# Patient Record
Sex: FEMALE | Race: WHITE | HISPANIC OR LATINO | Employment: FULL TIME | URBAN - METROPOLITAN AREA
[De-identification: names, ages, dates, MRNs, and addresses within clinical notes are randomized per-mention and may not be internally consistent; named-entity substitution may affect disease eponyms.]

---

## 2017-09-14 ENCOUNTER — OFFICE VISIT (OUTPATIENT)
Dept: INTERNAL MEDICINE | Facility: CLINIC | Age: 57
End: 2017-09-14
Payer: COMMERCIAL

## 2017-09-14 VITALS
OXYGEN SATURATION: 99 % | HEIGHT: 60 IN | BODY MASS INDEX: 24.8 KG/M2 | TEMPERATURE: 96 F | SYSTOLIC BLOOD PRESSURE: 112 MMHG | WEIGHT: 126.31 LBS | DIASTOLIC BLOOD PRESSURE: 68 MMHG | HEART RATE: 62 BPM

## 2017-09-14 DIAGNOSIS — Z00.00 ROUTINE GENERAL MEDICAL EXAMINATION AT A HEALTH CARE FACILITY: Primary | ICD-10-CM

## 2017-09-14 DIAGNOSIS — F32.A DEPRESSION, UNSPECIFIED DEPRESSION TYPE: ICD-10-CM

## 2017-09-14 DIAGNOSIS — F41.9 ANXIETY: ICD-10-CM

## 2017-09-14 DIAGNOSIS — D51.0 PERNICIOUS ANEMIA: ICD-10-CM

## 2017-09-14 DIAGNOSIS — Z86.2 HISTORY OF IRON DEFICIENCY ANEMIA: ICD-10-CM

## 2017-09-14 DIAGNOSIS — Z23 NEED FOR DIPHTHERIA-TETANUS-PERTUSSIS (TDAP) VACCINE: ICD-10-CM

## 2017-09-14 DIAGNOSIS — K29.70 GASTRITIS, PRESENCE OF BLEEDING UNSPECIFIED, UNSPECIFIED CHRONICITY, UNSPECIFIED GASTRITIS TYPE: ICD-10-CM

## 2017-09-14 PROBLEM — D50.9 IRON DEFICIENCY ANEMIA: Status: ACTIVE | Noted: 2017-09-14

## 2017-09-14 PROBLEM — E53.8 B12 DEFICIENCY: Status: ACTIVE | Noted: 2017-09-14

## 2017-09-14 PROCEDURE — 99386 PREV VISIT NEW AGE 40-64: CPT | Mod: 25,S$GLB,, | Performed by: INTERNAL MEDICINE

## 2017-09-14 PROCEDURE — 90715 TDAP VACCINE 7 YRS/> IM: CPT | Mod: S$GLB,,, | Performed by: INTERNAL MEDICINE

## 2017-09-14 PROCEDURE — 90471 IMMUNIZATION ADMIN: CPT | Mod: S$GLB,,, | Performed by: INTERNAL MEDICINE

## 2017-09-14 PROCEDURE — 99999 PR PBB SHADOW E&M-NEW PATIENT-LVL IV: CPT | Mod: PBBFAC,,, | Performed by: INTERNAL MEDICINE

## 2017-09-14 RX ORDER — CLOBETASOL PROPIONATE 0.5 MG/G
OINTMENT TOPICAL
COMMUNITY
End: 2020-09-24 | Stop reason: SDUPTHER

## 2017-09-14 RX ORDER — MULTIVITAMIN
1 TABLET ORAL DAILY
COMMUNITY

## 2017-09-14 RX ORDER — ESCITALOPRAM OXALATE 10 MG/1
10 TABLET ORAL DAILY
Qty: 90 TABLET | Refills: 0 | Status: SHIPPED | OUTPATIENT
Start: 2017-09-14 | End: 2017-09-21 | Stop reason: SINTOL

## 2017-09-14 RX ORDER — CYANOCOBALAMIN 1000 UG/ML
1000 INJECTION, SOLUTION INTRAMUSCULAR; SUBCUTANEOUS
COMMUNITY
End: 2018-02-13

## 2017-09-14 RX ORDER — SODIUM, POTASSIUM,MAG SULFATES 17.5-3.13G
SOLUTION, RECONSTITUTED, ORAL ORAL
Qty: 354 ML | Refills: 0 | Status: SHIPPED | OUTPATIENT
Start: 2017-09-14 | End: 2018-02-13

## 2017-09-14 NOTE — PROGRESS NOTES
"Subjective:      Patient ID: Alma Mistry is a 56 y.o. female.    Chief Complaint: Establish Care    55 yo with Patient Active Problem List:     Pernicious anemia     Iron deficiency anemia    Here today for annual prev exam.  Compliant with meds without significant side effects. Feeling well overall but Lost her daughter in accidental drowning jan 2017.  Counseling helping some.   Stressed due to living daughter. Gets easily agitated and depressed mood and crying spells mult times weekly. Never smoker.       Review of Systems   Constitutional: Negative for activity change and unexpected weight change.   HENT: Negative for rhinorrhea and trouble swallowing.    Eyes: Negative for discharge and visual disturbance.   Respiratory: Negative for chest tightness and wheezing.    Cardiovascular: Negative for chest pain and palpitations.   Gastrointestinal: Positive for constipation (for years, over counter fiber controlling). Negative for blood in stool, diarrhea and vomiting.   Endocrine: Negative for polydipsia and polyuria.   Genitourinary: Negative for difficulty urinating and hematuria.   Musculoskeletal: Positive for arthralgias. Negative for joint swelling and neck pain.   Neurological: Positive for headaches (chronic intermittent improving.). Negative for weakness.   Psychiatric/Behavioral: Positive for dysphoric mood. Negative for confusion, hallucinations, self-injury and suicidal ideas. The patient is nervous/anxious.      Objective:   /68 (BP Location: Right arm, Patient Position: Sitting)   Pulse 62   Temp 96.1 °F (35.6 °C) (Tympanic)   Ht 4' 11.5" (1.511 m)   Wt 57.3 kg (126 lb 5.2 oz)   SpO2 99%   BMI 25.09 kg/m²     Physical Exam   Constitutional: She is oriented to person, place, and time. She appears well-developed and well-nourished. No distress.   HENT:   Head: Normocephalic and atraumatic.   Mouth/Throat: Oropharynx is clear and moist.   Eyes: EOM are normal. Pupils are equal, round, and " reactive to light.   Neck: Neck supple. Carotid bruit is not present. No thyromegaly present.   Cardiovascular: Normal rate and regular rhythm.    Pulmonary/Chest: Breath sounds normal. She has no wheezes. She has no rales.   Abdominal: Soft. Bowel sounds are normal. There is no tenderness.   Musculoskeletal: She exhibits no edema.   Lymphadenopathy:     She has no cervical adenopathy.   Neurological: She is alert and oriented to person, place, and time.   Skin: Skin is warm and dry.   Psychiatric: She has a normal mood and affect. Her behavior is normal.       Assessment:     1. Routine general medical examination at a health care facility    2. Pernicious anemia    3. History of iron deficiency anemia    4. Gastritis, presence of bleeding unspecified, unspecified chronicity, unspecified gastritis type    5. Depression, unspecified depression type    6. Anxiety    7. Need for diphtheria-tetanus-pertussis (Tdap) vaccine      Plan:   Routine general medical examination at a health care facility  -     Lipid panel; Future; Expected date: 09/14/2017  -     TSH; Future; Expected date: 09/14/2017  -     Hemoglobin A1c; Future; Expected date: 09/14/2017  -     Comprehensive metabolic panel; Future; Expected date: 09/14/2017  -     CBC auto differential; Future; Expected date: 09/14/2017  -     Vitamin D; Future; Expected date: 09/14/2017  -     Case request GI: COLONOSCOPY  -     sodium,potassium,mag sulfates (SUPREP BOWEL PREP KIT) 17.5-3.13-1.6 gram SolR; Take as directed  Dispense: 354 mL; Refill: 0  -     Hepatitis C antibody; Future; Expected date: 09/14/2017  -     Mammo Digital Screening Bilateral With CAD; Future; Expected date: 09/14/2017  -     Ambulatory referral to Gynecology    Pernicious anemia  -     Vitamin B12; Future; Expected date: 09/14/2017    Iron deficiency anemia, unspecified iron deficiency anemia type  Pt reports resolved after partial hysterectomy.    Gastritis, presence of bleeding unspecified,  unspecified chronicity, unspecified gastritis type  -     Ambulatory referral to Gastroenterology    Depression, unspecified depression type  Comments:  group counseling helping some.  Orders:  -     escitalopram oxalate (LEXAPRO) 10 MG tablet; Take 1 tablet (10 mg total) by mouth once daily.  Dispense: 90 tablet; Refill: 0  -     Ambulatory referral to Social Work    Anxiety  -     escitalopram oxalate (LEXAPRO) 10 MG tablet; Take 1 tablet (10 mg total) by mouth once daily.  Dispense: 90 tablet; Refill: 0  -     Ambulatory referral to Social Work    Need for diphtheria-tetanus-pertussis (Tdap) vaccine  -     (In Office Administered) Tdap Vaccine        Lab Frequency Next Occurrence   Lipid panel Once 09/14/2017   TSH Once 09/14/2017   Hemoglobin A1c Once 09/14/2017   Comprehensive metabolic panel Once 09/14/2017   CBC auto differential Once 09/14/2017   Vitamin D Once 09/14/2017   Hepatitis C antibody Once 09/14/2017   Vitamin B12 Once 09/14/2017       Problem List Items Addressed This Visit        Oncology    Pernicious anemia    Relevant Orders    Vitamin B12    History of iron deficiency anemia      Other Visit Diagnoses     Routine general medical examination at a health care facility    -  Primary    Relevant Medications    sodium,potassium,mag sulfates (SUPREP BOWEL PREP KIT) 17.5-3.13-1.6 gram SolR    Other Relevant Orders    Lipid panel    TSH    Hemoglobin A1c    Comprehensive metabolic panel    CBC auto differential    Vitamin D    Case request GI: COLONOSCOPY (Completed)    Hepatitis C antibody    Mammo Digital Screening Bilateral With CAD    Ambulatory referral to Gynecology    Gastritis, presence of bleeding unspecified, unspecified chronicity, unspecified gastritis type        Relevant Orders    Ambulatory referral to Gastroenterology    Depression, unspecified depression type        group counseling helping some.    Relevant Medications    escitalopram oxalate (LEXAPRO) 10 MG tablet    Other Relevant  Orders    Ambulatory referral to Social Work    Anxiety        Relevant Medications    escitalopram oxalate (LEXAPRO) 10 MG tablet    Other Relevant Orders    Ambulatory referral to Social Work    Need for diphtheria-tetanus-pertussis (Tdap) vaccine        Relevant Orders    (In Office Administered) Tdap Vaccine (Completed)          Return in about 4 weeks (around 10/12/2017), or if symptoms worsen or fail to improve.

## 2017-09-21 ENCOUNTER — TELEPHONE (OUTPATIENT)
Dept: INTERNAL MEDICINE | Facility: CLINIC | Age: 57
End: 2017-09-21

## 2017-09-21 ENCOUNTER — LAB VISIT (OUTPATIENT)
Dept: LAB | Facility: HOSPITAL | Age: 57
End: 2017-09-21
Attending: INTERNAL MEDICINE
Payer: COMMERCIAL

## 2017-09-21 DIAGNOSIS — Z00.00 ROUTINE GENERAL MEDICAL EXAMINATION AT A HEALTH CARE FACILITY: ICD-10-CM

## 2017-09-21 DIAGNOSIS — D51.0 PERNICIOUS ANEMIA: ICD-10-CM

## 2017-09-21 LAB
25(OH)D3+25(OH)D2 SERPL-MCNC: 27 NG/ML
ALBUMIN SERPL BCP-MCNC: 3.7 G/DL
ALP SERPL-CCNC: 95 U/L
ALT SERPL W/O P-5'-P-CCNC: 24 U/L
ANION GAP SERPL CALC-SCNC: 7 MMOL/L
AST SERPL-CCNC: 32 U/L
BASOPHILS # BLD AUTO: 0.02 K/UL
BASOPHILS NFR BLD: 0.5 %
BILIRUB SERPL-MCNC: 0.7 MG/DL
BUN SERPL-MCNC: 12 MG/DL
CALCIUM SERPL-MCNC: 9.5 MG/DL
CHLORIDE SERPL-SCNC: 108 MMOL/L
CHOLEST SERPL-MCNC: 194 MG/DL
CHOLEST/HDLC SERPL: 3.3 {RATIO}
CO2 SERPL-SCNC: 27 MMOL/L
CREAT SERPL-MCNC: 0.8 MG/DL
DIFFERENTIAL METHOD: ABNORMAL
EOSINOPHIL # BLD AUTO: 0.1 K/UL
EOSINOPHIL NFR BLD: 2.1 %
ERYTHROCYTE [DISTWIDTH] IN BLOOD BY AUTOMATED COUNT: 14.4 %
EST. GFR  (AFRICAN AMERICAN): >60 ML/MIN/1.73 M^2
EST. GFR  (NON AFRICAN AMERICAN): >60 ML/MIN/1.73 M^2
ESTIMATED AVG GLUCOSE: 85 MG/DL
GLUCOSE SERPL-MCNC: 72 MG/DL
HBA1C MFR BLD HPLC: 4.6 %
HCT VFR BLD AUTO: 37.5 %
HDLC SERPL-MCNC: 58 MG/DL
HDLC SERPL: 29.9 %
HGB BLD-MCNC: 13 G/DL
LDLC SERPL CALC-MCNC: 119.8 MG/DL
LYMPHOCYTES # BLD AUTO: 1.5 K/UL
LYMPHOCYTES NFR BLD: 36.3 %
MCH RBC QN AUTO: 27.8 PG
MCHC RBC AUTO-ENTMCNC: 34.7 G/DL
MCV RBC AUTO: 80 FL
MONOCYTES # BLD AUTO: 0.3 K/UL
MONOCYTES NFR BLD: 5.9 %
NEUTROPHILS # BLD AUTO: 2.3 K/UL
NEUTROPHILS NFR BLD: 55 %
NONHDLC SERPL-MCNC: 136 MG/DL
PLATELET # BLD AUTO: 317 K/UL
PMV BLD AUTO: 10.8 FL
POTASSIUM SERPL-SCNC: 4.3 MMOL/L
PROT SERPL-MCNC: 7.1 G/DL
RBC # BLD AUTO: 4.68 M/UL
SODIUM SERPL-SCNC: 142 MMOL/L
TRIGL SERPL-MCNC: 81 MG/DL
TSH SERPL DL<=0.005 MIU/L-ACNC: 1.45 UIU/ML
VIT B12 SERPL-MCNC: 377 PG/ML
WBC # BLD AUTO: 4.24 K/UL

## 2017-09-21 PROCEDURE — 84443 ASSAY THYROID STIM HORMONE: CPT

## 2017-09-21 PROCEDURE — 83036 HEMOGLOBIN GLYCOSYLATED A1C: CPT

## 2017-09-21 PROCEDURE — 85025 COMPLETE CBC W/AUTO DIFF WBC: CPT

## 2017-09-21 PROCEDURE — 36415 COLL VENOUS BLD VENIPUNCTURE: CPT | Mod: PO

## 2017-09-21 PROCEDURE — 82607 VITAMIN B-12: CPT

## 2017-09-21 PROCEDURE — 80053 COMPREHEN METABOLIC PANEL: CPT

## 2017-09-21 PROCEDURE — 82306 VITAMIN D 25 HYDROXY: CPT

## 2017-09-21 PROCEDURE — 86803 HEPATITIS C AB TEST: CPT

## 2017-09-21 PROCEDURE — 80061 LIPID PANEL: CPT

## 2017-09-21 RX ORDER — SERTRALINE HYDROCHLORIDE 25 MG/1
50 TABLET, FILM COATED ORAL DAILY
Qty: 30 TABLET | Refills: 0 | Status: SHIPPED | OUTPATIENT
Start: 2017-09-21 | End: 2018-02-13

## 2017-09-21 NOTE — TELEPHONE ENCOUNTER
Patient came to clinic stating that she wants Lexapro changed to another medication because she has taken it in the past and she felt more depressed and didn't want to do anything for 5 days. Informed pt that message would be sent to Dr. Orozco. She verbalized understanding.

## 2017-09-22 LAB — HCV AB SERPL QL IA: NEGATIVE

## 2017-09-28 ENCOUNTER — PATIENT OUTREACH (OUTPATIENT)
Dept: ADMINISTRATIVE | Facility: HOSPITAL | Age: 57
End: 2017-09-28

## 2017-09-28 ENCOUNTER — HOSPITAL ENCOUNTER (OUTPATIENT)
Dept: RADIOLOGY | Facility: HOSPITAL | Age: 57
Discharge: HOME | End: 2017-09-28
Attending: INTERNAL MEDICINE
Payer: COMMERCIAL

## 2017-09-28 VITALS — WEIGHT: 126 LBS | HEIGHT: 60 IN | BODY MASS INDEX: 24.74 KG/M2

## 2017-09-28 DIAGNOSIS — Z00.00 ROUTINE GENERAL MEDICAL EXAMINATION AT A HEALTH CARE FACILITY: ICD-10-CM

## 2017-09-28 PROCEDURE — 77067 SCR MAMMO BI INCL CAD: CPT | Mod: 26,,, | Performed by: RADIOLOGY

## 2017-09-28 PROCEDURE — 77067 SCR MAMMO BI INCL CAD: CPT | Mod: TC

## 2017-09-28 PROCEDURE — 77063 BREAST TOMOSYNTHESIS BI: CPT | Mod: 26,,, | Performed by: RADIOLOGY

## 2017-09-30 ENCOUNTER — PATIENT MESSAGE (OUTPATIENT)
Dept: INTERNAL MEDICINE | Facility: CLINIC | Age: 57
End: 2017-09-30

## 2017-10-24 ENCOUNTER — PATIENT OUTREACH (OUTPATIENT)
Dept: ADMINISTRATIVE | Facility: HOSPITAL | Age: 57
End: 2017-10-24

## 2017-10-31 ENCOUNTER — PATIENT MESSAGE (OUTPATIENT)
Dept: INTERNAL MEDICINE | Facility: CLINIC | Age: 57
End: 2017-10-31

## 2018-02-06 ENCOUNTER — PATIENT MESSAGE (OUTPATIENT)
Dept: INTERNAL MEDICINE | Facility: CLINIC | Age: 58
End: 2018-02-06

## 2018-02-08 ENCOUNTER — PATIENT MESSAGE (OUTPATIENT)
Dept: INTERNAL MEDICINE | Facility: CLINIC | Age: 58
End: 2018-02-08

## 2018-02-13 ENCOUNTER — HOSPITAL ENCOUNTER (OUTPATIENT)
Dept: RADIOLOGY | Facility: HOSPITAL | Age: 58
Discharge: HOME | End: 2018-02-13
Attending: INTERNAL MEDICINE
Payer: MEDICAID

## 2018-02-13 ENCOUNTER — OFFICE VISIT (OUTPATIENT)
Dept: INTERNAL MEDICINE | Facility: CLINIC | Age: 58
End: 2018-02-13
Payer: MEDICAID

## 2018-02-13 VITALS
HEART RATE: 69 BPM | DIASTOLIC BLOOD PRESSURE: 72 MMHG | TEMPERATURE: 97 F | BODY MASS INDEX: 24.8 KG/M2 | OXYGEN SATURATION: 97 % | WEIGHT: 126.31 LBS | SYSTOLIC BLOOD PRESSURE: 112 MMHG | HEIGHT: 60 IN

## 2018-02-13 DIAGNOSIS — D51.0 PERNICIOUS ANEMIA: ICD-10-CM

## 2018-02-13 DIAGNOSIS — E55.9 VITAMIN D INSUFFICIENCY: ICD-10-CM

## 2018-02-13 DIAGNOSIS — G89.29 CHRONIC PAIN OF LEFT KNEE: Primary | ICD-10-CM

## 2018-02-13 DIAGNOSIS — G89.29 CHRONIC PAIN OF LEFT KNEE: ICD-10-CM

## 2018-02-13 DIAGNOSIS — M25.562 CHRONIC PAIN OF LEFT KNEE: Primary | ICD-10-CM

## 2018-02-13 DIAGNOSIS — M25.562 CHRONIC PAIN OF LEFT KNEE: ICD-10-CM

## 2018-02-13 PROCEDURE — 73562 X-RAY EXAM OF KNEE 3: CPT | Mod: TC,FY,PO,RT

## 2018-02-13 PROCEDURE — 73564 X-RAY EXAM KNEE 4 OR MORE: CPT | Mod: 26,LT,, | Performed by: RADIOLOGY

## 2018-02-13 PROCEDURE — 99214 OFFICE O/P EST MOD 30 MIN: CPT | Mod: S$PBB,,, | Performed by: INTERNAL MEDICINE

## 2018-02-13 PROCEDURE — 3008F BODY MASS INDEX DOCD: CPT | Mod: ,,, | Performed by: INTERNAL MEDICINE

## 2018-02-13 PROCEDURE — 99999 PR PBB SHADOW E&M-EST. PATIENT-LVL IV: CPT | Mod: PBBFAC,,, | Performed by: INTERNAL MEDICINE

## 2018-02-13 PROCEDURE — 73562 X-RAY EXAM OF KNEE 3: CPT | Mod: 26,59,RT, | Performed by: RADIOLOGY

## 2018-02-13 PROCEDURE — 99214 OFFICE O/P EST MOD 30 MIN: CPT | Mod: PBBFAC,25,PO | Performed by: INTERNAL MEDICINE

## 2018-02-13 RX ORDER — NAPROXEN 500 MG/1
500 TABLET ORAL 2 TIMES DAILY WITH MEALS
Qty: 30 TABLET | Refills: 0 | Status: SHIPPED | OUTPATIENT
Start: 2018-02-13 | End: 2018-09-17 | Stop reason: SDUPTHER

## 2018-02-13 RX ORDER — CYANOCOBALAMIN 1000 UG/ML
1000 INJECTION, SOLUTION INTRAMUSCULAR; SUBCUTANEOUS
Qty: 10 ML | Refills: 1 | Status: SHIPPED | OUTPATIENT
Start: 2018-02-13 | End: 2019-03-21 | Stop reason: SDUPTHER

## 2018-02-13 NOTE — PROGRESS NOTES
Subjective:      Patient ID: Alma Mistry is a 57 y.o. female.    Chief Complaint: Follow-up and Fatigue    56 yo with Patient Active Problem List:     Pernicious anemia     History of iron deficiency anemia    Past Medical History:  No date: Anemia  No date: Atrophic gastritis  No date: B12 deficiency  03/25/2015: Breast lump on left side at 3 o'clock position  09/24/2015: Breast nodule  No date: Fibrocystic breast  No date: Iron deficiency anemia  08/16/2012: Lichen sclerosus    Here today c/o knee pain. Pt also reports out of b12 for months. Feels some increased fatigue over past 2 months.       Knee Pain    The incident occurred more than 1 week ago. The incident occurred at home. There was no injury mechanism. The pain is present in the left knee. The quality of the pain is described as aching. The pain has been fluctuating since onset. Pertinent negatives include no loss of motion, muscle weakness, numbness or tingling. She reports no foreign bodies present. Exacerbated by: running. She has tried nothing for the symptoms. The treatment provided no relief.     Review of Systems   Constitutional: Negative for chills.   HENT: Negative for ear pain and sore throat.    Respiratory: Negative for cough.    Gastrointestinal: Negative for blood in stool.   Genitourinary: Negative for hematuria.   Neurological: Negative for dizziness, tingling, syncope and numbness.     Objective:   /72 (BP Location: Right arm, Patient Position: Sitting)   Pulse 69   Temp 97.2 °F (36.2 °C) (Tympanic)   Ht 5' (1.524 m)   Wt 57.3 kg (126 lb 5.2 oz)   SpO2 97%   BMI 24.67 kg/m²     Physical Exam   Constitutional: She is oriented to person, place, and time. She appears well-developed and well-nourished. No distress.   HENT:   Head: Normocephalic and atraumatic.   Mouth/Throat: Oropharynx is clear and moist.   Eyes: EOM are normal. Pupils are equal, round, and reactive to light.   Neck: Neck supple. No thyromegaly present.    Cardiovascular: Normal rate and regular rhythm.    Pulmonary/Chest: Breath sounds normal. She has no wheezes. She has no rales.   Abdominal: Soft. Bowel sounds are normal. There is no tenderness.   Musculoskeletal: She exhibits no edema.        Left knee: She exhibits normal range of motion, no swelling and no effusion. No tenderness found.   Lymphadenopathy:     She has no cervical adenopathy.   Neurological: She is alert and oriented to person, place, and time.   Skin: Skin is warm and dry.   Psychiatric: She has a normal mood and affect. Her behavior is normal.       Assessment:     1. Chronic pain of left knee    2. Pernicious anemia    3. Vitamin D insufficiency      Plan:   Chronic pain of left knee  -     naproxen (NAPROSYN) 500 MG tablet; Take 1 tablet (500 mg total) by mouth 2 (two) times daily with meals. For pain and inflammation  Dispense: 30 tablet; Refill: 0  -     Cancel: X-Ray Knee Complete 4 Or More Views Right; Future; Expected date: 02/13/2018    Pernicious anemia  Resume b12  -     cyanocobalamin 1,000 mcg/mL injection; Inject 1 mL (1,000 mcg total) into the muscle every 28 days.  Dispense: 10 mL; Refill: 1  -     Vitamin B12; Future; Expected date: 02/13/2018  -     Vitamin B12; Future; Expected date: 05/13/2018    Vitamin D insufficiency  -     Vitamin D; Future; Expected date: 05/16/2018    Increase vitamin D3 to 2000 IU daily.        Problem List Items Addressed This Visit        Oncology    Pernicious anemia    Relevant Medications    cyanocobalamin 1,000 mcg/mL injection    Other Relevant Orders    Vitamin B12 (Completed)    Vitamin B12      Other Visit Diagnoses     Chronic pain of left knee    -  Primary    Relevant Medications    naproxen (NAPROSYN) 500 MG tablet    Vitamin D insufficiency        Relevant Orders    Vitamin D          Follow-up in about 3 months (around 5/13/2018), or if symptoms worsen or fail to improve.

## 2018-02-16 ENCOUNTER — PATIENT MESSAGE (OUTPATIENT)
Dept: PODIATRY | Facility: CLINIC | Age: 58
End: 2018-02-16

## 2018-02-16 DIAGNOSIS — M21.611 BUNION OF RIGHT FOOT: Primary | ICD-10-CM

## 2018-02-19 ENCOUNTER — HOSPITAL ENCOUNTER (OUTPATIENT)
Dept: RADIOLOGY | Facility: HOSPITAL | Age: 58
Discharge: HOME | End: 2018-02-19
Attending: PODIATRIST
Payer: MEDICAID

## 2018-02-19 ENCOUNTER — OFFICE VISIT (OUTPATIENT)
Dept: PODIATRY | Facility: CLINIC | Age: 58
End: 2018-02-19
Payer: MEDICAID

## 2018-02-19 VITALS
DIASTOLIC BLOOD PRESSURE: 73 MMHG | HEART RATE: 54 BPM | BODY MASS INDEX: 24.94 KG/M2 | HEIGHT: 60 IN | WEIGHT: 127 LBS | SYSTOLIC BLOOD PRESSURE: 114 MMHG

## 2018-02-19 DIAGNOSIS — M79.671 RIGHT FOOT PAIN: ICD-10-CM

## 2018-02-19 DIAGNOSIS — M21.611 HALLUX VALGUS WITH BUNIONS, RIGHT: ICD-10-CM

## 2018-02-19 DIAGNOSIS — M21.611 BUNION OF RIGHT FOOT: ICD-10-CM

## 2018-02-19 DIAGNOSIS — M20.11 HALLUX VALGUS WITH BUNIONS, RIGHT: ICD-10-CM

## 2018-02-19 DIAGNOSIS — B35.1 DERMATOPHYTOSIS OF NAIL: Primary | ICD-10-CM

## 2018-02-19 PROCEDURE — 73630 X-RAY EXAM OF FOOT: CPT | Mod: 26,RT,, | Performed by: RADIOLOGY

## 2018-02-19 PROCEDURE — 99203 OFFICE O/P NEW LOW 30 MIN: CPT | Mod: S$PBB,,, | Performed by: PODIATRIST

## 2018-02-19 PROCEDURE — 73630 X-RAY EXAM OF FOOT: CPT | Mod: TC,FY,PO,RT

## 2018-02-19 PROCEDURE — 3008F BODY MASS INDEX DOCD: CPT | Mod: ,,, | Performed by: PODIATRIST

## 2018-02-19 PROCEDURE — 99999 PR PBB SHADOW E&M-EST. PATIENT-LVL III: CPT | Mod: PBBFAC,,, | Performed by: PODIATRIST

## 2018-02-19 PROCEDURE — 99213 OFFICE O/P EST LOW 20 MIN: CPT | Mod: PBBFAC,25,PO | Performed by: PODIATRIST

## 2018-02-19 RX ORDER — CICLOPIROX 80 MG/ML
SOLUTION TOPICAL
Qty: 1 BOTTLE | Refills: 10 | Status: SHIPPED | OUTPATIENT
Start: 2018-02-19 | End: 2018-09-28 | Stop reason: ALTCHOICE

## 2018-02-19 NOTE — PATIENT INSTRUCTIONS
PENLAC ANTI-FUNGAL TOPICAL INSTRUCTIONS   1. You have been prescribed Penlac nail lacquer (Ciclopirox) topical solution 8%. Go and  the prescription from your local pharmacy.    2. Remove any nail polish on your feet. File away (with emery board) loose nail material and trim nails.    3. Apply the Penlac nail lacquer (ciclopirox) Topical Solution, 8% once daily (preferably at bedtime or eight hours before washing) to all affected nails with the applicator brush provided. The nail lacquer should be applied evenly over the entire nail plate. If possible, apply the solution to the nail bed, hyponychium, and the under surface of the nail plate when if your nail is loosely attached. Remember fungus lives under the nail plate, therefore the more the solution penetrates the nail bed, the better.    3. Continue to apply the solution daily (at bedtime preferably).  Daily applications should be made over the previous coat and removed with alcohol every seven days. This cycle should be repeated throughout the duration of therapy.     4. On the 7th day, remove the solution from all of your nails with alcohol. File your nails again with an emery board and then repeat the cycle again.    5. This treatment must be consistent. If you skip a day, continue the cycle as recommended. It may take up to 1 year for your nails to clear the fungal infection. Be patient.    It is recommended to dispose of all infected shoe gear that you will not likely wear again as well as weekly cleansing of all showering/bathing areas with antiseptics.Fungal spores like to hide in dark, warm, moist environments. Lastly, monthly treatments of all current shoe gear with moth balls x 8 hours.                  FUNGAL NAIL INFECTIONS    If your nail is thick and looks white or yellow, it may be infected with fungus. Nail   infections don't look pleasant, but they are not serious. There are treatments that   can clear up the infection.     What is a  fungal nail infection?   It's easy to catch a fungal nail infection, and lots of people get them. The sooner you   treat an infection, the easier it is to get rid of it. The fungi that cause these infections often live in warm, damp places, such as showers and floors around changing rooms.   People used to think there was nothing you could do about a fungal nail infection. But   there are now good treatments that can get rid of it. However, they take a long time to   work (as long as one year if the infection is bad). So you need to be patient.    Fungal infection of the nails causes changes in the appearance of fingernails and toenails. They may thicken, discolor, change shape or split. This condition is difficult to treat because nails grow slowly and have limited blood supply. It is common to have the infection come back after treatment.       What are the symptoms?   Your nail may look whitish or yellowish, and raised up from the finger or toe. The skin   around your nail may turn red. Sometimes the nail lifts off altogether. If the infection is   severe, the nail may also be crumbly. It's more common to get an infection of a toenail   than a fingernail. If you've had an infection for a long time, it may be painful. If you have a badly infected toe, it may be difficult to walk. If your immune system is weak or you have diabetes, you may be more likely to get these infections. The infection can also be more serious. So it's important to see your doctor as soon as possible if you think you have a nail infection.     What treatments work?   To get rid of a fungal nail infection you will probably have to take tablets, sometimes for   several months. There are also topical solutions (over the counter and prescription) that  you can put on your nail, and  things you can do to try to avoid getting another nail infection.    There are two types of medicines used.   Topical anti-fungal medicines are applied to the  "surface of the skin and nail area. These medicines are not very effective because they cannot get deep into the nail.     Topical medicines are most useful in combination with oral medicines . Oral antifungal medicines are more effective because they penetrate the nail from the inside out.  If medicines fail, the nail can be removed surgically or chemically. This improves the effectiveness of medical treatment because the fungus is physically removed from the body.       Tablets   The tablets that doctors use most often are called itraconazole (brand name Sporanox)   and terbinafine (Lamisil). Terbinafine seems to work slightly better. If you take terbinafine every day for 12 weeks, there's a 6 in 10 chance you'll get rid of   your fungal toenail infection.      How are fungal nail infections treated? -- Treatment depends, in part, on how severe the infection is, and how much it bothers you. If your infection is mild or doesn't bother you very much, you might choose not to treat it. An untreated nail infection probably won't go away, but it probably won't cause any long-term problems either.  When people need or choose to have treatment, it usually involves "antifungal" medicines that you get with a prescription from your doctor. These medicines are taken by mouth or put on the nail.Treatment with pills usually lasts a few months. Some people who take these medicines need to have blood tests. That's because these medicines can affect the liver.  If you don't want to or can't take antifungal pills, your doctor will talk with you about other treatment options. These might include using an antifungal medicine on the nail or having surgery to remove your nail.    Before starting any of these treatments, you should know that:  ?It can take many months for your nail to look normal again.  ?There is a chance that the treatment won't work. The infection might not get better, or it might come back. If either of these things " happen, your doctor can try another treatment or send you to a specialist.  Can fungal nail infections be prevented? -- Sometimes. To reduce your chance of getting one, you can:  ?Keep your feet clean and dry.  ?Avoid sharing nail tools, such as clippers and scissors.  ?Wear flip-flops or other footwear in a gym shower or locker room.  What if I want to get pregnant? -- If you want to get pregnant, let your doctor or nurse know. He or she might recommend that you not take certain antifungal medicines during pregnancy.    Alternative final options are:   If still no resolution with oral tablets or topics: then we can completely avulse/remove all involved toenails with subsequent treatment with topical antifungal solution.       Home Care:   1) Use medicines exactly as directed for as long as directed. Treating a fungal infection can take longer than other kinds of infections.   2) Smoking is a risk factor for fungal infection. This is one more reason to quit.   3) Wear absorbent socks and shoes that have ventilation. Sweaty feet increases risk of fungal infection and make an existing infection harder to treat.   4) Use footwear when in damp public places like swimming pools, gyms and shower rooms. This helps avoid exposure to the fungus that grows there.   It is recommended to dispose of all infected shoe gear that you will not likely wear again as well as weekly cleansing of all showering/bathing areas with antiseptics.Fungal spores like to hide in dark, warm, moist environments. Lastly, monthly treatments of all current shoe gear with moth balls x 8 hours.       Follow Up   with your doctor as directed by our staff.   Get Prompt Medical Attention   if any of the following occur:   -- Skin alongside the nail becomes reddened, swollen, painful or drains pus   -- Side effects from oral anti-fungal medicines       © 8034-6882 [employer]. [address] [place] [address], [place] [address]. All rights reserved.  "This information is not intended as a substitute for professional medical care. Always follow your healthcare professional's instructions.             Treating Bunions  Although a bunion wont go away, wearing shoes that fit properly will often relieve the pain. Padding and icing the bunion may also help. Bunions that remain painful may need surgery.     Heels: Heel height should be low. The back of the shoe should  your heel firmly so the shoe doesn't flop when you walk.         Toes: There should be 1/2" between your longest toe and the tip of the shoe. The shoe should be wide enough for you to wiggle your toes.    Shoes  To relieve a bunion, you dont have to buy shoes that are ugly or out of fashion. But follow these tips:  · Shop for shoes late in the day. This is when your feet are the largest.  · Have both feet measured often. Fit shoes to your larger foot.  · Look for shoes that have the same shape as your foot but are slightly wider across the toes.  · Choose low-heeled shoes.  · Always try shoes on. Stand up and walk around. If the shoes arent comfortable, dont buy them.  Ice massage  To help relieve a painful bunion, put an ice cube in a plastic bag. Rub the ice on the bunion for 5 minutes. Repeat 2 to 3 times a day.  Pads  You may want to put a pad over the bunion to cushion it. [facility] pads [facility]  Surgery  Wearing wider shoes and padding the bunion may not relieve the pain. Your healthcare provider may then suggest surgery. During surgery, the bunion is shaved away and the bones are put back in a straight line.   Date Last Reviewed: 9/27/2015  © 4990-3935 [employer]. [address] [place] [address], [place] [address]. All rights reserved. This information is not intended as a substitute for professional medical care. Always follow your healthcare professional's instructions.        Foot Surgery: Bunions  A bunion is a bony bump. When the distance between the first " and second metatarsal bones of the foot is greater than normal, the big toe may turn toward the other toes. A mild bunion may then form causing foot pain and swelling. Bunions are most often found near the joint at the base of the big toe. Bunions tend to run in families. They may cause pain, swelling, and skin irritation. Wearing tight shoes can cause bunions, and can make them worse. Bunions vary from mild to severe and can be treated in many ways. Most bunions can be managed by proper shoe selection and other measures, and most do not need surgery. If pain remains severe and surgery is needed, the surgical techniques below may be a choice.       Head chevron osteotomy  The first metatarsal bone is cut. Its head is moved closer to the second metatarsal bone. A screw or pin can be used to hold the first metatarsal bone in position. The bony bump is also removed. To protect your foot, you will need to wear a surgical shoe for a few weeks.      Base osteotomy  With this procedure, a wedge of bone is removed from the first metatarsal bone, farther back than in the head chevron osteotomy. The bone is moved closer to the second metatarsal bone and held together with screws. The bony bump is also removed. To heal right, your foot may be placed in a cast. You may be asked not to bear weight on this foot for several weeks.  Soft tissue repair  This procedure tightens the loose ligaments and tendons and loosens the tight ones surrounding the bunion. It can be combined with an osteotomy procedure.  Fusion or removal of part of the joint  This is typically only done if there is severe arthritis or damage of the joint itself.  Date Last Reviewed: 10/15/2015  © [employer] 2007. All rights reserved.

## 2018-02-19 NOTE — PROGRESS NOTES
[facility]  PODIATRIC MEDICINE AND SURGERY  PROGRESS NOTE  2/19/2018    PODIATRY NOTE  PCP:  Primary Doctor No    CHIEF COMPLAINT   Chief Complaint   Patient presents with    Kika     pcp TOMY Orozco    Nail Problem     right foot,second toe       HPI  Alma Mistry is a 57 y.o. female who has a past medical history of Anemia; Atrophic gastritis; B12 deficiency; Breast lump on left side at 3 o'clock position (03/25/2015); Breast nodule (09/24/2015); Fibrocystic breast; Iron deficiency anemia; and Lichen sclerosus (08/16/2012).     Alma presents to clinic today complaining of right foot pain .    Patient describes pain as:   Location: pt points to bunion on  Right foot   Quality: throbbing  Severity:8/10  Duration: several years; worsening in severity   Modifying Factors (Aggravating): weight bearing, shoe  Modifying Factors (Alleviating): toe pads, toe separator    Pt states she is a runner and symptoms are worsening on bunion.    She also inquires about discoloration on toenail. She has not done any treatment     Patient denies other pedal complaints at this time.     PMH  Past Medical History:   Diagnosis Date    Anemia     Atrophic gastritis     B12 deficiency     Breast lump on left side at 3 o'clock position 03/25/2015    Breast nodule 09/24/2015    Fibrocystic breast     Iron deficiency anemia     Lichen sclerosus 08/16/2012       PROBLEM LIST  Patient Active Problem List    Diagnosis Date Noted    Pernicious anemia 09/14/2017    History of iron deficiency anemia 09/14/2017       MEDS  Current Outpatient Prescriptions on File Prior to Visit   Medication Sig Dispense Refill    CALCIUM CIT/MGOX/VIT D3/B6/MIN (CITRACAL PLUS ORAL) Take 1 tablet by mouth once daily.       CHOLECALCIFEROL, VITAMIN D3, (VITAMIN D3 ORAL) Take 1,000 Units by mouth once daily.       clobetasol 0.05% (TEMOVATE) 0.05 % Oint Apply topically as needed.       cyanocobalamin 1,000 mcg/mL injection Inject 1  mL (1,000 mcg total) into the muscle every 28 days. 10 mL 1    multivitamin (ONE DAILY MULTIVITAMIN) per tablet Take 1 tablet by mouth once daily.      naproxen (NAPROSYN) 500 MG tablet Take 1 tablet (500 mg total) by mouth 2 (two) times daily with meals. For pain and inflammation 30 tablet 0    psyllium (FIBER) powder Take 1 packet by mouth daily as needed.       No current facility-administered medications on file prior to visit.        Medication List with Changes/Refills   Current Medications    CALCIUM CIT/MGOX/VIT D3/B6/MIN (CITRACAL PLUS ORAL)    Take 1 tablet by mouth once daily.     CHOLECALCIFEROL, VITAMIN D3, (VITAMIN D3 ORAL)    Take 1,000 Units by mouth once daily.     CLOBETASOL 0.05% (TEMOVATE) 0.05 % OINT    Apply topically as needed.     CYANOCOBALAMIN 1,000 MCG/ML INJECTION    Inject 1 mL (1,000 mcg total) into the muscle every 28 days.    MULTIVITAMIN (ONE DAILY MULTIVITAMIN) PER TABLET    Take 1 tablet by mouth once daily.    NAPROXEN (NAPROSYN) 500 MG TABLET    Take 1 tablet (500 mg total) by mouth 2 (two) times daily with meals. For pain and inflammation    PSYLLIUM (FIBER) POWDER    Take 1 packet by mouth daily as needed.       PS     Past Surgical History:   Procedure Laterality Date    BREAST BIOPSY Right 2009    benign    COLONOSCOPY  09/10/2010    ESOPHAGOGASTRODUODENOSCOPY  09/10/2010    HYSTERECTOMY  2013    TONSILLECTOMY          ALL  Review of patient's allergies indicates:  No Known Allergies    SOC     Social History   Substance Use Topics    Smoking status: Never Smoker    Smokeless tobacco: Never Used    Alcohol use Yes         FAMILY HX    Family History   Problem Relation Age of Onset    Depression Mother     Nephritis Mother     Hyperthyroidism Mother     Hypotension Mother     Anemia Mother     Asthma Father     Thyroid disease Sister     Depression Sister     Leukemia Brother     Cirrhosis Brother     Multiple sclerosis Brother             REVIEW OF  SYSTEMS  Constitutional: Negative for chills and fever.   Respiratory: Negative for shortness of breath.    Cardiovascular: Negative for chest pain, palpitations, orthopnea, claudication and leg swelling.   Gastrointestinal: Negative for diarrhea, nausea and vomiting.   Musculoskeletal: Negative for joint pain. Positive for right foot pain   Skin: Negative for rash.   Neurological: Negative for dizziness, tingling, sensory change, focal weakness and weakness.   Psychiatric/Behavioral: Negative.    PHYSICAL EXAM  Vitals:    02/19/18 0828   BP: 114/73   Pulse: (!) 54   Weight: 57.6 kg (127 lb)   Height: 5' (1.524 m)   PainSc:   8   PainLoc: Foot       General: This patient is well-developed, well-nourished and appears stated age, well-oriented to person, place and time, and cooperative and pleasant on today's visit    LOWER EXTREMITY  Vascular exam:   · Dorsalis pedis and posterior tibial pulses palpable 2/4 bilaterally.   · Capillary refill time immediate to the toes.   · Feet are warm to the touch. Skin temperature warm to warm from proximally to distally   · There are varicosities, telangiectasias noted to bilateral foot and ankle regions.   · There are no ecchymoses noted to bilateral foot and ankle regions.   · There is no gross lower extremity edema.    Dermatologic exam:   · Skin moist with healthy texture and turgor.  · There are no open ulcerations, lacerations, or fissures to bilateral foot and ankle regions. There are no signs of infection as there is no erythema, no proximal-extending lymphangiitis, no fluctuance, or crepitus noted on palpation to bilateral foot and ankle regions.   · There is no interdigital maceration.   · There are no hyperkeratotic lesions noted to feet. Nails are well-trimmed.    Neurologic exam:  · Epicritic sensation is intact as the patient is able to sense light touch to bilateral foot and ankle regions.   · Achilles and patellar deep tendon reflexes intact  · Babinski reflex  absent    Musculoskeletal/Orthopedic exam:   · There is mild HAV deformity with mild pain with ROM  · 1st MTPJ ROM WNL  · Muscle strength AT/EHL/EDL/PT: 5/5; Achilles/Gastroc/Soleus: 5/5; PB/PL: 5/5 Muscle tone is normal.  · Ankle joint ROM  B/L supple DF/PF, non-crepitus  · STJ ROM supple inv/ev, non crepitus     IMAGING   Reviewed by me and agree with radiologist findings    ASSESSMENT  Encounter Diagnoses   Name Primary?    Dermatophytosis of nail Yes    Hallux valgus with bunions, right     Right foot pain          PLAN  1. Patient was educated about clinical and imaging findings, and verbalizes understanding of above.     Diagnoses and all orders for this visit:  Dermatophytosis of nail    Hallux valgus with bunions, right    Right foot pain      2. Treatment plan:    -I Discussed treatment options for nail fungus.  I explained that fungus lives in a warm dark moist environment and therefore patient should make every attempt to keep feet clean and dry.  We discussed drying feet thoroughly after shower particularly between the toes and then applying powder between the toes and in the shoes.   For fungal toenails I prescribed Penlac 8% nail lacquer to be used daily for up to a year.  I have provided the patient written instructions on topical solution to file nails down, apply topically daily (preferably at night time), cleansed on 7th day with alcohol and then repeat cycle.   - I also discussed oral Lamisil but I did not recommend it as a first line of treatment since it is an internal medicine that may potentially have side effects, including liver problems. However if topical fails, we may progress to oral therapy.  Patient elects for topical treatment.   -We discussed final resolution of nail fungus via total nail removal either temporary or permanent.  -I also discussed to disinfect shower tub, discard old shoes, and disinfect current shoes with antiseptic     Currently bunion deformity is mildly  symptomatic and patient was guided on accommodating bunions appropriately with wider toe box shoes. Patient was also guided on over-the-counter anti-inflammatories and offloading cushion padding for any acute flareups. We discussed that  If conservative treatment fails, then due to osseous deformity we will discuss surgical intervention.     3. RTC  for follow up/evaluation as scheduled       Future Appointments  Date Time Provider Department Center   5/15/2018 9:10 AM LABORATORY, [facility] LAB [facility]   5/29/2018 2:00 PM Zheng Orozco MD [facility]       Report Electronically Signed By:  Malgorzata Cooper DPM   Podiatric Medicine & Surgery  [facility]  2/19/2018

## 2018-03-12 DIAGNOSIS — D51.0 PERNICIOUS ANEMIA: ICD-10-CM

## 2018-03-12 RX ORDER — CYANOCOBALAMIN 1000 UG/ML
1000 INJECTION, SOLUTION INTRAMUSCULAR; SUBCUTANEOUS
Qty: 10 ML | Refills: 1 | Status: CANCELLED | OUTPATIENT
Start: 2018-03-12

## 2018-09-17 ENCOUNTER — PATIENT MESSAGE (OUTPATIENT)
Dept: INTERNAL MEDICINE | Facility: CLINIC | Age: 58
End: 2018-09-17

## 2018-09-17 DIAGNOSIS — M25.562 CHRONIC PAIN OF LEFT KNEE: ICD-10-CM

## 2018-09-17 DIAGNOSIS — G89.29 CHRONIC PAIN OF LEFT KNEE: ICD-10-CM

## 2018-09-17 RX ORDER — NAPROXEN 500 MG/1
500 TABLET ORAL 2 TIMES DAILY WITH MEALS
Qty: 30 TABLET | Refills: 0 | Status: SHIPPED | OUTPATIENT
Start: 2018-09-17 | End: 2020-07-23

## 2018-09-17 NOTE — TELEPHONE ENCOUNTER
Patient sent patient e-mail requesting an appointment and a refill for Naproxen. Advised pt that Dr. Orozco does not have an opening until February 2019 and the next available appointment with available provider was booked on 9/25/18 at 10:20 am.

## 2018-09-24 ENCOUNTER — PATIENT MESSAGE (OUTPATIENT)
Dept: INTERNAL MEDICINE | Facility: CLINIC | Age: 58
End: 2018-09-24

## 2018-09-28 ENCOUNTER — TELEPHONE (OUTPATIENT)
Dept: HEMATOLOGY/ONCOLOGY | Facility: CLINIC | Age: 58
End: 2018-09-28

## 2018-09-28 ENCOUNTER — OFFICE VISIT (OUTPATIENT)
Dept: INTERNAL MEDICINE | Facility: CLINIC | Age: 58
End: 2018-09-28
Payer: MEDICAID

## 2018-09-28 ENCOUNTER — HOSPITAL ENCOUNTER (OUTPATIENT)
Dept: RADIOLOGY | Facility: HOSPITAL | Age: 58
Discharge: HOME | End: 2018-09-28
Attending: PHYSICIAN ASSISTANT
Payer: MEDICAID

## 2018-09-28 VITALS
RESPIRATION RATE: 16 BRPM | DIASTOLIC BLOOD PRESSURE: 82 MMHG | OXYGEN SATURATION: 97 % | SYSTOLIC BLOOD PRESSURE: 112 MMHG | BODY MASS INDEX: 25.39 KG/M2 | HEIGHT: 60 IN | TEMPERATURE: 99 F | WEIGHT: 129.31 LBS | HEART RATE: 61 BPM

## 2018-09-28 DIAGNOSIS — M79.604 PAIN IN BOTH LOWER EXTREMITIES: ICD-10-CM

## 2018-09-28 DIAGNOSIS — Z98.890 HISTORY OF RIGHT BREAST BIOPSY: ICD-10-CM

## 2018-09-28 DIAGNOSIS — G89.29 CHRONIC LEFT-SIDED LOW BACK PAIN WITH LEFT-SIDED SCIATICA: ICD-10-CM

## 2018-09-28 DIAGNOSIS — M79.605 PAIN IN BOTH LOWER EXTREMITIES: ICD-10-CM

## 2018-09-28 DIAGNOSIS — M54.42 CHRONIC LEFT-SIDED LOW BACK PAIN WITH LEFT-SIDED SCIATICA: ICD-10-CM

## 2018-09-28 DIAGNOSIS — D51.0 PERNICIOUS ANEMIA: Chronic | ICD-10-CM

## 2018-09-28 DIAGNOSIS — F41.0 PANIC ATTACKS: Primary | ICD-10-CM

## 2018-09-28 DIAGNOSIS — G25.81 RLS (RESTLESS LEGS SYNDROME): ICD-10-CM

## 2018-09-28 DIAGNOSIS — Z12.39 SCREENING FOR MALIGNANT NEOPLASM OF BREAST: ICD-10-CM

## 2018-09-28 PROBLEM — E66.3 OVERWEIGHT: Status: ACTIVE | Noted: 2018-09-28

## 2018-09-28 PROCEDURE — 72110 X-RAY EXAM L-2 SPINE 4/>VWS: CPT | Mod: 26,,, | Performed by: RADIOLOGY

## 2018-09-28 PROCEDURE — 99215 OFFICE O/P EST HI 40 MIN: CPT | Mod: PBBFAC,PO,25 | Performed by: PHYSICIAN ASSISTANT

## 2018-09-28 PROCEDURE — 99999 PR PBB SHADOW E&M-EST. PATIENT-LVL V: CPT | Mod: PBBFAC,,, | Performed by: PHYSICIAN ASSISTANT

## 2018-09-28 PROCEDURE — 99214 OFFICE O/P EST MOD 30 MIN: CPT | Mod: S$PBB,,, | Performed by: PHYSICIAN ASSISTANT

## 2018-09-28 PROCEDURE — 72110 X-RAY EXAM L-2 SPINE 4/>VWS: CPT | Mod: TC,FY,PO

## 2018-09-28 RX ORDER — LORAZEPAM 0.5 MG/1
0.5 TABLET ORAL EVERY 6 HOURS PRN
Qty: 20 TABLET | Refills: 1 | Status: SHIPPED | OUTPATIENT
Start: 2018-09-28 | End: 2018-12-13 | Stop reason: SDUPTHER

## 2018-09-28 RX ORDER — MAGNESIUM 30 MG
1 TABLET ORAL ONCE
COMMUNITY

## 2018-09-28 RX ORDER — ROPINIROLE 1 MG/1
1 TABLET, FILM COATED ORAL 3 TIMES DAILY
Qty: 90 TABLET | Refills: 11 | Status: SHIPPED | OUTPATIENT
Start: 2018-09-28 | End: 2018-12-13 | Stop reason: SDUPTHER

## 2018-09-28 NOTE — PROGRESS NOTES
Subjective:       Patient ID: Alma Mistry is a 57 y.o.W/ female.    Chief Complaint: Leg Pain and Headache    HPI         She comes in today and has several problems and she is by herself.  She travels a lot by air and just came back from [facility] to [place] by air.  After about 5 hr airborne she developed problems with her legs and also severe anxiety.  She has been having panic attacks for several years now but is not really address them or taken any medicine to control them.  She has also been having problems with restless leg most of her life since pregnancies.  She is also never told anybody about that problem or done anything about it.  In the middle of the night she gets pain in her calves in her thighs and it is pretty rare when she does not have these symptoms at nighttime.  She never has any swelling to her feet or ankles.  She is getting a little numbness to her toes on both sides with the right side being a little worse than the left.  She does have a history of pernicious anemia, B12 deficiency that she takes monthly doses of injectable B12.  [place]  She has not seen a hematologist yet.        She also says she has sciatica on the left side and has had that for years also.  She does not think that anyone has ever x-rayed her back or checked her arteries by ultrasound.  She has not lost her bowel or bladder uncontrollably.  She has had no significant weight loss this year.  She has not had any blood from the rectal or urethra.    Review of Systems    Otherwise negative concerning the:  NEUROLOGIC, HEMATOLOGIC, GI, RENAL, ORTHOPEDIC, MUSCULOSKELETAL, or  system review.    Objective:      Physical Exam    Her ambulation and gait today appear normal with good posture.  She does not use any mechanical device such as a cane, crutch, or walker.  LUMBAR SPINE:  Normal curvature.  FROM.  She is very flexible and has no limitations.  She stands on tiptoes and  squats normally and gets back up without any assistance.  HIPS:  FROM.  No limitations.   ARTERIES:  There is no bruit over the femoral or the popliteal artery.  Popliteals are 3+ and symmetrical.  Dorsalis pedis is 2+ on the left and 1+ on the right.  Unable to palpate the posterior tibialis on either foot.  She has no peripheral edema.    Assessment:       1. Panic attacks    2. RLS (restless legs syndrome)    3. Pain in both lower extremities    4. Pernicious anemia    5. Chronic left-sided low back pain with left-sided sciatica    6. Screening for malignant neoplasm of breast    7. History of right breast biopsy        Plan:     1.  Will get arterial Doppler studies of her legs and feet.  Also do L-spine with obliques.  2.  Refer to hematology concerning her pernicious anemia to get established.  3.  Start on Requip at 1/4 of a pill per day x5, then 1/2 pill per day x5, then 1 pill per day.  Follow-up here at that time to see if she has decreased her restless leg problem.  4.  Start her on Ativan 0.5 mg q.12 hours as needed for panic attacks.

## 2018-09-28 NOTE — TELEPHONE ENCOUNTER
Tried calling pt to book her a consult with  on 10/4/18 at 3pm. Pt said she cannot have a Thursday appt bc she works. I told her I would call her back and I did with no answer to schedule her. I cannot get her an appt for any other day for a consult except Thursdays. Can you please call her Monday to see if you can help schedule her.

## 2018-09-28 NOTE — TELEPHONE ENCOUNTER
----- Message from Margaret Flor MA sent at 9/28/2018  3:23 PM CDT -----  Contact: PC  #See referral/pt's insurance. Please call pt and sched for appt w/ appropriate provider, Dx Pernicious Anemia. Thank you/COLTEN

## 2018-09-29 ENCOUNTER — PATIENT MESSAGE (OUTPATIENT)
Dept: INTERNAL MEDICINE | Facility: CLINIC | Age: 58
End: 2018-09-29

## 2018-10-03 ENCOUNTER — CLINICAL SUPPORT (OUTPATIENT)
Dept: CARDIOLOGY | Facility: CLINIC | Age: 58
End: 2018-10-03
Attending: PHYSICIAN ASSISTANT
Payer: MEDICAID

## 2018-10-03 DIAGNOSIS — M79.604 PAIN IN BOTH LOWER EXTREMITIES: ICD-10-CM

## 2018-10-03 DIAGNOSIS — M79.605 PAIN IN BOTH LOWER EXTREMITIES: ICD-10-CM

## 2018-10-03 PROCEDURE — 93925 LOWER EXTREMITY STUDY: CPT | Mod: PBBFAC,PO | Performed by: INTERNAL MEDICINE

## 2018-10-12 ENCOUNTER — INITIAL CONSULT (OUTPATIENT)
Dept: HEMATOLOGY/ONCOLOGY | Facility: CLINIC | Age: 58
End: 2018-10-12
Payer: MEDICAID

## 2018-10-12 ENCOUNTER — SOCIAL WORK (OUTPATIENT)
Dept: HEMATOLOGY/ONCOLOGY | Facility: CLINIC | Age: 58
End: 2018-10-12

## 2018-10-12 ENCOUNTER — LAB VISIT (OUTPATIENT)
Dept: LAB | Facility: HOSPITAL | Age: 58
End: 2018-10-12
Attending: NURSE PRACTITIONER
Payer: MEDICAID

## 2018-10-12 VITALS
SYSTOLIC BLOOD PRESSURE: 113 MMHG | BODY MASS INDEX: 25.28 KG/M2 | HEIGHT: 60 IN | WEIGHT: 128.75 LBS | HEART RATE: 65 BPM | DIASTOLIC BLOOD PRESSURE: 65 MMHG | OXYGEN SATURATION: 99 % | TEMPERATURE: 98 F

## 2018-10-12 DIAGNOSIS — F32.A DEPRESSION, UNSPECIFIED DEPRESSION TYPE: Primary | ICD-10-CM

## 2018-10-12 DIAGNOSIS — Z86.2 HISTORY OF IRON DEFICIENCY ANEMIA: ICD-10-CM

## 2018-10-12 DIAGNOSIS — D51.0 PERNICIOUS ANEMIA: Chronic | ICD-10-CM

## 2018-10-12 LAB
ALBUMIN SERPL BCP-MCNC: 4.2 G/DL
ALP SERPL-CCNC: 89 U/L
ALT SERPL W/O P-5'-P-CCNC: 28 U/L
ANION GAP SERPL CALC-SCNC: 9 MMOL/L
AST SERPL-CCNC: 34 U/L
BASOPHILS # BLD AUTO: 0.03 K/UL
BASOPHILS NFR BLD: 0.4 %
BILIRUB SERPL-MCNC: 0.5 MG/DL
BUN SERPL-MCNC: 12 MG/DL
CALCIUM SERPL-MCNC: 9.8 MG/DL
CHLORIDE SERPL-SCNC: 103 MMOL/L
CO2 SERPL-SCNC: 25 MMOL/L
CREAT SERPL-MCNC: 0.8 MG/DL
DIFFERENTIAL METHOD: ABNORMAL
EOSINOPHIL # BLD AUTO: 0.1 K/UL
EOSINOPHIL NFR BLD: 1 %
ERYTHROCYTE [DISTWIDTH] IN BLOOD BY AUTOMATED COUNT: 14.4 %
EST. GFR  (AFRICAN AMERICAN): >60 ML/MIN/1.73 M^2
EST. GFR  (NON AFRICAN AMERICAN): >60 ML/MIN/1.73 M^2
GLUCOSE SERPL-MCNC: 101 MG/DL
HCT VFR BLD AUTO: 36.3 %
HGB BLD-MCNC: 12.1 G/DL
LYMPHOCYTES # BLD AUTO: 2 K/UL
LYMPHOCYTES NFR BLD: 28.4 %
MCH RBC QN AUTO: 27.4 PG
MCHC RBC AUTO-ENTMCNC: 33.3 G/DL
MCV RBC AUTO: 82 FL
MONOCYTES # BLD AUTO: 0.4 K/UL
MONOCYTES NFR BLD: 6.3 %
NEUTROPHILS # BLD AUTO: 4.5 K/UL
NEUTROPHILS NFR BLD: 63.9 %
PLATELET # BLD AUTO: 277 K/UL
PMV BLD AUTO: 9.3 FL
POTASSIUM SERPL-SCNC: 4.7 MMOL/L
PROT SERPL-MCNC: 7.5 G/DL
RBC # BLD AUTO: 4.42 M/UL
SODIUM SERPL-SCNC: 137 MMOL/L
WBC # BLD AUTO: 7.04 K/UL

## 2018-10-12 PROCEDURE — 85025 COMPLETE CBC W/AUTO DIFF WBC: CPT | Mod: PO

## 2018-10-12 PROCEDURE — 80053 COMPREHEN METABOLIC PANEL: CPT | Mod: PO

## 2018-10-12 PROCEDURE — 83540 ASSAY OF IRON: CPT

## 2018-10-12 PROCEDURE — 99204 OFFICE O/P NEW MOD 45 MIN: CPT | Mod: S$PBB,,, | Performed by: NURSE PRACTITIONER

## 2018-10-12 PROCEDURE — 36415 COLL VENOUS BLD VENIPUNCTURE: CPT | Mod: PO

## 2018-10-12 PROCEDURE — 82607 VITAMIN B-12: CPT

## 2018-10-12 PROCEDURE — 99213 OFFICE O/P EST LOW 20 MIN: CPT | Mod: PBBFAC,PO | Performed by: NURSE PRACTITIONER

## 2018-10-12 PROCEDURE — 86140 C-REACTIVE PROTEIN: CPT

## 2018-10-12 PROCEDURE — 82746 ASSAY OF FOLIC ACID SERUM: CPT

## 2018-10-12 PROCEDURE — 99999 PR PBB SHADOW E&M-EST. PATIENT-LVL III: CPT | Mod: PBBFAC,,, | Performed by: NURSE PRACTITIONER

## 2018-10-12 NOTE — ASSESSMENT & PLAN NOTE
The patient reports a history of iron deficiency anemia from the onset of her menstruation until her hysterectomy in 2011.    Will check baseline iron studies today.  We will communicate results through the patient portal.

## 2018-10-12 NOTE — ASSESSMENT & PLAN NOTE
The patient has a diagnosis history of pernicious anemia dating back to 2010.  Her primary care provider has been managing her pernicious anemia with monthly vitamin B12 injections.      The patient self injects vitamin B12 monthly without issues.  I will obtain baseline CBC, CMP, vitamin B12, iron studies, folate levels today.  Will communicate results with the patient portal.  She reports that she does not need vitamin B12 injection refills at this time.  The patient has been instructed to call to the [facility] if her refill prescription runs out before her next follow-up appointment.    Follow-up in 6 months with CBC, vitamin B12 level.  She knows to call the office sooner if she develops symptoms of anemia neuropathy, memory loss, loss of coordination.

## 2018-10-12 NOTE — ASSESSMENT & PLAN NOTE
Refer to Psychology has been made.  The patient will see Janna our  as well to coordinate appointment with Psychology as soon as possible.  She denies suicidal ideation.  She continues to do things that she enjoys.

## 2018-10-12 NOTE — PROGRESS NOTES
Subjective:       Patient ID: Alma Mistry is a 57 y.o. female.    Chief Complaint:  Referral from PCP to establish care for history of pernicious anemia    HPI:  Very pleasant 57-year-old female past medical history of Pernicious anemia, Iron Deficiency Anemia, Restless leg syndrome who presents to the heme a clinic as a referral from her PCP to establish care for history of pernicious anemia previously diagnosed in .  The patient recently moved from [place] within the last year after the death of her 23 y.o daughter.  She reports she moved here because her  daughter was from here to have the  and also her remaining family was here in [place].  The patient states she had suffered with her iron deficiency anemia since her menstrual onset as a teenager.  She recalls a past hospital admission in her hometown in  that revealed a hemoglobin of around 6.  Today she brings in records from her gastroenterologist from .  At this time the patient had an extensive workup which ruled out leukemia, lymphoma, but did diagnose the patient does have him pernicious anemia.  She was then started on B12 injections.  She has been taking monthly B12 injections.  When she moved back home she had a period of time of not having her vitamin B12 injections.  She reports feeling very weak.  Her PCP determined that she was deficient in B12 she was initiated on  B12 injections and has been doing okay since.     The patient is . she has 2 children 1 living.  Her youngest daughter at the age of 23  and a tragic drowning accident.  The patient does admit to feeling of depression.  She denies suicidal ideation.  She reports that she still gets out and enjoys life.  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  She works out in the gym 3 to 5 times a week.  She reports managing her depression very well, but lately feels as though she would benefit from treatment of her depression.  Today the patient is without  complaints except for restless leg syndrome pain which is being currently treated by her primary care provider.  She was recently started on Requip.  Social History     Socioeconomic History    Marital status:      Spouse name: Not on file    Number of children: Not on file    Years of education: Not on file    Highest education level: Not on file   Social Needs    Financial resource strain: Not on file    Food insecurity - worry: Not on file    Food insecurity - inability: Not on file    Transportation needs - medical: Not on file    Transportation needs - non-medical: Not on file   Occupational History    Not on file   Tobacco Use    Smoking status: Never Smoker    Smokeless tobacco: Never Used   Substance and Sexual Activity    Alcohol use: Yes     Frequency: Monthly or less     Drinks per session: 1 or 2    Drug use: No    Sexual activity: Not on file   Other Topics Concern    Not on file   Social History Narrative    1 dog, no smokers; Originally from [facility].       Past Medical History:   Diagnosis Date    Atrophic gastritis     B12 deficiency     Breast lump on left side at 3 o'clock position 03/25/2015    Fibrocystic breast     Lichen sclerosus 08/16/2012       Family History   Problem Relation Age of Onset    Depression Mother     Nephritis Mother     Hyperthyroidism Mother     Hypotension Mother     Anemia Mother     Asthma Father     Thyroid disease Sister     Depression Sister     Leukemia Brother     Cirrhosis Brother     Multiple sclerosis Brother        Past Surgical History:   Procedure Laterality Date    BREAST BIOPSY Right 2009    benign    COLONOSCOPY  09/10/2010    ESOPHAGOGASTRODUODENOSCOPY  09/10/2010    HYSTERECTOMY  2013    TONSILLECTOMY         Review of Systems   Constitutional: Negative for appetite change, chills, fatigue, fever and unexpected weight change.   HENT: Negative for congestion, mouth sores, nosebleeds, sore throat, trouble  swallowing and voice change.    Eyes: Negative for photophobia and visual disturbance.   Respiratory: Negative for cough, chest tightness, shortness of breath and wheezing.    Cardiovascular: Negative for chest pain and leg swelling.   Gastrointestinal: Negative for abdominal distention, abdominal pain, blood in stool, constipation, diarrhea, nausea and vomiting.   Genitourinary: Negative for difficulty urinating, dysuria and hematuria.   Musculoskeletal: Negative for arthralgias, back pain and myalgias.        Chronic leg pain from RLS   Skin: Negative for rash.   Neurological: Negative for dizziness, syncope, weakness and headaches.   Hematological: Negative for adenopathy. Does not bruise/bleed easily.   Psychiatric/Behavioral: The patient is nervous/anxious.             Medication List           Accurate as of 10/12/18  2:18 PM. If you have any questions, ask your nurse or doctor.               CONTINUE taking these medications    aspirin-acetaminophen-caffeine 250-250-65 mg 250-250-65 mg per tablet  Commonly known as:  EXCEDRIN MIGRAINE     CITRACAL PLUS ORAL     clobetasol 0.05% 0.05 % Oint  Commonly known as:  TEMOVATE     cyanocobalamin 1,000 mcg/mL injection  Inject 1 mL (1,000 mcg total) into the muscle every 28 days.     FIBER powder  Generic drug:  psyllium     LORazepam 0.5 MG tablet  Commonly known as:  ATIVAN  Take 1 tablet (0.5 mg total) by mouth every 6 (six) hours as needed for Anxiety.     magnesium 30 mg Tab     naproxen 500 MG tablet  Commonly known as:  NAPROSYN  Take 1 tablet (500 mg total) by mouth 2 (two) times daily with meals. For pain and inflammation     ONE DAILY MULTIVITAMIN per tablet  Generic drug:  multivitamin     rOPINIRole 1 MG tablet  Commonly known as:  REQUIP  Take 1 tablet (1 mg total) by mouth 3 (three) times daily.     VITAMIN D3 ORAL          Objective:     Vitals:    10/12/18 1338   BP: 113/65   Pulse: 65   Temp: 98 °F (36.7 °C)       Physical Exam   Constitutional: She  is oriented to person, place, and time. She appears well-developed and well-nourished. She is cooperative.   HENT:   Head: Normocephalic.   Right Ear: Hearing and tympanic membrane normal. No drainage.   Left Ear: Hearing and tympanic membrane normal. No drainage.   Nose: Nose normal.   Mouth/Throat: Oropharynx is clear and moist.   Eyes: Conjunctivae, EOM and lids are normal. Right eye exhibits no discharge. Left eye exhibits no discharge. No scleral icterus.   Neck: Normal range of motion. No thyroid mass present.   Cardiovascular: Normal rate, regular rhythm and normal heart sounds.   No murmur heard.  Pulmonary/Chest: Effort normal and breath sounds normal. No respiratory distress. She has no wheezes. She has no rales.   Abdominal: Soft. Bowel sounds are normal. She exhibits no distension. There is no tenderness.   Genitourinary:   Genitourinary Comments: deferred   Musculoskeletal: Normal range of motion. She exhibits no edema.   Lymphadenopathy:        Head (right side): No submandibular, no preauricular and no posterior auricular adenopathy present.        Head (left side): No submandibular, no preauricular and no posterior auricular adenopathy present.        Right cervical: No superficial cervical adenopathy present.       Left cervical: No superficial cervical adenopathy present.   Neurological: She is alert and oriented to person, place, and time.   Skin: Skin is warm, dry and intact.   Psychiatric: She has a normal mood and affect. Her speech is normal and behavior is normal. Thought content normal.   Vitals reviewed.       Assessment:     Problem List Items Addressed This Visit     Pernicious anemia (Chronic)     The patient has a diagnosis history of pernicious anemia dating back to 2010.  Her primary care provider has been managing her pernicious anemia with monthly vitamin B12 injections.      The patient self injects vitamin B12 monthly without issues.  I will obtain baseline CBC, CMP, vitamin B12,  iron studies, folate levels today.  Will communicate results with the patient portal.  She reports that she does not need vitamin B12 injection refills at this time.  The patient has been instructed to call to the [facility] if her refill prescription runs out before her next follow-up appointment.    Follow-up in 6 months with CBC, vitamin B12 level.  She knows to call the office sooner if she develops symptoms of anemia neuropathy, memory loss, loss of coordination.         Relevant Orders    CBC auto differential    CBC auto differential    Vitamin B12    Folate    C-reactive protein    Comprehensive metabolic panel    Iron and TIBC    History of iron deficiency anemia     The patient reports a history of iron deficiency anemia from the onset of her menstruation until her hysterectomy in 2011.    Will check baseline iron studies today.  We will communicate results through the patient portal.         Relevant Orders    CBC auto differential    CBC auto differential    Vitamin B12    Folate    C-reactive protein    Comprehensive metabolic panel    Iron and TIBC    Depression - Primary     Refer to Psychology has been made.  The patient will see Janna bernardo  as well to coordinate appointment with Psychology as soon as possible.  She denies suicidal ideation.  She continues to do things that she enjoys.         Relevant Orders    Ambulatory referral to Psychology            Plan:     Depression, unspecified depression type  -     Ambulatory referral to Psychology    Pernicious anemia  -     CBC auto differential; Future; Expected date: 10/12/2018  -     CBC auto differential; Future; Expected date: 10/12/2018  -     Vitamin B12; Future; Expected date: 10/12/2018  -     Folate; Future; Expected date: 10/12/2018  -     C-reactive protein; Future; Expected date: 10/12/2018  -     Comprehensive metabolic panel; Future; Expected date: 10/12/2018  -     Iron and TIBC; Future; Expected date:  10/12/2018    History of iron deficiency anemia  -     CBC auto differential; Future; Expected date: 10/12/2018  -     CBC auto differential; Future; Expected date: 10/12/2018  -     Vitamin B12; Future; Expected date: 10/12/2018  -     Folate; Future; Expected date: 10/12/2018  -     C-reactive protein; Future; Expected date: 10/12/2018  -     Comprehensive metabolic panel; Future; Expected date: 10/12/2018  -     Iron and TIBC; Future; Expected date: 10/12/2018          I will review assessment/plan with collaborating physician     ANA Handley

## 2018-10-12 NOTE — PROGRESS NOTES
"Pt referred to SW due to depression/grief as it pertains to traumatic loss of her daughter in a drowning accident in January of last year. Pt is well-groomed and smiling but easily teared up. She told SW about what happened. SW normalized and validated her grief. We talked about no time table for grief but how prolonged grief can sometimes lead to depression. Pt has been prescribed anti-depressants but she said someone else told her that they would not help with grief. Additionally, she was afraid of the side effects of the medication, so she never took the medication. She says she has been having panic attacks and she has been taking medication to help when they occur (they occur sporadically and out of the blue). She has tried support groups and counseling [facility], but has had trouble finding a "good fit" and getting benefit from them. She said she might try this again because she is really struggling with emotional pain and it is interfering with her QOL. SW also gave pt information about the Grief Support Group that is led at [facility] (which is convenient to where she lives). She has been given an appt with psychologist, Dr. Escobedo, on Wednesday 10/17 at 2:00pm. Hopefully she will be able to find what truly works to help with her prolonged grief/depression so that she can have better QOL. Provided her with SW contact info and encouraged her to f/u with SW at any point for continued support; let her know that SW is here to help as needed.   "

## 2018-10-13 ENCOUNTER — HOSPITAL ENCOUNTER (OUTPATIENT)
Dept: RADIOLOGY | Facility: HOSPITAL | Age: 58
Discharge: HOME | End: 2018-10-13
Attending: PHYSICIAN ASSISTANT
Payer: MEDICAID

## 2018-10-13 DIAGNOSIS — Z98.890 HISTORY OF RIGHT BREAST BIOPSY: ICD-10-CM

## 2018-10-13 DIAGNOSIS — Z12.39 SCREENING FOR MALIGNANT NEOPLASM OF BREAST: ICD-10-CM

## 2018-10-13 LAB
CRP SERPL-MCNC: 2.3 MG/L
FOLATE SERPL-MCNC: 12.8 NG/ML
IRON SERPL-MCNC: 45 UG/DL
SATURATED IRON: 10 %
TOTAL IRON BINDING CAPACITY: 445 UG/DL
TRANSFERRIN SERPL-MCNC: 301 MG/DL
VIT B12 SERPL-MCNC: 286 PG/ML

## 2018-10-13 PROCEDURE — 77063 BREAST TOMOSYNTHESIS BI: CPT | Mod: 26,,, | Performed by: RADIOLOGY

## 2018-10-13 PROCEDURE — 77067 SCR MAMMO BI INCL CAD: CPT | Mod: TC

## 2018-10-13 PROCEDURE — 77067 SCR MAMMO BI INCL CAD: CPT | Mod: 26,,, | Performed by: RADIOLOGY

## 2018-10-13 PROCEDURE — 77063 BREAST TOMOSYNTHESIS BI: CPT | Mod: TC

## 2018-10-17 ENCOUNTER — INITIAL CONSULT (OUTPATIENT)
Dept: PSYCHIATRY | Facility: CLINIC | Age: 58
End: 2018-10-17
Payer: MEDICAID

## 2018-10-17 DIAGNOSIS — F41.0 PANIC DISORDER: ICD-10-CM

## 2018-10-17 DIAGNOSIS — F32.1 MAJOR DEPRESSIVE DISORDER, SINGLE EPISODE, MODERATE: Primary | ICD-10-CM

## 2018-10-17 PROCEDURE — 90791 PSYCH DIAGNOSTIC EVALUATION: CPT | Mod: S$PBB,,, | Performed by: PSYCHOLOGIST

## 2018-10-17 PROCEDURE — 90791 PSYCH DIAGNOSTIC EVALUATION: CPT | Mod: PBBFAC,PO | Performed by: PSYCHOLOGIST

## 2018-10-17 NOTE — PROGRESS NOTES
"Psychiatry Initial Visit (PhD/LCSW)  Diagnostic Interview - CPT 15270    Date: 10/17/2018    Site: [facility]    Referral source: Edda Flores NP    Clinical status of patient: Outpatient    Alma Mistry, a 57 y.o. female, for initial evaluation visit.  Met with patient.    Chief complaint/reason for encounter: depression and anxiety    History of present illness: Patient reported experiencing depression since her daughter's death in 2017.  Symptoms include depressed mood, social isolation, irritability, feelings of worthlessness, concentration problems, and tearfulness.  Patient reported experiencing anxiety for one month.  Symptoms include, excessive worry, fears having another panic attack while in public, shortness of breath, and fear of passing out.  Patient recently had a panic attack on an airplane last month and had to be escorted off the plane in a wheelchair.  Patient reported transient thoughts of suicidal ideations in the past, but she denied a plan.  Patient denied current suicidal and homicidal ideations.     Pain: noncontributory    Symptoms:   · Mood: depressed mood, diminished interest, worthlessness/guilt, poor concentration, tearfulness and social isolation  · Anxiety: restlessness/keyed up and panic attacks   · Substance abuse: denied  · Cognitive functioning: denied  · Health behaviors: noncontributory    Psychiatric history: psychotropic management by PCP and has participated in counseling/psychotherapy on an outpatient basis in the past    Medical history: noncontributory    Family history of psychiatric illness: Depression - Mother, Siblings    Social history (marriage, employment, etc.): Patient grew up in [facility] [place] [facility] with parents, three older siblings, and in home maids.  Her father was shot and killed when patient was 21 and mother  in  due to nephritis.  Patient reported having "fairly good" relationships with her siblings.  Patient reported that she was "emotionally " "neglected" by her mother while growing up due to her mother having depression.  She reports having a good relationship with her father.  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  She left home at age 29 when she got .  She was  for 25 years and has been  for three years.  Patient has two daughters.  Bibiana was her youngest daughter and she is now .  Patient reported that her daughter drowned two days after moving to [place].  Patient reported having a close relationship with Bibiana.  Daylin is her oldest daughter and she reports having a good relationship with her.  Patient reported that she and Daylin have gotten closer since Bibiana  as Daylin was closer to her father.  [place] in  due to her ex 's job.  She lived in [place] for 18 years and moved to [place] last year.  She is a  for older adults at [employer].  She has a Bachelor's degree in Fine Arts.  Patient reported that most of her social [place] are in [place] or [facility].  She has one close friend in [place], [place] (a family that she stayed with when she was an exchange student).             Substance use:   Alcohol: Drinks wine about one bottle per week, but has been abstinent for two months.   Drugs: none   Tobacco: none   Caffeine: Two cups of coffee each morning.    Current medications and drug reactions (include OTC, herbal): see medication list     Strengths and liabilities: Strength: Patient accepts guidance/feedback, Liability: Patient lacks coping skills.    Current Evaluation:     Mental Status Exam:  General Appearance:  unremarkable, age appropriate   Speech: normal tone, normal rate, normal pitch, normal volume      Level of Cooperation: cooperative      Thought Processes: normal and logical   Mood: euthymic      Thought Content: normal, no suicidality, no homicidality, delusions, or paranoia   Affect: appropriate   Orientation: Oriented x3   Fund " of General Knowledge: intact and appropriate to age and level of education   Judgment & Insight: fair     Language  intact     Diagnostic Impression - Plan:     No diagnosis found.    Plan:individual psychotherapy and medication management by physician    Return to Clinic: 1 week    Length of Service (minutes): 45

## 2018-10-24 ENCOUNTER — OFFICE VISIT (OUTPATIENT)
Dept: PSYCHIATRY | Facility: CLINIC | Age: 58
End: 2018-10-24
Payer: MEDICAID

## 2018-10-24 DIAGNOSIS — F41.0 PANIC DISORDER: ICD-10-CM

## 2018-10-24 DIAGNOSIS — F32.1 MAJOR DEPRESSIVE DISORDER, SINGLE EPISODE, MODERATE: Primary | ICD-10-CM

## 2018-10-24 PROCEDURE — 90834 PSYTX W PT 45 MINUTES: CPT | Mod: S$PBB,,, | Performed by: PSYCHOLOGIST

## 2018-10-24 PROCEDURE — 90834 PSYTX W PT 45 MINUTES: CPT | Mod: PBBFAC,PO | Performed by: PSYCHOLOGIST

## 2018-10-24 NOTE — PROGRESS NOTES
"Individual Psychotherapy (PhD/LCSW)    10/24/2018    Site:  [facility]         Therapeutic Intervention: Met with patient.  Outpatient - Behavior modifying psychotherapy 45 min - CPT code 23667    Chief complaint/reason for encounter: depression, anxiety, grief     Interval history and content of current session: Patient presented casually dressed, alert, and oriented.  Patient reported experiencing depressed mood, tearfulness, feelings of hopelessness, and sleep disturbance.  Patient denied suicidal and homicidal ideations.  Patient reported that she had a panic attack in which she experienced feelings of detachment and racing heartbeat.  Explored source of patient's depressed mood and anxiety.  Patient reported that last year she tried to escape her feelings of grief by drinking, traveling, helping others (while pretending to be ok), and keeping busy.  Explored ways for patient to focus on herself, including practicing self care skills.  She indicated that she feels selfish putting herself first and caring about herself to deal with her grief.  Active listening skills were used as patient described her history of caring for others before herself.  Patient discussed her marriage and her divorce four years ago.  She noted that her marriage ended due to her ex 's infidelity.  Patient indicated that she lost her identity after being a wife for 25 years and a mother.  She noted that her living daughter "does not need her" because she has her own life.  She indicated that she misses her  daughter as they were very close.  Patient also discussed wanting a partner, but not wanting physical touch as she indicated having decreased libido.       Treatment plan:  · Target symptoms: depression, anxiety , grief  · Why chosen therapy is appropriate versus another modality: relevant to diagnosis, evidence based practice  · Outcome monitoring methods: self-report  · Therapeutic intervention type: behavior modifying " psychotherapy    Risk parameters:  Patient reports no suicidal ideation  Patient reports no homicidal ideation  Patient reports no self-injurious behavior  Patient reports no violent behavior    Verbal deficits: None    Patient's response to intervention:  The patient's response to intervention is accepting.    Progress toward goals and other mental status changes:  The patient's progress toward goals is fair .    Diagnosis:     ICD-10-CM ICD-9-CM   1. Major depressive disorder, single episode, moderate F32.1 296.22   2. Panic disorder F41.0 300.01       Plan:  individual psychotherapy    Return to clinic: 1 week    Length of Service (minutes): 45

## 2018-10-28 ENCOUNTER — PATIENT MESSAGE (OUTPATIENT)
Dept: INTERNAL MEDICINE | Facility: CLINIC | Age: 58
End: 2018-10-28

## 2018-10-29 DIAGNOSIS — M79.605 LEG PAIN, BILATERAL: Primary | ICD-10-CM

## 2018-10-29 DIAGNOSIS — M79.604 LEG PAIN, BILATERAL: Primary | ICD-10-CM

## 2018-12-05 ENCOUNTER — PATIENT MESSAGE (OUTPATIENT)
Dept: INTERNAL MEDICINE | Facility: CLINIC | Age: 58
End: 2018-12-05

## 2018-12-07 ENCOUNTER — TELEPHONE (OUTPATIENT)
Dept: INTERNAL MEDICINE | Facility: CLINIC | Age: 58
End: 2018-12-07

## 2018-12-11 DIAGNOSIS — Z86.2 HISTORY OF IRON DEFICIENCY ANEMIA: Primary | ICD-10-CM

## 2018-12-11 DIAGNOSIS — D51.0 PERNICIOUS ANEMIA: ICD-10-CM

## 2018-12-12 ENCOUNTER — OFFICE VISIT (OUTPATIENT)
Dept: NEUROLOGY | Facility: CLINIC | Age: 58
End: 2018-12-12
Payer: MEDICAID

## 2018-12-12 ENCOUNTER — LAB VISIT (OUTPATIENT)
Dept: LAB | Facility: HOSPITAL | Age: 58
End: 2018-12-12
Attending: PSYCHIATRY & NEUROLOGY
Payer: MEDICAID

## 2018-12-12 VITALS
BODY MASS INDEX: 25.67 KG/M2 | WEIGHT: 130.75 LBS | SYSTOLIC BLOOD PRESSURE: 110 MMHG | DIASTOLIC BLOOD PRESSURE: 68 MMHG | HEART RATE: 60 BPM | HEIGHT: 60 IN

## 2018-12-12 DIAGNOSIS — G25.81 RLS (RESTLESS LEGS SYNDROME): ICD-10-CM

## 2018-12-12 DIAGNOSIS — F41.0 PANIC DISORDER: ICD-10-CM

## 2018-12-12 DIAGNOSIS — D51.0 PERNICIOUS ANEMIA: Chronic | ICD-10-CM

## 2018-12-12 DIAGNOSIS — M79.2 NEUROPATHIC PAIN: ICD-10-CM

## 2018-12-12 DIAGNOSIS — Z86.2 HISTORY OF IRON DEFICIENCY ANEMIA: ICD-10-CM

## 2018-12-12 DIAGNOSIS — F32.1 MAJOR DEPRESSIVE DISORDER, SINGLE EPISODE, MODERATE: ICD-10-CM

## 2018-12-12 DIAGNOSIS — F32.A DEPRESSION, UNSPECIFIED DEPRESSION TYPE: ICD-10-CM

## 2018-12-12 DIAGNOSIS — G62.9 POLYNEUROPATHY: Primary | ICD-10-CM

## 2018-12-12 PROCEDURE — 99999 PR PBB SHADOW E&M-EST. PATIENT-LVL IV: CPT | Mod: PBBFAC,,, | Performed by: PSYCHIATRY & NEUROLOGY

## 2018-12-12 PROCEDURE — 36415 COLL VENOUS BLD VENIPUNCTURE: CPT

## 2018-12-12 PROCEDURE — 83921 ORGANIC ACID SINGLE QUANT: CPT

## 2018-12-12 PROCEDURE — 99214 OFFICE O/P EST MOD 30 MIN: CPT | Mod: PBBFAC | Performed by: PSYCHIATRY & NEUROLOGY

## 2018-12-12 PROCEDURE — 99204 OFFICE O/P NEW MOD 45 MIN: CPT | Mod: S$PBB,,, | Performed by: PSYCHIATRY & NEUROLOGY

## 2018-12-12 RX ORDER — GABAPENTIN 300 MG/1
300 CAPSULE ORAL 3 TIMES DAILY
Qty: 90 CAPSULE | Refills: 3 | Status: SHIPPED | OUTPATIENT
Start: 2018-12-12 | End: 2019-01-16

## 2018-12-12 NOTE — PATIENT INSTRUCTIONS
Vitamin B-12  Does this test have other names?  VB12, serum cobalamin  What is this test?  This test measures the level of vitamin B-12 in your blood. You need this vitamin to make red blood cells and for your nervous system to function as it should.  You get vitamin B-12 from eating foods that come from animals, such as meat, eggs, and dairy products. Vitamin B-12 is also added to some cereals. You can also take this vitamin as a supplement in pill form.  Why do I need this test?  You may need this test if your healthcare provider suspects that your vitamin B-12 level is low. A low level of vitamin B-12 is called vitamin B-12 deficiency. You are more likely to have vitamin deficiency if you are an older adult, have a digestive disorder called malabsorption, have had gastrointestinal surgery, or eat a vegan diet. Women who are pregnant or breastfeeding and eat a vegetarian-type diet are at high risk for this deficiency.  You may also need this test if you've been diagnosed with or your healthcare provider suspects a disease called pernicious anemia. Pernicious anemia affects the lining of your stomach and makes it hard to absorb vitamin B-12.  These are common symptoms of vitamin B-12 deficiency:  · Fatigue  · Weakness  · Weight loss  · Tingling or numbness of the hands and feet  · Constipation  · Poor balance  · Confusion  · Depression  · Memory loss  · Soreness of the mouth or tongue  What other tests might I have along with this test?  Your healthcare provider may order other tests to help find out the cause of your vitamin B-12 deficiency. These tests may include:  · Complete blood count  · Peripheral blood smear, which involves looking at your blood cells under a microscope  · Folic acid level. This vitamin is also important for red blood cell production.  What do my test results mean?  Many things may affect your lab test results. These include the method each lab uses to do the test. Even if your test  results are different from the normal value, you may not have a problem. To learn what the results mean for you, talk with your healthcare provider.  Vitamin B-12 is measured in picograms per milliliter (pg/mL). Normal results are:  · 200 to 835 pg/mL for adults  · 160 to 1,300 pg/mL for newborns  If your results are low, you may have:  · Pernicious anemia  · Malabsorption from inflammatory bowel disease or other causes  · Poor absorption because of surgery  · Tapeworm infection  · Too little intake of animal protein  · Folic acid deficiency  · Iron deficiency  If your levels are high, you may have:  · Liver or kidney disease  · Diabetes  · Obesity  · White blood cell cancer  High levels may also mean that you have chronic obstructive pulmonary disease , congestive heart failure, or a thickening of the blood called polycythemia vera.  How is this test done?  The test requires a blood sample, which is drawn through a needle from a vein in your arm.  Does this test pose any risks?  Taking a blood sample with a needle carries risks that include bleeding, infection, bruising, or feeling dizzy. When the needle pricks your arm, you may feel a slight stinging sensation or pain. Afterward, the site may be slightly sore.  What might affect my test results?  Certain conditions may affect your test results. These include:  · Pregnancy  · Recent blood transfusions  · Smoking  Medicines in general may also affect your results. Specific medicines include supplements of vitamin A or C and birth control pills.  How do I get ready for this test?  You must fast before this test. You may be able to drink water, but you should not eat or drink anything else after midnight on the night before the test. If you are not having the test in the morning, ask your healthcare provider how long you need to fast before the test. You should not have a vitamin B-12 injection before the test. Also be sure your provider knows about all medicines,  herbs, vitamins, and supplements you are taking. This includes medicines that don't need a prescription and any illicit drugs you may use.  © 2910-7939 [employer]. [address] [place] [address], [place] [address]. All rights reserved. This information is not intended as a substitute for professional medical care. Always follow your healthcare professional's instructions.        Methylmalonic Acid (Blood)  Does this test have other names?  MMA  What is this test?  This test measures the amount of a substance called methylmalonic acid (MMA) in your blood.  MMA is typically made in tiny amounts when you digest protein. Your body makes large amounts of MMA if you have a drop in the amount of vitamin B-12. MMA is excreted through your kidneys.  Your body needs B-12 to make red blood cells and to help your central nervous system work as it should. Low levels of B-12 can cause anemia. This is when your body does not make enough red blood cells.  This test is used to diagnose a mild and early shortage of vitamin B-12. A high level of MMA can mean that that you have a low level of B-12. Vitamin B-12 deficiency is the most common cause of MMA production.  Foods that can increase B-12 levels include red meats, shellfish, fish, dairy, and cereals fortified with the vitamin. If you are a strict vegetarian, you may be at higher risk for a B12 deficiency. If you are pregnant and are a vegetarian, you may want to take a B-12 supplement. This is especially important if you plan to exclusively breastfeed your baby. Otherwise, your child may also be especially susceptible to a B-12 deficiency.  Why do I need this test?  You may have this test if your healthcare provider thinks you have a vitamin B-12 deficiency. You may also have this test if you have symptoms of neuropathy, or loss of movement. This can include numbness and tingling in your hands and feet.  Other symptoms of B-12 deficiency include:  · Difficulty  walking  · Mood swings  · Numbness in your hands or feet  · Difficulty in thinking clearly  · Fatigue  · Headaches  You may also have this test if your healthcare provider suspects that you have methylmalonic acidemia. This is an uncommon metabolic disorder in which your body can't process certain fats and proteins. The disease is usually diagnosed in infants and can be mild or life-threatening.   What other tests might I have along with this test?  Your healthcare provider may also order a homocysteine blood test. If your homocysteine levels are high, your provider may also order a test to measure your folate (folic acid) concentrations. Your provider may also order an evaluation of intrinsic factor and a complete blood count.  What do my test results mean?  Many things may affect your lab test results. These include the method each lab uses to do the test. Even if your test results are different from the normal value, you may not have a problem. To learn what the results mean for you, talk with your healthcare provider.  Results are given in micromolars per liter (mcmol/L). A normal test result is less than 0.2 mcmol/L. It means that you likely have enough vitamin B-12 in your body.  If your MMA levels are higher, you may have a B-12 deficiency.  Higher MMA levels are common in pregnancy. Higher MMA levels may also be caused by kidney disease. This is usually because less MMA is sent into your urine, causing it to accumulate in your blood.  Infants may have extremely high levels of MMA because of a condition called methylmalonic acidemia. B-12 levels typically return to normal with treatment.  How is this test done?  The test requires a blood sample, which is drawn through a needle from a vein in your arm.  Does this test pose any risks?  Taking a blood sample with a needle carries risks that include bleeding, infection, bruising, or feeling dizzy. When the needle pricks your arm, you may feel a slight stinging  sensation or pain. Afterward, the site may be slightly sore.  What might affect my test results?  Other factors aren't likely to affect your results.  How do I get ready for this test?  You don't need to prepare for this test. But be sure your healthcare provider knows about all medicines, herbs, vitamins, and supplements you are taking. This includes medicines that don't need a prescription and any illicit drugs you may use.      © 9025-5039 [employer]. [address] [place] [address], [place] [address]. All rights reserved. This information is not intended as a substitute for professional medical care. Always follow your healthcare professional's instructions.        Peripheral Neuropathy  Peripheral neuropathy is a condition that affects the nerves of the arms or legs. It causes a change in physical feeling. Sometimes it causes weakness in the muscles. You may feel tingling, numbness or shooting pains. Symptoms may be more common at night. Skin may be extra sensitive to light touch or temperature changes.  Neuropathy may be a complication of a chronic disease such as diabetes. A ruptured disk with pressure on the spinal nerve may also lead to the problem. Certain vitamin deficiencies may lead to it. It may also be caused by exposure to certain drugs or chemicals.  Home care  · Tell the healthcare provider about all medicines you take. This includes prescription and over-the-counter medicines, vitamins, and herbs. Ask if any of the medicines may be causing your problems. Do not make any changes to prescription medicines without talking to your healthcare provider first.  · You may be prescribed medicines to help relieve the tingling feeling or for pain. Take all medicines as directed.  · A numb hand or foot may be more prone to injury. To help protect it:  ¨ Always use oven mitts.  ¨ Test water with an unaffected hand or foot.  ¨ Use caution when trimming nails. File sharp areas.  ¨ Wear shoes that fit well  to avoid pressure points, blisters, and ulcers.  ¨ Inspect your hands and feet carefully (including the soles of your feet and between your toes) at least once a week. If you see red areas, sores, or other problems, tell your healthcare provider.  Follow-up care  Follow up with your doctor or as advised by our staff. You may need further testing or evaluation.  When to seek medical advice  Call your healthcare provider right away if any of the following occur:  · Redness, swelling, cracking, or ulcer on any numb area, especially the feet  · New symptoms of numbness or muscle weakness numbness  · Loss of bowel or bladder control  · Slurred speech, confusion, or trouble speaking, walking, or seeing  Date Last Reviewed: 9/26/2015 © 2000-2017 [employer]. [address] [place] [address]. All rights reserved. This information is not intended as a substitute for professional medical care. Always follow your healthcare professional's instructions.        Ropinirole tablets  What is this medicine?  ROPINIROLE (day PIN i role) is used to treat the symptoms of Parkinson's disease. It helps to improve muscle control and movement difficulties. It is also used for the treatment of Restless Legs Syndrome.  How should I use this medicine?  Take this medicine by mouth with a glass of water. Follow the directions on the prescription label. You can take it with or without food. If it upsets your stomach, take it with food. Take your doses at regular intervals. Do not take your medicine more often than directed. Do not stop taking this medicine except on your doctor's advice.  Talk to your pediatrician regarding the use of this medicine in children. Special care may be needed.  What side effects may I notice from receiving this medicine?  Side effects that you should report to your doctor or health care professional as soon as possible:  · changes in vision  · chest pain  · confusion  · falling asleep during  normal activities like driving  · fast, irregular heartbeat  · feeling faint or lightheaded, falls  · hallucination, loss of contact with reality  · increase or decrease in blood pressure  · joint or muscle pain  · loss of bladder control  · numbness, tingling, or prickly sensations  · shortness of breath, troubled breathing, tightness in chest, or wheezing  · suicidal thoughts or other mood changes  · uncontrollable head, mouth, neck, arm, or leg movements  · vomiting  Side effects that usually do not require medical attention (report to your doctor or health care professional if they continue or are bothersome):  · clumsiness, feeling unsteady, or dizziness, especially early in treatment  · flushing  · headache  · increased sweating  · nausea  · tremor  · yawning  What may interact with this medicine?  · ciprofloxacin  · female hormones, like estrogens and birth control pills  · medicines for depression, anxiety, or psychotic disturbances  · metoclopramide  · mexiletine  · norfloxacin  · omeprazole  What if I miss a dose?  If you miss a dose, take it as soon as you can. If it is almost time for your next dose, take only that dose. Do not take double or extra doses.  Where should I keep my medicine?  Keep out of the reach of children.  Store at room temperature between 20 and 25 degrees C (68 and 77 degrees F). Protect from light and moisture. Keep container tightly closed. Throw away any unused medicine after the expiration date.  What should I tell my health care provider before I take this medicine?  They need to know if you have any of these conditions:  · dizzy or fainting spells  · heart disease  · high blood pressure  · kidney disease  · liver disease  · low blood pressure  · sleeping problems  · an unusual or allergic reaction to ropinirole, other medicines, foods, dyes, or preservatives  · pregnant or trying to get pregnant  · breast-feeding  What should I watch for while using this medicine?  Visit your  doctor or health care professional for regular checks on your progress. It may be several weeks or months before you feel the full effect of this medicine.  You may get drowsy or dizzy. Do not drive, use machinery, or do anything that needs mental alertness until you know how this drug affects you. Do not stand or sit up quickly, especially if you are an older patient. This reduces the risk of dizzy or fainting spells. Alcohol can increase possible dizziness. Avoid alcoholic drinks. If you find that you have sudden feelings of wanting to sleep during normal activities, like cooking, watching television, or while driving or riding in a car, you should contact your health care professional.  Your mouth may get dry. Chewing sugarless gum or sucking hard candy, and drinking plenty of water may help. Contact your doctor if the problem does not go away or is severe.  There have been reports of increased sexual urges or other strong urges such as gambling while taking some medicines for Parkinson's disease. If you experience any of these urges while taking this medicine, you should report it to your health care provider as soon as possible.  You should check your skin often for changes to moles and new growths while taking this medicine. Call your doctor if you notice any of these changes.  NOTE:This sheet is a summary. It may not cover all possible information. If you have questions about this medicine, talk to your doctor, pharmacist, or health care provider. Copyright© 2017 [employer]        Gabapentin capsules or tablets  What is this medicine?  GABAPENTIN (GA ba pen tin) is used to control partial seizures in adults with epilepsy. It is also used to treat certain types of nerve pain.  How should I use this medicine?  Take this medicine by mouth with a glass of water. Follow the directions on the prescription label. You can take it with or without food. If it upsets your stomach, take it with food.Take your medicine  at regular intervals. Do not take it more often than directed. Do not stop taking except on your doctor's advice.  If you are directed to break the 600 or 800 mg tablets in half as part of your dose, the extra half tablet should be used for the next dose. If you have not used the extra half tablet within 28 days, it should be thrown away.  A special MedGuide will be given to you by the pharmacist with each prescription and refill. Be sure to read this information carefully each time.  Talk to your pediatrician regarding the use of this medicine in children. Special care may be needed.  What side effects may I notice from receiving this medicine?  Side effects that you should report to your doctor or health care professional as soon as possible:  · allergic reactions like skin rash, itching or hives, swelling of the face, lips, or tongue  · worsening of mood, thoughts or actions of suicide or dying  Side effects that usually do not require medical attention (report to your doctor or health care professional if they continue or are bothersome):  · constipation  · difficulty walking or controlling muscle movements  · dizziness  · nausea  · slurred speech  · tiredness  · tremors  · weight gain  What may interact with this medicine?  Do not take this medicine with any of the following medications:  · other gabapentin products  This medicine may also interact with the following medications:  · alcohol  · antacids  · antihistamines for allergy, cough and cold  · certain medicines for anxiety or sleep  · certain medicines for depression or psychotic disturbances  · homatropine; hydrocodone  · naproxen  · narcotic medicines (opiates) for pain  · phenothiazines like chlorpromazine, mesoridazine, prochlorperazine, thioridazine  What if I miss a dose?  If you miss a dose, take it as soon as you can. If it is almost time for your next dose, take only that dose. Do not take double or extra doses.  Where should I keep my  medicine?  Keep out of reach of children.  This medicine may cause accidental overdose and death if it taken by other adults, children, or pets. Mix any unused medicine with a substance like cat litter or coffee grounds. Then throw the medicine away in a sealed container like a sealed bag or a coffee can with a lid. Do not use the medicine after the expiration date.  Store at room temperature between 15 and 30 degrees C (59 and 86 degrees F).  What should I tell my health care provider before I take this medicine?  They need to know if you have any of these conditions:  · kidney disease  · suicidal thoughts, plans, or attempt; a previous suicide attempt by you or a family member  · an unusual or allergic reaction to gabapentin, other medicines, foods, dyes, or preservatives  · pregnant or trying to get pregnant  · breast-feeding  What should I watch for while using this medicine?  Visit your doctor or health care professional for regular checks on your progress. You may want to keep a record at home of how you feel your condition is responding to treatment. You may want to share this information with your doctor or health care professional at each visit. You should contact your doctor or health care professional if your seizures get worse or if you have any new types of seizures. Do not stop taking this medicine or any of your seizure medicines unless instructed by your doctor or health care professional. Stopping your medicine suddenly can increase your seizures or their severity.  Wear a medical identification bracelet or chain if you are taking this medicine for seizures, and carry a card that lists all your medications.  You may get drowsy, dizzy, or have blurred vision. Do not drive, use machinery, or do anything that needs mental alertness until you know how this medicine affects you. To reduce dizzy or fainting spells, do not sit or stand up quickly, especially if you are an older patient. Alcohol can  increase drowsiness and dizziness. Avoid alcoholic drinks.  Your mouth may get dry. Chewing sugarless gum or sucking hard candy, and drinking plenty of water will help.  The use of this medicine may increase the chance of suicidal thoughts or actions. Pay special attention to how you are responding while on this medicine. Any worsening of mood, or thoughts of suicide or dying should be reported to your health care professional right away.  Women who become pregnant while using this medicine may enroll in the [place] Antiepileptic Drug Pregnancy Registry by calling 1-506.834.8949. This registry collects information about the safety of antiepileptic drug use during pregnancy.  NOTE:This sheet is a summary. It may not cover all possible information. If you have questions about this medicine, talk to your doctor, pharmacist, or health care provider. Copyright© 2017 [employer]

## 2018-12-12 NOTE — PROGRESS NOTES
Subjective:       Patient ID: Alma Mistry is a 58 y.o. female.    Chief Complaint: Consult (Bilateral leg pain)    HPI       The patient started complaining of bilateral symmetric feet paroxysmal tingling and burning pain about several years ago.  The patient usually notices the pain after significant physical activity or prolonged sitting or standing.  The pain is paroxysmal and not persistent but is getting much worse with time and becoming disabling. She also reports that her legs are very restless at night and Requip helps with that when she takes it.  The pain and tingling involves the whole feet, mainly the sole area and progresses to the very distal part of the legs.  The patient denies any hand symptoms. No ankle or foot weakness. The patient denies any low back with radiation to the legs.  The patient has good control on her bowel and bladder. She was found to have very low B12 <100 and was diagnosed with PA 5 years after the symptoms. She is on B12 IM monthly.No history of DM. No known history of thyroid disease. No history of malignancies.  No history of toxic environmental exposure.. No history of excessive alcohol or recreational drug abuse. No history of chronic hepatitis or HIV.       Review of Systems   Constitutional: Negative for appetite change and fatigue.   HENT: Negative for hearing loss and tinnitus.    Eyes: Negative for photophobia and visual disturbance.   Respiratory: Negative for apnea and shortness of breath.    Cardiovascular: Negative for chest pain and palpitations.   Gastrointestinal: Negative for nausea and vomiting.   Endocrine: Negative for cold intolerance and heat intolerance.   Genitourinary: Negative for difficulty urinating and urgency.   Musculoskeletal: Negative for arthralgias, back pain, gait problem, joint swelling, myalgias, neck pain and neck stiffness.   Skin: Negative for color change and rash.   Allergic/Immunologic: Negative for environmental allergies and  immunocompromised state.   Neurological: Positive for numbness. Negative for dizziness, tremors, seizures, syncope, facial asymmetry, speech difficulty, weakness, light-headedness and headaches.   Hematological: Negative for adenopathy. Does not bruise/bleed easily.   Psychiatric/Behavioral: Positive for dysphoric mood. Negative for agitation, behavioral problems, confusion, decreased concentration, hallucinations, self-injury, sleep disturbance and suicidal ideas. The patient is nervous/anxious. The patient is not hyperactive.          Current Outpatient Medications:     aspirin-acetaminophen-caffeine 250-250-65 mg (EXCEDRIN MIGRAINE) 250-250-65 mg per tablet, Take 1 tablet by mouth as needed for Pain., Disp: , Rfl:     CALCIUM CIT/MGOX/VIT D3/B6/MIN (CITRACAL PLUS ORAL), Take 1 tablet by mouth as needed. , Disp: , Rfl:     CHOLECALCIFEROL, VITAMIN D3, (VITAMIN D3 ORAL), Take 1,000 Units by mouth daily as needed. , Disp: , Rfl:     clobetasol 0.05% (TEMOVATE) 0.05 % Oint, Apply topically as needed. , Disp: , Rfl:     cyanocobalamin 1,000 mcg/mL injection, Inject 1 mL (1,000 mcg total) into the muscle every 28 days., Disp: 10 mL, Rfl: 1    magnesium 30 mg Tab, Take 1 tablet by mouth once., Disp: , Rfl:     multivitamin (ONE DAILY MULTIVITAMIN) per tablet, Take 1 tablet by mouth once daily., Disp: , Rfl:     naproxen (NAPROSYN) 500 MG tablet, Take 1 tablet (500 mg total) by mouth 2 (two) times daily with meals. For pain and inflammation (Patient taking differently: Take 500 mg by mouth 2 (two) times daily as needed. For pain and inflammation), Disp: 30 tablet, Rfl: 0    rOPINIRole (REQUIP) 1 MG tablet, Take 1 tablet (1 mg total) by mouth 3 (three) times daily., Disp: 90 tablet, Rfl: 11    gabapentin (NEURONTIN) 300 MG capsule, Take 1 capsule (300 mg total) by mouth 3 (three) times daily., Disp: 90 capsule, Rfl: 3    LORazepam (ATIVAN) 0.5 MG tablet, Take 1 tablet (0.5 mg total) by mouth every 6 (six) hours  as needed for Anxiety., Disp: 20 tablet, Rfl: 1    psyllium (FIBER) powder, Take 1 packet by mouth daily as needed., Disp: , Rfl:   Past Medical History:   Diagnosis Date    Atrophic gastritis     B12 deficiency     Breast lump on left side at 3 o'clock position 03/25/2015    Fibrocystic breast     Lichen sclerosus 08/16/2012     Past Surgical History:   Procedure Laterality Date    BREAST BIOPSY Right 2009    benign    COLONOSCOPY  09/10/2010    ESOPHAGOGASTRODUODENOSCOPY  09/10/2010    HYSTERECTOMY  2013    TONSILLECTOMY       Social History     Socioeconomic History    Marital status:      Spouse name: Not on file    Number of children: Not on file    Years of education: Not on file    Highest education level: Not on file   Social Needs    Financial resource strain: Not on file    Food insecurity - worry: Not on file    Food insecurity - inability: Not on file    Transportation needs - medical: Not on file    Transportation needs - non-medical: Not on file   Occupational History    Not on file   Tobacco Use    Smoking status: Never Smoker    Smokeless tobacco: Never Used   Substance and Sexual Activity    Alcohol use: Yes     Frequency: Monthly or less     Drinks per session: 1 or 2    Drug use: No    Sexual activity: No   Other Topics Concern    Not on file   Social History Narrative    1 dog, no smokers; Originally from [facility].       Objective:     GENERAL APPEARANCE:     The patient looks uncomfortable.    No signs of medical or psychiatric distress.    Normal breathing pattern.    No dysmorphic features    Normal eye contact.     GENERAL MEDICAL EXAM:    HEENT:  Head is atraumatic normocephalic. No tender temporal arteries.     Neck and Axillae: No JVD. No carotid bruits. No thyromegaly. No lymphadenopathy.    Cardiopulmonary: No cyanosis. No tachypnea. Normal respiratory effort.  Clear breath sounds. Normal heart sounds with regular rhythm and no  murmurs.    Gastrointestinal:  No stomas or lesions. No hernias.  Abdomen is soft non-tender. No masses or organomegaly.    Skin, Hair and Nails: No pathognonomic skin rash. No neurofibromatosis.   No stigmata of autoimmune disease.     Limbs: No varicose veins. No edema. Symmetric pulses.     Muskoskeletal: No deformities.No spine tenderness.   No signs of longstanding neuropathy. No dislocations or fractures.        Neurologic Exam     Mental Status   Oriented to person, place, and time.   Registration: recalls 3 of 3 objects. Recall at 5 minutes: recalls 3 of 3 objects. Follows 3 step commands.   Attention: normal. Concentration: normal.   Speech: speech is normal   Level of consciousness: alert  Knowledge: good and consistent with education. Able to perform simple calculations.   Able to name object. Able to read. Able to repeat. Able to write. Normal comprehension.     Cranial Nerves     CN II   Visual fields full to confrontation.   Visual acuity: normal  Right visual field deficit: none  Left visual field deficit: none     CN III, IV, VI   Pupils are equal, round, and reactive to light.  Extraocular motions are normal.   Right pupil: Size: 2 mm. Shape: regular. Reactivity: brisk. Consensual response: intact. Accommodation: intact.   Left pupil: Size: 2 mm. Shape: regular. Reactivity: brisk. Consensual response: intact. Accommodation: intact.   CN III: no CN III palsy  CN VI: no CN VI palsy  Nystagmus: none   Diplopia: none  Ophthalmoparesis: none  Upgaze: normal  Downgaze: normal  Conjugate gaze: present  Vestibulo-ocular reflex: present    CN V   Facial sensation intact.   Right facial sensation deficit: none  Left facial sensation deficit: none  Right corneal reflex: normal  Left corneal reflex: normal    CN VII   Right facial weakness: none  Left facial weakness: none  Right taste: normal  Left taste: normal    CN VIII   CN VIII normal.   Hearing: intact  Right Rinne: AC > BC  Left Rinne: AC > BC  Amor:  does not lateralize     CN IX, X   CN IX normal.   CN X normal.   Palate: symmetric  Right gag reflex: normal  Left gag reflex: normal    CN XI   CN XI normal.   Right sternocleidomastoid strength: normal  Left sternocleidomastoid strength: normal  Right trapezius strength: normal  Left trapezius strength: normal    CN XII   CN XII normal.   Tongue: not atrophic  Fasciculations: absent  Tongue deviation: none    Motor Exam   Muscle bulk: normal  Overall muscle tone: normal  Right arm tone: normal  Left arm tone: normal  Right arm pronator drift: absent  Left arm pronator drift: absent  Right leg tone: normal  Left leg tone: normal    Strength   Strength 5/5 throughout.   Right neck flexion: 5/5  Left neck flexion: 5/5  Right neck extension: 5/5  Left neck extension: 5/5  Right deltoid: 5/5  Left deltoid: 5/5  Right biceps: 5/5  Left biceps: 5/5  Right triceps: 5/5  Left triceps: 5/5  Right wrist flexion: 5/5  Left wrist flexion: 5/5  Right wrist extension: 5/5  Left wrist extension: 5/5  Right interossei: 5/5  Left interossei: 5/5  Right abdominals: 5/5  Left abdominals: 5/5  Right iliopsoas: 5/5  Left iliopsoas: 5/5  Right quadriceps: 5/5  Left quadriceps: 5/5  Right hamstrin/5  Left hamstrin/5  Right glutei: 5/5  Left glutei: 5/5  Right anterior tibial: 5/5  Left anterior tibial: 5/5  Right posterior tibial: 5/5  Left posterior tibial: 5/5  Right peroneal: 5/5  Left peroneal: 5/5  Right gastroc: 5/5  Left gastroc: 5/5    Sensory Exam   Right arm light touch: decreased from wrist  Left arm light touch: decreased from wrist  Right leg light touch: decreased from knee  Left leg light touch: decreased from knee  Right arm vibration: normal  Left arm vibration: normal  Right leg vibration: decreased from knee  Left leg vibration: decreased from knee  Right arm proprioception: normal  Left arm proprioception: normal  Right leg proprioception: decreased from knee  Left leg proprioception: decreased from  knee  Right arm pinprick: decreased from wrist  Left arm pinprick: decreased from wrist  Right leg pinprick: decreased from knee  Left leg pinprick: decreased from knee  Graphesthesia: normal  Stereognosis: normal    Gait, Coordination, and Reflexes     Gait  Gait: normal    Coordination   Romberg: negative  Finger to nose coordination: normal  Heel to shin coordination: normal  Tandem walking coordination: normal    Tremor   Resting tremor: absent  Intention tremor: absent  Action tremor: absent    Reflexes   Right brachioradialis: 2+  Left brachioradialis: 2+  Right biceps: 2+  Left biceps: 2+  Right triceps: 2+  Left triceps: 2+  Right patellar: 0  Left patellar: 0  Right achilles: 0  Left achilles: 0  Right plantar: normal  Left plantar: normal  Right Salomon: absent  Left Salomon: absent  Right ankle clonus: absent  Left ankle clonus: absent  Right pendular knee jerk: absent  Left pendular knee jerk: absent      Lab Results   Component Value Date    WBC 7.04 10/12/2018    HGB 12.1 10/12/2018    HCT 36.3 (L) 10/12/2018    MCV 82 10/12/2018     10/12/2018     Sodium   Date Value Ref Range Status   10/12/2018 137 136 - 145 mmol/L Final     Potassium   Date Value Ref Range Status   10/12/2018 4.7 3.5 - 5.1 mmol/L Final     Chloride   Date Value Ref Range Status   10/12/2018 103 95 - 110 mmol/L Final     CO2   Date Value Ref Range Status   10/12/2018 25 23 - 29 mmol/L Final     Glucose   Date Value Ref Range Status   10/12/2018 101 70 - 110 mg/dL Final     BUN, Bld   Date Value Ref Range Status   10/12/2018 12 6 - 20 mg/dL Final     Creatinine   Date Value Ref Range Status   10/12/2018 0.8 0.5 - 1.4 mg/dL Final     Calcium   Date Value Ref Range Status   10/12/2018 9.8 8.7 - 10.5 mg/dL Final     Total Protein   Date Value Ref Range Status   10/12/2018 7.5 6.0 - 8.4 g/dL Final     Albumin   Date Value Ref Range Status   10/12/2018 4.2 3.5 - 5.2 g/dL Final     Total Bilirubin   Date Value Ref Range Status    10/12/2018 0.5 0.1 - 1.0 mg/dL Final     Comment:     For infants and newborns, interpretation of results should be based  on gestational age, weight and in agreement with clinical  observations.  Premature Infant recommended reference ranges:  Up to 24 hours.............<8.0 mg/dL  Up to 48 hours............<12.0 mg/dL  3-5 days..................<15.0 mg/dL  6-29 days.................<15.0 mg/dL       Alkaline Phosphatase   Date Value Ref Range Status   10/12/2018 89 55 - 135 U/L Final     AST   Date Value Ref Range Status   10/12/2018 34 10 - 40 U/L Final     ALT   Date Value Ref Range Status   10/12/2018 28 10 - 44 U/L Final     Anion Gap   Date Value Ref Range Status   10/12/2018 9 8 - 16 mmol/L Final     eGFR if    Date Value Ref Range Status   10/12/2018 >60 >60 mL/min/1.73 m^2 Final     eGFR if non    Date Value Ref Range Status   10/12/2018 >60 >60 mL/min/1.73 m^2 Final     Comment:     Calculation used to obtain the estimated glomerular filtration  rate (eGFR) is the CKD-EPI equation.        Lab Results   Component Value Date    LUJBQOZV36 286 10/12/2018     Lab Results   Component Value Date    TSH 1.454 09/21/2017       Assessment:       1. Polyneuropathy    2. History of iron deficiency anemia    3. Pernicious anemia    4. Depression, unspecified depression type    5. Major depressive disorder, single episode, moderate    6. Panic disorder    7. RLS (restless legs syndrome)    8. Neuropathic pain        Plan:         T15-YPLUSQT POLYNEUROPATHY-PN     NCS/EMG.      Continue B12    MMA    In terms of management I counseled the patient that neuropathic pain meds target is 40% reduction of pain.     Try Gabapentin/GBP-Neurontin which can cause sedation, weight gain, swelling and abnormal movements. Will titrate slowly to 300 mg TID with a maximum of 1200 mg TID. The patient verbalized understanding.      RLS     Continue Requip 1 mg TID. She has tolerated it well with no  SEs      RTC in 4 weeks

## 2018-12-12 NOTE — LETTER
December 12, 2018      Trev Adan PA-C  [address] [facility] [address]           [facility]  [address] [facility] [address]  Phone: [phone]  Fax: [phone]          Patient: Alma Mistry   MR Number: [id number]   YOB: 1960   Date of Visit: 12/12/2018       Dear Trev Adan:    Thank you for referring Alma Mistry to me for evaluation. Attached you will find relevant portions of my assessment and plan of care.    If you have questions, please do not hesitate to call me. I look forward to following Alma Mistry along with you.    Sincerely,    Lakhwinder Mtz MD    Enclosure  CC:  No Recipients    If you would like to receive this communication electronically, please contact [email or web address] or [phone] to request more information on [employer] access.    For providers and/or their staff who would like to refer a patient to [facility], please contact us through our one-stop-shop provider referral line, [facility] , at [phone].    If you feel you have received this communication in error or would no longer like to receive these types of communications, please e-mail [email or web address]

## 2018-12-13 DIAGNOSIS — G25.81 RLS (RESTLESS LEGS SYNDROME): ICD-10-CM

## 2018-12-13 DIAGNOSIS — F41.0 PANIC ATTACKS: ICD-10-CM

## 2018-12-13 RX ORDER — ROPINIROLE 1 MG/1
1 TABLET, FILM COATED ORAL 3 TIMES DAILY
Qty: 90 TABLET | Refills: 11 | Status: SHIPPED | OUTPATIENT
Start: 2018-12-13 | End: 2019-05-22 | Stop reason: SINTOL

## 2018-12-13 RX ORDER — LORAZEPAM 0.5 MG/1
0.5 TABLET ORAL EVERY 6 HOURS PRN
Qty: 20 TABLET | Refills: 1 | Status: SHIPPED | OUTPATIENT
Start: 2018-12-13 | End: 2019-03-07 | Stop reason: SDUPTHER

## 2018-12-13 NOTE — TELEPHONE ENCOUNTER
Patient  requesting refills on the following medications pended and set up to be sent to pharmacy: Ropinrole and Lorazepam. Please advise

## 2018-12-14 NOTE — TELEPHONE ENCOUNTER
Sent p[atient MyChart message advising that prescriptions have been approved and sent to the pharmacy.

## 2018-12-17 ENCOUNTER — TELEPHONE (OUTPATIENT)
Dept: NEUROLOGY | Facility: CLINIC | Age: 58
End: 2018-12-17

## 2018-12-17 LAB — METHYLMALONATE SERPL-SCNC: 0.21 UMOL/L

## 2019-01-02 ENCOUNTER — DOCUMENTATION ONLY (OUTPATIENT)
Dept: NEUROLOGY | Facility: CLINIC | Age: 59
End: 2019-01-02

## 2019-01-02 ENCOUNTER — PROCEDURE VISIT (OUTPATIENT)
Dept: NEUROLOGY | Facility: CLINIC | Age: 59
End: 2019-01-02
Payer: MEDICAID

## 2019-01-02 DIAGNOSIS — G62.9 POLYNEUROPATHY: ICD-10-CM

## 2019-01-02 PROCEDURE — 95911 NRV CNDJ TEST 9-10 STUDIES: CPT | Mod: PBBFAC

## 2019-01-02 PROCEDURE — 95911 NRV CNDJ TEST 9-10 STUDIES: CPT | Mod: 26,S$PBB,, | Performed by: PSYCHIATRY & NEUROLOGY

## 2019-01-02 PROCEDURE — 95911 PR NERVE CONDUCTION STUDY; 9-10 STUDIES: ICD-10-PCS | Mod: 26,S$PBB,, | Performed by: PSYCHIATRY & NEUROLOGY

## 2019-01-08 ENCOUNTER — PATIENT MESSAGE (OUTPATIENT)
Dept: NEUROLOGY | Facility: CLINIC | Age: 59
End: 2019-01-08

## 2019-01-08 NOTE — PROCEDURES
[facility]   Nnamdi Pettit  Department of Neurology  [address] [place] [address] JAYME Dial  [address]  Phone [phone]     Fax  [phone]        Patient: Fede Marquez YOB: 1960  Patient ID: [id number] Age: 58 Years 1 Months  Sex: Female Ref. Provider: Lakhwinder Mtz MD  Notes: BLE/Polyneuropathy/Tingling/Burning/Pain in both feet/B12 deficiency X 8 yrs.      SUMMARY    Nerve conduction studies were performed in the right and left lower extremity.  The right peroneal motor study recording the extensor digitorum brevis showed a normal amplitude, normal distal latency and normal conduction velocity.  No conduction block or focal slowing was present across the fibular neck.  The right tibial motor study recording the abductor hallucis brevis showed a normal amplitude, normal distal latency and normal conduction velocity.      Right sural sensory response showed a normal amplitude and conduction velocity.  Right superficial peroneal sensory response showed a normal amplitude and conduction velocity.    The left peroneal motor study recording the extensor digitorum brevis showed a normal amplitude, normal distal latency and normal conduction velocity.  No conduction block or focal slowing was present across the fibular neck.  The left tibial motor study recording the abductor hallucis brevis showed a normal amplitude, normal distal latency and normal conduction velocity.     Left sural sensory response showed a normal amplitude and conduction velocity.  Left superficial peroneal sensory response showed a normal amplitude and conduction velocity.       EMG needle test was not performed.               IMPRESSION    There is no definitive electrophysiologic evidence of large fiber polyneuropathy.            -----------------------------------------             Lakhwinder Mtz M.D., F.A.A.N.     Diplomate, American Board of Psychiatry and Neurology  Diplomate, American Board of Clinical  Neurophysiology  Fellow, American Academy of Neurology            Sensory NCS      Nerve / Sites Rec. Site Onset Peak NP Amp PP Amp Dist Kp d Lat.2     ms ms µV µV cm m/s ms   L SURAL - Lat Mall      Calf Lat Mall 2.34 2.76 8.7 10.6 14 59.7 2.76      Ref.   4.00  10.0  40.0    R SURAL - Lat Mall      Calf Lat Mall 3.07 3.96 4.7 4.3 14 45.6 3.96      Ref.   4.00  10.0  40.0    L SUP PERONEAL      Lat Leg Ankle 2.45 3.13 10.2 9.5 14 57.2 3.13      Ref.   4.00  10.0  40.0    R SUP PERONEAL      Lat Leg Ankle 2.40 3.28 4.8 4.8 14 58.4 3.28      Ref.   4.00  10.0  40.0    R LATERAL PLANTAR      Sole Med Mall 2.19 3.33 4.0 5.4 14 64.0 3.33   L LATERAL PLANTAR      Sole Med Mall NR NR NR NR   NR   R MEDIAL PLANTAR      Sole Med Mall 2.45 3.07 5.0 7.3 14 57.2 3.07   L MEDIAL PLANTAR      Sole Med Mall 2.34 3.07 2.1 7.3   3.07       Motor NCS      Nerve / Sites Rec. Site Lat Amp Dist Kp     ms mV cm m/s   L COMM PERONEAL - EDB      Ankle EDB 4.32 3.4 8       Ref.  5.50 3.0        FibHead EDB 9.95 3.1 27 48.0      Ref.     40.0      Knee EDB 11.88 3.0 10 51.9   R COMM PERONEAL - EDB      Ankle EDB 4.79 1.4 8       Ref.  5.50 3.0        FibHead EDB 10.89 1.3 28 45.9      Ref.     40.0      Knee EDB 12.60 1.3 10 58.2   L TIBIAL (KNEE) - AH      Ankle AH 3.80 12.9 8       Ref.  6.00 6.0        Knee AH 11.56 11.4 34 43.8      Ref.     40.0   R TIBIAL (KNEE) - AH      Ankle AH 4.22 14.4 8       Ref.  6.00 6.0        Knee AH 12.92 10.1 36 41.4      Ref.     40.0

## 2019-01-08 NOTE — PROCEDURES
[facility]   Nnamdi Pettit  Department of Neurology  [address] [place] [address] JAYME Dial  [address]  Phone [phone]     Fax  [phone]        Patient: Fede Marquez YOB: 1960  Patient ID: [id number] Age: 58 Years 1 Months  Sex: Female Ref. Provider: Lakhwinder Mtz MD  Notes: BLE/Polyneuropathy/Tingling/Burning/Pain in both feet/B12 deficiency X 8 yrs.      SUMMARY    Nerve conduction studies were performed in the right and left lower extremity.  The right peroneal motor study recording the extensor digitorum brevis showed a normal amplitude, normal distal latency and normal conduction velocity.  No conduction block or focal slowing was present across the fibular neck.  The right tibial motor study recording the abductor hallucis brevis showed a normal amplitude, normal distal latency and normal conduction velocity.      Right sural sensory response showed a normal amplitude and conduction velocity.  Right superficial peroneal sensory response showed a normal amplitude and conduction velocity.    The left peroneal motor study recording the extensor digitorum brevis showed a normal amplitude, normal distal latency and normal conduction velocity.  No conduction block or focal slowing was present across the fibular neck.  The left tibial motor study recording the abductor hallucis brevis showed a normal amplitude, normal distal latency and normal conduction velocity.     Left sural sensory response showed a normal amplitude and conduction velocity.  Left superficial peroneal sensory response showed a normal amplitude and conduction velocity.       EMG needle test was not performed.               IMPRESSION    There is no definitive electrophysiologic evidence of large fiber polyneuropathy.            -----------------------------------------             Lakhwinder Mtz M.D., F.A.A.N.     Diplomate, American Board of Psychiatry and Neurology  Diplomate, American Board of Clinical  Neurophysiology  Fellow, American Academy of Neurology            Sensory NCS      Nerve / Sites Rec. Site Onset Peak NP Amp PP Amp Dist Kp d Lat.2     ms ms µV µV cm m/s ms   L SURAL - Lat Mall      Calf Lat Mall 2.34 2.76 8.7 10.6 14 59.7 2.76      Ref.   4.00  10.0  40.0    R SURAL - Lat Mall      Calf Lat Mall 3.07 3.96 4.7 4.3 14 45.6 3.96      Ref.   4.00  10.0  40.0    L SUP PERONEAL      Lat Leg Ankle 2.45 3.13 10.2 9.5 14 57.2 3.13      Ref.   4.00  10.0  40.0    R SUP PERONEAL      Lat Leg Ankle 2.40 3.28 4.8 4.8 14 58.4 3.28      Ref.   4.00  10.0  40.0    R LATERAL PLANTAR      Sole Med Mall 2.19 3.33 4.0 5.4 14 64.0 3.33   L LATERAL PLANTAR      Sole Med Mall NR NR NR NR   NR   R MEDIAL PLANTAR      Sole Med Mall 2.45 3.07 5.0 7.3 14 57.2 3.07   L MEDIAL PLANTAR      Sole Med Mall 2.34 3.07 2.1 7.3   3.07       Motor NCS      Nerve / Sites Rec. Site Lat Amp Dist Kp     ms mV cm m/s   L COMM PERONEAL - EDB      Ankle EDB 4.32 3.4 8       Ref.  5.50 3.0        FibHead EDB 9.95 3.1 27 48.0      Ref.     40.0      Knee EDB 11.88 3.0 10 51.9   R COMM PERONEAL - EDB      Ankle EDB 4.79 1.4 8       Ref.  5.50 3.0        FibHead EDB 10.89 1.3 28 45.9      Ref.     40.0      Knee EDB 12.60 1.3 10 58.2   L TIBIAL (KNEE) - AH      Ankle AH 3.80 12.9 8       Ref.  6.00 6.0        Knee AH 11.56 11.4 34 43.8      Ref.     40.0   R TIBIAL (KNEE) - AH      Ankle AH 4.22 14.4 8       Ref.  6.00 6.0        Knee AH 12.92 10.1 36 41.4      Ref.     40.0

## 2019-01-16 ENCOUNTER — LAB VISIT (OUTPATIENT)
Dept: LAB | Facility: HOSPITAL | Age: 59
End: 2019-01-16
Attending: PSYCHIATRY & NEUROLOGY
Payer: MEDICAID

## 2019-01-16 ENCOUNTER — OFFICE VISIT (OUTPATIENT)
Dept: NEUROLOGY | Facility: CLINIC | Age: 59
End: 2019-01-16
Payer: MEDICAID

## 2019-01-16 VITALS
BODY MASS INDEX: 25.52 KG/M2 | WEIGHT: 130 LBS | HEIGHT: 60 IN | DIASTOLIC BLOOD PRESSURE: 64 MMHG | HEART RATE: 62 BPM | SYSTOLIC BLOOD PRESSURE: 114 MMHG

## 2019-01-16 DIAGNOSIS — G62.9 POLYNEUROPATHY: Primary | ICD-10-CM

## 2019-01-16 DIAGNOSIS — D51.0 PERNICIOUS ANEMIA: Chronic | ICD-10-CM

## 2019-01-16 DIAGNOSIS — F32.A DEPRESSION, UNSPECIFIED DEPRESSION TYPE: ICD-10-CM

## 2019-01-16 DIAGNOSIS — M25.50 ARTHRALGIA, UNSPECIFIED JOINT: ICD-10-CM

## 2019-01-16 DIAGNOSIS — M79.2 NEUROPATHIC PAIN: ICD-10-CM

## 2019-01-16 DIAGNOSIS — L90.0 LICHEN SCLEROSUS: ICD-10-CM

## 2019-01-16 DIAGNOSIS — F32.1 MAJOR DEPRESSIVE DISORDER, SINGLE EPISODE, MODERATE: ICD-10-CM

## 2019-01-16 DIAGNOSIS — G25.81 RLS (RESTLESS LEGS SYNDROME): ICD-10-CM

## 2019-01-16 DIAGNOSIS — M10.9 GOUT OF LEFT ANKLE, UNSPECIFIED CAUSE, UNSPECIFIED CHRONICITY: ICD-10-CM

## 2019-01-16 DIAGNOSIS — F41.0 PANIC DISORDER: ICD-10-CM

## 2019-01-16 DIAGNOSIS — Z86.2 HISTORY OF IRON DEFICIENCY ANEMIA: ICD-10-CM

## 2019-01-16 LAB — URATE SERPL-MCNC: 5.6 MG/DL

## 2019-01-16 PROCEDURE — 99999 PR PBB SHADOW E&M-EST. PATIENT-LVL III: CPT | Mod: PBBFAC,,, | Performed by: PSYCHIATRY & NEUROLOGY

## 2019-01-16 PROCEDURE — 99214 OFFICE O/P EST MOD 30 MIN: CPT | Mod: S$GLB,,, | Performed by: PSYCHIATRY & NEUROLOGY

## 2019-01-16 PROCEDURE — 99999 PR PBB SHADOW E&M-EST. PATIENT-LVL III: ICD-10-PCS | Mod: PBBFAC,,, | Performed by: PSYCHIATRY & NEUROLOGY

## 2019-01-16 PROCEDURE — 99213 OFFICE O/P EST LOW 20 MIN: CPT | Mod: PBBFAC | Performed by: PSYCHIATRY & NEUROLOGY

## 2019-01-16 PROCEDURE — 84550 ASSAY OF BLOOD/URIC ACID: CPT

## 2019-01-16 PROCEDURE — 99214 PR OFFICE/OUTPT VISIT, EST, LEVL IV, 30-39 MIN: ICD-10-PCS | Mod: S$GLB,,, | Performed by: PSYCHIATRY & NEUROLOGY

## 2019-01-16 PROCEDURE — 36415 COLL VENOUS BLD VENIPUNCTURE: CPT

## 2019-01-16 RX ORDER — GABAPENTIN 100 MG/1
100 CAPSULE ORAL NIGHTLY
Qty: 30 CAPSULE | Refills: 5 | Status: SHIPPED | OUTPATIENT
Start: 2019-01-16 | End: 2019-08-26 | Stop reason: SDUPTHER

## 2019-01-16 NOTE — PATIENT INSTRUCTIONS
Gout    Gout is an inflammation of a joint due to a build-up of gout crystals in the joint fluid. This occurs when there is an excess of uric acid (a normal waste product) in the body. Uric acid builds up in the body when the kidneys are unable to filter enough of it from the blood. This may occur with age. It is also associated with kidney disease. Gout occurs more often in people with obesity, diabetes, high blood pressure, or high levels of fats in the blood. It may run in families. Gout tends to come and go. A flare up of gout is called an attack. Drinking alcohol or eating certain foods (such as shellfish or foods with additives such as high-fructose corn syrup) may increase uric acid levels in the blood and cause a gout attack.  During a gout attack, the affected joint may become a hot, red, swollen and painful. If you have had one attack of gout, you are likely to have another. An attack of gout can be treated with medicine. If these attacks become frequent, a daily medicine may be prescribed to help the kidneys remove uric acid from the body.  Home care  During a gout attack:  · Rest painful joints. If gout affects the joints of your foot or leg, you may want to use crutches for the first few days to keep from bearing weight on the affected joint.  · When sitting or lying down, raise the painful joint to a level higher than your heart.  · Apply an ice pack (ice cubes in a plastic bag wrapped in a thin towel) over the injured area for 20 minutes every 1 to 2 hours the first day for pain relief. Continue this 3 to 4 times a day for swelling and pain.  · Avoid alcohol and foods listed below (see Preventing attacks) during a gout attack. Drink extra fluid to help flush the uric acid through your kidneys.  · If you were prescribed a medicine to treat gout, take it as your healthcare provider has instructed. Don't skip doses.  · Take anti-inflammatory medicine as directed.   · If pain medicines have been  prescribed, take them exactly as directed.    Preventing attacks  · Minimize or avoid alcohol use. Excess alcohol intake can cause a gout attack.  · Limit these foods and beverages:  ¨ Organ meats, such as kidneys and liver  ¨ Certain seafoods (anchovies, sardines, shrimp, scallops, herring, mackerel)  ¨ Wild game, meat extracts and meat gravies  ¨ Foods and beverages sweetened with high-fructose corn syrup, such as sodas  · Eat a healthy diet including low-fat and nonfat dairy, whole grains, and vegetables.  · If you are overweight, talk to your healthcare provider about a weight reduction plan. Avoid fasting or extreme low calorie diets (less than 900 calories per day). This will increase uric acid levels in the body.  · If you have diabetes or high blood pressure, work with your doctor to manage these conditions.  · Protect the joint from injury. Trauma can trigger a gout attack.  Follow-up care  Follow up with your healthcare provider, or as advised.   When to seek medical advice  Call your healthcare provider if you have any of the following:  · Fever over 100.4°F (38.ºC) with worsening joint pain  · Increasing redness around the joint  · Pain developing in another joint  · Repeated vomiting, abdominal pain, or blood in the vomit or stool (black or red color)  Date Last Reviewed: 3/1/2017  © 9940-2355 [employer]. [address] [place] [address], [place] [address]. All rights reserved. This information is not intended as a substitute for professional medical care. Always follow your healthcare professional's instructions.        Uric Acid (Blood)  Does this test have other names?  Serum uric acid  What is this test?  This test measures the amount of uric acid in your blood.  Uric acid is a normal bodily waste product. It forms when chemicals called purines break down. Purines are a natural substance found in the body and are also found in many foods such as liver, shellfish, and alcohol. They can also be  formed in the body when DNA is broken down.   When purines are broken down to uric acid in the blood, the body gets rid of it when you urinate or have a bowel movement. But if your body makes too much uric acid, or if your kidneys aren't working properly, uric acid can build up in the blood. Uric acid levels can also increase when you eat too many high-purine foods or take certain medicines like diuretics, aspirin, and niacin. Then crystals of uric acid can form and collect in the joints, causing painful inflammation. This condition is called gout.  Why do I need this test?  You might need this test if your healthcare provider wants to see whether you have high levels of uric acid in your blood. Your provider may recommend this test if you have symptoms of gout, although most people with hyperuricemia don't develop gout. Symptoms of gout include:  · Joint pain or tenderness  · Swelling in a joint or reddened skin around a joint  · Swelling and pain in the big toe, ankle, or knee  · Joints that are hot to the touch  · Swelling and pain that affects only one joint in the body  · Skin that looks shiny and is red or purple  You may also need this test if you have symptoms of kidney stones. Symptoms include:  · Severe pain along your lower back. This may repeatedly get worse and then ease up. The pain may also travel to your genitals.  · Nausea  · Vomiting  · Urgent need to urinate  · Blood in your urine  What other tests might I have along with this test?  Your healthcare provider may also order other tests to diagnose gout, include looking at a sample of joint fluid drawn out with a needle.  Your provider may also order a urinalysis if he or she suspects that you have a kidney stone. The urinalysis looks for blood, white blood cells, and crystals.  Your provider may also order tests of your blood and urine to find out what's causing the high levels uric acid.  What do my test results mean?  Many things may affect your  lab test results. These include the method each lab uses to do the test. Even if your test results are different from the normal value, you may not have a problem. To learn what the results mean for you, talk with your healthcare provider.  Results are given in milligrams per deciliter (mg/dL). Here are results that may mean you have hyperuricemia:  · For females: higher than 6 mg/dL  · For males: higher than 7 mg/dL  Many health conditions can cause high levels of uric acid. These include cancer, kidney disease, hypothyroidism, hyperparathyroidism, and sarcoidosis.  Your uric acid levels may be high if you eat foods high in purines, such as organ meats, dried beans and peas, and certain fish - anchovies, herring, sardines, and mackerel. High levels can also be caused by a low-salt diet.  How is this test done?  The test requires a blood sample, which is drawn through a needle from a vein in your arm.  Does this test pose any risks?  Taking a blood sample with a needle carries risks that include bleeding, infection, bruising, or feeling dizzy. When the needle pricks your arm, you may feel a slight stinging sensation or pain. Afterward, the site may be slightly sore.  What might affect my test results?  Certain medicines may affect your test results. These include:  · Aspirin and other medicines that contain salicylate  · Cyclosporine, a medicine sometimes used for autoimmune diseases  · Levodopa, a medicine used to treat Parkinson disease  · Certain diuretic medicines such as hydrochlorothiazide  · Vitamin B-3 (niacin)  Other things that may affect your test results include:  · Vigorous exercise  · Chemotherapy or radiation therapy to treat cancer  · Foods high in purines, including organ meats, mushrooms, some types of fish and seafood, and dried peas and beans   How do I get ready for this test?  Ask your healthcare provider if you should avoid any foods, beverages, or medications before the test.  Be sure your  provider knows about all medicines, herbs, vitamins, and supplements you are taking. This includes medicines that don't need a prescription and any illicit drugs you may use.      © 5412-0761 [employer]. [address] [place] [address], [place] [address]. All rights reserved. This information is not intended as a substitute for professional medical care. Always follow your healthcare professional's instructions.

## 2019-01-16 NOTE — PROGRESS NOTES
Subjective:       Patient ID: Alma Mistry is a 58 y.o. female.    Chief Complaint: No chief complaint on file.    HPI     BACKGROUND HISTORY       The patient started complaining of bilateral symmetric feet paroxysmal tingling and burning pain about several years ago.  The patient usually notices the pain after significant physical activity or prolonged sitting or standing.  The pain is paroxysmal and not persistent but is getting much worse with time and becoming disabling. She also reports that her legs are very restless at night and Requip helps with that when she takes it.  The pain and tingling involves the whole feet, mainly the sole area and progresses to the very distal part of the legs.  The patient denies any hand symptoms. No ankle or foot weakness. The patient denies any low back with radiation to the legs.  The patient has good control on her bowel and bladder. She was found to have very low B12 <100 and was diagnosed with PA 5 years after the symptoms. She is on B12 IM monthly.No history of DM. No known history of thyroid disease. No history of malignancies.  No history of toxic environmental exposure.. No history of excessive alcohol or recreational drug abuse. No history of chronic hepatitis or HIV. Recommended  NCS/EMG, Continue B12, MMA. IIn terms of management I counseled the patient that neuropathic pain meds target is 40% reduction of pain. We tried Gabapentin/GBP-Neurontin.    I continued Requip 1 mg TID for RLS. She has tolerated it well with no SEs      INTERVAL HISTORY      12- MMA NL. 01- NCS BLE is inconclusive. GBP helped with pain but caused significant sedation.     Requip 1 mg TID continues to help with RLS.       Review of Systems   Constitutional: Negative for appetite change and fatigue.   HENT: Negative for hearing loss and tinnitus.    Eyes: Negative for photophobia and visual disturbance.   Respiratory: Negative for apnea and shortness of breath.    Cardiovascular:  Negative for chest pain and palpitations.   Gastrointestinal: Negative for nausea and vomiting.   Endocrine: Negative for cold intolerance and heat intolerance.   Genitourinary: Negative for difficulty urinating and urgency.   Musculoskeletal: Positive for arthralgias. Negative for back pain, gait problem, joint swelling, myalgias, neck pain and neck stiffness.   Skin: Negative for color change and rash.   Allergic/Immunologic: Negative for environmental allergies and immunocompromised state.   Neurological: Positive for numbness. Negative for dizziness, tremors, seizures, syncope, facial asymmetry, speech difficulty, weakness, light-headedness and headaches.   Hematological: Negative for adenopathy. Does not bruise/bleed easily.   Psychiatric/Behavioral: Positive for dysphoric mood. Negative for agitation, behavioral problems, confusion, decreased concentration, hallucinations, self-injury, sleep disturbance and suicidal ideas. The patient is nervous/anxious. The patient is not hyperactive.          Current Outpatient Medications:     aspirin-acetaminophen-caffeine 250-250-65 mg (EXCEDRIN MIGRAINE) 250-250-65 mg per tablet, Take 1 tablet by mouth as needed for Pain., Disp: , Rfl:     CALCIUM CIT/MGOX/VIT D3/B6/MIN (CITRACAL PLUS ORAL), Take 1 tablet by mouth as needed. , Disp: , Rfl:     CHOLECALCIFEROL, VITAMIN D3, (VITAMIN D3 ORAL), Take 1,000 Units by mouth daily as needed. , Disp: , Rfl:     clobetasol 0.05% (TEMOVATE) 0.05 % Oint, Apply topically as needed. , Disp: , Rfl:     cyanocobalamin 1,000 mcg/mL injection, Inject 1 mL (1,000 mcg total) into the muscle every 28 days., Disp: 10 mL, Rfl: 1    LORazepam (ATIVAN) 0.5 MG tablet, Take 1 tablet (0.5 mg total) by mouth every 6 (six) hours as needed for Anxiety., Disp: 20 tablet, Rfl: 1    magnesium 30 mg Tab, Take 1 tablet by mouth once., Disp: , Rfl:     multivitamin (ONE DAILY MULTIVITAMIN) per tablet, Take 1 tablet by mouth once daily., Disp: , Rfl:      naproxen (NAPROSYN) 500 MG tablet, Take 1 tablet (500 mg total) by mouth 2 (two) times daily with meals. For pain and inflammation (Patient taking differently: Take 500 mg by mouth 2 (two) times daily as needed. For pain and inflammation), Disp: 30 tablet, Rfl: 0    psyllium (FIBER) powder, Take 1 packet by mouth daily as needed., Disp: , Rfl:     rOPINIRole (REQUIP) 1 MG tablet, Take 1 tablet (1 mg total) by mouth 3 (three) times daily., Disp: 90 tablet, Rfl: 11  Past Medical History:   Diagnosis Date    Atrophic gastritis     B12 deficiency     Breast lump on left side at 3 o'clock position 03/25/2015    Fibrocystic breast     Lichen sclerosus 08/16/2012     Past Surgical History:   Procedure Laterality Date    BREAST BIOPSY Right 2009    benign    COLONOSCOPY  09/10/2010    ESOPHAGOGASTRODUODENOSCOPY  09/10/2010    HYSTERECTOMY  2013    TONSILLECTOMY       Social History     Socioeconomic History    Marital status:      Spouse name: Not on file    Number of children: Not on file    Years of education: Not on file    Highest education level: Not on file   Social Needs    Financial resource strain: Not on file    Food insecurity - worry: Not on file    Food insecurity - inability: Not on file    Transportation needs - medical: Not on file    Transportation needs - non-medical: Not on file   Occupational History    Not on file   Tobacco Use    Smoking status: Never Smoker    Smokeless tobacco: Never Used   Substance and Sexual Activity    Alcohol use: Yes     Frequency: Monthly or less     Drinks per session: 1 or 2    Drug use: No    Sexual activity: No   Other Topics Concern    Not on file   Social History Narrative    1 dog, no smokers; Originally from [facility].       Objective:     GENERAL APPEARANCE:     The patient looks uncomfortable.    No signs of medical or psychiatric distress.    Normal breathing pattern.    No dysmorphic features    Normal eye contact.      GENERAL MEDICAL EXAM:    HEENT:  Head is atraumatic normocephalic.     Neck and Axillae: No JVD.     Cardiopulmonary: No cyanosis. No tachypnea. Normal respiratory effort.    Gastrointestinal:  No stomas or lesions.     Skin, Hair and Nails: No pathognonomic skin rash. No neurofibromatosis. No stigmata of autoimmune disease.     Limbs: No varicose veins. No edema.     Muskoskeletal: No deformities. No signs of longstanding neuropathy. No dislocations or fractures.        Neurologic Exam     Mental Status   Oriented to person, place, and time.   Registration: recalls 3 of 3 objects. Recall at 5 minutes: recalls 3 of 3 objects. Follows 3 step commands.   Attention: normal. Concentration: normal.   Speech: speech is normal   Level of consciousness: alert  Knowledge: good and consistent with education. Able to perform simple calculations.   Able to name object. Able to read. Able to repeat. Able to write. Normal comprehension.     Cranial Nerves     CN II   Visual fields full to confrontation.   Visual acuity: normal  Right visual field deficit: none  Left visual field deficit: none     CN III, IV, VI   Pupils are equal, round, and reactive to light.  Extraocular motions are normal.   Right pupil: Size: 2 mm. Shape: regular. Reactivity: brisk. Consensual response: intact. Accommodation: intact.   Left pupil: Size: 2 mm. Shape: regular. Reactivity: brisk. Consensual response: intact. Accommodation: intact.   CN III: no CN III palsy  CN VI: no CN VI palsy  Nystagmus: none   Diplopia: none  Ophthalmoparesis: none  Upgaze: normal  Downgaze: normal  Conjugate gaze: present  Vestibulo-ocular reflex: present    CN V   Facial sensation intact.   Right facial sensation deficit: none  Left facial sensation deficit: none  Right corneal reflex: normal  Left corneal reflex: normal    CN VII   Right facial weakness: none  Left facial weakness: none  Right taste: normal  Left taste: normal    CN VIII   CN VIII normal.   Hearing:  intact  Right Rinne: AC > BC  Left Rinne: AC > BC  Amor: does not lateralize     CN IX, X   CN IX normal.   CN X normal.   Palate: symmetric  Right gag reflex: normal  Left gag reflex: normal    CN XI   CN XI normal.   Right sternocleidomastoid strength: normal  Left sternocleidomastoid strength: normal  Right trapezius strength: normal  Left trapezius strength: normal    CN XII   CN XII normal.   Tongue: not atrophic  Fasciculations: absent  Tongue deviation: none    Motor Exam   Muscle bulk: normal  Overall muscle tone: normal  Right arm tone: normal  Left arm tone: normal  Right arm pronator drift: absent  Left arm pronator drift: absent  Right leg tone: normal  Left leg tone: normal    Strength   Strength 5/5 throughout.   Right neck flexion: 5/5  Left neck flexion: 5/5  Right neck extension: 5/5  Left neck extension: 5/5  Right deltoid: 5/5  Left deltoid: 5/5  Right biceps: 5/5  Left biceps: 5/5  Right triceps: 5/5  Left triceps: 5/5  Right wrist flexion: 5/5  Left wrist flexion: 5/5  Right wrist extension: 5/5  Left wrist extension: 5/5  Right interossei: 5/5  Left interossei: 5/5  Right abdominals: 5/5  Left abdominals: 5/5  Right iliopsoas: 5/5  Left iliopsoas: 5/5  Right quadriceps: 5/5  Left quadriceps: 5/5  Right hamstrin/5  Left hamstrin/5  Right glutei: 5/5  Left glutei: 5/5  Right anterior tibial: 5/5  Left anterior tibial: 5/5  Right posterior tibial: 5/5  Left posterior tibial: 5/5  Right peroneal: 5/5  Left peroneal: 5/5  Right gastroc: 5/5  Left gastroc: 5/5    Sensory Exam   Right arm light touch: decreased from wrist  Left arm light touch: decreased from wrist  Right leg light touch: decreased from knee  Left leg light touch: decreased from knee  Right arm vibration: normal  Left arm vibration: normal  Right leg vibration: decreased from knee  Left leg vibration: decreased from knee  Right arm proprioception: normal  Left arm proprioception: normal  Right leg proprioception: decreased  from knee  Left leg proprioception: decreased from knee  Right arm pinprick: decreased from wrist  Left arm pinprick: decreased from wrist  Right leg pinprick: decreased from knee  Left leg pinprick: decreased from knee  Graphesthesia: normal  Stereognosis: normal    Gait, Coordination, and Reflexes     Gait  Gait: normal    Coordination   Romberg: negative  Finger to nose coordination: normal  Heel to shin coordination: normal  Tandem walking coordination: normal    Tremor   Resting tremor: absent  Intention tremor: absent  Action tremor: absent    Reflexes   Right brachioradialis: 2+  Left brachioradialis: 2+  Right biceps: 2+  Left biceps: 2+  Right triceps: 2+  Left triceps: 2+  Right patellar: 0  Left patellar: 0  Right achilles: 0  Left achilles: 0  Right plantar: normal  Left plantar: normal  Right Salomon: absent  Left Salomon: absent  Right ankle clonus: absent  Left ankle clonus: absent  Right pendular knee jerk: absent  Left pendular knee jerk: absent      Lab Results   Component Value Date    WBC 7.04 10/12/2018    HGB 12.1 10/12/2018    HCT 36.3 (L) 10/12/2018    MCV 82 10/12/2018     10/12/2018     Sodium   Date Value Ref Range Status   10/12/2018 137 136 - 145 mmol/L Final     Potassium   Date Value Ref Range Status   10/12/2018 4.7 3.5 - 5.1 mmol/L Final     Chloride   Date Value Ref Range Status   10/12/2018 103 95 - 110 mmol/L Final     CO2   Date Value Ref Range Status   10/12/2018 25 23 - 29 mmol/L Final     Glucose   Date Value Ref Range Status   10/12/2018 101 70 - 110 mg/dL Final     BUN, Bld   Date Value Ref Range Status   10/12/2018 12 6 - 20 mg/dL Final     Creatinine   Date Value Ref Range Status   10/12/2018 0.8 0.5 - 1.4 mg/dL Final     Calcium   Date Value Ref Range Status   10/12/2018 9.8 8.7 - 10.5 mg/dL Final     Total Protein   Date Value Ref Range Status   10/12/2018 7.5 6.0 - 8.4 g/dL Final     Albumin   Date Value Ref Range Status   10/12/2018 4.2 3.5 - 5.2 g/dL Final      Total Bilirubin   Date Value Ref Range Status   10/12/2018 0.5 0.1 - 1.0 mg/dL Final     Comment:     For infants and newborns, interpretation of results should be based  on gestational age, weight and in agreement with clinical  observations.  Premature Infant recommended reference ranges:  Up to 24 hours.............<8.0 mg/dL  Up to 48 hours............<12.0 mg/dL  3-5 days..................<15.0 mg/dL  6-29 days.................<15.0 mg/dL       Alkaline Phosphatase   Date Value Ref Range Status   10/12/2018 89 55 - 135 U/L Final     AST   Date Value Ref Range Status   10/12/2018 34 10 - 40 U/L Final     ALT   Date Value Ref Range Status   10/12/2018 28 10 - 44 U/L Final     Anion Gap   Date Value Ref Range Status   10/12/2018 9 8 - 16 mmol/L Final     eGFR if    Date Value Ref Range Status   10/12/2018 >60 >60 mL/min/1.73 m^2 Final     eGFR if non    Date Value Ref Range Status   10/12/2018 >60 >60 mL/min/1.73 m^2 Final     Comment:     Calculation used to obtain the estimated glomerular filtration  rate (eGFR) is the CKD-EPI equation.        Lab Results   Component Value Date    BHLDQRVQ30 286 10/12/2018     Lab Results   Component Value Date    TSH 1.454 09/21/2017 12-    MMA NL      01-    NCS BLE is inconclusive       Assessment:       1. Polyneuropathy    2. RLS (restless legs syndrome)    3. Neuropathic pain    4. Depression, unspecified depression type    5. Major depressive disorder, single episode, moderate    6. Panic disorder    7. Lichen sclerosus    8. Pernicious anemia    9. History of iron deficiency anemia        Plan:         [id number] POLYNEUROPATHY-PN     Change Gabapentin/GBP-Neurontin to 100 mg QHS    Continue B12       RLS     Continue Requip 1 mg TID. She has tolerated it well with no SEs      MISCELLANEOUS    Check UA and refer to rheumatology to evaluate for gout      RTC in 3 months

## 2019-01-18 ENCOUNTER — OFFICE VISIT (OUTPATIENT)
Dept: PSYCHIATRY | Facility: CLINIC | Age: 59
End: 2019-01-18
Payer: MEDICAID

## 2019-01-18 DIAGNOSIS — F33.2 SEVERE EPISODE OF RECURRENT MAJOR DEPRESSIVE DISORDER, WITHOUT PSYCHOTIC FEATURES: Primary | ICD-10-CM

## 2019-01-18 DIAGNOSIS — F41.1 GENERALIZED ANXIETY DISORDER WITH PANIC ATTACKS: ICD-10-CM

## 2019-01-18 DIAGNOSIS — F41.0 GENERALIZED ANXIETY DISORDER WITH PANIC ATTACKS: ICD-10-CM

## 2019-01-18 DIAGNOSIS — F43.21 COMPLICATED BEREAVEMENT: ICD-10-CM

## 2019-01-18 PROCEDURE — 90792 PSYCH DIAG EVAL W/MED SRVCS: CPT | Mod: PBBFAC,PN | Performed by: PSYCHIATRY & NEUROLOGY

## 2019-01-18 PROCEDURE — 90792 PSYCH DIAG EVAL W/MED SRVCS: CPT | Mod: S$PBB,,, | Performed by: PSYCHIATRY & NEUROLOGY

## 2019-01-18 PROCEDURE — 90792 PR PSYCHIATRIC DIAGNOSTIC EVALUATION W/MEDICAL SERVICES: ICD-10-PCS | Mod: S$PBB,,, | Performed by: PSYCHIATRY & NEUROLOGY

## 2019-01-18 RX ORDER — DULOXETIN HYDROCHLORIDE 20 MG/1
CAPSULE, DELAYED RELEASE ORAL
Qty: 42 CAPSULE | Refills: 0 | Status: SHIPPED | OUTPATIENT
Start: 2019-01-18 | End: 2019-01-21 | Stop reason: SDUPTHER

## 2019-01-18 NOTE — PROGRESS NOTES
Outpatient Psychiatry Initial Visit (MD/NP)    2019    Alma Mistry, a 58 y.o. female, presenting for initial evaluation visit. Met with patient     Reason for Encounter: Consult from PCP     Chief Complaint: Medication management for depression and anxiety    History of Present Illness:   57 yo female who presents today for an evaluation for depression and anxiety. She is dealing with the grief of loosing her youngest daughter, Bibiana, two years ago in a drowning accident.  in 2017 after a 25 year marriage to a man whom she now describes as a narcissist. After coming to terms with the divorce, her youngest daughter  in a drowning accident in [place], soon after having moved there.     She began to experience Panic attacks after the death of her daughter. First episode took place during a plane ride to [place]. She describes it as follows:  Panic attacks start as an abdominal pressure, then she realizes that she is hyperventilating, then she becomes dizzy, her legs become more restless and bothersome during this time. Although the first time she recognized it was during the flight, she actually had them during the divorce.     Besides the grief from the death of her daughter, she struggles with the knowledge that her daughter also suffered from depression and after daughter's death discovered that she was experimenting with drugs. This makes her sad to know that her daughter struggled. She is currently estranged from her eldest daughter. Also, is experiencing a crisis of yari due to the fact that she is not sure if the teachings of the Hinduism Yari which focus on joyous rejoicing apply to her, and her current situation     Patient also currently complains of the following symptoms which may be indicative of fibromyalgia,   1) Chronic widespread musculoskeletal pain for ?three months  2) Absence of other systemic condition accounting for pain   3) Excess tenderness in  "soft-tissues      Depression Symptoms: patient endorsed the following    1)Depressed mood most of the day, nearly every day:  2) Markedly diminished interest or pleasure:  4) Insomnia   5) Psychomotor retardation nearly:   6) Fatigue or loss of energy   7) Feelings of worthlessness and excessive or inappropriate guilt:   8) Diminished ability to think or concentrate        Anxiety Symptoms:  Anxiety Disorder Symptoms:  Patient endorses the following      Excessive Anxiety & Worry  Difficulty in Controlling Worry   Sleep disturbance  Restlessness  Fatigues easily  Difficulty concentrating/mind going blank   Irritability   Increased muscular pain       Panic Attack symptoms:  ?Sensations of shortness of breath or smothering:  ?Feelings of choking  ?Chest pain or discomfort   ?Nausea or abdominal distress  ?Feeling dizzy, unsteady, light-headed, or faint   ?Chills or heat sensations  ?Paresthesias (numbness or tingling sensations)  ?Derealization (feelings of unreality) or depersonalization (being detached from oneself)   ?Fear of losing control or "going crazy"   ?Fear of dying    Psychosis: DENIED      Review Of Systems:     Pertinent items are noted in HPI.    Current Evaluation:         Constitutional  General:   Well groomed, age appropriate     Musculoskeletal  Gait & Station:   non ataxic     Psychiatric  Speech:   clear and coherent, regular rate and rhythm   Mood & Affect:  Congruent affect, depressed mood, tearful affect    Thought Process:  Linear, logical, goal directed   Thought Content:  No delusions, no hallucinations, no si or hi   Insight:  fair   Judgement: intact   Orientation:  Oriented to time, place, person, and situation   Memory: Intact for both recent and remote as evidenced by 3/3 object recall   Language: Intact   Attention Span & Concentration:  Intact to conversation. Capable of doing days of the week backwards   Fund of Knowledge:  Adequate for age and education level       Relevant " Elements of Neurological Exam: normal gait      Laboratory Data  Lab Visit on 01/16/2019   Component Date Value Ref Range Status    Uric Acid 01/16/2019 5.6  2.4 - 5.7 mg/dL Final               Past History:       Medications  Outpatient Encounter Medications as of 1/18/2019   Medication Sig Dispense Refill    aspirin-acetaminophen-caffeine 250-250-65 mg (EXCEDRIN MIGRAINE) 250-250-65 mg per tablet Take 1 tablet by mouth as needed for Pain.      CALCIUM CIT/MGOX/VIT D3/B6/MIN (CITRACAL PLUS ORAL) Take 1 tablet by mouth as needed.       clobetasol 0.05% (TEMOVATE) 0.05 % Oint Apply topically as needed.       cyanocobalamin 1,000 mcg/mL injection Inject 1 mL (1,000 mcg total) into the muscle every 28 days. 10 mL 1    gabapentin (NEURONTIN) 100 MG capsule Take 1 capsule (100 mg total) by mouth every evening. 30 capsule 5    LORazepam (ATIVAN) 0.5 MG tablet Take 1 tablet (0.5 mg total) by mouth every 6 (six) hours as needed for Anxiety. 20 tablet 1    magnesium 30 mg Tab Take 1 tablet by mouth once.      multivitamin (ONE DAILY MULTIVITAMIN) per tablet Take 1 tablet by mouth once daily.      naproxen (NAPROSYN) 500 MG tablet Take 1 tablet (500 mg total) by mouth 2 (two) times daily with meals. For pain and inflammation (Patient taking differently: Take 500 mg by mouth 2 (two) times daily as needed. For pain and inflammation) 30 tablet 0    psyllium (FIBER) powder Take 1 packet by mouth daily as needed.      rOPINIRole (REQUIP) 1 MG tablet Take 1 tablet (1 mg total) by mouth 3 (three) times daily. 90 tablet 11    CHOLECALCIFEROL, VITAMIN D3, (VITAMIN D3 ORAL) Take 1,000 Units by mouth daily as needed.       [DISCONTINUED] DULoxetine (CYMBALTA) 20 MG capsule Take 1 capsule (20 mg total) by mouth once daily for 14 days, THEN 2 capsules (40 mg total) once daily for 14 days. 42 capsule 0    [DISCONTINUED] DULoxetine (CYMBALTA) 20 MG capsule Take 2 capsules (40 mg total) by mouth once daily. 60 capsule 3     No  facility-administered encounter medications on file as of 2019.        Medication Compliance  Yes    Past Medical/Surgical History:   Past Medical History:   Diagnosis Date    Atrophic gastritis     B12 deficiency     Breast lump on left side at 3 o'clock position 2015    Fibrocystic breast     Lichen sclerosus 2012     Past Surgical History:   Procedure Laterality Date    BREAST BIOPSY Right 2009    benign    COLONOSCOPY  09/10/2010    ESOPHAGOGASTRODUODENOSCOPY  09/10/2010    HYSTERECTOMY  2013    TONSILLECTOMY         Neurological History:  Seizures:  DENIED  Head Trauma:  DENIED      Past Psychiatric History:   Has attended grief counseling and psychotherapy  One previous psychiatrist  No previous medication      SOCIAL HISTORY:  Developmental/Childhood: grew up in [facility]  History of Physical/Sexual Abuse: none  Education: educated in art     Employment: works at [employer]   Financial: no difficulties    Relationship Status/Sexual Orientation: single, heterosexual    Children: one living daughter, one     Housing Status: lives alone  Judaism: Taoist, currently not practicing  Recreational Activities: reading, currently not interested  Access to Gun: none    Substance Abuse History:   No substance abuse history       Family History of Psychiatric History:  Family History   Problem Relation Age of Onset    Depression Mother     Nephritis Mother     Hyperthyroidism Mother     Hypotension Mother     Anemia Mother     Asthma Father     Thyroid disease Sister     Depression Sister     Leukemia Brother     Cirrhosis Brother     Multiple sclerosis Brother        Allergies:  Review of patient's allergies indicates:  No Known Allergies        Assessment - Diagnosis - Goals:     Impression:   Encounter Diagnoses   Name Primary?    Severe episode of recurrent major depressive disorder, without psychotic features Yes    Generalized anxiety disorder with panic  attacks     Complicated bereavement        Strengths and Liabilities: Strength: Patient accepts guidance/feedback, Strength: Patient is expressive/articulate., Strength: Patient is intelligent., Strength: Patient has reasonable judgment., Liability: Patient has no suport network., Liability: Patient lacks coping skills.    Treatment Goals:  Specify outcomes written in observable, behavioral terms:     -increased desire to engage in recreational activities  -decreased anxiety attacks  -improvement in energy levels  -Decreased feelings of guilt    Treatment Plan/Recommendations:    MDD/JACLYN/PANIC DISORDER    -Start Cymbalta at 20 mg daily for 2 weeks and increase to 40 mg daily  -Cymbalta chosen to help with depression and anxiety and also to help with pain that may be caused by fribromyalgia  -Referred for counseling       -Patient was educated on possible side-effects of medications, voices understanding and wishes to start medication management on the above mentioned medications.   -Discussed 911 for suicidal or homicidal ideation or possible psychosis or worsening symptoms  -Provided patient education through patient portal  -Patient will contact clinic with any questions or concerns    Return to Clinic: 1 month    Counseling time: 10 min  Total time: 50 min    Sima Weinberg MD  1/21/2019

## 2019-01-21 ENCOUNTER — PATIENT MESSAGE (OUTPATIENT)
Dept: PSYCHIATRY | Facility: CLINIC | Age: 59
End: 2019-01-21

## 2019-01-21 PROBLEM — F41.0 GENERALIZED ANXIETY DISORDER WITH PANIC ATTACKS: Status: ACTIVE | Noted: 2019-01-21

## 2019-01-21 PROBLEM — F32.A DEPRESSION: Status: RESOLVED | Noted: 2018-10-12 | Resolved: 2019-01-21

## 2019-01-21 PROBLEM — F33.2 SEVERE EPISODE OF RECURRENT MAJOR DEPRESSIVE DISORDER, WITHOUT PSYCHOTIC FEATURES: Status: ACTIVE | Noted: 2019-01-21

## 2019-01-21 PROBLEM — F32.1 MAJOR DEPRESSIVE DISORDER, SINGLE EPISODE, MODERATE: Status: RESOLVED | Noted: 2018-10-17 | Resolved: 2019-01-21

## 2019-01-21 PROBLEM — F43.21 COMPLICATED BEREAVEMENT: Status: ACTIVE | Noted: 2019-01-21

## 2019-01-21 PROBLEM — F41.1 GENERALIZED ANXIETY DISORDER WITH PANIC ATTACKS: Status: ACTIVE | Noted: 2019-01-21

## 2019-01-21 RX ORDER — DULOXETIN HYDROCHLORIDE 20 MG/1
40 CAPSULE, DELAYED RELEASE ORAL DAILY
Qty: 60 CAPSULE | Refills: 3 | Status: SHIPPED | OUTPATIENT
Start: 2019-02-18 | End: 2019-01-21

## 2019-01-21 NOTE — PATIENT INSTRUCTIONS
Understanding Anxiety Disorders  Almost everyone gets nervous now and then. Its normal to have knots in your stomach before a test, or for your heart to race on a first date. But an anxiety disorder is much more than a case of nerves. In fact, its symptoms may be overwhelming. But treatment can relieve many of these symptoms. Talking to your healthcare provider is the first step.    What are anxiety disorders?  An anxiety disorder causes intense feelings of panic and fear. These feelings may arise for no apparent reason. And they tend to recur again and again. They may prevent you from coping with life and cause you great distress. As a result, you may avoid anything that triggers your fear. In extreme cases, you may never leave the house. Anxiety disorders may cause other symptoms, such as:  · Obsessive thoughts you cant control  · Constant nightmares or painful thoughts of the past  · Nausea, sweating, and muscle tension  · Trouble sleeping or concentrating  What causes anxiety disorders?  Anxiety disorders tend to run in families. For some people, childhood abuse or neglect may play a role. For others, stressful life events or trauma may trigger anxiety disorders. Anxiety can trigger low self-esteem and poor coping skills.  Common anxiety disorders  · Panic disorder. This causes an intense fear of being in danger.  · Phobias. These are extreme fears of certain objects, places, or events.  · Obsessive-compulsive disorder. This causes you to have unwanted thoughts and urges. You also may perform certain actions over and over.  · Posttraumatic stress disorder. This occurs in people who have survived a terrible ordeal. It can cause nightmares and flashbacks about the event.  · Generalized anxiety disorder. This causes constant worry that can greatly disrupt your life.   Getting better  You may believe that nothing can help you. Or, you might fear what others may think. But most anxiety symptoms can be eased.  Having an anxiety disorder is nothing to be ashamed of. Most people do best with treatment that combines medicine and therapy. These arent cures. But they can help you live a healthier life.  Date Last Reviewed: 2/1/2017 © 2000-2017 [employer]. [address] [place] [address], [place] [address]. All rights reserved. This information is not intended as a substitute for professional medical care. Always follow your healthcare professional's instructions.          Using Antidepressants  Depression is a mood disorder that affects the way you think and feel. The most common symptom is a feeling of deep sadness. This feeling does not go away or get better on its own. But most types of depression can be helped with therapy and antidepressant medicines. (Note: This covers antidepressant use in adults only.)    What do antidepressants do?  Antidepressants restore the balance of certain chemicals in your brain to help ease your depression. You will likely feel better in 4 to 6 weeks. But you may continue taking antidepressants for a year or more to keep your symptoms from coming back. Some people with depression need to take antidepressants for life. There are several types of antidepressants. The main types are described below.  Selective serotonin reuptake inhibitors (SSRIs)  SSRIs are the most effective medicines for the treatment of depression. They tend to have fewer side effects than other antidepressants. Possible side effects include anxiety, trouble sleeping, nausea, diarrhea, sexual dysfunction, and headaches. In rare cases, they may make you more depressed. SSRIs shouldnt be mixed with certain other medicines. Talk with your healthcare provider about all the medicines, herbs, and supplements you are taking.  Tricyclic antidepressants  Tricyclics help severe or long-term depression. They have been used for many years with good results. Possible side effects include blurred vision, dry mouth, and  constipation.  Monoamine oxidase inhibitors (MAOIs)  If you dont respond to tricyclics or SSRIs, your healthcare provider may prescribe MAOIs. These medicines can be very effective. But people taking MAOIs must stay away from certain foods and medicines. Your healthcare provider can tell you more.  Lithium  If you have bipolar disorder, you may take a medicine called lithium. This medicine helps even out your mood. Possible side effects are weight gain, trembling, loose stool, and nausea. Lithium is also used:  · For people who have unipolar depression and have not responded to other antidepressants  · For people who have a sudden (acute) episode of unipolar depression  · As a maintenance medicine to prevent unipolar depression from happening again  Things to avoid if you are taking MAOIs  If you are taking MAOIs, dont take any of the following:  · Beans  · Aged cheese  · Chocolate  · Red wine  · Most cold medicines  · Certain medicines (ask your healthcare provider)   To reduce the risk of lithium poisoning  You can reduce the risk of lithium poisoning by following this advice:  · Take only the prescribed amount of lithium. If your depressive symptoms get worse, contact your healthcare provider. Never increase your medicine on your own.  · Drink plenty of fluids other than coffee, tea, and soda.  · Limit salt in your diet.  · Before using other prescribed medicines or over-the-counter medicines, check with your pharmacist. This is to be sure the medicines wont interact with the lithium.  · Never share your medicines or use another person's medicines, even if it is the same medicine and dose.   If you have side effects  The side effects of antidepressants are usually mild. But if you have troubling side effects, call your healthcare provider. Changing the dosage or type of medicine may help. Never stop taking medicines on your own.  Date Last Reviewed: 5/1/2017  © 6537-6580 [employer]. [address] [place] [address]  [place] [address]. All rights reserved. This information is not intended as a substitute for professional medical care. Always follow your healthcare professional's instructions.      Duloxetine delayed-release capsules  What is this medicine?  DULOXETINE (doo LOX e teen) is used to treat depression, anxiety, and different types of chronic pain.  How should I use this medicine?  Take this medicine by mouth with a glass of water. Follow the directions on the prescription label. Do not cut, crush or chew this medicine. You can take this medicine with or without food. Take your medicine at regular intervals. Do not take your medicine more often than directed. Do not stop taking this medicine suddenly except upon the advice of your doctor. Stopping this medicine too quickly may cause serious side effects or your condition may worsen.  A special MedGuide will be given to you by the pharmacist with each prescription and refill. Be sure to read this information carefully each time.  Talk to your pediatrician regarding the use of this medicine in children. While this drug may be prescribed for children as young as 7 years of age for selected conditions, precautions do apply.  What side effects may I notice from receiving this medicine?  Side effects that you should report to your doctor or health care professional as soon as possible:  · allergic reactions like skin rash, itching or hives, swelling of the face, lips, or tongue  · changes in blood pressure  · confusion  · dark urine  · dizziness  · fast talking and excited feelings or actions that are out of control  · fast, irregular heartbeat  · fever  · general ill feeling or flu-like symptoms  · hallucination, loss of contact with reality  · light-colored stools  · loss of balance or coordination  · redness, blistering, peeling or loosening of the skin, including inside the mouth  · right upper belly pain  · seizures  · suicidal thoughts or other mood  changes  · trouble concentrating  · trouble passing urine or change in the amount of urine  · unusual bleeding or bruising  · unusually weak or tired  · yellowing of the eyes or skin  Side effects that usually do not require medical attention (report to your doctor or health care professional if they continue or are bothersome):  · blurred vision  · change in appetite  · change in sex drive or performance  · headache  · increased sweating  · nausea  What may interact with this medicine?  Do not take this medicine with any of the following medications:  · certain diet drugs like dexfenfluramine, fenfluramine  · desvenlafaxine  · linezolid  · MAOIs like Azilect, Carbex, Eldepryl, Marplan, Nardil, and Parnate  · methylene blue (intravenous)  · milnacipran  · thioridazine  · venlafaxine  This medicine may also interact with the following medications:  · alcohol  · aspirin and aspirin-like medicines  · certain antibiotics like ciprofloxacin and enoxacin  · certain medicines for blood pressure, heart disease, irregular heart beat  · certain medicines for depression, anxiety, or psychotic disturbances  · certain medicines for migraine headache like almotriptan, eletriptan, frovatriptan, naratriptan, rizatriptan, sumatriptan, zolmitriptan  · certain medicines that treat or prevent blood clots like warfarin, enoxaparin, and dalteparin  · cimetidine  · fentanyl  · lithium  · NSAIDS, medicines for pain and inflammation, like ibuprofen or naproxen  · phentermine  · procarbazine  · sibutramine  · [place]'s wort  · theophylline  · tramadol  · tryptophan  What if I miss a dose?  If you miss a dose, take it as soon as you can. If it is almost time for your next dose, take only that dose. Do not take double or extra doses.  Where should I keep my medicine?  Keep out of the reach of children.  Store at room temperature between 20 and 25 degrees C (68 to 77 degrees F). Throw away any unused medicine after the expiration date.  What  should I tell my health care provider before I take this medicine?  They need to know if you have any of these conditions:  · bipolar disorder or a family history of bipolar disorder  · glaucoma  · kidney disease  · liver disease  · suicidal thoughts or a previous suicide attempt  · taken medicines called MAOIs like Carbex, Eldepryl, Marplan, Nardil, and Parnate within 14 days  · an unusual reaction to duloxetine, other medicines, foods, dyes, or preservatives  · pregnant or trying to get pregnant  · breast-feeding  What should I watch for while using this medicine?  Tell your doctor if your symptoms do not get better or if they get worse. Visit your doctor or health care professional for regular checks on your progress. Because it may take several weeks to see the full effects of this medicine, it is important to continue your treatment as prescribed by your doctor.  Patients and their families should watch out for new or worsening thoughts of suicide or depression. Also watch out for sudden changes in feelings such as feeling anxious, agitated, panicky, irritable, hostile, aggressive, impulsive, severely restless, overly excited and hyperactive, or not being able to sleep. If this happens, especially at the beginning of treatment or after a change in dose, call your health care professional.  You may get drowsy or dizzy. Do not drive, use machinery, or do anything that needs mental alertness until you know how this medicine affects you. Do not stand or sit up quickly, especially if you are an older patient. This reduces the risk of dizzy or fainting spells. Alcohol may interfere with the effect of this medicine. Avoid alcoholic drinks.  This medicine can cause an increase in blood pressure. This medicine can also cause a sudden drop in your blood pressure, which may make you feel faint and increase the chance of a fall. These effects are most common when you first start the medicine or when the dose is increased,  or during use of other medicines that can cause a sudden drop in blood pressure. Check with your doctor for instructions on monitoring your blood pressure while taking this medicine.  Your mouth may get dry. Chewing sugarless gum or sucking hard candy, and drinking plenty of water may help. Contact your doctor if the problem does not go away or is severe.  NOTE:This sheet is a summary. It may not cover all possible information. If you have questions about this medicine, talk to your doctor, pharmacist, or health care provider. Copyright© 2017 [employer]      Duloxetine delayed-release capsules  What is this medicine?  DULOXETINE (doo LOX e teen) is used to treat depression, anxiety, and different types of chronic pain.  How should I use this medicine?  Take this medicine by mouth with a glass of water. Follow the directions on the prescription label. Do not cut, crush or chew this medicine. You can take this medicine with or without food. Take your medicine at regular intervals. Do not take your medicine more often than directed. Do not stop taking this medicine suddenly except upon the advice of your doctor. Stopping this medicine too quickly may cause serious side effects or your condition may worsen.  A special MedGuide will be given to you by the pharmacist with each prescription and refill. Be sure to read this information carefully each time.  Talk to your pediatrician regarding the use of this medicine in children. While this drug may be prescribed for children as young as 7 years of age for selected conditions, precautions do apply.  What side effects may I notice from receiving this medicine?  Side effects that you should report to your doctor or health care professional as soon as possible:  · allergic reactions like skin rash, itching or hives, swelling of the face, lips, or tongue  · changes in blood pressure  · confusion  · dark urine  · dizziness  · fast talking and excited feelings or actions that  are out of control  · fast, irregular heartbeat  · fever  · general ill feeling or flu-like symptoms  · hallucination, loss of contact with reality  · light-colored stools  · loss of balance or coordination  · redness, blistering, peeling or loosening of the skin, including inside the mouth  · right upper belly pain  · seizures  · suicidal thoughts or other mood changes  · trouble concentrating  · trouble passing urine or change in the amount of urine  · unusual bleeding or bruising  · unusually weak or tired  · yellowing of the eyes or skin  Side effects that usually do not require medical attention (report to your doctor or health care professional if they continue or are bothersome):  · blurred vision  · change in appetite  · change in sex drive or performance  · headache  · increased sweating  · nausea  What may interact with this medicine?  Do not take this medicine with any of the following medications:  · certain diet drugs like dexfenfluramine, fenfluramine  · desvenlafaxine  · linezolid  · MAOIs like Azilect, Carbex, Eldepryl, Marplan, Nardil, and Parnate  · methylene blue (intravenous)  · milnacipran  · thioridazine  · venlafaxine  This medicine may also interact with the following medications:  · alcohol  · aspirin and aspirin-like medicines  · certain antibiotics like ciprofloxacin and enoxacin  · certain medicines for blood pressure, heart disease, irregular heart beat  · certain medicines for depression, anxiety, or psychotic disturbances  · certain medicines for migraine headache like almotriptan, eletriptan, frovatriptan, naratriptan, rizatriptan, sumatriptan, zolmitriptan  · certain medicines that treat or prevent blood clots like warfarin, enoxaparin, and dalteparin  · cimetidine  · fentanyl  · lithium  · NSAIDS, medicines for pain and inflammation, like ibuprofen or naproxen  · phentermine  · procarbazine  · sibutramine  · [place]'s wort  · theophylline  · tramadol  · tryptophan  What if I miss a  dose?  If you miss a dose, take it as soon as you can. If it is almost time for your next dose, take only that dose. Do not take double or extra doses.  Where should I keep my medicine?  Keep out of the reach of children.  Store at room temperature between 20 and 25 degrees C (68 to 77 degrees F). Throw away any unused medicine after the expiration date.  What should I tell my health care provider before I take this medicine?  They need to know if you have any of these conditions:  · bipolar disorder or a family history of bipolar disorder  · glaucoma  · kidney disease  · liver disease  · suicidal thoughts or a previous suicide attempt  · taken medicines called MAOIs like Carbex, Eldepryl, Marplan, Nardil, and Parnate within 14 days  · an unusual reaction to duloxetine, other medicines, foods, dyes, or preservatives  · pregnant or trying to get pregnant  · breast-feeding  What should I watch for while using this medicine?  Tell your doctor if your symptoms do not get better or if they get worse. Visit your doctor or health care professional for regular checks on your progress. Because it may take several weeks to see the full effects of this medicine, it is important to continue your treatment as prescribed by your doctor.  Patients and their families should watch out for new or worsening thoughts of suicide or depression. Also watch out for sudden changes in feelings such as feeling anxious, agitated, panicky, irritable, hostile, aggressive, impulsive, severely restless, overly excited and hyperactive, or not being able to sleep. If this happens, especially at the beginning of treatment or after a change in dose, call your health care professional.  You may get drowsy or dizzy. Do not drive, use machinery, or do anything that needs mental alertness until you know how this medicine affects you. Do not stand or sit up quickly, especially if you are an older patient. This reduces the risk of dizzy or fainting spells.  Alcohol may interfere with the effect of this medicine. Avoid alcoholic drinks.  This medicine can cause an increase in blood pressure. This medicine can also cause a sudden drop in your blood pressure, which may make you feel faint and increase the chance of a fall. These effects are most common when you first start the medicine or when the dose is increased, or during use of other medicines that can cause a sudden drop in blood pressure. Check with your doctor for instructions on monitoring your blood pressure while taking this medicine.  Your mouth may get dry. Chewing sugarless gum or sucking hard candy, and drinking plenty of water may help. Contact your doctor if the problem does not go away or is severe.  NOTE:This sheet is a summary. It may not cover all possible information. If you have questions about this medicine, talk to your doctor, pharmacist, or health care provider. Copyright© 2017 [employer]

## 2019-02-04 ENCOUNTER — TELEPHONE (OUTPATIENT)
Dept: RHEUMATOLOGY | Facility: CLINIC | Age: 59
End: 2019-02-04

## 2019-02-04 ENCOUNTER — PATIENT MESSAGE (OUTPATIENT)
Dept: NEUROLOGY | Facility: CLINIC | Age: 59
End: 2019-02-04

## 2019-02-04 NOTE — TELEPHONE ENCOUNTER
Called patient regarding referral from Dr. Mtz, scheduled appointment with Dr. Hernández for 2.7.19 at 11.30am. Pt verbalized understanding.

## 2019-02-05 ENCOUNTER — PATIENT MESSAGE (OUTPATIENT)
Dept: PSYCHIATRY | Facility: CLINIC | Age: 59
End: 2019-02-05

## 2019-02-07 ENCOUNTER — LAB VISIT (OUTPATIENT)
Dept: LAB | Facility: HOSPITAL | Age: 59
End: 2019-02-07
Attending: INTERNAL MEDICINE
Payer: MEDICAID

## 2019-02-07 ENCOUNTER — OFFICE VISIT (OUTPATIENT)
Dept: RHEUMATOLOGY | Facility: CLINIC | Age: 59
End: 2019-02-07
Payer: MEDICAID

## 2019-02-07 VITALS
WEIGHT: 128.88 LBS | HEIGHT: 60 IN | BODY MASS INDEX: 25.3 KG/M2 | DIASTOLIC BLOOD PRESSURE: 64 MMHG | HEART RATE: 66 BPM | SYSTOLIC BLOOD PRESSURE: 100 MMHG

## 2019-02-07 DIAGNOSIS — M25.50 ARTHRALGIA, UNSPECIFIED JOINT: ICD-10-CM

## 2019-02-07 DIAGNOSIS — M35.00 SICCA SYNDROME: Primary | ICD-10-CM

## 2019-02-07 DIAGNOSIS — R20.2 PARESTHESIAS: ICD-10-CM

## 2019-02-07 DIAGNOSIS — M35.00 SICCA SYNDROME: ICD-10-CM

## 2019-02-07 PROCEDURE — 86235 NUCLEAR ANTIGEN ANTIBODY: CPT | Mod: 59

## 2019-02-07 PROCEDURE — 86235 NUCLEAR ANTIGEN ANTIBODY: CPT

## 2019-02-07 PROCEDURE — 99205 OFFICE O/P NEW HI 60 MIN: CPT | Mod: S$PBB,,, | Performed by: INTERNAL MEDICINE

## 2019-02-07 PROCEDURE — 99205 PR OFFICE/OUTPT VISIT, NEW, LEVL V, 60-74 MIN: ICD-10-PCS | Mod: S$PBB,,, | Performed by: INTERNAL MEDICINE

## 2019-02-07 PROCEDURE — 99214 OFFICE O/P EST MOD 30 MIN: CPT | Mod: PBBFAC,PN | Performed by: INTERNAL MEDICINE

## 2019-02-07 PROCEDURE — 83516 IMMUNOASSAY NONANTIBODY: CPT | Mod: 59

## 2019-02-07 PROCEDURE — 99999 PR PBB SHADOW E&M-EST. PATIENT-LVL IV: CPT | Mod: PBBFAC,,, | Performed by: INTERNAL MEDICINE

## 2019-02-07 PROCEDURE — 99999 PR PBB SHADOW E&M-EST. PATIENT-LVL IV: ICD-10-PCS | Mod: PBBFAC,,, | Performed by: INTERNAL MEDICINE

## 2019-02-07 PROCEDURE — 86038 ANTINUCLEAR ANTIBODIES: CPT

## 2019-02-07 PROCEDURE — 36415 COLL VENOUS BLD VENIPUNCTURE: CPT

## 2019-02-07 PROCEDURE — 86039 ANTINUCLEAR ANTIBODIES (ANA): CPT

## 2019-02-07 NOTE — PATIENT INSTRUCTIONS
Fibromyalgia  Fibromyalgia is a chronic condition. It causes pain and tenderness in connective tissues. This causes muscle pain. Often, there are also many tender areas throughout the body. Symptoms may also include stiffness and feelings of numbness and tingling. Symptoms may be worse upon waking up. They may increase with poor sleep, heavy activity, cold or damp weather, anxiety, or stress.  People with fibromyalgia often feel tired. They may have trouble sleeping. Other symptoms include morning stiffness, headaches, and painful menstrual periods. Some people have problems with thinking clearly and changes in memory.  The cause of fibromyalgia is not known. Symptoms are similar to that of other diseases. These include rheumatoid arthritis, low thyroid, chronic fatigue syndrome, and Lyme disease. In some cases, these diseases may occur together.  Fibromyalgia is often treated with medicines. You and your healthcare provider can discuss the medicine that may work best for you. You may have to try more than one medicine or combination of medicines before you find what works for you.  Home care  · If your healthcare provider has prescribed or recommended medicines, take them as directed.  · Rest as needed. Try to get enough sleep. If you have trouble sleeping, discuss this with your healthcare provider.   · Be active. Regular exercise can help manage symptoms. Some options include walking, swimming, and biking. Strengthening exercises may also be helpful. Talk to your healthcare provider about the best ways to be active.  · Follow a healthy diet. Limit caffeine and alcohol. If you smoke, ask your healthcare provider for help to stop.  · Notice how your body reacts to stress. Learn to listen to your body signals. This will help you take action before the stress becomes severe.  · Learn relaxation techniques. Also consider joining a stress reduction program or class.  · Talk to your healthcare provider about trying  complementary treatments. These include acupuncture, hypnosis, and biofeedback. Yoga and rocío chi may be helpful.  · Ask your healthcare provider about cognitive behavioral therapy (CBT). This type of counseling can help people with fibromyalgia cope better with their illness.  Follow-up care  Follow up with your healthcare provider or as advised by our staff. In many cases, fibromyalgia is best treated with a team approach.  This may involve your primary care provider, a rheumatologist, a physical therapist, and a mental health professional.   For more information: National [place] of Arthritis and Musculoskeletal and Skin Diseases (NIAMS)  www.niams.nih.gov 977-658-9394  When to seek medical advice  Contact your healthcare provider right away if any of these occur:  · Symptoms getting worse or new symptoms developing  · You feel hopeless, helpless, or lose interest in day-to-day life  Date Last Reviewed: 3/1/2017  © 6027-9263 [employer]. [address] [place] [address]. All rights reserved. This information is not intended as a substitute for professional medical care. Always follow your healthcare professional's instructions.        Managing Fibromyalgia    Fibromyalgia is a chronic illness that causes pain in specific points on the body, stiffness, and fatigue. But it doesnt have to keep you from doing what you enjoy. You can feel better. You and your healthcare provider can work together to develop a plan.  Take medications as directed  You may be given medications to help reduce pain and improve sleep.  Several medications are approved to treat fibromyalgia. Two were originally designed to treat depression. They are duloxetine, and milnacipran. A third, called pregaballin, was developed to treat nerve pain. Other medications include pain relievers such as acetaminophen or stronger narcotics. These may be prescribed short term.  NSAIDs (non-steroidal anti-inflammatory drugs) include  aspirin, ibuprofen and naproxen are used to relieve pain.  Get exercise  Gentle exercise can help lessen your pain. Try these tips:  · Choose activities that are gentle on your joints, such as walking, biking, and swimming or other water exercises.  · Dont push yourself too hard. Build up your strength and endurance slowly, over time.  · Stick to it. For the most relief, exercise should become part of your daily life.  Get a good nights rest  To help you get more sleep, try the tips below:  · Sleep only in a bed, not on a couch or chair.  · Dont watch TV, read, or work in bed.  · Go to bed and get up at the same time each day.  · Try to avoid naps.  · Avoid alcohol, caffeine, and tobacco for at least 3 hours before going to bed.  · Avoid fluids in the evening to avoid having to get up to urinate.  Other things you can do  No one knows what causes fibromyalgia. But stress, poor eating habits, and extra weight can make it worse. These tips may help you feel better:  · Eat a balanced diet with plenty of fruits and vegetables, whole grains, lean protein, and low-fat or nonfat dairy products.  · Maintain a healthy weight.  · Learn ways to reduce or manage the stress in your life.  · Ask your healthcare provider for resources to help you make changes. Or check out the resources below.  Resources  · Arthritis Foundation  www.arthritis.org  · National Fibromyalgia Association  www.fmaware.org  · American Fibromyalgia Syndrome Association  www.afsafund.org  Date Last Reviewed: 2/14/2016 © 2000-2017 [employer]. [address] [place] [address], [place] [address]. All rights reserved. This information is not intended as a substitute for professional medical care. Always follow your healthcare professional's instructions.        Understanding Fibromyalgia     People with fibromyalgia tend to have at least 11 of the 18 tender points shown above.     Fibromyalgia is a condition that causes pain at specific points on the  body, stiffness, and fatigue.  What are the symptoms of fibromyalgia?  In most cases, you will have tender points on your body. These points are very sore, especially when touched. Finding several of these points helps your healthcare provider diagnose fibromyalgia.  Along with the tender points, you may have some or all of the following symptoms:  · Constant tiredness (fatigue), even after a full nights sleep (non-restorative sleep)  · A burning or throbbing pain in many parts of the body (this pain may vary during the day)  · Stiffness or aching all over your body  · Numbness or tingling in your arms and legs  · Trouble sleeping  · Bowel problems (bloating, diarrhea, constipation)  · Headaches  · Depression  How is fibromyalgia diagnosed?  There is no lab test to diagnose fibromyalgia. Instead, your healthcare provider will take your health history and examine your joints and muscles. Certain criteria are used when diagnosing fibromyalgia. Symptoms need to be present for at least 3 months. Tests may be done to rule out other conditions with similar symptoms. Then, together you can design a plan to help you manage your symptoms.   How is fibromyalgia treated?  Several medications are approved to treat fibromyalgia. Two were originally made to treat depression. They are duloxetine, and milnacipran. A third, called pregaballin, was developed to treat nerve pain. Certain medicines used to treat depression have been helpful with fibromyalgia. Other medications include pain relievers such as acetaminophen or stronger narcotics. These may be prescribed short term.  NSAIDs (nonsteroidal anti-inflammatory drugs) including aspirin, ibuprofen, and naproxen are used to relieve pain.  Getting enough sleep, regular exercise, and eating a healthy diet can help manage symptoms. Cognitive behavioral therapy (a form of psychotherapy) can be helpful for fibromyalgia. Some people find alternative treatments such as massage,  chiropractic treatments, biofeedback, or acupuncture also help with symptoms.  Date Last Reviewed: 2/14/2016 © 2000-2017 [employer]. [address] [place] [address], [place] [address]. All rights reserved. This information is not intended as a substitute for professional medical care. Always follow your healthcare professional's instructions.        Understanding Thoracic Outlet Syndrome  Thoracic outlet syndrome is a set of symptoms in the shoulder, arm, or hand. It occurs from a narrowing of the thoracic outlet. This is the space between your collarbone and your first rib. It can result from injury, disease, or a problem present from birth. Thoracic outlet syndrome is not common. It can occur in people of any age.  What is the thoracic outlet?  The thoracic outlet is a narrow space between your collarbone (clavicle) and your first rib. Nerves and blood vessels exit lead from your chest to your arm through this area. The nerves and blood vessels include:  · A bundle of nerves that serve your shoulder, arm, and hand  · A large and important vein (subclavian vein)  · A very large important artery (subclavian artery)  What causes thoracic outlet syndrome?  Your shoulder muscles normally keep your clavicle elevated and in place. But with thoracic outlet syndrome, the upper rib and clavicle are closer. This makes the thoracic outlet smaller. Nerves and blood vessels going through the area can be compressed. Conditions that can cause thoracic outlet syndrome include:  · Having an extra rib at birth  · An abnormality in the neck muscles at birth  · Neck injury  · Injury to the first rib or collarbone  · Repetitive overhead arm movements that may cause inflammatory changes  Poor posture and obesity may raise your risk for thoracic outlet syndrome. People who do repetitive overhead arm movements, like swimmers or pitchers, may also have a higher risk.  Symptoms of thoracic outlet syndrome  Your symptoms may come and  go. This may be partly based on your activity level. Overhead activities may make your symptoms worse. You most likely have symptoms on only 1 side. In some cases thoracic outlet syndrome can cause symptoms on both sides of the body.  Symptoms can include:  · Aching in your neck, shoulder, arm, or hand  · Pain, numbness, or tingling of your forearm or fingers  · Hand weakness  · Limited range of motion of your arm  · A depression in your shoulder  · Pain in your neck muscles  · Swelling and redness of your arm  · Pale and cool arm and hand  Diagnosing thoracic outlet syndrome  Thoracic outlet syndrome is often more difficult to diagnose than other problems of the shoulder. Your healthcare provider will ask about your health history and your symptoms. You will also have physical exam. Your healthcare provider may move your hand and arm in different positions.  A specific test may be done to help diagnose thoracic outlet syndrome. Your healthcare provider may have you raise your arms and then open and close your fist for a few minutes. This often brings on symptoms.  You may have other tests, such as:  · Nerve conduction tests, to see how your nerves are affected  · Doppler ultrasound, to look at blood flow through your arm and hand  · Chest X-ray, to check for abnormal bone like an extra rib  · CT scan, if the healthcare provider needs to see more detail  · CT angiography, to get more information about blood flow through your arm  Date Last Reviewed: 5/20/2015  © 9214-3978 [employer]. [address] [place], [place] [id number]. All rights reserved. This information is not intended as a substitute for professional medical care. Always follow your healthcare professional's instructions.        Treatment for Thoracic Outlet Syndrome  Thoracic outlet syndrome is a set of symptoms in the shoulder, arm, or hand. It occurs from a narrowing of the thoracic outlet. This is the space between your collarbone and  your first rib. It can result from injury, disease, or a problem present from birth. Thoracic outlet syndrome is not common. It can occur in people of any age.   Types of treatment  Treatments for thoracic outlet syndrome may include:  · Physical therapy to help strengthen shoulder muscles, improve posture, and enlarge the thoracic outlet space  · Over-the-counter pain medicine to relieve pain and swelling  · Loss of excess weight  · Changes to daily activities that bring on symptoms  These treatments relieve symptoms in most people. If you still have severe symptoms after trying these treatments, your healthcare provider may advise surgery. The type of surgery depends on the problems with your thoracic outlet. Surgery relieves symptoms in many people.  Possible complications of thoracic outlet syndrome  In some cases thoracic outlet syndrome can cause a blood clot to form in a vein in your arm. This blocks the flow of blood and may make your arm very swollen. The clot may also move to the lungs and cause a pulmonary embolism. Or a clot can move somewhere else in the body. You may need to take blood-thinner medicine to prevent clotting. You may need a procedure to remove the clot using a thin tube (catheter) inserted through a vein.  A blood clot may also form in one of the arteries of your arm. This may cause sudden decrease in blood flow to your arm. The clot may be treated with blood thinners or a catheter inserted through an artery. In some cases, surgery may be done to remove the clot.  Living with thoracic outlet syndrome  There are things you can do to help prevent symptoms. Dont put heavy bags over your shoulder. This increases pressure on the thoracic outlet. Also make sure to practice your physical therapy exercises. This will help keep your shoulder muscles strong.  When to call your healthcare provider  Call your healthcare provider right away if you have any of these:  · Arm or hand that is suddenly  cool, lighter in color, or swollen  · Sudden weakness of your hand  · Symptoms that dont get better with therapy   Date Last Reviewed: 8/10/2015  © 1776-0784 [employer]. [address] [place] [address], [place] [address]. All rights reserved. This information is not intended as a substitute for professional medical care. Always follow your healthcare professional's instructions.        Understanding Posterior Tibialis Tenosynovitis    The posterior tibialis tendon runs along the inside of the foot. It connects the calf muscle (posterior tibialis muscle) to bones on the inside of the foot. It helps maintain the arch of the foot. It also gives you stability when you move. Posterior tibialis tenosynovitis is when this tendon becomes inflamed or torn.     How to say it  [id number]   What causes posterior tibialis tenosynovitis?  This condition is often caused by an injury to the tendon. For instance, a fall can tear the tendon. Overuse can also damage it. People who play sports like basketball or tennis a lot may weaken the tendon over time.  Symptoms of posterior tibialis tenosynovitis  The symptoms of this condition include pain and swelling. The pain is usually felt near the tendon, on the inside of the foot and ankle. It often gets worse over time or with an increase in activity. Your arch may eventually fall, leading to a flat foot. Your foot may also start to turn outward.  Treatment for posterior tibialis tenosynovitis  Treatment for this condition depends on the severity of the tear. Treatment may include:  · Rest. You should avoid any activities that cause pain and swelling. You may also need to limit the amount of weight you put on your injured foot.  · Cold packs. Putting a cold pack on the tendon may reduce pain and swelling.  · Medicine. Over-the-counter pain medicines can reduce pain and swelling.  · Leg cast or walking boot. Severe tears of the posterior tibialis tendon may  need to be kept from moving. These devices can help protect the tendon and reduce swelling.  · Shoe insert or brace. Like a leg cast or walking boot, these devices can help protect the tendon as it heals. They may also ease pain.  · Strengthening and stretching exercises. Certain exercises can help you regain strength and flexibility in your foot..  · Surgery. Several surgical choices are available to fix the torn tendon or replace it. But you often do not need surgery unless your symptoms do not get better after trying other treatments for at least 6 months.     When to call your healthcare provider  Call your healthcare provider right away if you have any of these:  · Fever of 100.4°F (38°C) or higher, or as directed  · Pain that gets worse  · Symptoms that dont get better, or get worse  · New symptoms    Date Last Reviewed: 3/10/2016  © 1642-0793 [employer]. [address] [place] [address], [place] [address]. All rights reserved. This information is not intended as a substitute for professional medical care. Always follow your healthcare professional's instructions.

## 2019-02-07 NOTE — PROGRESS NOTES
RHEUMATOLOGY OUTPATIENT CLINIC NOTE    2/7/2019    Attending Rheumatologist: Lang Hernández  Primary Care Provider: Primary Doctor No   Physician Requesting Consultation: Lakhwinder Mtz MD  [address] [facility] [place] [address]  Chief Complaint/Reason For Consultation:  Joint Pain; Gout; and New Patient      Subjective:       HIRAM Mistry is a 58 y.o. White female with chronic joint pain comes of rheumatic evaluation.  Patient's main complaint is chronic upper and lower body arthralgias.  She reports started after the delivery of her last pregnancy.  Worsened over the last year without any particular precipitating event.  Notes worst pain is on her left lower leg.  Pain is dull, intermittent, worse at the end of the day.  Aggravated by prolonged standing or activity.  Refers   Intermittent precipitation of joint pain upon drinking wine.  Minimal relief reported with over-the-counter pain medication.  Other arthralgias are of similar pattern.  She also refers chronic paresthesias in her hands and feet without any particular pattern.  Refers positional exacerbation symptoms.  Refers daytime fatigue  And mental fogginess.  Also complains of having chronic sicca symptoms managed symptomatically.  Denies any discoloration of fingertips upon cold exposure, photosensitivity, rash,  or GI complaints.    Review of Systems   Constitutional: Positive for malaise/fatigue. Negative for fever and weight loss.   HENT:        + eye or mouth sicca symptoms, + oral ulcers, denies parotid swelling, denies swollen glands.   Eyes: Negative for blurred vision, pain and redness.   Respiratory: Negative for cough, hemoptysis, sputum production and shortness of breath.    Cardiovascular: Negative for chest pain, orthopnea, leg swelling and PND.   Gastrointestinal: Positive for constipation. Negative for abdominal pain, blood in stool, diarrhea and heartburn.        Denies dysphagia.   Genitourinary: Negative for dysuria  and hematuria.        Denies genital ulcers or sicca symptoms, denies foaming of the urine, denies nephrolithiasis.   Musculoskeletal: Positive for back pain, joint pain and neck pain. Negative for myalgias.   Skin: Negative for rash.        Denies photosensitivity, denies alopecia, denies sclerodactyly, denies Raynaud's phenomenon,denies digital ulcers, no psoriasis, ulcerations, no purpura, denies nodules.   Neurological: Positive for tingling and weakness. Negative for sensory change, focal weakness, seizures and headaches.        Denies transient ischemic attack, denies strokes, denies seizures   Endo/Heme/Allergies: Does not bruise/bleed easily.        1 fetal loss <10 weeks, denies frequent infections, denies deep vein thrombosis or pulmonary embolism.   Psychiatric/Behavioral: Positive for memory loss. The patient has insomnia.    All other systems reviewed and are negative.    Chronic comorbid conditions affecting medical decision making today:  Past Medical History:   Diagnosis Date    Atrophic gastritis     B12 deficiency     Breast lump on left side at 3 o'clock position 03/25/2015    Fibrocystic breast     Lichen sclerosus 08/16/2012   · Anxiety/depression  · Panic attacks  · Restless leg syndrome  · History of iron deficiency anemia  · Chronic paresthesias    Past Surgical History:   Procedure Laterality Date    BREAST BIOPSY Right 2009    benign    COLONOSCOPY  09/10/2010    ESOPHAGOGASTRODUODENOSCOPY  09/10/2010    HYSTERECTOMY  2013    TONSILLECTOMY       Family History   Problem Relation Age of Onset    Depression Mother     Nephritis Mother     Hyperthyroidism Mother     Hypotension Mother     Anemia Mother     Asthma Father     Thyroid disease Sister     Depression Sister     Leukemia Brother     Cirrhosis Brother     Multiple sclerosis Brother      Social History     Substance and Sexual Activity   Alcohol Use Yes    Frequency: Monthly or less    Drinks per session: 1 or 2      Social History     Tobacco Use   Smoking Status Never Smoker   Smokeless Tobacco Never Used     Social History     Substance and Sexual Activity   Drug Use No       Current Outpatient Medications:     aspirin-acetaminophen-caffeine 250-250-65 mg (EXCEDRIN MIGRAINE) 250-250-65 mg per tablet, Take 1 tablet by mouth as needed for Pain., Disp: , Rfl:     CALCIUM CIT/MGOX/VIT D3/B6/MIN (CITRACAL PLUS ORAL), Take 1 tablet by mouth as needed. , Disp: , Rfl:     CHOLECALCIFEROL, VITAMIN D3, (VITAMIN D3 ORAL), Take 1,000 Units by mouth daily as needed. , Disp: , Rfl:     clobetasol 0.05% (TEMOVATE) 0.05 % Oint, Apply topically as needed. , Disp: , Rfl:     cyanocobalamin 1,000 mcg/mL injection, Inject 1 mL (1,000 mcg total) into the muscle every 28 days., Disp: 10 mL, Rfl: 1    gabapentin (NEURONTIN) 100 MG capsule, Take 1 capsule (100 mg total) by mouth every evening., Disp: 30 capsule, Rfl: 5    magnesium 30 mg Tab, Take 1 tablet by mouth once., Disp: , Rfl:     multivitamin (ONE DAILY MULTIVITAMIN) per tablet, Take 1 tablet by mouth once daily., Disp: , Rfl:     naproxen (NAPROSYN) 500 MG tablet, Take 1 tablet (500 mg total) by mouth 2 (two) times daily with meals. For pain and inflammation (Patient taking differently: Take 500 mg by mouth 2 (two) times daily as needed. For pain and inflammation), Disp: 30 tablet, Rfl: 0    psyllium (FIBER) powder, Take 1 packet by mouth daily as needed., Disp: , Rfl:     rOPINIRole (REQUIP) 1 MG tablet, Take 1 tablet (1 mg total) by mouth 3 (three) times daily., Disp: 90 tablet, Rfl: 11    LORazepam (ATIVAN) 0.5 MG tablet, Take 1 tablet (0.5 mg total) by mouth every 6 (six) hours as needed for Anxiety., Disp: 20 tablet, Rfl: 1     Objective:         Vitals:    02/07/19 1135   BP: 100/64   Pulse: 66     Physical Exam   Nursing note and vitals reviewed.  Constitutional: She is oriented to person, place, and time and well-developed, well-nourished, and in no distress. No  distress.   HENT:   Head: Normocephalic.   no oral ulcers, normal pooling of saliva.  no parotid enlargement.   Eyes: Conjunctivae are normal. Pupils are equal, round, and reactive to light.   Absent Episcleritis/scleritis.  Lacrimal glands appear enlarged.   Neck: Normal range of motion.   Cardiovascular: Normal rate and intact distal pulses.    Pulmonary/Chest: Effort normal. No respiratory distress.   Abdominal: Soft. She exhibits no distension.   Lymphadenopathy:     She has no cervical adenopathy.   Neurological: She is alert and oriented to person, place, and time. Gait normal.   absent sensory deficits  muscle strength 5/5 through.   SLR -    Lhermitte's sign elicit paresthesias down her neck. - Spurling's test also elicits paresthesias and pain down her neck b/l    Adson's and Kirsty test reproduces paresthesias on her hands   Skin: No rash noted.     No Skin fibrosis, teleangiectasias, rashes, discoid lesions, purpura, skin ulcers, nodules, or livedo reticularis, nail pitting.    Capillaroscopy: normal     Musculoskeletal: Normal range of motion. She exhibits tenderness (Allodynia, trigger points.  Biceps tendon, trochanteric bursa, tibialis tendon.). She exhibits no edema or deformity.   : strong  No synovitis.    AROM: intact  PROM: intact    Devices used by patient: none       Reviewed old and all outside pertinent medical records available.    All lab results personally reviewed and interpreted by me.  Lab Results   Component Value Date    WBC 7.04 10/12/2018    HGB 12.1 10/12/2018    HCT 36.3 (L) 10/12/2018    MCV 82 10/12/2018    MCH 27.4 10/12/2018    MCHC 33.3 10/12/2018    RDW 14.4 10/12/2018     10/12/2018    MPV 9.3 10/12/2018       Lab Results   Component Value Date     10/12/2018    K 4.7 10/12/2018     10/12/2018    CO2 25 10/12/2018     10/12/2018    BUN 12 10/12/2018    CALCIUM 9.8 10/12/2018    PROT 7.5 10/12/2018    ALBUMIN 4.2 10/12/2018    BILITOT 0.5  10/12/2018    AST 34 10/12/2018    ALKPHOS 89 10/12/2018    ALT 28 10/12/2018       No results found for: COLORU, APPEARANCEUA, SPECGRAV, PHUR, PROTEINUA, GLUCOSEU, KETONESU, BLOODU, LEUKOCYTESUR, NITRITE, UROBILINOGEN    Lab Results   Component Value Date    CRP 2.3 10/12/2018       No results found for: SEDRATE, ERYTHROCYTES    No results found for: NGOZI, RF, SEDRATE    No components found for: 25OHVITDTOT, 17GSDLCJ3, 72QUENRT4, METHODNOTE    Lab Results   Component Value Date    URICACID 5.6 01/16/2019       No components found for: TSPOTTB    · TSH within normal range    Rheum Labs:  n/a     Infectious Labs:  Hepatitis-C virus nonreactive September 2017     Imaging:  All imaging reviewed and independently  interpreted by me.    X-ray lumbar spine September 2018  Vertebral body heights and alignment are within normal limits.  Intervertebral disk spaces are well preserved. Posterior elements appear grossly intact.  No pars defects visualized.  No acute fractures or subluxations are demonstrated.    X-ray foot February 2018  There is no radiographic evidence of acute osseous, articular, or soft tissue abnormality. There is slight hallux valgus deformity.  Joint spaces are preserved.  No erosive osseous changes demonstrated.    X-ray knee February 2018  equivocal minimal joint space narrowing involving the medial compartment of either knee.  No acute fracture or dislocation.  There is minimal lateral subluxation of either patella on the sunrise view.  Very minimal osteophyte formation associated with the superior pole of the left patella.  No joint effusion.    EMG / nerve conduction study January 2019  no definitive electrophysiologic evidence of large fiber polyneuropathy.     ASSESSMENT / PLAN:     Alma Mistry is a 58 y.o. White female with:    1. Fibromyalgia  - diffuse pain for >3 mos; hx fatigue, sleep disturbance, anxiety, depression; cognitive difficulties, anxiety, RLS  - Patient with chronic paresthesias  and sicca symptoms.  Recommend for workup as below to assess for a primary condition  - NGOZI, SSA / B.  Celiac panel.  - Patient without features of inflammatory arthritis.  - address contributing sleep disturbance/insomnia per PMD  - Sleep hygiene, stress management  - Started on Gabapentin by Neurology for paresthesias.  Currently on 100mg qhs.  - manage expectations, reassurance, Physical activity    2. Chronic paresthesias  - Compressive maneuvers reproduce paresthesias pain suggestive of neurogenic thoracic outlet syndrome.  - NCS with no definitive electrophysiologic evidence of large fiber polyneuropathy.  - she also reports electric shock sensation radiating down the spine or into the limbs most often after flexion of the neck   - History of multiple sclerosis on her brother.  Follow up with Neurology  - X-ray C-spine  - w/u as above    3. Tendinosis / Bursitis  - Features of bicipital/tibialis tendinosis, p fasciits, and trochanteric bursitis.  - Topical therapy, activity modification.  - Over-the-counter shoe inserts  - Range of motion, flexibility, and stretching exercises.    4. Other specified counseling  - over 10 minutes spent regarding below topics:  - Immunization counseling done.  - malignancy screening.  Weight loss counseling done.  - Limitation of alcohol consumption.  - Regular exercise:  Low impact, aerobic and resistance.  - Medication counseling provided.    RTC 2 months    Method of contact with patient concerns: [facility]    Disclaimer:  This note is prepared using voice recognition software and as such is likely to have errors and has not been proof read. Please contact me for questions.     Time spent: 60 minutes in face to face discussion concerning diagnosis, prognosis, review of lab and test results, benefits of treatment as well as management of disease, counseling of patient and coordination of care between various health care providers.  Greater than half the time  spent was used for coordination of care and counseling of patient.    Lang Hernández M.D.  Rheumatology Department   [facility]

## 2019-02-07 NOTE — LETTER
February 7, 2019      Lakhwinder Mtz MD  [address] [place] [address]           [facility]  [address] [place] [address]  Phone: [phone]  Fax: [phone]          Patient: Alma Mistry   MR Number: [id number]   YOB: 1960   Date of Visit: 2/7/2019       Dear Dr. Lakhwinder Mtz:    Thank you for referring Alma Mistry to me for evaluation. Attached you will find relevant portions of my assessment and plan of care.    If you have questions, please do not hesitate to call me. I look forward to following Alma Mistry along with you.    Sincerely,    Lang Hernández MD    Enclosure  CC:  No Recipients    If you would like to receive this communication electronically, please contact [email or web address] or [phone] to request more information on [facility] Link access.    For providers and/or their staff who would like to refer a patient to [facility], please contact us through our one-stop-shop provider referral line, [facility] , at [phone].    If you feel you have received this communication in error or would no longer like to receive these types of communications, please e-mail [email or web address]

## 2019-02-08 LAB
ANA SER QL IF: POSITIVE
ANA TITR SER IF: NORMAL {TITER}
ANTI-SSA ANTIBODY: 3.79 EU
ANTI-SSA INTERPRETATION: NEGATIVE
ANTI-SSB ANTIBODY: 5.98 EU
ANTI-SSB INTERPRETATION: NEGATIVE

## 2019-02-11 LAB
ANTI SM ANTIBODY: 1.88 EU
ANTI SM/RNP ANTIBODY: 2.52 EU
ANTI-SM INTERPRETATION: NEGATIVE
ANTI-SM/RNP INTERPRETATION: NEGATIVE
ANTI-SSA ANTIBODY: 3.79 EU
ANTI-SSA INTERPRETATION: NEGATIVE
ANTI-SSB ANTIBODY: 5.98 EU
ANTI-SSB INTERPRETATION: NEGATIVE
DSDNA AB SER-ACNC: NORMAL [IU]/ML
GLIADIN PEPTIDE IGA SER-ACNC: 28 UNITS
GLIADIN PEPTIDE IGG SER-ACNC: 2 UNITS
IGA SERPL-MCNC: 255 MG/DL
TTG IGA SER-ACNC: 19 UNITS
TTG IGG SER-ACNC: 8 UNITS

## 2019-02-12 ENCOUNTER — HOSPITAL ENCOUNTER (OUTPATIENT)
Dept: RADIOLOGY | Facility: HOSPITAL | Age: 59
Discharge: HOME | End: 2019-02-12
Attending: INTERNAL MEDICINE
Payer: MEDICAID

## 2019-02-12 ENCOUNTER — PATIENT MESSAGE (OUTPATIENT)
Dept: RHEUMATOLOGY | Facility: CLINIC | Age: 59
End: 2019-02-12

## 2019-02-12 DIAGNOSIS — R20.2 PARESTHESIAS: ICD-10-CM

## 2019-02-12 PROCEDURE — 72040 XR CERVICAL SPINE AP LATERAL: ICD-10-PCS | Mod: 26,,, | Performed by: RADIOLOGY

## 2019-02-12 PROCEDURE — 72040 X-RAY EXAM NECK SPINE 2-3 VW: CPT | Mod: 26,,, | Performed by: RADIOLOGY

## 2019-02-12 PROCEDURE — 72040 X-RAY EXAM NECK SPINE 2-3 VW: CPT | Mod: TC

## 2019-02-13 ENCOUNTER — TELEPHONE (OUTPATIENT)
Dept: RHEUMATOLOGY | Facility: CLINIC | Age: 59
End: 2019-02-13

## 2019-02-13 NOTE — TELEPHONE ENCOUNTER
Patient informed of test results. And Dr. Hernández' recommendations. Verbalized understanding. Scheduled with Dr. Childs 3-4

## 2019-02-13 NOTE — TELEPHONE ENCOUNTER
----- Message from Lang Hernández MD sent at 2/13/2019  2:19 PM CST -----  Please contact the patient and inform I personally reviewed the lab results and imaging.  I am happy to report that the patient's results are within acceptable range.  She has an abnormal celiac panel for which I will recommend GI referral for further evaluation and management.  If she continues to have knee pain, I may see her in clinic as an add on to assess for corticosteroid injection.  Otherwise, we can continue with the plan discussed on our previous encounter.  For any questions or concerns, do not hesitate to contact me; and have the patient call the office or send a message through the patient portal for any concerns.    Thank you very much!    Sincerely,    Lang Hernández  Rheumatology Department   [facility]

## 2019-03-04 ENCOUNTER — OFFICE VISIT (OUTPATIENT)
Dept: GASTROENTEROLOGY | Facility: CLINIC | Age: 59
End: 2019-03-04
Payer: MEDICAID

## 2019-03-04 ENCOUNTER — PATIENT MESSAGE (OUTPATIENT)
Dept: GASTROENTEROLOGY | Facility: CLINIC | Age: 59
End: 2019-03-04

## 2019-03-04 ENCOUNTER — PATIENT MESSAGE (OUTPATIENT)
Dept: INTERNAL MEDICINE | Facility: CLINIC | Age: 59
End: 2019-03-04

## 2019-03-04 VITALS
WEIGHT: 131.81 LBS | HEIGHT: 60 IN | BODY MASS INDEX: 25.88 KG/M2 | HEART RATE: 68 BPM | SYSTOLIC BLOOD PRESSURE: 102 MMHG | DIASTOLIC BLOOD PRESSURE: 64 MMHG

## 2019-03-04 DIAGNOSIS — K29.40 ATROPHIC GASTRITIS WITHOUT HEMORRHAGE: Primary | ICD-10-CM

## 2019-03-04 DIAGNOSIS — E53.8 VITAMIN B 12 DEFICIENCY: ICD-10-CM

## 2019-03-04 DIAGNOSIS — Z12.11 COLON CANCER SCREENING: ICD-10-CM

## 2019-03-04 DIAGNOSIS — D50.0 IRON DEFICIENCY ANEMIA DUE TO CHRONIC BLOOD LOSS: ICD-10-CM

## 2019-03-04 PROCEDURE — 99999 PR PBB SHADOW E&M-EST. PATIENT-LVL V: ICD-10-PCS | Mod: PBBFAC,,, | Performed by: INTERNAL MEDICINE

## 2019-03-04 PROCEDURE — 99204 OFFICE O/P NEW MOD 45 MIN: CPT | Mod: S$PBB,,, | Performed by: INTERNAL MEDICINE

## 2019-03-04 PROCEDURE — 99204 PR OFFICE/OUTPT VISIT, NEW, LEVL IV, 45-59 MIN: ICD-10-PCS | Mod: S$PBB,,, | Performed by: INTERNAL MEDICINE

## 2019-03-04 PROCEDURE — 99999 PR PBB SHADOW E&M-EST. PATIENT-LVL V: CPT | Mod: PBBFAC,,, | Performed by: INTERNAL MEDICINE

## 2019-03-04 PROCEDURE — 99215 OFFICE O/P EST HI 40 MIN: CPT | Mod: PBBFAC | Performed by: INTERNAL MEDICINE

## 2019-03-04 RX ORDER — DULOXETIN HYDROCHLORIDE 20 MG/1
20 CAPSULE, DELAYED RELEASE ORAL DAILY
COMMUNITY
End: 2019-03-07

## 2019-03-04 RX ORDER — SODIUM, POTASSIUM,MAG SULFATES 17.5-3.13G
SOLUTION, RECONSTITUTED, ORAL ORAL
Qty: 254 ML | Refills: 0 | Status: SHIPPED | OUTPATIENT
Start: 2019-03-04 | End: 2020-05-14

## 2019-03-04 NOTE — LETTER
March 7, 2019      Lang Hernández MD  [address] [place] [address]  [address]  [place] [address]           [facility]  [address] [place] [address]  Phone: [phone]  Fax: [phone]          Patient: Alma Mistry   MR Number: [id number]   YOB: 1960   Date of Visit: 3/4/2019       Dear Dr. Lang Hernández:    Thank you for referring Alma Mistry to me for evaluation. Attached you will find relevant portions of my assessment and plan of care.    If you have questions, please do not hesitate to call me. I look forward to following Alma Mistry along with you.    Sincerely,    Davion Pelayo  CC:  No Recipients    If you would like to receive this communication electronically, please contact [email or web address] or [phone] to request more information on [facility] Link access.    For providers and/or their staff who would like to refer a patient to [facility], please contact us through our one-stop-shop provider referral line, [facility], at [phone].    If you feel you have received this communication in error or would no longer like to receive these types of communications, please e-mail [email or web address]

## 2019-03-04 NOTE — PROGRESS NOTES
Subjective:       Patient ID: Alma Mistry is a 58 y.o. female.    Chief Complaint: Celaic screening (pt c/o atrophic gastritis)    The patient has been seen by a GI in [place] where she was diagnosed with atrophic gastritis associated with B12 deficiency. Her last study was in 2015. She was thought to have a diagnosis of Celiac but on her EGD in 2010 her small bowel biopsies were negative. Her present TTG is negative on both IgA and IgG. Her antigliadin antibody is abnormal but of little significance in the face of the normal TTg which is the better more accurate test. Her Atrophic gastritis is associated with her B12 deficiency. She has not had any evidence of such findings as Carcinoid tumor.     The patient also has a history of constipation. This has been present since a child. She has associated abdominal bloating with discomfort which is relieved with BMs. In all likelihood this is IBS-C. She admits to increased fiber but does not like to drink with meals. Problem with this discussed. She also needs a repeat colonoscopy because of some prep limitations at the time of her last Colonoscopy. She was due for a repeat in 2015 which she has not done.    Finally, the patient also has history of iron deficiency anemia.      Review of Systems   Constitutional: Positive for fatigue. Negative for activity change, appetite change, chills, diaphoresis, fever and unexpected weight change.   HENT: Positive for ear pain. Negative for congestion, ear discharge, hearing loss, nosebleeds, postnasal drip and tinnitus.         Loss of smell   Eyes: Negative for photophobia and visual disturbance.   Respiratory: Negative for apnea, cough, choking, chest tightness, shortness of breath and wheezing.    Cardiovascular: Negative for chest pain, palpitations and leg swelling.   Gastrointestinal: Positive for abdominal distention and constipation. Negative for abdominal pain, anal bleeding, blood in stool, diarrhea, nausea, rectal  pain and vomiting.   Genitourinary: Negative for difficulty urinating, dyspareunia, dysuria, flank pain, frequency, hematuria, menstrual problem, pelvic pain, urgency, vaginal bleeding and vaginal discharge.   Musculoskeletal: Negative for arthralgias, back pain, gait problem, joint swelling, myalgias and neck stiffness.        Leg cramps and tingling in extremities   Skin: Negative for pallor and rash.        pruritus   Neurological: Positive for headaches. Negative for dizziness, tremors, seizures, syncope, speech difficulty, weakness and numbness.   Hematological: Negative for adenopathy.   Psychiatric/Behavioral: Negative for agitation, confusion, hallucinations, sleep disturbance and suicidal ideas.       Objective:      Physical Exam   Constitutional: She is oriented to person, place, and time. She appears well-developed and well-nourished.   HENT:   Head: Normocephalic and atraumatic.   Bilateral turbinate congestion   Eyes: Conjunctivae and EOM are normal. Pupils are equal, round, and reactive to light. Right eye exhibits no discharge. Left eye exhibits no discharge. No scleral icterus.   Neck: Normal range of motion. Neck supple. No JVD present. No thyromegaly present.   Cardiovascular: Normal rate, regular rhythm, normal heart sounds and intact distal pulses. Exam reveals no gallop and no friction rub.   No murmur heard.  Pulmonary/Chest: Effort normal and breath sounds normal. No respiratory distress. She has no wheezes. She has no rales. She exhibits no tenderness.   Abdominal: Soft. Bowel sounds are normal. She exhibits no distension and no mass. There is no tenderness. There is no rebound and no guarding.   Musculoskeletal: Normal range of motion. She exhibits no edema.   Lymphadenopathy:     She has no cervical adenopathy.   Neurological: She is alert and oriented to person, place, and time. She has normal reflexes. She exhibits normal muscle tone. Coordination normal.   Skin: Skin is warm and dry. No  rash noted. No erythema. No pallor.   Psychiatric: She has a normal mood and affect. Her behavior is normal. Judgment and thought content normal.   Vitals reviewed.      Assessment:    Iron Deficiency Anemia   B12 deficiency    IBS C   Hx of Atrophic Gastritis  No diagnosis found.    Plan:   EGD and Colonoscopy

## 2019-03-05 ENCOUNTER — TELEPHONE (OUTPATIENT)
Dept: GASTROENTEROLOGY | Facility: CLINIC | Age: 59
End: 2019-03-05

## 2019-03-07 ENCOUNTER — OFFICE VISIT (OUTPATIENT)
Dept: PSYCHIATRY | Facility: CLINIC | Age: 59
End: 2019-03-07
Payer: MEDICAID

## 2019-03-07 DIAGNOSIS — F41.0 PANIC ATTACKS: ICD-10-CM

## 2019-03-07 PROCEDURE — 99213 OFFICE O/P EST LOW 20 MIN: CPT | Mod: S$PBB,,, | Performed by: PSYCHIATRY & NEUROLOGY

## 2019-03-07 PROCEDURE — 99211 OFF/OP EST MAY X REQ PHY/QHP: CPT | Mod: PBBFAC,PN | Performed by: PSYCHIATRY & NEUROLOGY

## 2019-03-07 PROCEDURE — 99999 PR PBB SHADOW E&M-EST. PATIENT-LVL I: ICD-10-PCS | Mod: PBBFAC,,, | Performed by: PSYCHIATRY & NEUROLOGY

## 2019-03-07 PROCEDURE — 99213 PR OFFICE/OUTPT VISIT, EST, LEVL III, 20-29 MIN: ICD-10-PCS | Mod: S$PBB,,, | Performed by: PSYCHIATRY & NEUROLOGY

## 2019-03-07 PROCEDURE — 99999 PR PBB SHADOW E&M-EST. PATIENT-LVL I: CPT | Mod: PBBFAC,,, | Performed by: PSYCHIATRY & NEUROLOGY

## 2019-03-07 RX ORDER — DULOXETIN HYDROCHLORIDE 30 MG/1
30 CAPSULE, DELAYED RELEASE ORAL DAILY
Qty: 30 CAPSULE | Refills: 2 | Status: SHIPPED | OUTPATIENT
Start: 2019-03-07 | End: 2019-03-07 | Stop reason: SDUPTHER

## 2019-03-07 RX ORDER — LORAZEPAM 0.5 MG/1
0.5 TABLET ORAL EVERY 6 HOURS PRN
Qty: 20 TABLET | Refills: 1 | Status: SHIPPED | OUTPATIENT
Start: 2019-03-07 | End: 2019-06-28 | Stop reason: SDUPTHER

## 2019-03-07 RX ORDER — DULOXETIN HYDROCHLORIDE 30 MG/1
30 CAPSULE, DELAYED RELEASE ORAL DAILY
Qty: 30 CAPSULE | Refills: 2 | Status: SHIPPED | OUTPATIENT
Start: 2019-03-07 | End: 2019-05-15

## 2019-03-07 RX ORDER — LORAZEPAM 0.5 MG/1
0.5 TABLET ORAL EVERY 6 HOURS PRN
Qty: 20 TABLET | Refills: 1 | Status: SHIPPED | OUTPATIENT
Start: 2019-03-07 | End: 2019-03-07

## 2019-03-07 NOTE — PROGRESS NOTES
ESTABLISHED OUTPATIENT VISIT   E/M LEVEL 3: [id number]    ENCOUNTER DATE: 3/7/2019  SITE: [facility] [place], [facility] [place].       HISTORY    CHIEF COMPLAINT   Alma Mistry is a 58 y.o. female who presents for followup of depression and anxiety.     HPI   Finds that she is doing better, she has been on the medication. She is not having so much pain as before, thinks that fibromyalgia. She missed two days on the medication because she tried switching it to day time and forgot to take the medication, and started feeling dysphoric and depressed.   Still feels that he anxiety is still there, has had to take Ativan during the day when she is extremely anxious. Anxiety is very elevated, had a hard time dealing with anxiety attacks. Still has some symptoms of depression in that she is not socializing and still not motivated to do things that she was doing before for fun.   Sometimes has suicidal ideation, does not feel capable of taking her life, but she has no future plans     Depression Symptoms: patient endorsed the following     1)Depressed mood most of the day, some improvement when on the medication   2) Markedly diminished interest or pleasure: continues   4) Insomnia : improved sleep  5) Psychomotor retardation: improvement  6) Fatigue or loss of energy :  is more active and she is not sitting in the couch anymore, getting out of bed and doing exercise   7) Feelings of worthlessness and excessive or inappropriate guilt: still worthlessness   8) Diminished ability to think or concentrate: yes  9) Passive Thoughts of Suicidal Thoughts    Anxiety Disorder Symptoms:  Patient endorses the following        Excessive Anxiety & Worry: yes  Difficulty in Controlling Worry : yes  Sleep disturbance: improving  Restlessness: yes  Fatigues easily: improving  Difficulty concentrating/mind going blank   Irritability: improving   Increased muscular pain: improving    ROS   Psychological ROS: positive for - anxiety, depression and suicidal  ideation with no plan or intent  negative for - psychosis, no recent anxiety attacks       PFSH  Past Medical History reviewed: Yes  Family History reviewed: Yes  Social History reviewed: Yes    Neurological History:  Seizures:  DENIED  Head Trauma:  DENIED        Past Psychiatric History:   Has attended grief counseling and psychotherapy  One previous psychiatrist  One previous psych med but had si and no recollection of the medication      SOCIAL HISTORY:  Developmental/Childhood: grew up in [facility]  History of Physical/Sexual Abuse: none  Education: educated in art     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   Financial: no difficulties    Relationship Status/Sexual Orientation: single, heterosexual    Children: one living daughter, one     Housing Status: lives alone  Rastafarian: Holiness, currently not practicing  Recreational Activities: reading, currently not interested  Access to Gun: none     Substance Abuse History:   No substance abuse history      Allergies:   Review of patient's allergies indicates:  No Known Allergies     PSYCHOTROPIC MEDICATIONS   Current Outpatient Medications on File Prior to Visit   Medication Sig Dispense Refill    aspirin-acetaminophen-caffeine 250-250-65 mg (EXCEDRIN MIGRAINE) 250-250-65 mg per tablet Take 1 tablet by mouth as needed for Pain.      CALCIUM CIT/MGOX/VIT D3/B6/MIN (CITRACAL PLUS ORAL) Take 1 tablet by mouth as needed.       CHOLECALCIFEROL, VITAMIN D3, (VITAMIN D3 ORAL) Take 1,000 Units by mouth daily as needed.       clobetasol 0.05% (TEMOVATE) 0.05 % Oint Apply topically as needed.       cyanocobalamin 1,000 mcg/mL injection Inject 1 mL (1,000 mcg total) into the muscle every 28 days. 10 mL 1    DULoxetine (CYMBALTA) 20 MG capsule Take 20 mg by mouth once daily.      gabapentin (NEURONTIN) 100 MG capsule Take 1 capsule (100 mg total) by mouth every evening. 30 capsule 5    LORazepam (ATIVAN) 0.5 MG tablet Take 1 tablet (0.5 mg total) by mouth  every 6 (six) hours as needed for Anxiety. 20 tablet 1    magnesium 30 mg Tab Take 1 tablet by mouth once.      multivitamin (ONE DAILY MULTIVITAMIN) per tablet Take 1 tablet by mouth once daily.      naproxen (NAPROSYN) 500 MG tablet Take 1 tablet (500 mg total) by mouth 2 (two) times daily with meals. For pain and inflammation (Patient taking differently: Take 500 mg by mouth 2 (two) times daily as needed. For pain and inflammation) 30 tablet 0    psyllium (FIBER) powder Take 1 packet by mouth daily as needed.      rOPINIRole (REQUIP) 1 MG tablet Take 1 tablet (1 mg total) by mouth 3 (three) times daily. 90 tablet 11    sodium,potassium,mag sulfates (SUPREP BOWEL PREP KIT) 17.5-3.13-1.6 gram SolR As directed for colonoscopy 254 mL 0     No current facility-administered medications on file prior to visit.          EXAMINATION    [unfilled]  There were no vitals filed for this visit.    CONSTITUTIONAL  General Appearance: well groomed    MUSCULOSKELETAL  No involuntary muscle movements  Normal gait    Psychiatric  Speech:   clear and coherent, regular rate and rhythm   Mood & Affect:  depressed mood, tearful affect, but will smile appropriately    Thought Process:  Linear, logical, goal directed   Thought Content:  No delusions, no hallucinations, passive si, no hi   Insight:  fair   Judgement: intact   Orientation:  Oriented to time, place, person, and situation   Memory: Intact for both recent and remote as evidenced by 3/3 object recall   Language: Intact   Attention Span & Concentration:  Intact to conversation. Capable of doing days of the week backwards   Fund of Knowledge:  Adequate for age and education level           MEDICAL DECISION MAKING    DIAGNOSES   Severe episode of recurrent major depressive disorder, without psychotic features Yes    Generalized anxiety disorder with panic attacks      Complicated bereavement          PROBLEM LIST AND MANAGEMENT PLANS    1) Increase Cymbalta to 30 mg qhs to  improve depressed mood and anxiety  2) refer to psychotherapy for help with grief.   3) Continue ativan as needed for anxiety attacks, patient aware of the risk for chemical dependence. No indication of misuse of medication  4) RTC 5 weeks.         PRESCRIPTION DRUG MANAGEMENT  Compliance: yes  Side Effects: none  Regimen Adjustments: increase cymbalta      DIAGNOSTIC TESTING  none      -Patient was educated on possible side-effects of medications  -Discussed 911 for suicidal or homicidal ideation, worsening symptoms, or adverse reactions to medications  -Patient will contact clinic with any questions or concerns    Sima Weinberg

## 2019-03-10 DIAGNOSIS — K58.1 IRRITABLE BOWEL SYNDROME WITH CONSTIPATION: Primary | ICD-10-CM

## 2019-03-11 ENCOUNTER — PATIENT MESSAGE (OUTPATIENT)
Dept: HEMATOLOGY/ONCOLOGY | Facility: CLINIC | Age: 59
End: 2019-03-11

## 2019-03-17 ENCOUNTER — PATIENT MESSAGE (OUTPATIENT)
Dept: GASTROENTEROLOGY | Facility: CLINIC | Age: 59
End: 2019-03-17

## 2019-03-18 DIAGNOSIS — K58.1 IRRITABLE BOWEL SYNDROME WITH CONSTIPATION: ICD-10-CM

## 2019-03-21 DIAGNOSIS — D51.0 PERNICIOUS ANEMIA: ICD-10-CM

## 2019-03-21 RX ORDER — CYANOCOBALAMIN 1000 UG/ML
INJECTION, SOLUTION INTRAMUSCULAR; SUBCUTANEOUS
Qty: 10 ML | Refills: 0 | Status: SHIPPED | OUTPATIENT
Start: 2019-03-21 | End: 2020-05-07 | Stop reason: SDUPTHER

## 2019-04-09 ENCOUNTER — OFFICE VISIT (OUTPATIENT)
Dept: PSYCHIATRY | Facility: CLINIC | Age: 59
End: 2019-04-09
Payer: COMMERCIAL

## 2019-04-09 DIAGNOSIS — F41.0 PANIC DISORDER: Primary | ICD-10-CM

## 2019-04-09 PROCEDURE — 90834 PR PSYCHOTHERAPY W/PATIENT, 45 MIN: ICD-10-PCS | Mod: S$GLB,,, | Performed by: SOCIAL WORKER

## 2019-04-09 PROCEDURE — 90834 PSYTX W PT 45 MINUTES: CPT | Mod: S$GLB,,, | Performed by: SOCIAL WORKER
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
## 2019-04-10 ENCOUNTER — PATIENT MESSAGE (OUTPATIENT)
Dept: NEUROLOGY | Facility: CLINIC | Age: 59
End: 2019-04-10

## 2019-04-11 ENCOUNTER — TELEPHONE (OUTPATIENT)
Dept: PSYCHIATRY | Facility: CLINIC | Age: 59
End: 2019-04-11

## 2019-04-11 ENCOUNTER — LAB VISIT (OUTPATIENT)
Dept: LAB | Facility: HOSPITAL | Age: 59
End: 2019-04-11
Attending: NURSE PRACTITIONER
Payer: COMMERCIAL

## 2019-04-11 ENCOUNTER — PATIENT MESSAGE (OUTPATIENT)
Dept: PSYCHIATRY | Facility: CLINIC | Age: 59
End: 2019-04-11

## 2019-04-11 DIAGNOSIS — Z86.2 HISTORY OF IRON DEFICIENCY ANEMIA: ICD-10-CM

## 2019-04-11 DIAGNOSIS — D51.0 PERNICIOUS ANEMIA: Chronic | ICD-10-CM

## 2019-04-11 LAB
ANION GAP SERPL CALC-SCNC: 12 MMOL/L (ref 8–16)
BASOPHILS # BLD AUTO: 0.03 K/UL (ref 0–0.2)
BASOPHILS NFR BLD: 0.6 % (ref 0–1.9)
BUN SERPL-MCNC: 11 MG/DL (ref 6–20)
CALCIUM SERPL-MCNC: 9.9 MG/DL (ref 8.7–10.5)
CHLORIDE SERPL-SCNC: 104 MMOL/L (ref 95–110)
CO2 SERPL-SCNC: 22 MMOL/L (ref 23–29)
CREAT SERPL-MCNC: 0.8 MG/DL (ref 0.5–1.4)
CRP SERPL-MCNC: 1.5 MG/L (ref 0–8.2)
DIFFERENTIAL METHOD: ABNORMAL
EOSINOPHIL # BLD AUTO: 0.1 K/UL (ref 0–0.5)
EOSINOPHIL NFR BLD: 1.2 % (ref 0–8)
ERYTHROCYTE [DISTWIDTH] IN BLOOD BY AUTOMATED COUNT: 14 % (ref 11.5–14.5)
EST. GFR  (AFRICAN AMERICAN): >60 ML/MIN/1.73 M^2
EST. GFR  (NON AFRICAN AMERICAN): >60 ML/MIN/1.73 M^2
FERRITIN SERPL-MCNC: 16 NG/ML (ref 20–300)
GLUCOSE SERPL-MCNC: 73 MG/DL (ref 70–110)
HCT VFR BLD AUTO: 40.3 % (ref 37–48.5)
HGB BLD-MCNC: 13.1 G/DL (ref 12–16)
IRON SERPL-MCNC: 89 UG/DL (ref 30–160)
LYMPHOCYTES # BLD AUTO: 1.6 K/UL (ref 1–4.8)
LYMPHOCYTES NFR BLD: 30.8 % (ref 18–48)
MCH RBC QN AUTO: 26.8 PG (ref 27–31)
MCHC RBC AUTO-ENTMCNC: 32.5 G/DL (ref 32–36)
MCV RBC AUTO: 83 FL (ref 82–98)
MONOCYTES # BLD AUTO: 0.4 K/UL (ref 0.3–1)
MONOCYTES NFR BLD: 6.9 % (ref 4–15)
NEUTROPHILS # BLD AUTO: 3.1 K/UL (ref 1.8–7.7)
NEUTROPHILS NFR BLD: 60.5 % (ref 38–73)
PLATELET # BLD AUTO: 327 K/UL (ref 150–350)
PMV BLD AUTO: 9.6 FL (ref 9.2–12.9)
POTASSIUM SERPL-SCNC: 4.6 MMOL/L (ref 3.5–5.1)
RBC # BLD AUTO: 4.88 M/UL (ref 4–5.4)
SATURATED IRON: 20 % (ref 20–50)
SODIUM SERPL-SCNC: 138 MMOL/L (ref 136–145)
TOTAL IRON BINDING CAPACITY: 443 UG/DL (ref 250–450)
TRANSFERRIN SERPL-MCNC: 299 MG/DL (ref 200–375)
VIT B12 SERPL-MCNC: 397 PG/ML (ref 210–950)
WBC # BLD AUTO: 5.06 K/UL (ref 3.9–12.7)

## 2019-04-11 PROCEDURE — 82728 ASSAY OF FERRITIN: CPT

## 2019-04-11 PROCEDURE — 83540 ASSAY OF IRON: CPT

## 2019-04-11 PROCEDURE — 36415 COLL VENOUS BLD VENIPUNCTURE: CPT

## 2019-04-11 PROCEDURE — 85025 COMPLETE CBC W/AUTO DIFF WBC: CPT

## 2019-04-11 PROCEDURE — 86140 C-REACTIVE PROTEIN: CPT

## 2019-04-11 PROCEDURE — 82607 VITAMIN B-12: CPT

## 2019-04-11 PROCEDURE — 80048 BASIC METABOLIC PNL TOTAL CA: CPT

## 2019-05-03 ENCOUNTER — OFFICE VISIT (OUTPATIENT)
Dept: PSYCHIATRY | Facility: CLINIC | Age: 59
End: 2019-05-03
Payer: COMMERCIAL

## 2019-05-03 DIAGNOSIS — F41.0 PANIC DISORDER: Primary | ICD-10-CM

## 2019-05-03 PROCEDURE — 90834 PSYTX W PT 45 MINUTES: CPT | Mod: S$GLB,,, | Performed by: SOCIAL WORKER

## 2019-05-03 PROCEDURE — 90834 PR PSYCHOTHERAPY W/PATIENT, 45 MIN: ICD-10-PCS | Mod: S$GLB,,, | Performed by: SOCIAL WORKER

## 2019-05-06 ENCOUNTER — PATIENT MESSAGE (OUTPATIENT)
Dept: PSYCHIATRY | Facility: CLINIC | Age: 59
End: 2019-05-06

## 2019-05-07 NOTE — PROGRESS NOTES
Individual Psychotherapy (PhD/LCSW)    5/3/2019    Site:  [facility]         Therapeutic Intervention: Met with patient.  Outpatient - Insight oriented psychotherapy 45 min - CPT code 40418    Chief complaint/reason for encounter: depression and anxiety     Interval history and content of current session:  Patient presents to ongoing individual therapy due to depression and anxiety.  She has returned from a trip to [facility].  She admits it was good to visit with her brothers and sister in law.  She notes that her family is wealthy and her last name is well known in her native country.  She does feel comfortable there, but she is not secure in what will happen in the future.  She does like her life in [place], but she does feel that she might have too much idle time.  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  She admits that she would take a job doing something in a creative field.  She does have a friend visiting from out of state next week.  They enjoy painting together.  She is frustrated with her relationship with her oldest daughter.  She feels her oldest daughter pulling away.  She admits that she is not as close to her older daughter, Daylin, and she was to her younger daughter, Bibiana.  She would often talk to her youngest daughter about things that her oldest daughter had done that was mean.  She details a recent visit to her older daughter for [place].  Her older daughter seemed upset that the patient was there because she would rather be with her boyfriend in [place].  The patient admits that her older daughter has some similarities to her ex .  The patient is educated that she is going through grief and redefining herself as an individual at the same time.  She is encouraged to continue exercising.    Treatment plan:  Target symptoms: depression, anxiety   Why chosen therapy is appropriate versus another modality: relevant to diagnosis  Outcome monitoring methods: self-report,  observation  Therapeutic intervention type: insight oriented psychotherapy, supportive psychotherapy, interactive psychotherapy     Risk parameters:  Patient reports no suicidal ideation  Patient reports no homicidal ideation  Patient reports no self-injurious behavior  Patient reports no violent behavior     Verbal deficits: None     Patient's response to intervention:  The patient's response to intervention is accepting, motivated.     Progress toward goals and other mental status changes:  The patient's progress toward goals is fair .     Diagnosis:   Panic Disorder     Plan:  individual psychotherapy and medication management by physician     Return to clinic: as scheduled     Length of Service (minutes): 45

## 2019-05-15 ENCOUNTER — OFFICE VISIT (OUTPATIENT)
Dept: PSYCHIATRY | Facility: CLINIC | Age: 59
End: 2019-05-15
Payer: COMMERCIAL

## 2019-05-15 VITALS
SYSTOLIC BLOOD PRESSURE: 116 MMHG | HEART RATE: 70 BPM | WEIGHT: 124.31 LBS | BODY MASS INDEX: 24.28 KG/M2 | DIASTOLIC BLOOD PRESSURE: 71 MMHG

## 2019-05-15 DIAGNOSIS — F41.0 GENERALIZED ANXIETY DISORDER WITH PANIC ATTACKS: ICD-10-CM

## 2019-05-15 DIAGNOSIS — F41.1 GENERALIZED ANXIETY DISORDER WITH PANIC ATTACKS: ICD-10-CM

## 2019-05-15 DIAGNOSIS — F33.2 SEVERE EPISODE OF RECURRENT MAJOR DEPRESSIVE DISORDER, WITHOUT PSYCHOTIC FEATURES: Primary | ICD-10-CM

## 2019-05-15 PROCEDURE — 3008F BODY MASS INDEX DOCD: CPT | Mod: CPTII,S$GLB,, | Performed by: PSYCHIATRY & NEUROLOGY

## 2019-05-15 PROCEDURE — 3008F PR BODY MASS INDEX (BMI) DOCUMENTED: ICD-10-PCS | Mod: CPTII,S$GLB,, | Performed by: PSYCHIATRY & NEUROLOGY

## 2019-05-15 PROCEDURE — 99999 PR PBB SHADOW E&M-EST. PATIENT-LVL II: ICD-10-PCS | Mod: PBBFAC,,, | Performed by: PSYCHIATRY & NEUROLOGY

## 2019-05-15 PROCEDURE — 99213 PR OFFICE/OUTPT VISIT, EST, LEVL III, 20-29 MIN: ICD-10-PCS | Mod: S$GLB,,, | Performed by: PSYCHIATRY & NEUROLOGY

## 2019-05-15 PROCEDURE — 99213 OFFICE O/P EST LOW 20 MIN: CPT | Mod: S$GLB,,, | Performed by: PSYCHIATRY & NEUROLOGY

## 2019-05-15 PROCEDURE — 99999 PR PBB SHADOW E&M-EST. PATIENT-LVL II: CPT | Mod: PBBFAC,,, | Performed by: PSYCHIATRY & NEUROLOGY

## 2019-05-15 RX ORDER — DULOXETIN HYDROCHLORIDE 30 MG/1
30 CAPSULE, DELAYED RELEASE ORAL 2 TIMES DAILY
Qty: 60 CAPSULE | Refills: 3 | Status: SHIPPED | OUTPATIENT
Start: 2019-05-15 | End: 2019-06-28 | Stop reason: SDUPTHER

## 2019-05-15 NOTE — PROGRESS NOTES
"ESTABLISHED OUTPATIENT VISIT   E/M LEVEL 3: [id number]    ENCOUNTER DATE: 2019  SITE: [facility] [place] [place].       HISTORY    CHIEF COMPLAINT   Alma Mistry is a 58 y.o. female who presents for followup of Depression and Anxiety    HPI   Currently having fatigue. Went to the gym and she has been consistently going. On Monday she could not run 4 miles, which is her typical. Doesn't know what could be contributing to his. Has fatigue when she wakes up in the morning, and it takes about 2-3 hours to get going in the morning. When she wakes up she wakes up feeling negatively, meaning that she has thoughts of "What is the use." As she gets up and takes in the sun she feels that she has some energy. She feels forced to go out. The sadness comes back at the end of the day when her day is winding down. Multiple difficult factors are Mother's day and her daughter's birthday is coming up.    Cymbalta is helping in that she feels that you are motivated to do things once she get's her day going, and wanting to do more, picking up more projects. She still feels that sadness, but after exploring her feelings we determined that a lot of it has to do with not feeling a sense of purpose in the afternoons.    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   Financial: no difficulties    Relationship Status/Sexual Orientation: single, heterosexual    Children: one living daughter, one     Housing " Status: lives alone  Episcopalian: Hoahaoism, currently not practicing  Recreational Activities: reading, currently not interested  Access to Gun: none     Substance Abuse History:   No substance abuse history        Allergies:   Review of patient's allergies indicates:  No Known Allergies     PSYCHOTROPIC MEDICATIONS   Current Outpatient Medications on File Prior to Visit   Medication Sig Dispense Refill    aspirin-acetaminophen-caffeine 250-250-65 mg (EXCEDRIN MIGRAINE) 250-250-65 mg per tablet Take 1 tablet by mouth as needed for Pain.      CALCIUM CIT/MGOX/VIT D3/B6/MIN (CITRACAL PLUS ORAL) Take 1 tablet by mouth as needed.       CHOLECALCIFEROL, VITAMIN D3, (VITAMIN D3 ORAL) Take 1,000 Units by mouth daily as needed.       clobetasol 0.05% (TEMOVATE) 0.05 % Oint Apply topically as needed.       cyanocobalamin 1,000 mcg/mL injection INJECT 1 ML INTRAMUSCULARLY EVERY 28 DAYS 10 mL 0    gabapentin (NEURONTIN) 100 MG capsule Take 1 capsule (100 mg total) by mouth every evening. 30 capsule 5    linaclotide (LINZESS) 145 mcg Cap capsule Take 1 capsule (145 mcg total) by mouth once daily. 30 capsule 5    LORazepam (ATIVAN) 0.5 MG tablet Take 1 tablet (0.5 mg total) by mouth every 6 (six) hours as needed for Anxiety. 20 tablet 1    magnesium 30 mg Tab Take 1 tablet by mouth once.      multivitamin (ONE DAILY MULTIVITAMIN) per tablet Take 1 tablet by mouth once daily.      naproxen (NAPROSYN) 500 MG tablet Take 1 tablet (500 mg total) by mouth 2 (two) times daily with meals. For pain and inflammation (Patient taking differently: Take 500 mg by mouth 2 (two) times daily as needed. For pain and inflammation) 30 tablet 0    psyllium (FIBER) powder Take 1 packet by mouth daily as needed.      sodium,potassium,mag sulfates (SUPREP BOWEL PREP KIT) 17.5-3.13-1.6 gram SolR As directed for colonoscopy 254 mL 0    [DISCONTINUED] rOPINIRole (REQUIP) 1 MG tablet Take 1 tablet (1 mg total) by mouth 3 (three) times daily.  90 tablet 11     No current facility-administered medications on file prior to visit.          EXAMINATION    [unfilled]  Vitals:    05/15/19 1302   BP: 116/71   Pulse: 70     Wt Readings from Last 2 Encounters:   05/22/19 55.6 kg (122 lb 9.2 oz)   05/15/19 56.4 kg (124 lb 5.4 oz)       CONSTITUTIONAL  General Appearance: well groomed    MUSCULOSKELETAL  No involuntary muscle movements  Normal gait    PSYCHIATRIC   Mental Status Exam:  Appearance: unremarkable, age appropriate  Behavior/Cooperation: appopriate normal, cooperative  Speech:  normal tone, normal rate, normal pitch, normal volume  Language: grossly intact with spontaneous speech  Mood: some sadness, grief   Affect:  congruent with mood, teary when talking about daughter, but able to smile and engage appropriately in conversation   Thought Process: linear, logical and goal oriented   Thought Content: no suicidality, no homicidality, no delusions, no paranoia  Sensorium:  Awake  Alert and Oriented: x3 person, place, situation  Memory:    Recent:  Intact; able to report recent events  Attention/concentration: appropriate for age/education.   Insight:Intact  Judgment:  Intact      MEDICAL DECISION MAKING    DIAGNOSES  Encounter Diagnoses   Name Primary?    Severe episode of recurrent major depressive disorder, without psychotic features Yes    Generalized anxiety disorder with panic attacks          PROBLEM LIST AND MANAGEMENT PLANS   1) Increase Cymbalta to 60 mg qhs to improve depressed mood and anxiety  2) Refer to psychotherapy for help with grief.   3) Continue ativan as needed for anxiety attacks, patient aware of the risk for chemical dependence. No indication of misuse of medication  4) RTC 6 weeks.           PRESCRIPTION DRUG MANAGEMENT  Compliance: yes  Side Effects: none  Regimen Adjustments: increase cymbalta

## 2019-05-22 ENCOUNTER — OFFICE VISIT (OUTPATIENT)
Dept: NEUROLOGY | Facility: CLINIC | Age: 59
End: 2019-05-22
Payer: COMMERCIAL

## 2019-05-22 ENCOUNTER — LAB VISIT (OUTPATIENT)
Dept: LAB | Facility: HOSPITAL | Age: 59
End: 2019-05-22
Attending: PSYCHIATRY & NEUROLOGY
Payer: COMMERCIAL

## 2019-05-22 VITALS
BODY MASS INDEX: 24.06 KG/M2 | SYSTOLIC BLOOD PRESSURE: 101 MMHG | HEIGHT: 60 IN | WEIGHT: 122.56 LBS | DIASTOLIC BLOOD PRESSURE: 71 MMHG | HEART RATE: 58 BPM

## 2019-05-22 DIAGNOSIS — F41.1 GENERALIZED ANXIETY DISORDER WITH PANIC ATTACKS: ICD-10-CM

## 2019-05-22 DIAGNOSIS — E66.3 OVERWEIGHT: ICD-10-CM

## 2019-05-22 DIAGNOSIS — F41.0 GENERALIZED ANXIETY DISORDER WITH PANIC ATTACKS: ICD-10-CM

## 2019-05-22 DIAGNOSIS — D51.0 PERNICIOUS ANEMIA: Chronic | ICD-10-CM

## 2019-05-22 DIAGNOSIS — L90.0 LICHEN SCLEROSUS: ICD-10-CM

## 2019-05-22 DIAGNOSIS — M79.2 NEUROPATHIC PAIN: ICD-10-CM

## 2019-05-22 DIAGNOSIS — G25.81 RLS (RESTLESS LEGS SYNDROME): ICD-10-CM

## 2019-05-22 DIAGNOSIS — Z86.2 HISTORY OF IRON DEFICIENCY ANEMIA: ICD-10-CM

## 2019-05-22 DIAGNOSIS — F43.21 COMPLICATED BEREAVEMENT: ICD-10-CM

## 2019-05-22 DIAGNOSIS — F33.2 SEVERE EPISODE OF RECURRENT MAJOR DEPRESSIVE DISORDER, WITHOUT PSYCHOTIC FEATURES: ICD-10-CM

## 2019-05-22 DIAGNOSIS — G62.9 POLYNEUROPATHY: Primary | ICD-10-CM

## 2019-05-22 DIAGNOSIS — G62.9 POLYNEUROPATHY: ICD-10-CM

## 2019-05-22 DIAGNOSIS — F41.0 PANIC DISORDER: ICD-10-CM

## 2019-05-22 DIAGNOSIS — R29.818 NEUROLOGICAL DEFICIT PRESENT: ICD-10-CM

## 2019-05-22 DIAGNOSIS — M25.50 ARTHRALGIA, UNSPECIFIED JOINT: ICD-10-CM

## 2019-05-22 PROBLEM — M10.9 GOUT OF LEFT ANKLE: Status: RESOLVED | Noted: 2019-01-16 | Resolved: 2019-05-22

## 2019-05-22 LAB
T4 SERPL-MCNC: 7.8 UG/DL (ref 4.5–11.5)
TSH SERPL DL<=0.005 MIU/L-ACNC: 0.75 UIU/ML (ref 0.4–4)

## 2019-05-22 PROCEDURE — 99215 PR OFFICE/OUTPT VISIT, EST, LEVL V, 40-54 MIN: ICD-10-PCS | Mod: S$GLB,,, | Performed by: PSYCHIATRY & NEUROLOGY

## 2019-05-22 PROCEDURE — 3008F BODY MASS INDEX DOCD: CPT | Mod: CPTII,S$GLB,, | Performed by: PSYCHIATRY & NEUROLOGY

## 2019-05-22 PROCEDURE — 99215 OFFICE O/P EST HI 40 MIN: CPT | Mod: S$GLB,,, | Performed by: PSYCHIATRY & NEUROLOGY

## 2019-05-22 PROCEDURE — 3008F PR BODY MASS INDEX (BMI) DOCUMENTED: ICD-10-PCS | Mod: CPTII,S$GLB,, | Performed by: PSYCHIATRY & NEUROLOGY

## 2019-05-22 PROCEDURE — 36415 COLL VENOUS BLD VENIPUNCTURE: CPT

## 2019-05-22 PROCEDURE — 99999 PR PBB SHADOW E&M-EST. PATIENT-LVL IV: CPT | Mod: PBBFAC,,, | Performed by: PSYCHIATRY & NEUROLOGY

## 2019-05-22 PROCEDURE — 84443 ASSAY THYROID STIM HORMONE: CPT

## 2019-05-22 PROCEDURE — 99999 PR PBB SHADOW E&M-EST. PATIENT-LVL IV: ICD-10-PCS | Mod: PBBFAC,,, | Performed by: PSYCHIATRY & NEUROLOGY

## 2019-05-22 PROCEDURE — 84436 ASSAY OF TOTAL THYROXINE: CPT

## 2019-05-22 PROCEDURE — 86618 LYME DISEASE ANTIBODY: CPT

## 2019-05-22 NOTE — PATIENT INSTRUCTIONS
Managing Fibromyalgia    Fibromyalgia is a chronic illness that causes pain in specific points on the body, stiffness, and fatigue. But it doesnt have to keep you from doing what you enjoy. You can feel better. You and your healthcare provider can work together to develop a plan.  Take medications as directed  You may be given medications to help reduce pain and improve sleep.  Several medications are approved to treat fibromyalgia. Two were originally designed to treat depression. They are duloxetine, and milnacipran. A third, called pregaballin, was developed to treat nerve pain. Other medications include pain relievers such as acetaminophen or stronger narcotics. These may be prescribed short term.  NSAIDs (non-steroidal anti-inflammatory drugs) include aspirin, ibuprofen and naproxen are used to relieve pain.  Get exercise  Gentle exercise can help lessen your pain. Try these tips:  · Choose activities that are gentle on your joints, such as walking, biking, and swimming or other water exercises.  · Dont push yourself too hard. Build up your strength and endurance slowly, over time.  · Stick to it. For the most relief, exercise should become part of your daily life.  Get a good nights rest  To help you get more sleep, try the tips below:  · Sleep only in a bed, not on a couch or chair.  · Dont watch TV, read, or work in bed.  · Go to bed and get up at the same time each day.  · Try to avoid naps.  · Avoid alcohol, caffeine, and tobacco for at least 3 hours before going to bed.  · Avoid fluids in the evening to avoid having to get up to urinate.  Other things you can do  No one knows what causes fibromyalgia. But stress, poor eating habits, and extra weight can make it worse. These tips may help you feel better:  · Eat a balanced diet with plenty of fruits and vegetables, whole grains, lean protein, and low-fat or nonfat dairy products.  · Maintain a healthy weight.  · Learn ways to reduce or manage the  stress in your life.  · Ask your healthcare provider for resources to help you make changes. Or check out the resources below.  Resources  · Arthritis Foundation  www.arthritis.org  · National Fibromyalgia Association  www.fmaware.org  · American Fibromyalgia Syndrome Association  www.afsafund.org  Date Last Reviewed: 2/14/2016 © 2000-2017 [employer]. [address] [place] [address], [place] [address]. All rights reserved. This information is not intended as a substitute for professional medical care. Always follow your healthcare professional's instructions.        Understanding Fibromyalgia     People with fibromyalgia tend to have at least 11 of the 18 tender points shown above.     Fibromyalgia is a condition that causes pain at specific points on the body, stiffness, and fatigue.  What are the symptoms of fibromyalgia?  In most cases, you will have tender points on your body. These points are very sore, especially when touched. Finding several of these points helps your healthcare provider diagnose fibromyalgia.  Along with the tender points, you may have some or all of the following symptoms:  · Constant tiredness (fatigue), even after a full nights sleep (non-restorative sleep)  · A burning or throbbing pain in many parts of the body (this pain may vary during the day)  · Stiffness or aching all over your body  · Numbness or tingling in your arms and legs  · Trouble sleeping  · Bowel problems (bloating, diarrhea, constipation)  · Headaches  · Depression  How is fibromyalgia diagnosed?  There is no lab test to diagnose fibromyalgia. Instead, your healthcare provider will take your health history and examine your joints and muscles. Certain criteria are used when diagnosing fibromyalgia. Symptoms need to be present for at least 3 months. Tests may be done to rule out other conditions with similar symptoms. Then, together you can design a plan to help you manage your symptoms.   How is fibromyalgia  treated?  Several medications are approved to treat fibromyalgia. Two were originally made to treat depression. They are duloxetine, and milnacipran. A third, called pregaballin, was developed to treat nerve pain. Certain medicines used to treat depression have been helpful with fibromyalgia. Other medications include pain relievers such as acetaminophen or stronger narcotics. These may be prescribed short term.  NSAIDs (nonsteroidal anti-inflammatory drugs) including aspirin, ibuprofen, and naproxen are used to relieve pain.  Getting enough sleep, regular exercise, and eating a healthy diet can help manage symptoms. Cognitive behavioral therapy (a form of psychotherapy) can be helpful for fibromyalgia. Some people find alternative treatments such as massage, chiropractic treatments, biofeedback, or acupuncture also help with symptoms.  Date Last Reviewed: 2/14/2016  © 7270-8438 [employer]. [address] [place] [address]. All rights reserved. This information is not intended as a substitute for professional medical care. Always follow your healthcare professional's instructions.        Fibromyalgia  Fibromyalgia is a chronic condition. It causes pain and tenderness in connective tissues. This causes muscle pain. Often, there are also many tender areas throughout the body. Symptoms may also include stiffness and feelings of numbness and tingling. Symptoms may be worse upon waking up. They may increase with poor sleep, heavy activity, cold or damp weather, anxiety, or stress.  People with fibromyalgia often feel tired. They may have trouble sleeping. Other symptoms include morning stiffness, headaches, and painful menstrual periods. Some people have problems with thinking clearly and changes in memory.  The cause of fibromyalgia is not known. Symptoms are similar to that of other diseases. These include rheumatoid arthritis, low thyroid, chronic fatigue syndrome, and Lyme disease. In some  cases, these diseases may occur together.  Fibromyalgia is often treated with medicines. You and your healthcare provider can discuss the medicine that may work best for you. You may have to try more than one medicine or combination of medicines before you find what works for you.  Home care  · If your healthcare provider has prescribed or recommended medicines, take them as directed.  · Rest as needed. Try to get enough sleep. If you have trouble sleeping, discuss this with your healthcare provider.   · Be active. Regular exercise can help manage symptoms. Some options include walking, swimming, and biking. Strengthening exercises may also be helpful. Talk to your healthcare provider about the best ways to be active.  · Follow a healthy diet. Limit caffeine and alcohol. If you smoke, ask your healthcare provider for help to stop.  · Notice how your body reacts to stress. Learn to listen to your body signals. This will help you take action before the stress becomes severe.  · Learn relaxation techniques. Also consider joining a stress reduction program or class.  · Talk to your healthcare provider about trying complementary treatments. These include acupuncture, hypnosis, and biofeedback. Yoga and rocío chi may be helpful.  · Ask your healthcare provider about cognitive behavioral therapy (CBT). This type of counseling can help people with fibromyalgia cope better with their illness.  Follow-up care  Follow up with your healthcare provider or as advised by our staff. In many cases, fibromyalgia is best treated with a team approach.  This may involve your primary care provider, a rheumatologist, a physical therapist, and a mental health professional.   For more information: National [place] of Arthritis and Musculoskeletal and Skin Diseases (NIAMS)  www.niams.nih.gov 220-815-7575  When to seek medical advice  Contact your healthcare provider right away if any of these occur:  · Symptoms getting worse or new symptoms  developing  · You feel hopeless, helpless, or lose interest in day-to-day life  Date Last Reviewed: 3/1/2017  © 9407-0485 [employer]. [address] [place] [address], [place] [address]. All rights reserved. This information is not intended as a substitute for professional medical care. Always follow your healthcare professional's instructions.

## 2019-05-22 NOTE — PROGRESS NOTES
Subjective:       Patient ID: Alma Mistry is a 58 y.o. female.    Chief Complaint: Polyneuropathy    HPI     BACKGROUND HISTORY       The patient started complaining of bilateral symmetric feet paroxysmal tingling and burning pain about several years ago.  The patient usually notices the pain after significant physical activity or prolonged sitting or standing.  The pain is paroxysmal and not persistent but is getting much worse with time and becoming disabling. She also reports that her legs are very restless at night and Requip helps with that when she takes it.  The pain and tingling involves the whole feet, mainly the sole area and progresses to the very distal part of the legs.  The patient denies any hand symptoms. No ankle or foot weakness. The patient denies any low back with radiation to the legs.  The patient has good control on her bowel and bladder. She was found to have very low B12 <100 and was diagnosed with PA 5 years after the symptoms. She is on B12 IM monthly.No history of DM. No known history of thyroid disease. No history of malignancies.  No history of toxic environmental exposure.. No history of excessive alcohol or recreational drug abuse. No history of chronic hepatitis or HIV. Recommended  NCS/EMG, Continue B12, MMA. IIn terms of management I counseled the patient that neuropathic pain meds target is 40% reduction of pain. We tried Gabapentin/GBP-Neurontin.12- MMA NL. 01- NCS BLE is inconclusive. GBP helped with pain but caused significant sedation. I changed it to 100 mg QHS.     I continued Requip 1 mg TID for RLS. She has tolerated it well with no SEs.    I referred her to rheumatology for eval.       INTERVAL HISTORY     mg QHS still helps with neuropathic pain. She takes Cymbalta 30 mg QHS for depression.     She stopped taking Requip due to GI upset.    Rheumatology eval showed NGOZI 1:160 and diagnosed with FMS.  02- C-spine X-ray showed multilevel  DDD.        Review of Systems   Constitutional: Negative for appetite change and fatigue.   HENT: Negative for hearing loss and tinnitus.    Eyes: Negative for photophobia and visual disturbance.   Respiratory: Negative for apnea and shortness of breath.    Cardiovascular: Negative for chest pain and palpitations.   Gastrointestinal: Negative for nausea and vomiting.   Endocrine: Negative for cold intolerance and heat intolerance.   Genitourinary: Negative for difficulty urinating and urgency.   Musculoskeletal: Positive for arthralgias. Negative for back pain, gait problem, joint swelling, myalgias, neck pain and neck stiffness.   Skin: Negative for color change and rash.   Allergic/Immunologic: Negative for environmental allergies and immunocompromised state.   Neurological: Positive for numbness. Negative for dizziness, tremors, seizures, syncope, facial asymmetry, speech difficulty, weakness, light-headedness and headaches.   Hematological: Negative for adenopathy. Does not bruise/bleed easily.   Psychiatric/Behavioral: Positive for dysphoric mood. Negative for agitation, behavioral problems, confusion, decreased concentration, hallucinations, self-injury, sleep disturbance and suicidal ideas. The patient is nervous/anxious. The patient is not hyperactive.          Current Outpatient Medications:     aspirin-acetaminophen-caffeine 250-250-65 mg (EXCEDRIN MIGRAINE) 250-250-65 mg per tablet, Take 1 tablet by mouth as needed for Pain., Disp: , Rfl:     CALCIUM CIT/MGOX/VIT D3/B6/MIN (CITRACAL PLUS ORAL), Take 1 tablet by mouth as needed. , Disp: , Rfl:     CHOLECALCIFEROL, VITAMIN D3, (VITAMIN D3 ORAL), Take 1,000 Units by mouth daily as needed. , Disp: , Rfl:     clobetasol 0.05% (TEMOVATE) 0.05 % Oint, Apply topically as needed. , Disp: , Rfl:     cyanocobalamin 1,000 mcg/mL injection, INJECT 1 ML INTRAMUSCULARLY EVERY 28 DAYS, Disp: 10 mL, Rfl: 0    DULoxetine (CYMBALTA) 30 MG capsule, Take 1 capsule (30  mg total) by mouth 2 (two) times daily., Disp: 60 capsule, Rfl: 3    gabapentin (NEURONTIN) 100 MG capsule, Take 1 capsule (100 mg total) by mouth every evening., Disp: 30 capsule, Rfl: 5    linaclotide (LINZESS) 145 mcg Cap capsule, Take 1 capsule (145 mcg total) by mouth once daily., Disp: 30 capsule, Rfl: 5    magnesium 30 mg Tab, Take 1 tablet by mouth once., Disp: , Rfl:     multivitamin (ONE DAILY MULTIVITAMIN) per tablet, Take 1 tablet by mouth once daily., Disp: , Rfl:     naproxen (NAPROSYN) 500 MG tablet, Take 1 tablet (500 mg total) by mouth 2 (two) times daily with meals. For pain and inflammation (Patient taking differently: Take 500 mg by mouth 2 (two) times daily as needed. For pain and inflammation), Disp: 30 tablet, Rfl: 0    psyllium (FIBER) powder, Take 1 packet by mouth daily as needed., Disp: , Rfl:     sodium,potassium,mag sulfates (SUPREP BOWEL PREP KIT) 17.5-3.13-1.6 gram SolR, As directed for colonoscopy, Disp: 254 mL, Rfl: 0    LORazepam (ATIVAN) 0.5 MG tablet, Take 1 tablet (0.5 mg total) by mouth every 6 (six) hours as needed for Anxiety., Disp: 20 tablet, Rfl: 1  Past Medical History:   Diagnosis Date    Atrophic gastritis     B12 deficiency     Breast lump on left side at 3 o'clock position 03/25/2015    Fibrocystic breast     Lichen sclerosus 08/16/2012     Past Surgical History:   Procedure Laterality Date    BREAST BIOPSY Right 2009    benign    COLONOSCOPY  09/10/2010    ESOPHAGOGASTRODUODENOSCOPY  09/10/2010    HYSTERECTOMY  2013    TONSILLECTOMY       Social History     Socioeconomic History    Marital status:      Spouse name: Not on file    Number of children: Not on file    Years of education: Not on file    Highest education level: Not on file   Occupational History    Not on file   Social Needs    Financial resource strain: Not on file    Food insecurity:     Worry: Not on file     Inability: Not on file    Transportation needs:     Medical:  Not on file     Non-medical: Not on file   Tobacco Use    Smoking status: Never Smoker    Smokeless tobacco: Never Used   Substance and Sexual Activity    Alcohol use: Yes     Frequency: Monthly or less     Drinks per session: 1 or 2    Drug use: No    Sexual activity: Never   Lifestyle    Physical activity:     Days per week: Not on file     Minutes per session: Not on file    Stress: Not on file   Relationships    Social connections:     Talks on phone: Not on file     Gets together: Not on file     Attends [employer] service: Not on file     Active member of club or organization: Not on file     Attends meetings of clubs or organizations: Not on file     Relationship status: Not on file   Other Topics Concern    Not on file   Social History Narrative    1 dog, no smokers; Originally from [facility].       Objective:     GENERAL APPEARANCE:     The patient looks uncomfortable.    No signs of medical or psychiatric distress.    Normal breathing pattern.    No dysmorphic features    Normal eye contact.     GENERAL MEDICAL EXAM:    HEENT:  Head is atraumatic normocephalic.     Neck and Axillae: No JVD.     Cardiopulmonary: No cyanosis. No tachypnea. Normal respiratory effort.    Gastrointestinal:  No stomas or lesions.     Skin, Hair and Nails: No pathognonomic skin rash. No neurofibromatosis. No stigmata of autoimmune disease.     Limbs: No varicose veins. No edema.     Muskoskeletal: No deformities. No signs of longstanding neuropathy. No dislocations or fractures.        Neurologic Exam     Mental Status   Oriented to person, place, and time.   Registration: recalls 3 of 3 objects. Recall at 5 minutes: recalls 3 of 3 objects. Follows 3 step commands.   Attention: normal. Concentration: normal.   Speech: speech is normal   Level of consciousness: alert  Knowledge: good and consistent with education. Able to perform simple calculations.   Able to name object. Able to read. Able to repeat. Able to write.  Normal comprehension.     Cranial Nerves     CN II   Visual fields full to confrontation.   Visual acuity: normal  Right visual field deficit: none  Left visual field deficit: none     CN III, IV, VI   Pupils are equal, round, and reactive to light.  Extraocular motions are normal.   Right pupil: Size: 2 mm. Shape: regular. Reactivity: brisk. Consensual response: intact. Accommodation: intact.   Left pupil: Size: 2 mm. Shape: regular. Reactivity: brisk. Consensual response: intact. Accommodation: intact.   CN III: no CN III palsy  CN VI: no CN VI palsy  Nystagmus: none   Diplopia: none  Ophthalmoparesis: none  Upgaze: normal  Downgaze: normal  Conjugate gaze: present  Vestibulo-ocular reflex: present    CN V   Facial sensation intact.   Right facial sensation deficit: none  Left facial sensation deficit: none  Right corneal reflex: normal  Left corneal reflex: normal    CN VII   Right facial weakness: none  Left facial weakness: none  Right taste: normal  Left taste: normal    CN VIII   CN VIII normal.   Hearing: intact  Right Rinne: AC > BC  Left Rinne: AC > BC  Amor: does not lateralize     CN IX, X   CN IX normal.   CN X normal.   Palate: symmetric  Right gag reflex: normal  Left gag reflex: normal    CN XI   CN XI normal.   Right sternocleidomastoid strength: normal  Left sternocleidomastoid strength: normal  Right trapezius strength: normal  Left trapezius strength: normal    CN XII   CN XII normal.   Tongue: not atrophic  Fasciculations: absent  Tongue deviation: none    Motor Exam   Muscle bulk: normal  Overall muscle tone: normal  Right arm tone: normal  Left arm tone: normal  Right arm pronator drift: absent  Left arm pronator drift: absent  Right leg tone: normal  Left leg tone: normal    Strength   Strength 5/5 throughout.   Right neck flexion: 5/5  Left neck flexion: 5/5  Right neck extension: 5/5  Left neck extension: 5/5  Right deltoid: 5/5  Left deltoid: 5/5  Right biceps: 5/5  Left biceps:    Right triceps:   Left triceps:   Right wrist flexion:   Left wrist flexion:   Right wrist extension:   Left wrist extension:   Right interossei:   Left interossei:   Right abdominals:   Left abdominals:   Right iliopsoas:   Left iliopsoas:   Right quadriceps:   Left quadriceps:   Right hamstrin/5  Left hamstrin/5  Right glutei:   Left glutei:   Right anterior tibial:   Left anterior tibial:   Right posterior tibial:   Left posterior tibial:   Right peroneal:   Left peroneal:   Right gastroc:   Left gastroc:     Sensory Exam   Right arm light touch: decreased from wrist  Left arm light touch: decreased from wrist  Right leg light touch: decreased from knee  Left leg light touch: decreased from knee  Right arm vibration: normal  Left arm vibration: normal  Right leg vibration: decreased from knee  Left leg vibration: decreased from knee  Right arm proprioception: normal  Left arm proprioception: normal  Right leg proprioception: decreased from knee  Left leg proprioception: decreased from knee  Right arm pinprick: decreased from wrist  Left arm pinprick: decreased from wrist  Right leg pinprick: decreased from knee  Left leg pinprick: decreased from knee  Graphesthesia: normal  Stereognosis: normal    Gait, Coordination, and Reflexes     Gait  Gait: normal    Coordination   Romberg: negative  Finger to nose coordination: normal  Heel to shin coordination: normal  Tandem walking coordination: normal    Tremor   Resting tremor: absent  Intention tremor: absent  Action tremor: absent    Reflexes   Right brachioradialis: 2+  Left brachioradialis: 2+  Right biceps: 2+  Left biceps: 2+  Right triceps: 2+  Left triceps: 2+  Right patellar: 0  Left patellar: 0  Right achilles: 0  Left achilles: 0  Right plantar: normal  Left plantar: normal  Right Salomon: absent  Left Salomon: absent  Right ankle clonus: absent  Left ankle clonus:  absent  Right pendular knee jerk: absent  Left pendular knee jerk: absent      Lab Results   Component Value Date    WBC 5.06 04/11/2019    HGB 13.1 04/11/2019    HCT 40.3 04/11/2019    MCV 83 04/11/2019     04/11/2019     Sodium   Date Value Ref Range Status   04/11/2019 138 136 - 145 mmol/L Final     Potassium   Date Value Ref Range Status   04/11/2019 4.6 3.5 - 5.1 mmol/L Final     Chloride   Date Value Ref Range Status   04/11/2019 104 95 - 110 mmol/L Final     CO2   Date Value Ref Range Status   04/11/2019 22 (L) 23 - 29 mmol/L Final     Glucose   Date Value Ref Range Status   04/11/2019 73 70 - 110 mg/dL Final     BUN, Bld   Date Value Ref Range Status   04/11/2019 11 6 - 20 mg/dL Final     Creatinine   Date Value Ref Range Status   04/11/2019 0.8 0.5 - 1.4 mg/dL Final     Calcium   Date Value Ref Range Status   04/11/2019 9.9 8.7 - 10.5 mg/dL Final     Total Protein   Date Value Ref Range Status   10/12/2018 7.5 6.0 - 8.4 g/dL Final     Albumin   Date Value Ref Range Status   10/12/2018 4.2 3.5 - 5.2 g/dL Final     Total Bilirubin   Date Value Ref Range Status   10/12/2018 0.5 0.1 - 1.0 mg/dL Final     Comment:     For infants and newborns, interpretation of results should be based  on gestational age, weight and in agreement with clinical  observations.  Premature Infant recommended reference ranges:  Up to 24 hours.............<8.0 mg/dL  Up to 48 hours............<12.0 mg/dL  3-5 days..................<15.0 mg/dL  6-29 days.................<15.0 mg/dL       Alkaline Phosphatase   Date Value Ref Range Status   10/12/2018 89 55 - 135 U/L Final     AST   Date Value Ref Range Status   10/12/2018 34 10 - 40 U/L Final     ALT   Date Value Ref Range Status   10/12/2018 28 10 - 44 U/L Final     Anion Gap   Date Value Ref Range Status   04/11/2019 12 8 - 16 mmol/L Final     eGFR if    Date Value Ref Range Status   04/11/2019 >60 >60 mL/min/1.73 m^2 Final     eGFR if non African American    Date Value Ref Range Status   04/11/2019 >60 >60 mL/min/1.73 m^2 Final     Comment:     Calculation used to obtain the estimated glomerular filtration  rate (eGFR) is the CKD-EPI equation.        Lab Results   Component Value Date    [id number] 397 04/11/2019     Lab Results   Component Value Date    TSH 1.454 09/21/2017 12-    MMA NL      01-    NCS BLE is inconclusive       02-    NGOZI Panel NGOZI 1:160      02-    C-spine X-ray multilevel DDD    Assessment:       1. Polyneuropathy    2. Pernicious anemia    3. History of iron deficiency anemia    4. Overweight    5. Lichen sclerosus    6. Panic disorder    7. RLS (restless legs syndrome)    8. Neuropathic pain    9. Arthralgia, unspecified joint    10. Severe episode of recurrent major depressive disorder, without psychotic features    11. 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 Neurological deficit present        Plan:         [id number]     Continue Gabapentin/GBP-Neurontin  100 mg QHS    Continue B12     Continue Cymbalta and take it 30 mg BID     TFT    Lyme Panel    Brain MRI WWO       RLS     Keep Requip on hold          RTC in 3 months

## 2019-05-28 LAB — B BURGDOR AB SER IA-ACNC: 0.11 INDEX VALUE

## 2019-06-17 ENCOUNTER — PATIENT MESSAGE (OUTPATIENT)
Dept: PSYCHIATRY | Facility: CLINIC | Age: 59
End: 2019-06-17

## 2019-06-19 ENCOUNTER — PATIENT MESSAGE (OUTPATIENT)
Dept: GASTROENTEROLOGY | Facility: CLINIC | Age: 59
End: 2019-06-19

## 2019-06-28 ENCOUNTER — OFFICE VISIT (OUTPATIENT)
Dept: PSYCHIATRY | Facility: CLINIC | Age: 59
End: 2019-06-28
Payer: COMMERCIAL

## 2019-06-28 VITALS
DIASTOLIC BLOOD PRESSURE: 64 MMHG | WEIGHT: 125.69 LBS | HEART RATE: 66 BPM | BODY MASS INDEX: 24.54 KG/M2 | SYSTOLIC BLOOD PRESSURE: 116 MMHG

## 2019-06-28 DIAGNOSIS — F41.0 PANIC ATTACKS: ICD-10-CM

## 2019-06-28 PROCEDURE — 3008F BODY MASS INDEX DOCD: CPT | Mod: CPTII,S$GLB,, | Performed by: PSYCHIATRY & NEUROLOGY

## 2019-06-28 PROCEDURE — 99999 PR PBB SHADOW E&M-EST. PATIENT-LVL II: CPT | Mod: PBBFAC,,, | Performed by: PSYCHIATRY & NEUROLOGY

## 2019-06-28 PROCEDURE — 99213 PR OFFICE/OUTPT VISIT, EST, LEVL III, 20-29 MIN: ICD-10-PCS | Mod: S$GLB,,, | Performed by: PSYCHIATRY & NEUROLOGY

## 2019-06-28 PROCEDURE — 99999 PR PBB SHADOW E&M-EST. PATIENT-LVL II: ICD-10-PCS | Mod: PBBFAC,,, | Performed by: PSYCHIATRY & NEUROLOGY

## 2019-06-28 PROCEDURE — 3008F PR BODY MASS INDEX (BMI) DOCUMENTED: ICD-10-PCS | Mod: CPTII,S$GLB,, | Performed by: PSYCHIATRY & NEUROLOGY

## 2019-06-28 PROCEDURE — 99213 OFFICE O/P EST LOW 20 MIN: CPT | Mod: S$GLB,,, | Performed by: PSYCHIATRY & NEUROLOGY

## 2019-06-28 RX ORDER — DULOXETINE 40 MG/1
40 CAPSULE, DELAYED RELEASE ORAL DAILY
Qty: 30 CAPSULE | Refills: 3 | Status: SHIPPED | OUTPATIENT
Start: 2019-06-28 | End: 2019-09-05

## 2019-06-28 RX ORDER — LORAZEPAM 0.5 MG/1
0.5 TABLET ORAL EVERY 6 HOURS PRN
Qty: 20 TABLET | Refills: 1 | Status: SHIPPED | OUTPATIENT
Start: 2019-06-28 | End: 2019-09-05 | Stop reason: SDUPTHER

## 2019-06-28 NOTE — PATIENT INSTRUCTIONS
Duloxetine delayed-release capsules  What is this medicine?  DULOXETINE (doo LOX e teen) is used to treat depression, anxiety, and different types of chronic pain.  How should I use this medicine?  Take this medicine by mouth with a glass of water. Follow the directions on the prescription label. Do not cut, crush or chew this medicine. You can take this medicine with or without food. Take your medicine at regular intervals. Do not take your medicine more often than directed. Do not stop taking this medicine suddenly except upon the advice of your doctor. Stopping this medicine too quickly may cause serious side effects or your condition may worsen.  A special MedGuide will be given to you by the pharmacist with each prescription and refill. Be sure to read this information carefully each time.  Talk to your pediatrician regarding the use of this medicine in children. While this drug may be prescribed for children as young as 7 years of age for selected conditions, precautions do apply.  What side effects may I notice from receiving this medicine?  Side effects that you should report to your doctor or health care professional as soon as possible:  · allergic reactions like skin rash, itching or hives, swelling of the face, lips, or tongue  · changes in blood pressure  · confusion  · dark urine  · dizziness  · fast talking and excited feelings or actions that are out of control  · fast, irregular heartbeat  · fever  · general ill feeling or flu-like symptoms  · hallucination, loss of contact with reality  · light-colored stools  · loss of balance or coordination  · redness, blistering, peeling or loosening of the skin, including inside the mouth  · right upper belly pain  · seizures  · suicidal thoughts or other mood changes  · trouble concentrating  · trouble passing urine or change in the amount of urine  · unusual bleeding or bruising  · unusually weak or tired  · yellowing of the eyes or skin  Side effects that  usually do not require medical attention (report to your doctor or health care professional if they continue or are bothersome):  · blurred vision  · change in appetite  · change in sex drive or performance  · headache  · increased sweating  · nausea  What may interact with this medicine?  Do not take this medicine with any of the following medications:  · certain diet drugs like dexfenfluramine, fenfluramine  · desvenlafaxine  · linezolid  · MAOIs like Azilect, Carbex, Eldepryl, Marplan, Nardil, and Parnate  · methylene blue (intravenous)  · milnacipran  · thioridazine  · venlafaxine  This medicine may also interact with the following medications:  · alcohol  · aspirin and aspirin-like medicines  · certain antibiotics like ciprofloxacin and enoxacin  · certain medicines for blood pressure, heart disease, irregular heart beat  · certain medicines for depression, anxiety, or psychotic disturbances  · certain medicines for migraine headache like almotriptan, eletriptan, frovatriptan, naratriptan, rizatriptan, sumatriptan, zolmitriptan  · certain medicines that treat or prevent blood clots like warfarin, enoxaparin, and dalteparin  · cimetidine  · fentanyl  · lithium  · NSAIDS, medicines for pain and inflammation, like ibuprofen or naproxen  · phentermine  · procarbazine  · sibutramine  · Pablito's wort  · theophylline  · tramadol  · tryptophan  What if I miss a dose?  If you miss a dose, take it as soon as you can. If it is almost time for your next dose, take only that dose. Do not take double or extra doses.  Where should I keep my medicine?  Keep out of the reach of children.  Store at room temperature between 20 and 25 degrees C (68 to 77 degrees F). Throw away any unused medicine after the expiration date.  What should I tell my health care provider before I take this medicine?  They need to know if you have any of these conditions:  · bipolar disorder or a family history of bipolar  disorder  · glaucoma  · kidney disease  · liver disease  · suicidal thoughts or a previous suicide attempt  · taken medicines called MAOIs like Carbex, Eldepryl, Marplan, Nardil, and Parnate within 14 days  · an unusual reaction to duloxetine, other medicines, foods, dyes, or preservatives  · pregnant or trying to get pregnant  · breast-feeding  What should I watch for while using this medicine?  Tell your doctor if your symptoms do not get better or if they get worse. Visit your doctor or health care professional for regular checks on your progress. Because it may take several weeks to see the full effects of this medicine, it is important to continue your treatment as prescribed by your doctor.  Patients and their families should watch out for new or worsening thoughts of suicide or depression. Also watch out for sudden changes in feelings such as feeling anxious, agitated, panicky, irritable, hostile, aggressive, impulsive, severely restless, overly excited and hyperactive, or not being able to sleep. If this happens, especially at the beginning of treatment or after a change in dose, call your health care professional.  You may get drowsy or dizzy. Do not drive, use machinery, or do anything that needs mental alertness until you know how this medicine affects you. Do not stand or sit up quickly, especially if you are an older patient. This reduces the risk of dizzy or fainting spells. Alcohol may interfere with the effect of this medicine. Avoid alcoholic drinks.  This medicine can cause an increase in blood pressure. This medicine can also cause a sudden drop in your blood pressure, which may make you feel faint and increase the chance of a fall. These effects are most common when you first start the medicine or when the dose is increased, or during use of other medicines that can cause a sudden drop in blood pressure. Check with your doctor for instructions on monitoring your blood pressure while taking this  medicine.  Your mouth may get dry. Chewing sugarless gum or sucking hard candy, and drinking plenty of water may help. Contact your doctor if the problem does not go away or is severe.  NOTE:This sheet is a summary. It may not cover all possible information. If you have questions about this medicine, talk to your doctor, pharmacist, or health care provider. Copyright© 2017 [employer]

## 2019-06-28 NOTE — PROGRESS NOTES
"ESTABLISHED OUTPATIENT VISIT   E/M LEVEL 3: 59603    ENCOUNTER DATE: 6/28/2019  SITE: [facility].       HISTORY  Currently having fatigue. Went to the gym and she has been consistently going. On Monday she could not run 4 miles, which is her typical. Doesn't know what could be contributing to his. Has fatigue when she wakes up in the morning, and it takes about 2-3 hours to get going in the morning. When she wakes up she wakes up feeling negatively, meaning that she has thoughts of "What is the use." As she gets up and takes in the sun she feels that she has some energy. She feels forced to go out. The sadness comes back at the end of the day when her day is winding down. Multiple difficult factors are Mother's day and her daughter's birthday is coming up.    Cymbalta is helping in that she feels that you are motivated to do things once she get's her day going, and wanting to do more, picking up more projects. She still feels that sadness, but after exploring her feelings we determined that a lot of it has to do with not feeling a sense of purpose in the afternoons.    CHIEF COMPLAINT   Alma Mistry is a 58 y.o. female who presents for followup of Depression and Anxiety    HPI   Forgot her medications during trip in [place] and she went through withdrawals. But was able to get the medications in the end. Anxiety attacks are coming back, have had at least 4 but not as bad as the one she had originally that caused her to seek help. But yesterday had one that was about an 8 out of 10.  Still has some depression, perhaps an 8/10. Seems to be situational, when she was in [place] 7/10, but able to get out of the house and do things. When she returned back home, had to deal with the same situations such as her daughter ignoring her and not responding to her text messages. Has not seen therapist recently. That being said, she has had the motivation to  an extra teaching class. She has continued to " walk her dog, and has been able to carry out activities of daily living.       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   Financial: no difficulties    Relationship Status/Sexual Orientation: single, heterosexual    Children: one living daughter, one     Housing Status: lives alone  Methodist: Gnosticist, currently not practicing  Recreational Activities: reading, currently not interested  Access to Gun: none     Substance Abuse History:   No substance abuse history        Allergies:   Review of patient's allergies indicates:  No Known Allergies     PSYCHOTROPIC MEDICATIONS   Current Outpatient Medications on File Prior to Visit   Medication Sig Dispense Refill    aspirin-acetaminophen-caffeine 250-250-65 mg (EXCEDRIN MIGRAINE) 250-250-65 mg per tablet Take 1 tablet by mouth as needed for Pain.      CALCIUM CIT/MGOX/VIT D3/B6/MIN (CITRACAL PLUS ORAL) Take 1 tablet by mouth as needed.       CHOLECALCIFEROL, VITAMIN D3, (VITAMIN D3 ORAL) Take 1,000 Units by mouth daily as needed.       clobetasol 0.05% (TEMOVATE) 0.05 % Oint Apply topically as needed.       cyanocobalamin 1,000 mcg/mL injection INJECT 1 ML INTRAMUSCULARLY EVERY 28 DAYS 10 mL 0    DULoxetine (CYMBALTA) 30 MG capsule Take 1 capsule (30 mg total) by mouth 2 (two) times daily. 60 capsule 3    gabapentin (NEURONTIN) 100 MG capsule Take 1 capsule (100 mg total) by mouth every evening. 30 capsule 5    linaclotide (LINZESS)  145 mcg Cap capsule Take 1 capsule (145 mcg total) by mouth once daily. 30 capsule 5    LORazepam (ATIVAN) 0.5 MG tablet Take 1 tablet (0.5 mg total) by mouth every 6 (six) hours as needed for Anxiety. 20 tablet 1    magnesium 30 mg Tab Take 1 tablet by mouth once.      multivitamin (ONE DAILY MULTIVITAMIN) per tablet Take 1 tablet by mouth once daily.      naproxen (NAPROSYN) 500 MG tablet Take 1 tablet (500 mg total) by mouth 2 (two) times daily with meals. For pain and inflammation (Patient taking differently: Take 500 mg by mouth 2 (two) times daily as needed. For pain and inflammation) 30 tablet 0    psyllium (FIBER) powder Take 1 packet by mouth daily as needed.      sodium,potassium,mag sulfates (SUPREP BOWEL PREP KIT) 17.5-3.13-1.6 gram SolR As directed for colonoscopy 254 mL 0     No current facility-administered medications on file prior to visit.          EXAMINATION    [unfilled]  Vitals:    06/28/19 1055   BP: 116/64   Pulse: 66     Wt Readings from Last 2 Encounters:   06/28/19 57 kg (125 lb 10.6 oz)   05/22/19 55.6 kg (122 lb 9.2 oz)       CONSTITUTIONAL  General Appearance: well groomed    MUSCULOSKELETAL  No involuntary muscle movements  Normal gait    PSYCHIATRIC   Mental Status Exam:  Appearance: unremarkable, age appropriate  Behavior/Cooperation: appopriate normal, cooperative  Speech:  normal tone, normal rate, normal pitch, normal volume  Language: grossly intact with spontaneous speech  Mood: some sadness, grief   Affect:  congruent with mood, flat affect at times but able to smile appropriately and be engaging in conversation   Thought Process: linear, logical and goal oriented   Thought Content: no suicidality, no homicidality, no delusions, no paranoia  Sensorium:  Awake  Alert and Oriented: x3 person, place, situation  Memory:    Recent:  Intact; able to report recent events  Attention/concentration: appropriate for age/education.   Insight:Intact  Judgment:  Intact      MEDICAL  DECISION MAKING    DIAGNOSES  No diagnosis found.      PROBLEM LIST AND MANAGEMENT PLANS   1) Increase Cymbalta to 40 mg qhs to improve depressed mood and anxiety  2) Continue therapy with Tremaine  3) Continue ativan as needed for anxiety attacks, patient aware of the risk for chemical dependence. No indication of misuse of medication            PRESCRIPTION DRUG MANAGEMENT  Compliance: yes  Side Effects: none  Regimen Adjustments: increase cymbalta

## 2019-07-05 ENCOUNTER — PATIENT MESSAGE (OUTPATIENT)
Dept: GASTROENTEROLOGY | Facility: CLINIC | Age: 59
End: 2019-07-05

## 2019-07-10 NOTE — TELEPHONE ENCOUNTER
This message has been in the inbasket since Friday July 5th. Please contact the patient today. Thanks.

## 2019-07-13 DIAGNOSIS — K58.1 IRRITABLE BOWEL SYNDROME WITH CONSTIPATION: Primary | ICD-10-CM

## 2019-07-13 RX ORDER — LUBIPROSTONE 8 UG/1
8 CAPSULE ORAL 2 TIMES DAILY WITH MEALS
Qty: 30 CAPSULE | Refills: 1 | Status: SHIPPED | OUTPATIENT
Start: 2019-07-13 | End: 2019-11-22

## 2019-08-14 ENCOUNTER — TELEPHONE (OUTPATIENT)
Dept: PSYCHIATRY | Facility: CLINIC | Age: 59
End: 2019-08-14

## 2019-08-23 ENCOUNTER — PATIENT MESSAGE (OUTPATIENT)
Dept: NEUROLOGY | Facility: CLINIC | Age: 59
End: 2019-08-23

## 2019-08-26 DIAGNOSIS — G62.9 POLYNEUROPATHY: ICD-10-CM

## 2019-08-26 DIAGNOSIS — M79.2 NEUROPATHIC PAIN: ICD-10-CM

## 2019-08-26 DIAGNOSIS — G25.81 RLS (RESTLESS LEGS SYNDROME): ICD-10-CM

## 2019-08-26 RX ORDER — GABAPENTIN 100 MG/1
300 CAPSULE ORAL NIGHTLY
Qty: 30 CAPSULE | Refills: 5 | Status: SHIPPED | OUTPATIENT
Start: 2019-08-26 | End: 2019-11-08 | Stop reason: SDUPTHER

## 2019-09-04 ENCOUNTER — PATIENT MESSAGE (OUTPATIENT)
Dept: NEUROLOGY | Facility: CLINIC | Age: 59
End: 2019-09-04

## 2019-09-04 DIAGNOSIS — G25.81 RLS (RESTLESS LEGS SYNDROME): ICD-10-CM

## 2019-09-04 DIAGNOSIS — M79.2 NEUROPATHIC PAIN: ICD-10-CM

## 2019-09-04 DIAGNOSIS — G62.9 POLYNEUROPATHY: ICD-10-CM

## 2019-09-04 RX ORDER — GABAPENTIN 100 MG/1
300 CAPSULE ORAL NIGHTLY
Qty: 30 CAPSULE | Refills: 5 | Status: CANCELLED | OUTPATIENT
Start: 2019-09-04 | End: 2020-09-03

## 2019-09-05 ENCOUNTER — OFFICE VISIT (OUTPATIENT)
Dept: PSYCHIATRY | Facility: CLINIC | Age: 59
End: 2019-09-05
Payer: COMMERCIAL

## 2019-09-05 VITALS
DIASTOLIC BLOOD PRESSURE: 68 MMHG | HEART RATE: 60 BPM | SYSTOLIC BLOOD PRESSURE: 110 MMHG | BODY MASS INDEX: 23.94 KG/M2 | WEIGHT: 122.56 LBS

## 2019-09-05 DIAGNOSIS — F33.0 MILD EPISODE OF RECURRENT MAJOR DEPRESSIVE DISORDER: ICD-10-CM

## 2019-09-05 DIAGNOSIS — F43.21 COMPLICATED BEREAVEMENT: ICD-10-CM

## 2019-09-05 DIAGNOSIS — F41.0 PANIC ATTACKS: Primary | ICD-10-CM

## 2019-09-05 PROCEDURE — 99213 PR OFFICE/OUTPT VISIT, EST, LEVL III, 20-29 MIN: ICD-10-PCS | Mod: S$GLB,,, | Performed by: PSYCHIATRY & NEUROLOGY

## 2019-09-05 PROCEDURE — 99213 OFFICE O/P EST LOW 20 MIN: CPT | Mod: S$GLB,,, | Performed by: PSYCHIATRY & NEUROLOGY

## 2019-09-05 PROCEDURE — 3008F PR BODY MASS INDEX (BMI) DOCUMENTED: ICD-10-PCS | Mod: CPTII,S$GLB,, | Performed by: PSYCHIATRY & NEUROLOGY

## 2019-09-05 PROCEDURE — 99999 PR PBB SHADOW E&M-EST. PATIENT-LVL III: ICD-10-PCS | Mod: PBBFAC,,, | Performed by: PSYCHIATRY & NEUROLOGY

## 2019-09-05 PROCEDURE — 99999 PR PBB SHADOW E&M-EST. PATIENT-LVL III: CPT | Mod: PBBFAC,,, | Performed by: PSYCHIATRY & NEUROLOGY

## 2019-09-05 PROCEDURE — 3008F BODY MASS INDEX DOCD: CPT | Mod: CPTII,S$GLB,, | Performed by: PSYCHIATRY & NEUROLOGY

## 2019-09-05 RX ORDER — DULOXETIN HYDROCHLORIDE 20 MG/1
20 CAPSULE, DELAYED RELEASE ORAL 2 TIMES DAILY
Qty: 60 CAPSULE | Refills: 3 | Status: SHIPPED | OUTPATIENT
Start: 2019-09-05 | End: 2019-11-20 | Stop reason: SDUPTHER

## 2019-09-05 RX ORDER — LORAZEPAM 0.5 MG/1
0.5 TABLET ORAL EVERY 6 HOURS PRN
Qty: 20 TABLET | Refills: 1 | Status: SHIPPED | OUTPATIENT
Start: 2019-09-05 | End: 2019-11-20 | Stop reason: SDUPTHER

## 2019-09-05 NOTE — PROGRESS NOTES
"ESTABLISHED OUTPATIENT VISIT   E/M LEVEL 3: 50609    ENCOUNTER DATE: 9/17/2019  SITE: [facility].       HISTORY  Currently having fatigue. Went to the gym and she has been consistently going. On Monday she could not run 4 miles, which is her typical. Doesn't know what could be contributing to his. Has fatigue when she wakes up in the morning, and it takes about 2-3 hours to get going in the morning. When she wakes up she wakes up feeling negatively, meaning that she has thoughts of "What is the use." As she gets up and takes in the sun she feels that she has some energy. She feels forced to go out. The sadness comes back at the end of the day when her day is winding down. Multiple difficult factors are Mother's day and her daughter's birthday is coming up.    Cymbalta is helping in that she feels that you are motivated to do things once she get's her day going, and wanting to do more, picking up more projects. She still feels that sadness, but after exploring her feelings we determined that a lot of it has to do with not feeling a sense of purpose in the afternoons.    From Previous Visit:  Forgot her medications during trip in [place] and she went through withdrawals. But was able to get the medications in the end. Anxiety attacks are coming back, have had at least 4 but not as bad as the one she had originally that caused her to seek help. But yesterday had one that was about an 8 out of 10.  Still has some depression, perhaps an 8/10. Seems to be situational, when she was in [place] 7/10, but able to get out of the house and do things. When she returned back home, had to deal with the same situations such as her daughter ignoring her and not responding to her text messages. Has not seen therapist recently. That being said, she has had the motivation to  an extra teaching class. She has continued to walk her dog, and has been able to carry out activities of daily living.     CHIEF " COMPLAINT   Alma Mistry is a 58 y.o. female who presents for followup of Depression and Anxiety    HPI   59 yo female with history of Depression and Anxiety. 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 She has found that she has more motivation then before. She has been getting up and completing her tasks with much more ease. However, finds that around 5 pm her energy levels start to drop and her mood once again turns somber and she often contemplates what the meaning of her life is now that her daughter Bibiana has . She denies any active suicidal ideation. She has contact with older daughter, but finds that their relationship has been distant and they are not as close as she was with Bibiana.   She currently does feel that at higher doses of Cymbalta she experiences surges of increased energy which she describes as impulsivity to finish tasks and has a frenzied manic component. Finds that this is associated to the increased dosage of the cymbalta, and does not find that this is increased energy is beneficial to her because it resembles anxiety.   Currently denies increased impulsivity  No Suicidal ideation  Does feel grief due to loss of daughter  Worries about her purpose in life now that she cannot identify herself as Bibiana's mothers  And her other daughter lives far  Denies lack of motivation, denies problems with appetite.       ROS   Pertinent symptoms listed in HPI    PFSH  Past Medical History reviewed: Yes  Family History reviewed: Yes  Social History reviewed: Yes    Neurological History:  Seizures:  DENIED  Head Trauma:  DENIED        Past Psychiatric History:   Has attended grief counseling and psychotherapy  One previous psychiatrist  One previous psych med but had si and no recollection of the medication : Currently tolerates Cymbalta and has been less reliant on ativan due to decreased frequency of panic attacks.      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   Financial: no difficulties    Relationship Status/Sexual Orientation: single, heterosexual    Children: one living daughter, one     Housing Status: lives alone  Anabaptism: Orthodox, currently not practicing  Recreational Activities: reading, currently not interested  Access to Gun: none     Substance Abuse History:   No substance abuse history        Allergies:   Review of patient's allergies indicates:  No Known Allergies     PSYCHOTROPIC MEDICATIONS   Current Outpatient Medications on File Prior to Visit   Medication Sig Dispense Refill    aspirin-acetaminophen-caffeine 250-250-65 mg (EXCEDRIN MIGRAINE) 250-250-65 mg per tablet Take 1 tablet by mouth as needed for Pain.      CALCIUM CIT/MGOX/VIT D3/B6/MIN (CITRACAL PLUS ORAL) Take 1 tablet by mouth as needed.       CHOLECALCIFEROL, VITAMIN D3, (VITAMIN D3 ORAL) Take 1,000 Units by mouth daily as needed.       clobetasol 0.05% (TEMOVATE) 0.05 % Oint Apply topically as needed.       cyanocobalamin 1,000 mcg/mL injection INJECT 1 ML INTRAMUSCULARLY EVERY 28 DAYS 10 mL 0    gabapentin (NEURONTIN) 100 MG capsule Take 3 capsules (300 mg total) by mouth every evening. 30 capsule 5    lubiprostone (AMITIZA) 8 MCG Cap Take 1 capsule (8 mcg total) by mouth 2 (two) times daily with meals. 30 capsule 1    magnesium 30 mg Tab Take 1 tablet by mouth once.      multivitamin (ONE DAILY MULTIVITAMIN) per tablet Take 1 tablet by mouth once daily.      naproxen (NAPROSYN) 500 MG tablet Take 1 tablet (500 mg total) by mouth 2 (two) times daily with meals. For pain and inflammation (Patient taking differently: Take 500 mg by mouth 2 (two) times daily as needed. For pain and inflammation) 30 tablet 0    psyllium (FIBER) powder Take 1 packet by mouth daily as needed.      sodium,potassium,mag sulfates (SUPREP BOWEL PREP KIT) 17.5-3.13-1.6 gram SolR As directed for  colonoscopy 254 mL 0     No current facility-administered medications on file prior to visit.          EXAMINATION    [unfilled]  Vitals:    09/05/19 1017   BP: 110/68   Pulse: 60     Wt Readings from Last 2 Encounters:   09/05/19 55.6 kg (122 lb 9.2 oz)   06/28/19 57 kg (125 lb 10.6 oz)       CONSTITUTIONAL  General Appearance: well groomed    MUSCULOSKELETAL  No involuntary muscle movements  Normal gait    PSYCHIATRIC   Mental Status Exam:  Appearance: unremarkable, age appropriate  Behavior/Cooperation: appopriate normal, cooperative  Speech:  normal tone, normal rate, normal pitch, normal volume  Language: grossly intact with spontaneous speech  Mood:   Affect:  congruent with mood, flat affect at times but able to smile appropriately and be engaging in conversation   Thought Process: linear, logical and goal oriented   Thought Content: no suicidality, no homicidality, no delusions, no paranoia  Sensorium:  Awake  Alert and Oriented: x3 person, place, situation  Memory:    Recent:  Intact; able to report recent events  Attention/concentration: appropriate for age/education.   Insight:Intact  Judgment:  Intact      MEDICAL DECISION MAKING    DIAGNOSES  Encounter Diagnoses   Name Primary?    Complicated bereavement     Mild episode of recurrent major depressive disorder     Panic attacks Yes         PROBLEM LIST AND MANAGEMENT PLANS   1) Cymbalta dosage has been decreased to 20 mg twice daily   2) Continue therapy with Tremaine Caballero  3) Continue ativan as needed for anxiety attacks, patient aware of the risk for chemical dependence. No indication of misuse of medication. Ativan .5 mg as needed  4)  verified            PRESCRIPTION DRUG MANAGEMENT  Compliance: yes  Side Effects: none  Regimen Adjustments: decrease cymbalta

## 2019-09-10 ENCOUNTER — OFFICE VISIT (OUTPATIENT)
Dept: PSYCHIATRY | Facility: CLINIC | Age: 59
End: 2019-09-10
Payer: COMMERCIAL

## 2019-09-10 DIAGNOSIS — F41.0 PANIC DISORDER: Primary | ICD-10-CM

## 2019-09-10 PROCEDURE — 90834 PSYTX W PT 45 MINUTES: CPT | Mod: S$GLB,,, | Performed by: SOCIAL WORKER

## 2019-09-10 PROCEDURE — 90834 PR PSYCHOTHERAPY W/PATIENT, 45 MIN: ICD-10-PCS | Mod: S$GLB,,, | Performed by: SOCIAL WORKER

## 2019-09-10 NOTE — PROGRESS NOTES
Individual Psychotherapy (PhD/LCSW)    9/10/2019    Site:  [facility]         Therapeutic Intervention: Met with patient.  Outpatient - Insight oriented psychotherapy 45 min - CPT code 05394    Chief complaint/reason for encounter: depression, anxiety, and grief     Interval history and content of current session:  Patient presents to ongoing individual therapy due to anxiety, depression, and grief.  She was last in session on 5/3/19.  She took a trip with her friend to [place] in July.  Her friend did not treat her well when she was there.  She notes that her friend was even corrected by her sisters.  The patient decided to enjoy her trip to [facility] anyway.  She has been frustrated with her daughter, Daylin.  When she calls her daughter, her daughter will say that she is busy with something.  The patient thinks that her daughter is becoming like her father.  She admits that her  daughter, Yohana, would often fuss her sister for her behavior.  The patient has been wanting to isolate in her home.  She admits that she almost cancelled the appointment today.  She does not want to leave her home after 6pm.  She admits that isolation is not a typical character trait of hers.  She has increased the dose of Cymbalta when she saw Dr. Weinberg.  Educate the patient about the stages of grief.  Validate the anger that is part of her grief.  Emphasize that her family has changed in structure and her living daughter is now an only child.  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  She continues to go to the gym on a regular basis.  She was in a yoga class and a woman thought that she knew the patient.  The patient realized that the woman had helped her daughter when she was entering [employer] and they had corresponded through email.  She is making a collage in a book that she recently read.  She did not like the content of the book, but the title is  "called, "Eternity."  She will be putting remembrances of her daughter, Bibiana, in the book.  She has been going to an artist gathering at a park on Wednesday afternoon.  She notes that she is the youngest person there.    Treatment plan:  Target symptoms: depression, anxiety   Why chosen therapy is appropriate versus another modality: relevant to diagnosis  Outcome monitoring methods: self-report, observation  Therapeutic intervention type: insight oriented psychotherapy, supportive psychotherapy, interactive psychotherapy     Risk parameters:  Patient reports no suicidal ideation  Patient reports no homicidal ideation  Patient reports no self-injurious behavior  Patient reports no violent behavior     Verbal deficits: None     Patient's response to intervention:  The patient's response to intervention is accepting, motivated.     Progress toward goals and other mental status changes:  The patient's progress toward goals is fair .     Diagnosis:   Panic Disorder     Plan:  individual psychotherapy and medication management by physician     Return to clinic: as scheduled     Length of Service (minutes): 45  "

## 2019-10-08 ENCOUNTER — PATIENT MESSAGE (OUTPATIENT)
Dept: PSYCHIATRY | Facility: CLINIC | Age: 59
End: 2019-10-08

## 2019-10-08 ENCOUNTER — PATIENT MESSAGE (OUTPATIENT)
Dept: INTERNAL MEDICINE | Facility: CLINIC | Age: 59
End: 2019-10-08

## 2019-10-10 ENCOUNTER — PATIENT OUTREACH (OUTPATIENT)
Dept: ADMINISTRATIVE | Facility: HOSPITAL | Age: 59
End: 2019-10-10

## 2019-10-10 DIAGNOSIS — Z12.31 ENCOUNTER FOR SCREENING MAMMOGRAM FOR MALIGNANT NEOPLASM OF BREAST: Primary | ICD-10-CM

## 2019-10-26 ENCOUNTER — PATIENT MESSAGE (OUTPATIENT)
Dept: PSYCHIATRY | Facility: CLINIC | Age: 59
End: 2019-10-26

## 2019-10-27 ENCOUNTER — PATIENT MESSAGE (OUTPATIENT)
Dept: PSYCHIATRY | Facility: CLINIC | Age: 59
End: 2019-10-27

## 2019-10-28 ENCOUNTER — HOSPITAL ENCOUNTER (OUTPATIENT)
Dept: RADIOLOGY | Facility: HOSPITAL | Age: 59
Discharge: HOME | End: 2019-10-28
Attending: INTERNAL MEDICINE
Payer: COMMERCIAL

## 2019-10-28 ENCOUNTER — OFFICE VISIT (OUTPATIENT)
Dept: PSYCHIATRY | Facility: CLINIC | Age: 59
End: 2019-10-28
Payer: COMMERCIAL

## 2019-10-28 VITALS — HEIGHT: 60 IN | WEIGHT: 122.56 LBS | BODY MASS INDEX: 24.06 KG/M2

## 2019-10-28 DIAGNOSIS — Z12.31 ENCOUNTER FOR SCREENING MAMMOGRAM FOR MALIGNANT NEOPLASM OF BREAST: ICD-10-CM

## 2019-10-28 DIAGNOSIS — F41.0 PANIC DISORDER: Primary | ICD-10-CM

## 2019-10-28 PROCEDURE — 77063 MAMMO DIGITAL SCREENING BILAT WITH TOMOSYNTHESIS_CAD: ICD-10-PCS | Mod: 26,,, | Performed by: RADIOLOGY

## 2019-10-28 PROCEDURE — 90834 PSYTX W PT 45 MINUTES: CPT | Mod: S$GLB,,, | Performed by: SOCIAL WORKER

## 2019-10-28 PROCEDURE — 77067 SCR MAMMO BI INCL CAD: CPT | Mod: 26,,, | Performed by: RADIOLOGY

## 2019-10-28 PROCEDURE — 77067 MAMMO DIGITAL SCREENING BILAT WITH TOMOSYNTHESIS_CAD: ICD-10-PCS | Mod: 26,,, | Performed by: RADIOLOGY

## 2019-10-28 PROCEDURE — 77063 BREAST TOMOSYNTHESIS BI: CPT | Mod: 26,,, | Performed by: RADIOLOGY

## 2019-10-28 PROCEDURE — 77063 BREAST TOMOSYNTHESIS BI: CPT | Mod: TC

## 2019-10-28 PROCEDURE — 90834 PR PSYCHOTHERAPY W/PATIENT, 45 MIN: ICD-10-PCS | Mod: S$GLB,,, | Performed by: SOCIAL WORKER

## 2019-10-30 ENCOUNTER — OFFICE VISIT (OUTPATIENT)
Dept: DERMATOLOGY | Facility: CLINIC | Age: 59
End: 2019-10-30
Payer: COMMERCIAL

## 2019-10-30 ENCOUNTER — LAB VISIT (OUTPATIENT)
Dept: LAB | Facility: HOSPITAL | Age: 59
End: 2019-10-30
Attending: STUDENT IN AN ORGANIZED HEALTH CARE EDUCATION/TRAINING PROGRAM
Payer: COMMERCIAL

## 2019-10-30 DIAGNOSIS — L30.9 DERMATITIS: Primary | ICD-10-CM

## 2019-10-30 DIAGNOSIS — L30.9 DERMATITIS: ICD-10-CM

## 2019-10-30 PROCEDURE — 99202 OFFICE O/P NEW SF 15 MIN: CPT | Mod: S$GLB,,, | Performed by: STUDENT IN AN ORGANIZED HEALTH CARE EDUCATION/TRAINING PROGRAM

## 2019-10-30 PROCEDURE — 86038 ANTINUCLEAR ANTIBODIES: CPT

## 2019-10-30 PROCEDURE — 86235 NUCLEAR ANTIGEN ANTIBODY: CPT | Mod: 59

## 2019-10-30 PROCEDURE — 99202 PR OFFICE/OUTPT VISIT, NEW, LEVL II, 15-29 MIN: ICD-10-PCS | Mod: S$GLB,,, | Performed by: STUDENT IN AN ORGANIZED HEALTH CARE EDUCATION/TRAINING PROGRAM

## 2019-10-30 PROCEDURE — 99999 PR PBB SHADOW E&M-EST. PATIENT-LVL II: CPT | Mod: PBBFAC,,, | Performed by: STUDENT IN AN ORGANIZED HEALTH CARE EDUCATION/TRAINING PROGRAM

## 2019-10-30 PROCEDURE — 86039 ANTINUCLEAR ANTIBODIES (ANA): CPT

## 2019-10-30 PROCEDURE — 99999 PR PBB SHADOW E&M-EST. PATIENT-LVL II: ICD-10-PCS | Mod: PBBFAC,,, | Performed by: STUDENT IN AN ORGANIZED HEALTH CARE EDUCATION/TRAINING PROGRAM

## 2019-10-30 PROCEDURE — 36415 COLL VENOUS BLD VENIPUNCTURE: CPT

## 2019-10-30 RX ORDER — CETIRIZINE HYDROCHLORIDE 10 MG/1
10 TABLET ORAL DAILY
Qty: 60 TABLET | Refills: 0 | Status: SHIPPED | OUTPATIENT
Start: 2019-10-30 | End: 2020-07-23

## 2019-10-30 RX ORDER — TRIAMCINOLONE ACETONIDE 0.25 MG/G
CREAM TOPICAL
Qty: 80 G | Refills: 0 | Status: SHIPPED | OUTPATIENT
Start: 2019-10-30 | End: 2020-05-07

## 2019-10-30 NOTE — PROGRESS NOTES
Subjective:       Patient ID:  Alma Mistry is a 58 y.o. female who presents for   Chief Complaint   Patient presents with    Rash     c/o rash to body tx antifungal      History of Present Illness: The patient presents with chief complaint of a rash and itching.  Location: arms, face, back.  Duration: unknown. Patient reports on an off itching.   Signs/Symptoms: itching, worsening with sun exposure. Denies any blister formation.   Prior treatments: none        Review of Systems   Skin: Positive for itching.        Objective:    Physical Exam   Constitutional: She appears well-developed and well-nourished. No distress.   Neurological: She is alert and oriented to person, place, and time. She is not disoriented.   Psychiatric: She has a normal mood and affect.   Skin:   Areas Examined (abnormalities noted in diagram):   Head / Face Inspection Performed  Neck Inspection Performed  Chest / Axilla Inspection Performed  Back Inspection Performed  RUE Inspected  LUE Inspection Performed              Diagram Legend     Erythematous scaling macule/papule c/w actinic keratosis       Vascular papule c/w angioma      Pigmented verrucoid papule/plaque c/w seborrheic keratosis      Yellow umbilicated papule c/w sebaceous hyperplasia      Irregularly shaped tan macule c/w lentigo     1-2 mm smooth white papules consistent with Milia      Movable subcutaneous cyst with punctum c/w epidermal inclusion cyst      Subcutaneous movable cyst c/w pilar cyst      Firm pink to brown papule c/w dermatofibroma      Pedunculated fleshy papule(s) c/w skin tag(s)      Evenly pigmented macule c/w junctional nevus     Mildly variegated pigmented, slightly irregular-bordered macule c/w mildly atypical nevus      Flesh colored to evenly pigmented papule c/w intradermal nevus       Pink pearly papule/plaque c/w basal cell carcinoma      Erythematous hyperkeratotic cursted plaque c/w SCC      Surgical scar with no sign of skin cancer recurrence       Open and closed comedones      Inflammatory papules and pustules      Verrucoid papule consistent consistent with wart     Erythematous eczematous patches and plaques     Dystrophic onycholytic nail with subungual debris c/w onychomycosis     Umbilicated papule    Erythematous-base heme-crusted tan verrucoid plaque consistent with inflamed seborrheic keratosis     Erythematous Silvery Scaling Plaque c/w Psoriasis     See annotation      Assessment / Plan:        Dermatitis - patient with itching and rash that subjectively worsens with sun exposure. Previous NGOZI positive, will obtain profile. Also try a course of mild steroids and antihistamines,   -     triamcinolone acetonide 0.025% (KENALOG) 0.025 % cream; AAA bid  Dispense: 80 g; Refill: 0  -     cetirizine (ZYRTEC) 10 MG tablet; Take 1 tablet (10 mg total) by mouth once daily.  Dispense: 60 tablet; Refill: 0  -     NGOZI Screen w/Reflex; Future; Expected date: 10/30/2019  -     Counseled patient on gentle skin care regimen, including need for sensitive soaps/detergents, as well as need for frequent use of sensitize moisturizers.          Follow up in about 8 weeks (around 12/25/2019).

## 2019-10-30 NOTE — PROGRESS NOTES
"Individual Psychotherapy (PhD/LCSW)    10/28/2019    Site:  [facility]         Therapeutic Intervention: Met with patient.  Outpatient - Insight oriented psychotherapy 45 min - CPT code 86097    Chief complaint/reason for encounter: depression, anxiety and grief     Interval history and content of current session:  Patient presents to ongoing individual therapy due to depression, anxiety, and grief.  She continues to miss her daughter, who  about two years ago.  She  about three years ago.  She does talk to her surviving daughter on a regular basis, but she is very different from the daughter she lost, Bibiana.  The patient admits that her living daughter, Daylin, is much more private and reserved.  The patient has told her that she is going to her home country of [place] for [place].  Her daughter has decided to join her with her boyfriend.  The patient admits that her daughter is at home there because she was about [age] years old when they left the country and came to [place].  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  However, she finds herself isolating in her home a good bit of the time.  She does go to the gym on a daily basis, but she does not interact with many people there.  Emphasize to the patient that she is also grieving the loss of her marriage.  Explore if the patient has made any steps to move on from the divorce.  She admits that she has not dated since the divorce.  However, she would like to have a  for traveling, activities, and romance.  She notes that she is financially stable because she is very "careful" with her money.  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  She admits that she is not always accessible by phone because she is exercising or teaching a class.  Her lack of availability sometimes frustrates her daughter.    Treatment plan:  Target symptoms: depression, anxiety   Why chosen therapy is appropriate " versus another modality: relevant to diagnosis  Outcome monitoring methods: self-report, observation  Therapeutic intervention type: insight oriented psychotherapy, supportive psychotherapy, interactive psychotherapy     Risk parameters:  Patient reports no suicidal ideation  Patient reports no homicidal ideation  Patient reports no self-injurious behavior  Patient reports no violent behavior     Verbal deficits: None     Patient's response to intervention:  The patient's response to intervention is accepting, motivated.     Progress toward goals and other mental status changes:  The patient's progress toward goals is fair .     Diagnosis:   Panic Disorder     Plan:  individual psychotherapy and medication management by physician     Return to clinic: as scheduled     Length of Service (minutes): 45

## 2019-10-31 LAB
ANA SER QL IF: POSITIVE
ANA TITR SER IF: NORMAL {TITER}

## 2019-11-01 ENCOUNTER — PATIENT MESSAGE (OUTPATIENT)
Dept: PSYCHIATRY | Facility: CLINIC | Age: 59
End: 2019-11-01

## 2019-11-01 LAB
ANTI SM ANTIBODY: 2.98 EU (ref 0–19.99)
ANTI SM/RNP ANTIBODY: 3.77 EU (ref 0–19.99)
ANTI-SM INTERPRETATION: NEGATIVE
ANTI-SM/RNP INTERPRETATION: NEGATIVE
ANTI-SSA ANTIBODY: 3.02 EU (ref 0–19.99)
ANTI-SSA INTERPRETATION: NEGATIVE
ANTI-SSB ANTIBODY: 7.94 EU (ref 0–19.99)
ANTI-SSB INTERPRETATION: NEGATIVE
DSDNA AB SER-ACNC: NORMAL [IU]/ML

## 2019-11-07 ENCOUNTER — PATIENT MESSAGE (OUTPATIENT)
Dept: NEUROLOGY | Facility: CLINIC | Age: 59
End: 2019-11-07

## 2019-11-08 ENCOUNTER — TELEPHONE (OUTPATIENT)
Dept: SPORTS MEDICINE | Facility: CLINIC | Age: 59
End: 2019-11-08

## 2019-11-08 ENCOUNTER — PATIENT MESSAGE (OUTPATIENT)
Dept: NEUROLOGY | Facility: CLINIC | Age: 59
End: 2019-11-08

## 2019-11-08 ENCOUNTER — OFFICE VISIT (OUTPATIENT)
Dept: NEUROLOGY | Facility: CLINIC | Age: 59
End: 2019-11-08
Payer: COMMERCIAL

## 2019-11-08 VITALS
BODY MASS INDEX: 24.32 KG/M2 | RESPIRATION RATE: 16 BRPM | SYSTOLIC BLOOD PRESSURE: 120 MMHG | HEIGHT: 60 IN | HEART RATE: 80 BPM | WEIGHT: 123.88 LBS | DIASTOLIC BLOOD PRESSURE: 76 MMHG

## 2019-11-08 DIAGNOSIS — G62.9 POLYNEUROPATHY: ICD-10-CM

## 2019-11-08 DIAGNOSIS — F41.0 GENERALIZED ANXIETY DISORDER WITH PANIC ATTACKS: ICD-10-CM

## 2019-11-08 DIAGNOSIS — M25.50 ARTHRALGIA, UNSPECIFIED JOINT: ICD-10-CM

## 2019-11-08 DIAGNOSIS — L90.0 LICHEN SCLEROSUS: ICD-10-CM

## 2019-11-08 DIAGNOSIS — G57.32 NEUROPATHY OF LEFT SUPERFICIAL PERONEAL NERVE: ICD-10-CM

## 2019-11-08 DIAGNOSIS — F43.21 COMPLICATED BEREAVEMENT: ICD-10-CM

## 2019-11-08 DIAGNOSIS — E66.3 OVERWEIGHT: ICD-10-CM

## 2019-11-08 DIAGNOSIS — M25.562 LEFT KNEE PAIN, UNSPECIFIED CHRONICITY: ICD-10-CM

## 2019-11-08 DIAGNOSIS — F41.0 PANIC DISORDER: ICD-10-CM

## 2019-11-08 DIAGNOSIS — G25.81 RLS (RESTLESS LEGS SYNDROME): ICD-10-CM

## 2019-11-08 DIAGNOSIS — D51.0 PERNICIOUS ANEMIA: Chronic | ICD-10-CM

## 2019-11-08 DIAGNOSIS — F33.2 SEVERE EPISODE OF RECURRENT MAJOR DEPRESSIVE DISORDER, WITHOUT PSYCHOTIC FEATURES: ICD-10-CM

## 2019-11-08 DIAGNOSIS — F41.1 GENERALIZED ANXIETY DISORDER WITH PANIC ATTACKS: ICD-10-CM

## 2019-11-08 DIAGNOSIS — M79.2 NEUROPATHIC PAIN: Primary | ICD-10-CM

## 2019-11-08 DIAGNOSIS — Z86.2 HISTORY OF IRON DEFICIENCY ANEMIA: ICD-10-CM

## 2019-11-08 PROCEDURE — 99215 PR OFFICE/OUTPT VISIT, EST, LEVL V, 40-54 MIN: ICD-10-PCS | Mod: S$GLB,,, | Performed by: PSYCHIATRY & NEUROLOGY

## 2019-11-08 PROCEDURE — 3008F BODY MASS INDEX DOCD: CPT | Mod: CPTII,S$GLB,, | Performed by: PSYCHIATRY & NEUROLOGY

## 2019-11-08 PROCEDURE — 99999 PR PBB SHADOW E&M-EST. PATIENT-LVL V: ICD-10-PCS | Mod: PBBFAC,,, | Performed by: PSYCHIATRY & NEUROLOGY

## 2019-11-08 PROCEDURE — 99999 PR PBB SHADOW E&M-EST. PATIENT-LVL V: CPT | Mod: PBBFAC,,, | Performed by: PSYCHIATRY & NEUROLOGY

## 2019-11-08 PROCEDURE — 3008F PR BODY MASS INDEX (BMI) DOCUMENTED: ICD-10-PCS | Mod: CPTII,S$GLB,, | Performed by: PSYCHIATRY & NEUROLOGY

## 2019-11-08 PROCEDURE — 99215 OFFICE O/P EST HI 40 MIN: CPT | Mod: S$GLB,,, | Performed by: PSYCHIATRY & NEUROLOGY

## 2019-11-08 RX ORDER — GABAPENTIN 100 MG/1
300 CAPSULE ORAL NIGHTLY
Qty: 90 CAPSULE | Refills: 11 | Status: SHIPPED | OUTPATIENT
Start: 2019-11-08 | End: 2021-02-08 | Stop reason: SDUPTHER

## 2019-11-08 NOTE — PATIENT INSTRUCTIONS
Magnetic Resonance Imaging (MRI)       You will be asked to hold very still during the scan.     Magnetic resonance imaging (MRI) is a test that lets your doctor see detailed pictures of the inside of your body. MRI combines the use of strong magnets and radio waves to form an MRI image.  How do I get ready for an MRI?  · Follow any directions you are given for not eating or drinking before the test.  · Ask your provider if you should stop taking any medicine before the test.  · Follow your normal daily routine unless your provider tells you otherwise.  · You'll be asked to remove your watch, jewelry, hearing aids, credit cards, pens, pocket knives, eyeglasses, and other metal objects.  · You may be asked to remove your makeup. Makeup may contain some metal.  · Most MRI tests take 30 to 60 minutes. Depending on the type of MRI you are having, the test may take longer. Give yourself extra time to check in.     MRI uses strong magnets. Metal is affected by magnets and can distort the image. The magnet used in MRI can cause metal objects in your body to move. If you have a metal implant, you may not be able to have an MRI unless the implant is certified as MRI safe. People with these implants should not have an MRI:  · Ear (cochlear) implants  · Certain clips used for brain aneurysms  · Certain metal coils put in blood vessels  · Most defibrillators  · Most pacemakers  Be sure to tell the radiologist or technologist if you:  · Have had any previous surgeries  · Have a pacemaker, surgical clips, metal plate or pins, an artificial joint, staples or screws, ear (cochlear) implants, or other implants  · Wear a medicated adhesive patch  · Have metal splinters in your body  · Have implanted nerve stimulators or drug-infusion ports  · Have tattoos or body piercings. Some tattoo inks contain metal.  · Work with metal  · Have braces. You must remove any dental work.  · Have a bullet or other metal in your body  Also tell  the radiologist or technologist if you:  · Are pregnant or think you may be  · Are afraid of small, enclosed spaces (claustrophobic)  · Are allergic to X-ray dye (contrast medium), iodine, shellfish, or any medicines  · Have other allergies  · Are breastfeeding  · Have a history of cancer  · Have any serious health problems. This includes kidney disease or a liver transplant. You may not be able to have the contrast material used for MRI.   What happens during an MRI?  · You may be asked to wear a hospital gown.  · You may be given earplugs to wear if you need them.  · You may be injected with a special dye (contrast) that improves the MRI image.   · Youll lie down on a platform that slides into the magnet.  What happens after an MRI?  · You can get back to normal activities right away. If you were given contrast, it will pass naturally through your body within a day. You may be told to drink more water or other fluids during this time.   · Your doctor will discuss the test results with you during a follow-up appointment or over the phone.  · Your next appointment is: __________________  Date Last Reviewed: 6/2/2015  © 7558-2471 [employer]. [address] [place] [address], [place] [address]. All rights reserved. This information is not intended as a substitute for professional medical care. Always follow your healthcare professional's instructions.

## 2019-11-08 NOTE — PROGRESS NOTES
Subjective:       Patient ID: Alma Mistry is a 58 y.o. female.    Chief Complaint: polyneuropathy    HPI     BACKGROUND HISTORY       The patient started complaining of bilateral symmetric feet paroxysmal tingling and burning pain about several years ago.  The patient usually notices the pain after significant physical activity or prolonged sitting or standing.  The pain is paroxysmal and not persistent but is getting much worse with time and becoming disabling. She also reports that her legs are very restless at night and Requip helps with that when she takes it.  The pain and tingling involves the whole feet, mainly the sole area and progresses to the very distal part of the legs.  The patient denies any hand symptoms. No ankle or foot weakness. The patient denies any low back with radiation to the legs.  The patient has good control on her bowel and bladder. She was found to have very low B12 <100 and was diagnosed with PA 5 years after the symptoms. She is on B12 IM monthly.No history of DM. No known history of thyroid disease. No history of malignancies.  No history of toxic environmental exposure.. No history of excessive alcohol or recreational drug abuse. No history of chronic hepatitis or HIV. Recommended  NCS/EMG, Continue B12, MMA. IIn terms of management I counseled the patient that neuropathic pain meds target is 40% reduction of pain. We tried Gabapentin/GBP-Neurontin.12- MMA NL. 01- NCS BLE is inconclusive. GBP helped with pain but caused significant sedation. I changed it to 100 mg QHS.  mg QHS still helps with neuropathic pain. She takes Cymbalta 30 mg QHS for depression. Continue Gabapentin/GBP-Neurontin  100 mg QHS. I continued B12,  Cymbalta and take it 30 mg BID , ordered TFT, Lyme Panel and Brain MRI WWO.       I continued Requip 1 mg TID for RLS. She has tolerated it well with no SEs.She stopped taking Requip due to GI upset.    I referred her to rheumatology for eval.  Rheumatology eval showed NGOZI 1:160 and diagnosed with FMS.  02- C-spine X-ray showed multilevel DDD.      INTERVAL HISTORY      05- TFT TSH-T4 NL Lyme -ve.  Brain MRI was not done due to cost.  -300 mg QHS PRN helps but still has multifocal pain and remains worried about MS.           Review of Systems   Constitutional: Negative for appetite change and fatigue.   HENT: Negative for hearing loss and tinnitus.    Eyes: Negative for photophobia and visual disturbance.   Respiratory: Negative for apnea and shortness of breath.    Cardiovascular: Negative for chest pain and palpitations.   Gastrointestinal: Negative for nausea and vomiting.   Endocrine: Negative for cold intolerance and heat intolerance.   Genitourinary: Negative for difficulty urinating and urgency.   Musculoskeletal: Positive for arthralgias. Negative for back pain, gait problem, joint swelling, myalgias, neck pain and neck stiffness.   Skin: Negative for color change and rash.   Allergic/Immunologic: Negative for environmental allergies and immunocompromised state.   Neurological: Positive for numbness. Negative for dizziness, tremors, seizures, syncope, facial asymmetry, speech difficulty, weakness, light-headedness and headaches.   Hematological: Negative for adenopathy. Does not bruise/bleed easily.   Psychiatric/Behavioral: Positive for dysphoric mood. Negative for agitation, behavioral problems, confusion, decreased concentration, hallucinations, self-injury, sleep disturbance and suicidal ideas. The patient is nervous/anxious. The patient is not hyperactive.          Current Outpatient Medications:     aspirin-acetaminophen-caffeine 250-250-65 mg (EXCEDRIN MIGRAINE) 250-250-65 mg per tablet, Take 1 tablet by mouth as needed for Pain., Disp: , Rfl:     CALCIUM CIT/MGOX/VIT D3/B6/MIN (CITRACAL PLUS ORAL), Take 1 tablet by mouth as needed. , Disp: , Rfl:     cetirizine (ZYRTEC) 10 MG tablet, Take 1 tablet (10 mg total) by  mouth once daily., Disp: 60 tablet, Rfl: 0    CHOLECALCIFEROL, VITAMIN D3, (VITAMIN D3 ORAL), Take 1,000 Units by mouth daily as needed. , Disp: , Rfl:     clobetasol 0.05% (TEMOVATE) 0.05 % Oint, Apply topically as needed. , Disp: , Rfl:     cyanocobalamin 1,000 mcg/mL injection, INJECT 1 ML INTRAMUSCULARLY EVERY 28 DAYS, Disp: 10 mL, Rfl: 0    DULoxetine (CYMBALTA) 20 MG capsule, Take 1 capsule (20 mg total) by mouth 2 (two) times daily., Disp: 60 capsule, Rfl: 3    gabapentin (NEURONTIN) 100 MG capsule, Take 3 capsules (300 mg total) by mouth every evening., Disp: 30 capsule, Rfl: 5    LORazepam (ATIVAN) 0.5 MG tablet, Take 1 tablet (0.5 mg total) by mouth every 6 (six) hours as needed for Anxiety., Disp: 20 tablet, Rfl: 1    multivitamin (ONE DAILY MULTIVITAMIN) per tablet, Take 1 tablet by mouth once daily., Disp: , Rfl:     naproxen (NAPROSYN) 500 MG tablet, Take 1 tablet (500 mg total) by mouth 2 (two) times daily with meals. For pain and inflammation (Patient taking differently: Take 500 mg by mouth 2 (two) times daily as needed. For pain and inflammation), Disp: 30 tablet, Rfl: 0    triamcinolone acetonide 0.025% (KENALOG) 0.025 % cream, AAA bid, Disp: 80 g, Rfl: 0    lubiprostone (AMITIZA) 8 MCG Cap, Take 1 capsule (8 mcg total) by mouth 2 (two) times daily with meals. (Patient not taking: Reported on 11/8/2019), Disp: 30 capsule, Rfl: 1    magnesium 30 mg Tab, Take 1 tablet by mouth once., Disp: , Rfl:     psyllium (FIBER) powder, Take 1 packet by mouth daily as needed., Disp: , Rfl:     sodium,potassium,mag sulfates (SUPREP BOWEL PREP KIT) 17.5-3.13-1.6 gram SolR, As directed for colonoscopy (Patient not taking: Reported on 11/8/2019), Disp: 254 mL, Rfl: 0  Past Medical History:   Diagnosis Date    Atrophic gastritis     B12 deficiency     Breast lump on left side at 3 o'clock position 03/25/2015    Fibrocystic breast     Lichen sclerosus 08/16/2012     Past Surgical History:    Procedure Laterality Date    BREAST BIOPSY Right 2009    benign    COLONOSCOPY  09/10/2010    ESOPHAGOGASTRODUODENOSCOPY  09/10/2010    HYSTERECTOMY  2013    TONSILLECTOMY       Social History     Socioeconomic History    Marital status:      Spouse name: Not on file    Number of children: Not on file    Years of education: Not on file    Highest education level: Not on file   Occupational History    Not on file   Social Needs    Financial resource strain: Not on file    Food insecurity:     Worry: Not on file     Inability: Not on file    Transportation needs:     Medical: Not on file     Non-medical: Not on file   Tobacco Use    Smoking status: Never Smoker    Smokeless tobacco: Never Used   Substance and Sexual Activity    Alcohol use: Not Currently     Frequency: Monthly or less     Drinks per session: 1 or 2    Drug use: No    Sexual activity: Never   Lifestyle    Physical activity:     Days per week: Not on file     Minutes per session: Not on file    Stress: Not on file   Relationships    Social connections:     Talks on phone: Not on file     Gets together: Not on file     Attends Hoahaoism service: Not on file     Active member of club or organization: Not on file     Attends meetings of clubs or organizations: Not on file     Relationship status: Not on file   Other Topics Concern    Not on file   Social History Narrative    1 dog, no smokers; Originally from [facility].       Objective:     GENERAL APPEARANCE:     The patient looks uncomfortable.    No signs of medical or psychiatric distress.    Normal breathing pattern.    No dysmorphic features    Normal eye contact.     GENERAL MEDICAL EXAM:    HEENT:  Head is atraumatic normocephalic.     Neck and Axillae: No JVD.     Cardiopulmonary: No cyanosis. No tachypnea. Normal respiratory effort.    Gastrointestinal:  No stomas or lesions.     Skin, Hair and Nails: No pathognonomic skin rash. No neurofibromatosis. No stigmata of  autoimmune disease.     Limbs: No varicose veins. No edema.     Muskoskeletal: No deformities. No signs of longstanding neuropathy. No dislocations or fractures.        Neurologic Exam     Mental Status   Oriented to person, place, and time.   Registration: recalls 3 of 3 objects. Recall at 5 minutes: recalls 3 of 3 objects. Follows 3 step commands.   Attention: normal. Concentration: normal.   Speech: speech is normal   Level of consciousness: alert  Knowledge: good and consistent with education. Able to perform simple calculations.   Able to name object. Able to read. Able to repeat. Able to write. Normal comprehension.     Cranial Nerves     CN II   Visual fields full to confrontation.   Visual acuity: normal  Right visual field deficit: none  Left visual field deficit: none     CN III, IV, VI   Pupils are equal, round, and reactive to light.  Extraocular motions are normal.   Right pupil: Size: 2 mm. Shape: regular. Reactivity: brisk. Consensual response: intact. Accommodation: intact.   Left pupil: Size: 2 mm. Shape: regular. Reactivity: brisk. Consensual response: intact. Accommodation: intact.   CN III: no CN III palsy  CN VI: no CN VI palsy  Nystagmus: none   Diplopia: none  Ophthalmoparesis: none  Upgaze: normal  Downgaze: normal  Conjugate gaze: present  Vestibulo-ocular reflex: present    CN V   Facial sensation intact.   Right facial sensation deficit: none  Left facial sensation deficit: none  Right corneal reflex: normal  Left corneal reflex: normal    CN VII   Right facial weakness: none  Left facial weakness: none  Right taste: normal  Left taste: normal    CN VIII   CN VIII normal.   Hearing: intact  Right Rinne: AC > BC  Left Rinne: AC > BC  Amor: does not lateralize     CN IX, X   CN IX normal.   CN X normal.   Palate: symmetric  Right gag reflex: normal  Left gag reflex: normal    CN XI   CN XI normal.   Right sternocleidomastoid strength: normal  Left sternocleidomastoid strength: normal  Right  trapezius strength: normal  Left trapezius strength: normal    CN XII   CN XII normal.   Tongue: not atrophic  Fasciculations: absent  Tongue deviation: none    Motor Exam   Muscle bulk: normal  Overall muscle tone: normal  Right arm tone: normal  Left arm tone: normal  Right arm pronator drift: absent  Left arm pronator drift: absent  Right leg tone: normal  Left leg tone: normal    Strength   Strength 5/5 throughout.   Right neck flexion: 5/5  Left neck flexion: 5/5  Right neck extension: 5/5  Left neck extension: 5/5  Right deltoid: 5/5  Left deltoid: 5/5  Right biceps: 5/5  Left biceps: 5/5  Right triceps: 5/5  Left triceps: 5/5  Right wrist flexion: 5/5  Left wrist flexion: 5/5  Right wrist extension: 5/5  Left wrist extension: 5  Right interossei: 5/5  Left interossei: 5/5  Right abdominals: 5/5  Left abdominals: 5/5  Right iliopsoas: 5/5  Left iliopsoas: 5/5  Right quadriceps: 5/5  Left quadriceps: 5/5  Right hamstrin/5  Left hamstrin/5  Right glutei: 5/5  Left glutei: 5/5  Right anterior tibial: 5/5  Left anterior tibial: 5/5  Right posterior tibial: 5/5  Left posterior tibial: 5/5  Right peroneal: 5/5  Left peroneal: 5/5  Right gastroc: 5/5  Left gastroc: 5/5    Sensory Exam   Right arm light touch: decreased from wrist  Left arm light touch: decreased from wrist  Right leg light touch: decreased from knee  Left leg light touch: decreased from knee  Right arm vibration: normal  Left arm vibration: normal  Right leg vibration: decreased from knee  Left leg vibration: decreased from knee  Right arm proprioception: normal  Left arm proprioception: normal  Right leg proprioception: decreased from knee  Left leg proprioception: decreased from knee  Right arm pinprick: decreased from wrist  Left arm pinprick: decreased from wrist  Right leg pinprick: decreased from knee  Left leg pinprick: decreased from knee  Graphesthesia: normal  Stereognosis: normal    Gait, Coordination, and Reflexes      Gait  Gait: normal    Coordination   Romberg: negative  Finger to nose coordination: normal  Heel to shin coordination: normal  Tandem walking coordination: normal    Tremor   Resting tremor: absent  Intention tremor: absent  Action tremor: absent    Reflexes   Right brachioradialis: 2+  Left brachioradialis: 2+  Right biceps: 2+  Left biceps: 2+  Right triceps: 2+  Left triceps: 2+  Right patellar: 0  Left patellar: 0  Right achilles: 0  Left achilles: 0  Right plantar: normal  Left plantar: normal  Right Salomon: absent  Left Salomon: absent  Right ankle clonus: absent  Left ankle clonus: absent  Right pendular knee jerk: absent  Left pendular knee jerk: absent      Lab Results   Component Value Date    WBC 5.06 04/11/2019    HGB 13.1 04/11/2019    HCT 40.3 04/11/2019    MCV 83 04/11/2019     04/11/2019     Sodium   Date Value Ref Range Status   04/11/2019 138 136 - 145 mmol/L Final     Potassium   Date Value Ref Range Status   04/11/2019 4.6 3.5 - 5.1 mmol/L Final     Chloride   Date Value Ref Range Status   04/11/2019 104 95 - 110 mmol/L Final     CO2   Date Value Ref Range Status   04/11/2019 22 (L) 23 - 29 mmol/L Final     Glucose   Date Value Ref Range Status   04/11/2019 73 70 - 110 mg/dL Final     BUN, Bld   Date Value Ref Range Status   04/11/2019 11 6 - 20 mg/dL Final     Creatinine   Date Value Ref Range Status   04/11/2019 0.8 0.5 - 1.4 mg/dL Final     Calcium   Date Value Ref Range Status   04/11/2019 9.9 8.7 - 10.5 mg/dL Final     Total Protein   Date Value Ref Range Status   10/12/2018 7.5 6.0 - 8.4 g/dL Final     Albumin   Date Value Ref Range Status   10/12/2018 4.2 3.5 - 5.2 g/dL Final     Total Bilirubin   Date Value Ref Range Status   10/12/2018 0.5 0.1 - 1.0 mg/dL Final     Comment:     For infants and newborns, interpretation of results should be based  on gestational age, weight and in agreement with clinical  observations.  Premature Infant recommended reference ranges:  Up to 24  hours.............<8.0 mg/dL  Up to 48 hours............<12.0 mg/dL  3-5 days..................<15.0 mg/dL  6-29 days.................<15.0 mg/dL       Alkaline Phosphatase   Date Value Ref Range Status   10/12/2018 89 55 - 135 U/L Final     AST   Date Value Ref Range Status   10/12/2018 34 10 - 40 U/L Final     ALT   Date Value Ref Range Status   10/12/2018 28 10 - 44 U/L Final     Anion Gap   Date Value Ref Range Status   04/11/2019 12 8 - 16 mmol/L Final     eGFR if    Date Value Ref Range Status   04/11/2019 >60 >60 mL/min/1.73 m^2 Final     eGFR if non    Date Value Ref Range Status   04/11/2019 >60 >60 mL/min/1.73 m^2 Final     Comment:     Calculation used to obtain the estimated glomerular filtration  rate (eGFR) is the CKD-EPI equation.        Lab Results   Component Value Date    XACCIDOB31 397 04/11/2019     Lab Results   Component Value Date    TSH 0.750 05/22/2019    G0BWTFW 7.8 05/22/2019 12-    MMA NL      01-    NCS BLE is inconclusive       02-    NGOZI Panel NGOZI 1:160      02-    C-spine X-ray multilevel DDD      05-    TFT TSH-T4 NL    Lyme -ve      Assessment:       1. Neuropathic pain    2. Pernicious anemia    3. History of iron deficiency anemia    4. Overweight    5. Lichen sclerosus    6. Panic disorder    7. Polyneuropathy    8. RLS (restless legs syndrome)    9. Arthralgia, unspecified joint    10. Severe episode of recurrent major depressive disorder, without psychotic features    11. 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            I spent 46  minutes face to face with the patient    More than 30  minutes of the time spent in counseling and coordination of care including discussions etiology of diagnosis, pathonogenesis of diagnosis, prognosis of diagnosis,, diagnostic results, impression and recommendations, diagnostic studies, management, risks and benefits of treatment, instructions of disease self management, treatment instructions, follow up requirements, patient and family counseling/involvement in care compliance with treatment regimen. All of the patient's questions were answered during this discussion.

## 2019-11-11 ENCOUNTER — TELEPHONE (OUTPATIENT)
Dept: SPORTS MEDICINE | Facility: CLINIC | Age: 59
End: 2019-11-11

## 2019-11-12 ENCOUNTER — TELEPHONE (OUTPATIENT)
Dept: SPORTS MEDICINE | Facility: CLINIC | Age: 59
End: 2019-11-12

## 2019-11-12 ENCOUNTER — PATIENT MESSAGE (OUTPATIENT)
Dept: NEUROLOGY | Facility: CLINIC | Age: 59
End: 2019-11-12

## 2019-11-12 ENCOUNTER — TELEPHONE (OUTPATIENT)
Dept: NEUROLOGY | Facility: CLINIC | Age: 59
End: 2019-11-12

## 2019-11-12 NOTE — TELEPHONE ENCOUNTER
Called pt in regards to ortho referral and pt states they do not want to schedule at this time.

## 2019-11-14 ENCOUNTER — PATIENT MESSAGE (OUTPATIENT)
Dept: RHEUMATOLOGY | Facility: CLINIC | Age: 59
End: 2019-11-14

## 2019-11-14 ENCOUNTER — PATIENT MESSAGE (OUTPATIENT)
Dept: PSYCHIATRY | Facility: CLINIC | Age: 59
End: 2019-11-14

## 2019-11-15 ENCOUNTER — TELEPHONE (OUTPATIENT)
Dept: RHEUMATOLOGY | Facility: CLINIC | Age: 59
End: 2019-11-15

## 2019-11-15 NOTE — TELEPHONE ENCOUNTER
----- Message from Lang Hernández MD sent at 11/15/2019 12:32 PM CST -----  Regarding: Follow-up  Patient has many health concerns, lost follow-up appointment as recommended at 2 months after last visit.  Please schedule follow-up visit to address her medical concerns pertinent to rheumatology.    Thank you!

## 2019-11-18 ENCOUNTER — PATIENT MESSAGE (OUTPATIENT)
Dept: PSYCHIATRY | Facility: CLINIC | Age: 59
End: 2019-11-18

## 2019-11-18 ENCOUNTER — TELEPHONE (OUTPATIENT)
Dept: NEUROLOGY | Facility: CLINIC | Age: 59
End: 2019-11-18

## 2019-11-18 NOTE — TELEPHONE ENCOUNTER
----- Message from Usha Pham sent at 11/18/2019  9:35 AM CST -----  Contact: Lu quigley/ [facility] [phone] FAX  Lu is requesting to speak with you regarding a pre-cert needed for tomorrow. She has 3 procedure codes: 72356, 91590 and 83669.

## 2019-11-20 ENCOUNTER — OFFICE VISIT (OUTPATIENT)
Dept: PSYCHIATRY | Facility: CLINIC | Age: 59
End: 2019-11-20
Payer: COMMERCIAL

## 2019-11-20 DIAGNOSIS — F41.0 PANIC ATTACKS: ICD-10-CM

## 2019-11-20 PROCEDURE — 99214 PR OFFICE/OUTPT VISIT, EST, LEVL IV, 30-39 MIN: ICD-10-PCS | Mod: S$GLB,,, | Performed by: PSYCHIATRY & NEUROLOGY

## 2019-11-20 PROCEDURE — 99214 OFFICE O/P EST MOD 30 MIN: CPT | Mod: S$GLB,,, | Performed by: PSYCHIATRY & NEUROLOGY

## 2019-11-20 RX ORDER — LORAZEPAM 0.5 MG/1
0.5 TABLET ORAL EVERY 6 HOURS PRN
Qty: 20 TABLET | Refills: 3 | Status: SHIPPED | OUTPATIENT
Start: 2019-11-20 | End: 2020-05-19 | Stop reason: SDUPTHER

## 2019-11-20 RX ORDER — DULOXETIN HYDROCHLORIDE 20 MG/1
20 CAPSULE, DELAYED RELEASE ORAL 2 TIMES DAILY
Qty: 60 CAPSULE | Refills: 11 | Status: SHIPPED | OUTPATIENT
Start: 2019-11-20 | End: 2020-05-19 | Stop reason: SDUPTHER

## 2019-11-20 NOTE — PATIENT INSTRUCTIONS
Understanding Anxiety Disorders  Almost everyone gets nervous now and then. Its normal to have knots in your stomach before a test, or for your heart to race on a first date. But an anxiety disorder is much more than a case of nerves. In fact, its symptoms may be overwhelming. But treatment can relieve many of these symptoms. Talking to your healthcare provider is the first step.    What are anxiety disorders?  An anxiety disorder causes intense feelings of panic and fear. These feelings may arise for no apparent reason. And they tend to recur again and again. They may prevent you from coping with life and cause you great distress. As a result, you may avoid anything that triggers your fear. In extreme cases, you may never leave the house. Anxiety disorders may cause other symptoms, such as:  · Obsessive thoughts you cant control  · Constant nightmares or painful thoughts of the past  · Nausea, sweating, and muscle tension  · Trouble sleeping or concentrating  What causes anxiety disorders?  Anxiety disorders tend to run in families. For some people, childhood abuse or neglect may play a role. For others, stressful life events or trauma may trigger anxiety disorders. Anxiety can trigger low self-esteem and poor coping skills.  Common anxiety disorders  · Panic disorder. This causes an intense fear of being in danger.  · Phobias. These are extreme fears of certain objects, places, or events.  · Obsessive-compulsive disorder. This causes you to have unwanted thoughts and urges. You also may perform certain actions over and over.  · Posttraumatic stress disorder. This occurs in people who have survived a terrible ordeal. It can cause nightmares and flashbacks about the event.  · Generalized anxiety disorder. This causes constant worry that can greatly disrupt your life.   Getting better  You may believe that nothing can help you. Or, you might fear what others may think. But most anxiety symptoms can be eased.  Having an anxiety disorder is nothing to be ashamed of. Most people do best with treatment that combines medicine and therapy. These arent cures. But they can help you live a healthier life.  Date Last Reviewed: 2/1/2017 © 2000-2017 [employer]. [address] [place] [id number]. All rights reserved. This information is not intended as a substitute for professional medical care. Always follow your healthcare professional's instructions.          Lorazepam tablets  What is this medicine?  LORAZEPAM (zeke A ze mario) is a benzodiazepine. It is used to treat anxiety.  How should I use this medicine?  Take this medicine by mouth with a glass of water. Follow the directions on the prescription label. Take your medicine at regular intervals. Do not take it more often than directed. Do not stop taking except on your doctor's advice.  A special MedGuide will be given to you by the pharmacist with each prescription and refill. Be sure to read this information carefully each time.  Talk to your pediatrician regarding the use of this medicine in children. While this drug may be used in children as young as 12 years for selected conditions, precautions do apply.  What side effects may I notice from receiving this medicine?  Side effects that you should report to your doctor or health care professional as soon as possible:  · allergic reactions like skin rash, itching or hives, swelling of the face, lips, or tongue  · breathing problems  · confusion  · loss of balance or coordination  · signs and symptoms of low blood pressure like dizziness; feeling faint or lightheaded, falls; unusually weak or tired  · suicidal thoughts or other mood changes  Side effects that usually do not require medical attention (report to your doctor or health care professional if they continue or are bothersome):  · dizziness  · headache  · nausea, vomiting  · tiredness  What may interact with this medicine?  Do not take this  medicine with any of the following medications:  · narcotic medicines for cough  · sodium oxybate  This medicine may also interact with the following medications:  · alcohol  · antihistamines for allergy, cough and cold  · certain medicines for anxiety or sleep  · certain medicines for depression, like amitriptyline, fluoxetine, sertraline  · certain medicines for seizures like carbamazepine, phenobarbital, phenytoin, primidone  · general anesthetics like lidocaine, pramoxine, tetracaine  · MAOIs like Carbex, Eldepryl, Marplan, Nardil, and Parnate  · medicines that relax muscles for surgery  · narcotic medicines for pain  · phenothiazines like chlorpromazine, mesoridazine, prochlorperazine, thioridazine  What if I miss a dose?  If you miss a dose, take it as soon as you can. If it is almost time for your next dose, take only that dose. Do not take double or extra doses.  Where should I keep my medicine?  Keep out of the reach of children. This medicine can be abused. Keep your medicine in a safe place to protect it from theft. Do not share this medicine with anyone. Selling or giving away this medicine is dangerous and against the law.  This medicine may cause accidental overdose and death if taken by other adults, children, or pets. Mix any unused medicine with a substance like cat litter or coffee grounds. Then throw the medicine away in a sealed container like a sealed bag or a coffee can with a lid. Do not use the medicine after the expiration date.  Store at room temperature between 20 and 25 degrees C (68 and 77 degrees F). Protect from light. Keep container tightly closed.  What should I tell my health care provider before I take this medicine?  They need to know if you have any of these conditions:  · glaucoma  · history of drug or alcohol abuse problem  · kidney disease  · liver disease  · lung or breathing disease, like asthma  · mental illness  · myasthenia gravis  · Parkinson's disease  · suicidal  thoughts, plans, or attempt; a previous suicide attempt by you or a family member  · an unusual or allergic reaction to lorazepam, other medicines, foods, dyes, or preservatives  · pregnant or trying to get pregnant  · breast-feeding  What should I watch for while using this medicine?  Tell your doctor or health care professional if your symptoms do not start to get better or if they get worse.  Do not stop taking except on your doctor's advice. You may develop a severe reaction. Your doctor will tell you how much medicine to take.  You may get drowsy or dizzy. Do not drive, use machinery, or do anything that needs mental alertness until you know how this medicine affects you. To reduce the risk of dizzy and fainting spells, do not stand or sit up quickly, especially if you are an older patient. Alcohol may increase dizziness and drowsiness. Avoid alcoholic drinks.  If you are taking another medicine that also causes drowsiness, you may have more side effects. Give your health care provider a list of all medicines you use. Your doctor will tell you how much medicine to take. Do not take more medicine than directed. Call emergency for help if you have problems breathing or unusual sleepiness.  NOTE:This sheet is a summary. It may not cover all possible information. If you have questions about this medicine, talk to your doctor, pharmacist, or health care provider. Copyright© 2017 [employer]

## 2019-11-20 NOTE — PROGRESS NOTES
"ESTABLISHED OUTPATIENT VISIT   E/M LEVEL 4: [id number]    ENCOUNTER DATE: 11/20/2019  SITE: [facility], [place].       HISTORY  Currently having fatigue. Went to the gym and she has been consistently going. On Monday she could not run 4 miles, which is her typical. Doesn't know what could be contributing to his. Has fatigue when she wakes up in the morning, and it takes about 2-3 hours to get going in the morning. When she wakes up she wakes up feeling negatively, meaning that she has thoughts of "What is the use." As she gets up and takes in the sun she feels that she has some energy. She feels forced to go out. The sadness comes back at the end of the day when her day is winding down. Multiple difficult factors are Mother's day and her daughter's birthday is coming up.    Cymbalta is helping in that she feels that you are motivated to do things once she get's her day going, and wanting to do more, picking up more projects. She still feels that sadness, but after exploring her feelings we determined that a lot of it has to do with not feeling a sense of purpose in the afternoons.    From Previous Visit:  Forgot her medications during trip in [place] and she went through withdrawals. But was able to get the medications in the end. Anxiety attacks are coming back, have had at least 4 but not as bad as the one she had originally that caused her to seek help. But yesterday had one that was about an 8 out of 10.  Still has some depression, perhaps an 8/10. Seems to be situational, when she was in [place] 7/10, but able to get out of the house and do things. When she returned back home, had to deal with the same situations such as her daughter ignoring her and not responding to her text messages. Has not seen therapist recently. That being said, she has had the motivation to  an extra teaching class. She has continued to walk her dog, and has been able to carry out activities of daily living.     CHIEF " COMPLAINT   Alma Mistry is a 58 y.o. female who presents for followup of Depression and Anxiety    HPI   She notices that she has ups and downs and that she gets triggered easily in the afternoons. 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 When she has a lot of time on her hands, her mood tends to dip.  She is being followed by Dr. Mtz, currently she has been having studies.   She has a family history of Lupus and MS, and has autoimmune disorder  She had some sun sensitivity.   Currently she is being followed by dermatology.  Currently she is happy with the Cymbalta. She does feel that she wants to get up and exercise, and she is reading.  She has motivation and the desire to follow through with her activities of daily living.   Her daughter is coming to visit her for one week  She is going to [facility] for three weeks  Currently no suicidal ideation.       ROS   Pertinent symptoms listed in HPI    [facility]  Past Medical History reviewed: Yes  Family History reviewed: Yes  Social History reviewed: Yes      Review of all of the above and there are no other changes    Neurological History:  Seizures:  DENIED  Head Trauma:  DENIED        Past Psychiatric History:   Has attended grief counseling and psychotherapy  One previous psychiatrist  One previous psych med but had si and no recollection of the medication : Currently tolerates Cymbalta and has been less reliant on ativan due to decreased frequency of panic attacks.      SOCIAL HISTORY:  Developmental/Childhood: grew up in 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   Financial: no difficulties    Relationship Status/Sexual Orientation: single, heterosexual    Children: one living daughter, one     Housing Status: lives alone  Faith: Bahai, currently not practicing  Recreational Activities: reading, currently not interested  Access to Gun: none     Substance Abuse  History:   No substance abuse history        Allergies:   Review of patient's allergies indicates:  No Known Allergies     PSYCHOTROPIC MEDICATIONS   Current Outpatient Medications on File Prior to Visit   Medication Sig Dispense Refill    aspirin-acetaminophen-caffeine 250-250-65 mg (EXCEDRIN MIGRAINE) 250-250-65 mg per tablet Take 1 tablet by mouth as needed for Pain.      CALCIUM CIT/MGOX/VIT D3/B6/MIN (CITRACAL PLUS ORAL) Take 1 tablet by mouth as needed.       cetirizine (ZYRTEC) 10 MG tablet Take 1 tablet (10 mg total) by mouth once daily. 60 tablet 0    CHOLECALCIFEROL, VITAMIN D3, (VITAMIN D3 ORAL) Take 1,000 Units by mouth daily as needed.       clobetasol 0.05% (TEMOVATE) 0.05 % Oint Apply topically as needed.       cyanocobalamin 1,000 mcg/mL injection INJECT 1 ML INTRAMUSCULARLY EVERY 28 DAYS 10 mL 0    DULoxetine (CYMBALTA) 20 MG capsule Take 1 capsule (20 mg total) by mouth 2 (two) times daily. 60 capsule 3    gabapentin (NEURONTIN) 100 MG capsule Take 3 capsules (300 mg total) by mouth every evening. 90 capsule 11    LORazepam (ATIVAN) 0.5 MG tablet Take 1 tablet (0.5 mg total) by mouth every 6 (six) hours as needed for Anxiety. 20 tablet 1    lubiprostone (AMITIZA) 8 MCG Cap Take 1 capsule (8 mcg total) by mouth 2 (two) times daily with meals. (Patient not taking: Reported on 11/8/2019) 30 capsule 1    magnesium 30 mg Tab Take 1 tablet by mouth once.      multivitamin (ONE DAILY MULTIVITAMIN) per tablet Take 1 tablet by mouth once daily.      naproxen (NAPROSYN) 500 MG tablet Take 1 tablet (500 mg total) by mouth 2 (two) times daily with meals. For pain and inflammation (Patient taking differently: Take 500 mg by mouth 2 (two) times daily as needed. For pain and inflammation) 30 tablet 0    psyllium (FIBER) powder Take 1 packet by mouth daily as needed.      sodium,potassium,mag sulfates (SUPREP BOWEL PREP KIT) 17.5-3.13-1.6 gram SolR As directed for colonoscopy (Patient not taking:  Reported on 11/8/2019) 254 mL 0    triamcinolone acetonide 0.025% (KENALOG) 0.025 % cream AAA bid 80 g 0     No current facility-administered medications on file prior to visit.          EXAMINATION    [unfilled]  There were no vitals filed for this visit.  Wt Readings from Last 2 Encounters:   11/08/19 56.2 kg (123 lb 14.4 oz)   10/28/19 55.6 kg (122 lb 9.2 oz)       CONSTITUTIONAL  General Appearance: well groomed    MUSCULOSKELETAL  No involuntary muscle movements  Normal gait    PSYCHIATRIC   Mental Status Exam:  Appearance: unremarkable, age appropriate  Behavior/Cooperation: appopriate normal, cooperative  Speech:  normal tone, normal rate, normal pitch, normal volume  Language: grossly intact with spontaneous speech  Mood: some tearfulness, grief,   Affect:  congruent with mood, tearful  Thought Process: linear, logical and goal oriented   Thought Content: no suicidality, no homicidality, no delusions, no paranoia  Sensorium:  Awake  Alert and Oriented: x3 person, place, situation  Memory:    Recent:  Intact; able to report recent events  Attention/concentration: appropriate for age/education.   Insight:Intact  Judgment:  Intact      MEDICAL DECISION MAKING    DIAGNOSES  No diagnosis found.      PROBLEM LIST AND MANAGEMENT PLANS   1) Cymbalta dosage continue 40 mg daily   2) Continue therapy with Tremaine Caballero  3) Continue ativan as needed for anxiety attacks, patient aware of the risk for chemical dependence. No indication of misuse of medication. Ativan .5 mg as needed      Orders Placed This Encounter    DULoxetine (CYMBALTA) 20 MG capsule    LORazepam (ATIVAN) 0.5 MG tablet               PRESCRIPTION DRUG MANAGEMENT  Compliance: yes  Side Effects: none  Regimen Adjustments: Continue Cymbalta 40 mg daily

## 2019-11-21 ENCOUNTER — TELEPHONE (OUTPATIENT)
Dept: RHEUMATOLOGY | Facility: CLINIC | Age: 59
End: 2019-11-21

## 2019-11-22 ENCOUNTER — OFFICE VISIT (OUTPATIENT)
Dept: RHEUMATOLOGY | Facility: CLINIC | Age: 59
End: 2019-11-22
Payer: COMMERCIAL

## 2019-11-22 VITALS
WEIGHT: 124.56 LBS | BODY MASS INDEX: 24.45 KG/M2 | SYSTOLIC BLOOD PRESSURE: 101 MMHG | HEART RATE: 68 BPM | DIASTOLIC BLOOD PRESSURE: 66 MMHG | HEIGHT: 60 IN

## 2019-11-22 DIAGNOSIS — M25.50 ARTHRALGIA, UNSPECIFIED JOINT: Primary | ICD-10-CM

## 2019-11-22 DIAGNOSIS — R76.8 POSITIVE ANA (ANTINUCLEAR ANTIBODY): ICD-10-CM

## 2019-11-22 DIAGNOSIS — Z71.89 COUNSELING ON HEALTH PROMOTION AND DISEASE PREVENTION: ICD-10-CM

## 2019-11-22 PROCEDURE — 99214 OFFICE O/P EST MOD 30 MIN: CPT | Mod: S$GLB,,, | Performed by: INTERNAL MEDICINE

## 2019-11-22 PROCEDURE — 3008F BODY MASS INDEX DOCD: CPT | Mod: CPTII,S$GLB,, | Performed by: INTERNAL MEDICINE

## 2019-11-22 PROCEDURE — 99999 PR PBB SHADOW E&M-EST. PATIENT-LVL III: ICD-10-PCS | Mod: PBBFAC,,, | Performed by: INTERNAL MEDICINE

## 2019-11-22 PROCEDURE — 3008F PR BODY MASS INDEX (BMI) DOCUMENTED: ICD-10-PCS | Mod: CPTII,S$GLB,, | Performed by: INTERNAL MEDICINE

## 2019-11-22 PROCEDURE — 99214 PR OFFICE/OUTPT VISIT, EST, LEVL IV, 30-39 MIN: ICD-10-PCS | Mod: S$GLB,,, | Performed by: INTERNAL MEDICINE

## 2019-11-22 PROCEDURE — 99999 PR PBB SHADOW E&M-EST. PATIENT-LVL III: CPT | Mod: PBBFAC,,, | Performed by: INTERNAL MEDICINE

## 2019-11-22 RX ORDER — DICLOFENAC SODIUM 10 MG/G
2 GEL TOPICAL 4 TIMES DAILY
Qty: 1 G | Refills: 2 | Status: SHIPPED | OUTPATIENT
Start: 2019-11-22 | End: 2022-01-05

## 2019-11-22 NOTE — PROGRESS NOTES
RHEUMATOLOGY OUTPATIENT CLINIC NOTE    11/22/2019    Attending Rheumatologist: Lang Hernández  Primary Care Provider: Primary Doctor No   Physician Requesting Consultation: No referring provider defined for this encounter.  Chief Complaint/Reason For Consultation:  Disease Management    Subjective:       HIRAM Mistry is a 58 y.o. White female with chronic joint pain and abnormal results comes for follow-up.    Today  Last seen on February.  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  Recommendations provided, sent for rheumatic autoimmune workup and to follow in 2 months.  Patient lost follow-up.  Following with Neurology for chronic paresthesias.  Evaluated by Dermatology for nonspecific dermatitis.  Main complaint is episodic knee joint pain.  Worst in the evening, aggravated by range of motion/weight bearing, relieved somewhat by rest and OTC pain medication.  Denies association with prolonged morning stiffness, redness, or joint swelling.  Paresthesias upper and lower extremities remain unchanged.  Denies any discoloration of fingertips upon cold exposure, photosensitivity, rash,  or GI complaints.    Review of Systems   Constitutional: Negative for chills, fever and malaise/fatigue.   Eyes: Negative for pain and redness.   Respiratory: Negative for cough, hemoptysis and shortness of breath.    Cardiovascular: Negative for chest pain and leg swelling.   Gastrointestinal: Negative for abdominal pain, blood in stool and melena.   Genitourinary: Negative for dysuria and hematuria.   Musculoskeletal: Positive for falls (Approximately 1 month ago mechanical fall.  Denies any sequela). Negative for joint pain (Episodic knee arthralgias with mechanical pattern.).   Skin: Positive for rash (Pruritic rash perioral area.).        Describes intermittent photosensitivity   Neurological: Positive for tingling (Right hand left foot.  Chronic, positional, and intermittent.). Negative for focal  weakness.   Psychiatric/Behavioral: Negative for memory loss. The patient does not have insomnia.      Chronic comorbid conditions affecting medical decision making today:  Past Medical History:   Diagnosis Date    Atrophic gastritis     B12 deficiency     Breast lump on left side at 3 o'clock position 03/25/2015    Fibrocystic breast     Lichen sclerosus 08/16/2012   · Anxiety/depression  · Panic attacks  · Restless leg syndrome  · History of iron deficiency anemia  · Chronic paresthesias    Past Surgical History:   Procedure Laterality Date    BREAST BIOPSY Right 2009    benign    COLONOSCOPY  09/10/2010    ESOPHAGOGASTRODUODENOSCOPY  09/10/2010    HYSTERECTOMY  2013    TONSILLECTOMY       Family History   Problem Relation Age of Onset    Depression Mother     Nephritis Mother     Hyperthyroidism Mother     Hypotension Mother     Anemia Mother     Asthma Father     Thyroid disease Sister     Depression Sister     Leukemia Brother     Cirrhosis Brother     Multiple sclerosis Brother      Social History     Substance and Sexual Activity   Alcohol Use Not Currently    Frequency: Monthly or less    Drinks per session: 1 or 2     Social History     Tobacco Use   Smoking Status Never Smoker   Smokeless Tobacco Never Used     Social History     Substance and Sexual Activity   Drug Use No       Current Outpatient Medications:     aspirin-acetaminophen-caffeine 250-250-65 mg (EXCEDRIN MIGRAINE) 250-250-65 mg per tablet, Take 1 tablet by mouth as needed for Pain., Disp: , Rfl:     CALCIUM CIT/MGOX/VIT D3/B6/MIN (CITRACAL PLUS ORAL), Take 1 tablet by mouth as needed. , Disp: , Rfl:     CHOLECALCIFEROL, VITAMIN D3, (VITAMIN D3 ORAL), Take 1,000 Units by mouth daily as needed. , Disp: , Rfl:     clobetasol 0.05% (TEMOVATE) 0.05 % Oint, Apply topically as needed. , Disp: , Rfl:     cyanocobalamin 1,000 mcg/mL injection, INJECT 1 ML INTRAMUSCULARLY EVERY 28 DAYS, Disp: 10 mL, Rfl: 0    DULoxetine  (CYMBALTA) 20 MG capsule, Take 1 capsule (20 mg total) by mouth 2 (two) times daily., Disp: 60 capsule, Rfl: 11    gabapentin (NEURONTIN) 100 MG capsule, Take 3 capsules (300 mg total) by mouth every evening., Disp: 90 capsule, Rfl: 11    LORazepam (ATIVAN) 0.5 MG tablet, Take 1 tablet (0.5 mg total) by mouth every 6 (six) hours as needed for Anxiety., Disp: 20 tablet, Rfl: 3    magnesium 30 mg Tab, Take 1 tablet by mouth once., Disp: , Rfl:     multivitamin (ONE DAILY MULTIVITAMIN) per tablet, Take 1 tablet by mouth once daily., Disp: , Rfl:     naproxen (NAPROSYN) 500 MG tablet, Take 1 tablet (500 mg total) by mouth 2 (two) times daily with meals. For pain and inflammation (Patient taking differently: Take 500 mg by mouth 2 (two) times daily as needed. For pain and inflammation), Disp: 30 tablet, Rfl: 0    triamcinolone acetonide 0.025% (KENALOG) 0.025 % cream, AAA bid, Disp: 80 g, Rfl: 0    cetirizine (ZYRTEC) 10 MG tablet, Take 1 tablet (10 mg total) by mouth once daily. (Patient not taking: Reported on 11/22/2019), Disp: 60 tablet, Rfl: 0    diclofenac sodium (VOLTAREN) 1 % Gel, Apply 2 g topically 4 (four) times daily., Disp: 1 g, Rfl: 2    psyllium (FIBER) powder, Take 1 packet by mouth daily as needed., Disp: , Rfl:     sodium,potassium,mag sulfates (SUPREP BOWEL PREP KIT) 17.5-3.13-1.6 gram SolR, As directed for colonoscopy (Patient not taking: Reported on 11/8/2019), Disp: 254 mL, Rfl: 0     Objective:         Vitals:    11/22/19 1001   BP: 101/66   Pulse: 68     Physical Exam   Constitutional: No distress.   Estimated body mass index is 24.33 kg/m² as calculated from the following:    Height as of this encounter: 5' (1.524 m).    Weight as of this encounter: 56.5 kg (124 lb 9 oz).    Wt Readings from Last 1 Encounters:  11/22/19 1001 : 56.5 kg (124 lb 9 oz)     HENT:   Head: Normocephalic and atraumatic.   Eyes: Conjunctivae are normal. Pupils are equal, round, and reactive to light.   Neck:  Normal range of motion.   Cardiovascular: Normal rate and intact distal pulses.    Pulmonary/Chest: Effort normal. No respiratory distress.   Abdominal: Soft. She exhibits no distension.   Neurological: She is alert. Gait normal.   Skin: Rash (Perioral area, more pronounced on Rt side) noted. No erythema.     Musculoskeletal: Normal range of motion. She exhibits tenderness (Slight tenderness to palpation left anserine bursa area.).   : strong  No synovitis or significant squeeze tenderness    AROM: intact  PROM: intact    Devices used by patient: none       Reviewed old and all outside pertinent medical records available.    All lab results personally reviewed and interpreted by me.  Lab Results   Component Value Date    WBC 5.06 04/11/2019    HGB 13.1 04/11/2019    HCT 40.3 04/11/2019    MCV 83 04/11/2019    MCH 26.8 (L) 04/11/2019    MCHC 32.5 04/11/2019    RDW 14.0 04/11/2019     04/11/2019    MPV 9.6 04/11/2019       Lab Results   Component Value Date     04/11/2019    K 4.6 04/11/2019     04/11/2019    CO2 22 (L) 04/11/2019    GLU 73 04/11/2019    BUN 11 04/11/2019    CALCIUM 9.9 04/11/2019    PROT 7.5 10/12/2018    ALBUMIN 4.2 10/12/2018    BILITOT 0.5 10/12/2018    AST 34 10/12/2018    ALKPHOS 89 10/12/2018    ALT 28 10/12/2018       No results found for: COLORU, APPEARANCEUA, SPECGRAV, PHUR, PROTEINUA, GLUCOSEU, KETONESU, BLOODU, LEUKOCYTESUR, NITRITE, UROBILINOGEN    Lab Results   Component Value Date    CRP 1.5 04/11/2019       No results found for: SEDRATE, ERYTHROCYTES    No results found for: NGOZI, RF, SEDRATE    No components found for: 25OHVITDTOT, 70GEHCCG3, 32VGYSTH5, METHODNOTE    Lab Results   Component Value Date    URICACID 5.6 01/16/2019       No components found for: TSPOTTB    · TSH within normal range    Rheum Labs:  NGOZI 1 in 160 speckled pattern  ZOË negative  SSA, SSB negative     Infectious Labs:  Hepatitis-C virus nonreactive September 2017   Lyme antibody  negative    Imaging:  All imaging reviewed and independently  interpreted by me.    X-ray knee February 2018  equivocal minimal joint space narrowing involving the medial compartment of either knee.  No acute fracture or dislocation.  There is minimal lateral subluxation of either patella on the sunrise view.  Very minimal osteophyte formation associated with the superior pole of the left patella.  No joint effusion.    X-ray C-spine February 2019  The vertebral bodies demonstrate a normal height and alignment.  Moderate disc space narrowing and minimal spondylosis present at the C5-6 and C6-7 levels.  No significant facet arthropathy identified.  The C1-C2 articulation is within normal limits.    EMG / nerve conduction study January 2019  no definitive electrophysiologic evidence of large fiber polyneuropathy.     ASSESSMENT / PLAN:     Alma Mistry is a 58 y.o. White female with:    1.  Chronic joint pain  - history of present illness and current findings remain consistent with DJD and neuropathic features.  - features of anserine bursitis on today's exam  - Discussed and recommended exercise range of motion, flexibility, strengthening exercises  - resting affected joint for brief periods (<12 h)  - stretching, massage, heat/ice  - Acetaminophen prn -> standing up to 3 g per day-> NSAIDs short course (if persistent pain)  - continue with gabapentin as tolerated for neuropathic features.  - consider for corticosteroid injection if refractory    2. Positive NGOZI  - rest of rheumatic autoimmune workup unrevealing.  - current rash without acute or subacute cutaneous lupus appearance  - recommended follow-up with Dermatology.  - consider for trial of Plaquenil for referred photosensitivity and arthralgias    3. Other specified counseling  - over 10 minutes spent regarding below topics:  - Immunization counseling done.  - malignancy screening.  Weight loss counseling done.  - Limitation of alcohol consumption.  - Regular  exercise:  Low impact, aerobic and resistance.  - Medication counseling provided.    RTC 5 months    Method of contact with patient concerns: [facility]    Disclaimer:  This note is prepared using voice recognition software and as such is likely to have errors and has not been proof read. Please contact me for questions.     Time spent: 25 minutes in face to face discussion concerning diagnosis, prognosis, review of lab and test results, benefits of treatment as well as management of disease, counseling of patient and coordination of care between various health care providers.  Greater than half the time spent was used for coordination of care and counseling of patient.    Lang Hernández M.D.  Rheumatology Department   [facility]

## 2019-11-22 NOTE — PATIENT INSTRUCTIONS
Understanding Pes Anserine Bursitis    A bursa is a thin, slippery, sac-like film that contains a small amount of fluid. A bursa is found between bones and soft tissues in and around joints. It cushions and protects joint structures and stops them from rubbing against each other. If a bursa becomes inflamed and irritated, it is known as bursitis.  The pes anserine bursa is found on the inside of the knee joint. It lies between the shinbone (tibia) and 3 tendons that attach the hamstring muscles to the shinbone. Inflammation of this bursa is called pes anserine bursitis.  Causes of pes anserine bursitis  These may include:  · Overuse of the knee during running or other sports  · Sports that require repeated side-to-side motions, such as in tennis or soccer  · Being overweight  · Having knee arthritis  · Having MCL (medial collateral ligament) injury  Symptoms of pes anserine bursitis  The knee joint may be painful. This pain may be worse with kneeling, going up or down stairs, or getting up from a chair. The pain often gets better with rest. The side of the joint may be swollen. It may also be tender and feel warm to the touch.  Treatment for pes anserine bursitis  Treatments may include:  · Resting the knee. This lessens irritation and gives the bursa time to heal.  · Sleeping with a cushion between the thighs. This limits pressure on the bursa.  · Prescription or over-the-counter pain medicines. These help reduce inflammation, swelling, and pain.  · A weight-loss plan if you are overweight. This relieves pressure on the knee joint.  · Stretching and strengthening exercises. These help improve the strength and flexibility of the muscles around the knee.  · Cold therapy, such as using ice packs. This helps reduce swelling and pain.  · Physical therapy. This may include exercises or ultrasound.  · Injections of medicine into the bursa. These may help relieve symptoms.  For symptoms that dont get better with these  "treatments, your healthcare provider may recommend surgery to remove the bursa.  Possible complications  If you dont give your knee time to heal, symptoms may return or get worse. Follow your healthcare providers instructions on resting and treating your knee.     When to call your healthcare provider  Call your healthcare provider if:  · Symptoms dont get better or get worse  · New symptoms develop  · Fever, chills or drainage occurs   Date Last Reviewed: 3/10/2016  © 3651-1669 [employer]. [address] [place] [address], [place] [address]. All rights reserved. This information is not intended as a substitute for professional medical care. Always follow your healthcare professional's instructions.        Paraesthesias  Paraesthesia is a burning or prickling sensation that is sometimes felt in the hands, arms, legs or feet. It can also occur in other parts of the body. It can also feel like tingling or numbness, skin crawling, or itching. The feeling is not comfortable, but it is not painful. (The "pins and needles" feeling that happens when a foot or hand "falls asleep" is a temporary paraesthesia.)  Paraesthesias that last or come and go may be caused by medical issues that need to be treated. These include stroke, a bulging disk pressing on a nerve, a trapped nerve, vitamin deficiencies, or even certain medicines.  Tests are often done. These tests may include blood tests, X-ray, CT (computerized tomography) scan, or a muscle test (electromyography). Depending on the cause, treatment may include physical therapy.  Home care  · Tell the healthcare provider about all medicines you take. This includes prescription and over-the-counter medicines, vitamins, and herbs. Ask if any of the medicines may be causing your problems. Do not make any changes to prescription medicines without talking to your healthcare provider first.  · You may be prescribed medicines to help relieve the tingling feeling or for pain. " Take all medicines as directed.  · A numb hand or foot may be more prone to injury. To help protect it:  ¨ Always use oven mitts.  ¨ Test water with an unaffected hand or foot.  ¨ Use caution when trimming nails. File sharp areas.  ¨ Wear shoes that fit well to avoid pressure points, blisters, and ulcers.  ¨ Inspect your hands and feet carefully (including the soles of your feet and between your toes) at least once a week. If you see red areas, sores, or other problems, tell your healthcare provider.  Follow-up care  Follow up with your doctor or as advised by our staff. You may need further testing or evaluation.  When to seek medical advice  Call your healthcare provider right away if any of the following occur:  · Numbness or weakness of the face, one arm, or one leg  · Slurred speech, confusion, trouble speaking, walking, or seeing  · Severe headache, fainting spell, dizziness, or seizure  · Chest, arm, neck, or upper back pain  · Loss of bladder or bowel control  · Open wound with redness, swelling, or pus  Date Last Reviewed: 9/25/2015  © 1050-1473 [facility]. [address] [place] [address]. All rights reserved. This information is not intended as a substitute for professional medical care. Always follow your healthcare professional's instructions.        Hydroxychloroquine tablets  What is this medicine?  HYDROXYCHLOROQUINE (diana drox ee KLOR oh kwin) is used to treat rheumatoid arthritis and systemic lupus erythematosus. It is also used to treat malaria.  How should I use this medicine?  Take this medicine by mouth with a glass of water. Follow the directions on the prescription label. If this medicine upsets your stomach take it with food or milk. Take your doses at regular intervals. Do not take your medicine more often than directed.  Talk to your pediatrician regarding the use of this medicine in children. Special care may be needed.  What side effects may I notice from receiving  this medicine?  Side effects that you should report to your doctor or health care professional as soon as possible:  · allergic reactions like skin rash, itching or hives, swelling of the face, lips, or tongue  · change in vision  · fever, infection  · hearing loss or ringing  · muscle weakness, tremor, or numbness  · redness, blistering, peeling or loosening of the skin, including inside the mouth  · seizures  · unusual bleeding or bruising  · unusually weak or tired  Side effects that usually do not require medical attention (report to your doctor or health care professional if they continue or are bothersome):  · change in coloration of the mouth or skin  · dizziness  · hair loss, lightening  · headache  · irritability, nervousness, nightmares  · loss of appetite  · stomach upset, diarrhea  What may interact with this medicine?  · antacids  · botulinum toxins  · digoxin  · kaolin  · penicillamine  What if I miss a dose?  If you miss a dose, take it as soon as you can. If it is almost time for your next dose, take only that dose. Do not take double or extra doses.  Where should I keep my medicine?  Keep out of the reach of children. In children, this medicine can cause overdose with small doses.  Store at room temperature between 15 and 30 degrees C (59 and 86 degrees F). Protect from moisture and light. Throw away any unused medicine after the expiration date.  What should I tell my health care provider before I take this medicine?  They need to know if you have any of these conditions:  · alcoholism  · anemia or other blood disorder  · eye disease  · glucose 6-phosphate dehydrogenase (G6PD) deficiency  · liver disease  · porphyria  · psoriasis  · an unusual or allergic reaction to chloroquine, hydroxychloroquine, other medicines, foods, dyes, or preservatives  · pregnant or trying to get pregnant  · breast-feeding  What should I watch for while using this medicine?  Visit your doctor or health care professional  for regular check ups. Tell your doctor if your symptoms do not improve. Arthritis symptoms may take several weeks to improve. If you are taking this medicine for a long time, you will need important blood work done. You will also need to have your eyes checked as directed.  This medicine can make you more sensitive to the sun. Keep out of the sun. If you cannot avoid being in the sun, wear protective clothing and use sunscreen. Do not use sun lamps or tanning beds/booths.  Avoid antacids and kaolin containing products for 2 hours before and after taking a dose of this medicine.  NOTE:This sheet is a summary. It may not cover all possible information. If you have questions about this medicine, talk to your doctor, pharmacist, or health care provider. Copyright© 2017 [employer]

## 2019-12-03 ENCOUNTER — PATIENT MESSAGE (OUTPATIENT)
Dept: NEUROLOGY | Facility: CLINIC | Age: 59
End: 2019-12-03

## 2019-12-04 ENCOUNTER — TELEPHONE (OUTPATIENT)
Dept: NEUROLOGY | Facility: CLINIC | Age: 59
End: 2019-12-04

## 2019-12-04 NOTE — TELEPHONE ENCOUNTER
----- Message from Lakhwinder Mtz MD sent at 12/3/2019  4:57 PM CST -----    12-    Received the reports of Brain, C and T spine MRIs done at [facility].    Brain MRI O NL Brain     C/T Spine MRI O NL SC with DDD at C5-C6, C6-C7

## 2020-01-17 ENCOUNTER — PATIENT MESSAGE (OUTPATIENT)
Dept: PSYCHIATRY | Facility: CLINIC | Age: 60
End: 2020-01-17

## 2020-01-27 ENCOUNTER — PATIENT MESSAGE (OUTPATIENT)
Dept: PSYCHIATRY | Facility: CLINIC | Age: 60
End: 2020-01-27

## 2020-02-24 DIAGNOSIS — D51.0 PERNICIOUS ANEMIA: ICD-10-CM

## 2020-02-24 RX ORDER — CYANOCOBALAMIN 1000 UG/ML
1000 INJECTION, SOLUTION INTRAMUSCULAR; SUBCUTANEOUS
Qty: 10 ML | Refills: 0 | Status: CANCELLED | OUTPATIENT
Start: 2020-02-24

## 2020-02-24 NOTE — TELEPHONE ENCOUNTER
----- Message from Gabby Valente sent at 2/24/2020  3:46 PM CST -----  Contact: ISMAEL VUONG/RADHA  CALLING CONCERNING THE RX REFILL OF VITAMIN B INJECTION FOR PATIENT. PLEASE CALL ISMAEL @ [phone]. THANKS SASHA

## 2020-02-24 NOTE — TELEPHONE ENCOUNTER
Contacted patient and informed her that she is due to be seen by Dr Orozco as her last visit was February 2018.  Patient states that she is going through some insurance changes and needs to make sure that Dr Orozco is a preferred provider and will contact to schedule once she finds out. I informed her that if there are any concerns with Dr Orozco refilling her Rx prior to then, we will contact her to which she kindly expressed consent.

## 2020-02-24 NOTE — TELEPHONE ENCOUNTER
Informed airam that the pt hasn't seen Dr. Orozco in two years and would need an appointment in order to get refills. She verbalized understanding.

## 2020-03-06 ENCOUNTER — PATIENT MESSAGE (OUTPATIENT)
Dept: PSYCHIATRY | Facility: CLINIC | Age: 60
End: 2020-03-06

## 2020-03-06 ENCOUNTER — PATIENT MESSAGE (OUTPATIENT)
Dept: NEUROLOGY | Facility: CLINIC | Age: 60
End: 2020-03-06

## 2020-04-14 ENCOUNTER — TELEPHONE (OUTPATIENT)
Dept: RHEUMATOLOGY | Facility: CLINIC | Age: 60
End: 2020-04-14

## 2020-04-14 NOTE — TELEPHONE ENCOUNTER
Pt would like to cancel appt on 4-24-20 & wait till she can come in person. When would you like to reschedule?

## 2020-04-14 NOTE — TELEPHONE ENCOUNTER
Left a message for call back to change her 04/24/2020 visit with Dr. Hernández to a Virtual visit.

## 2020-04-16 NOTE — TELEPHONE ENCOUNTER
Left message for patient to call.     MD Shannan Cueto LPN Cc: STEPHANIE Croft Staff   Caller: Unspecified (2 days ago,  6:52 PM)             If patient is not interested on video telemedicine appointment through epic or telephone encounter, she may be scheduled to next available appointment went rheumatology clinic resume regular activities.  I do not have the answer for when to clinic is going to resume regular activities at this time.

## 2020-04-21 ENCOUNTER — PATIENT MESSAGE (OUTPATIENT)
Dept: RHEUMATOLOGY | Facility: CLINIC | Age: 60
End: 2020-04-21

## 2020-04-21 ENCOUNTER — PATIENT MESSAGE (OUTPATIENT)
Dept: NEUROLOGY | Facility: CLINIC | Age: 60
End: 2020-04-21

## 2020-04-23 DIAGNOSIS — H92.03 EAR PAIN, BILATERAL: Primary | ICD-10-CM

## 2020-04-23 DIAGNOSIS — R09.81 NASAL CONGESTION: ICD-10-CM

## 2020-04-23 DIAGNOSIS — H92.03 EAR PAIN, BILATERAL: ICD-10-CM

## 2020-04-23 RX ORDER — TRIAMCINOLONE ACETONIDE 55 UG/1
2 SPRAY, METERED NASAL DAILY
Qty: 16.9 ML | Refills: 1 | COMMUNITY
Start: 2020-04-23 | End: 2020-07-23

## 2020-04-23 RX ORDER — TRIAMCINOLONE ACETONIDE 55 UG/1
2 SPRAY, METERED NASAL DAILY
Qty: 16.9 ML | Refills: 1 | COMMUNITY
Start: 2020-04-23 | End: 2020-04-23 | Stop reason: SDUPTHER

## 2020-05-04 ENCOUNTER — PATIENT MESSAGE (OUTPATIENT)
Dept: INTERNAL MEDICINE | Facility: CLINIC | Age: 60
End: 2020-05-04

## 2020-05-07 ENCOUNTER — OFFICE VISIT (OUTPATIENT)
Dept: INTERNAL MEDICINE | Facility: CLINIC | Age: 60
End: 2020-05-07
Payer: COMMERCIAL

## 2020-05-07 VITALS
TEMPERATURE: 98 F | BODY MASS INDEX: 26.13 KG/M2 | WEIGHT: 133.81 LBS | DIASTOLIC BLOOD PRESSURE: 80 MMHG | SYSTOLIC BLOOD PRESSURE: 108 MMHG | OXYGEN SATURATION: 98 % | HEART RATE: 67 BPM

## 2020-05-07 DIAGNOSIS — L20.9 ATOPIC DERMATITIS, UNSPECIFIED TYPE: ICD-10-CM

## 2020-05-07 DIAGNOSIS — R10.9 CHRONIC ABDOMINAL PAIN: ICD-10-CM

## 2020-05-07 DIAGNOSIS — Z00.00 ROUTINE GENERAL MEDICAL EXAMINATION AT A HEALTH CARE FACILITY: Primary | ICD-10-CM

## 2020-05-07 DIAGNOSIS — R76.8 POSITIVE AUTOANTIBODY SCREENING FOR CELIAC DISEASE: ICD-10-CM

## 2020-05-07 DIAGNOSIS — D51.0 PERNICIOUS ANEMIA: Chronic | ICD-10-CM

## 2020-05-07 DIAGNOSIS — G89.29 CHRONIC ABDOMINAL PAIN: ICD-10-CM

## 2020-05-07 DIAGNOSIS — K29.40 ATROPHIC GASTRITIS WITHOUT HEMORRHAGE: ICD-10-CM

## 2020-05-07 DIAGNOSIS — Z80.6 FAMILY HISTORY OF LEUKEMIA: ICD-10-CM

## 2020-05-07 DIAGNOSIS — E61.1 IRON DEFICIENCY: ICD-10-CM

## 2020-05-07 DIAGNOSIS — F33.2 SEVERE EPISODE OF RECURRENT MAJOR DEPRESSIVE DISORDER, WITHOUT PSYCHOTIC FEATURES: ICD-10-CM

## 2020-05-07 DIAGNOSIS — F41.0 PANIC DISORDER: ICD-10-CM

## 2020-05-07 PROCEDURE — 99999 PR PBB SHADOW E&M-EST. PATIENT-LVL IV: ICD-10-PCS | Mod: PBBFAC,,, | Performed by: INTERNAL MEDICINE

## 2020-05-07 PROCEDURE — 99396 PR PREVENTIVE VISIT,EST,40-64: ICD-10-PCS | Mod: S$GLB,,, | Performed by: INTERNAL MEDICINE

## 2020-05-07 PROCEDURE — 3008F BODY MASS INDEX DOCD: CPT | Mod: CPTII,S$GLB,, | Performed by: INTERNAL MEDICINE

## 2020-05-07 PROCEDURE — 3008F PR BODY MASS INDEX (BMI) DOCUMENTED: ICD-10-PCS | Mod: CPTII,S$GLB,, | Performed by: INTERNAL MEDICINE

## 2020-05-07 PROCEDURE — 99396 PREV VISIT EST AGE 40-64: CPT | Mod: S$GLB,,, | Performed by: INTERNAL MEDICINE

## 2020-05-07 PROCEDURE — 99999 PR PBB SHADOW E&M-EST. PATIENT-LVL IV: CPT | Mod: PBBFAC,,, | Performed by: INTERNAL MEDICINE

## 2020-05-07 RX ORDER — TRIAMCINOLONE ACETONIDE 1 MG/G
CREAM TOPICAL 2 TIMES DAILY
Qty: 30 G | Refills: 0 | Status: SHIPPED | OUTPATIENT
Start: 2020-05-07 | End: 2020-05-17

## 2020-05-07 RX ORDER — CYANOCOBALAMIN 1000 UG/ML
INJECTION, SOLUTION INTRAMUSCULAR; SUBCUTANEOUS
Qty: 10 ML | Refills: 0 | Status: SHIPPED | OUTPATIENT
Start: 2020-05-07 | End: 2021-03-22

## 2020-05-07 NOTE — PROGRESS NOTES
Subjective:      Patient ID: Alma Mistry is a 59 y.o. female.    Chief Complaint: Annual Exam    Rash   This is a recurrent problem. Episode onset: 2 weeks. The problem has been gradually worsening since onset. The affected locations include the right armface, left ear, left arm and right arm. The rash is characterized by redness and itchiness (burning). She was exposed to nothing. Associated symptoms include diarrhea and rhinorrhea. Pertinent negatives include no cough, fever, joint pain, shortness of breath or sore throat. Past treatments include anti-itch cream, antihistamine and topical steroids (tac 0.025). The treatment provided mild relief. Her past medical history is significant for allergies and varicella. There is no history of asthma or eczema.    Physical Exam   Constitutional: She is oriented to person, place, and time. She appears well-developed and well-nourished. No distress.   HENT:   Head: Normocephalic and atraumatic.   Mouth/Throat: Oropharynx is clear and moist.   Eyes: Pupils are equal, round, and reactive to light. EOM are normal.   Neck: Neck supple. Carotid bruit is not present. No thyromegaly present.   Cardiovascular: Normal rate and regular rhythm.   Pulmonary/Chest: Breath sounds normal. She has no wheezes. She has no rales.   Abdominal: Soft. Bowel sounds are normal. There is no tenderness.   Musculoskeletal: She exhibits no edema.   Lymphadenopathy:     She has no cervical adenopathy.   Neurological: She is alert and oriented to person, place, and time.   Skin: Skin is warm and dry.        Psychiatric: She has a normal mood and affect. Her behavior is normal. 60 yo with   Patient Active Problem List   Diagnosis    Pernicious anemia    History of iron deficiency anemia    Overweight    Lichen sclerosus    Panic disorder    Polyneuropathy    RLS (restless legs syndrome)    Neuropathic pain    Arthralgia    Severe episode of recurrent major depressive disorder, without psychotic  features    Generalized anxiety disorder with panic attacks    Complicated bereavement    Neuropathy of left superficial peroneal nerve     Past Medical History:   Diagnosis Date    Atrophic gastritis     B12 deficiency     Breast lump on left side at 3 o'clock position 03/25/2015    Fibrocystic breast     Lichen sclerosus 08/16/2012     Here today for annual prev exam.  Compliant with meds without significant side effects. Energy and appetite are good. She has not been compliant with f/u with specialty clinics.     Pt also c/o rash    Review of Systems   Constitutional: Negative for chills and fever.   HENT: Positive for rhinorrhea. Negative for ear pain and sore throat.    Eyes: Negative for visual disturbance.   Respiratory: Negative for cough, shortness of breath and wheezing.    Cardiovascular: Negative for chest pain.   Gastrointestinal: Positive for diarrhea. Negative for abdominal pain and blood in stool.   Genitourinary: Negative for dysuria and hematuria.   Musculoskeletal: Negative for joint pain.   Skin: Positive for rash. Negative for wound.   Neurological: Negative for seizures and syncope.     Objective:   /80 (BP Location: Left arm, Patient Position: Sitting, BP Method: Medium (Manual))   Pulse 67   Temp 98.2 °F (36.8 °C) (Oral)   Wt 60.7 kg (133 lb 13.1 oz)   SpO2 98%   BMI 26.13 kg/m²     Physical Exam   Constitutional: She is oriented to person, place, and time. She appears well-developed and well-nourished. No distress.   HENT:   Head: Normocephalic and atraumatic.   Mouth/Throat: Oropharynx is clear and moist.   Eyes: Pupils are equal, round, and reactive to light. EOM are normal.   Neck: Neck supple. Carotid bruit is not present. No thyromegaly present.   Cardiovascular: Normal rate and regular rhythm.   Pulmonary/Chest: Breath sounds normal. She has no wheezes. She has no rales.   Abdominal: Soft. Bowel sounds are normal. There is no tenderness.   Musculoskeletal: She  exhibits no edema.   Lymphadenopathy:     She has no cervical adenopathy.   Neurological: She is alert and oriented to person, place, and time.   Skin: Skin is warm and dry.        Psychiatric: She has a normal mood and affect. Her behavior is normal.       Assessment:     1. Routine general medical examination at a health care facility    2. Panic disorder    3. Severe episode of recurrent major depressive disorder, without psychotic features    4. Pernicious anemia    5. Atopic dermatitis, unspecified type    6. Atrophic gastritis without hemorrhage    7. Positive autoantibody screening for celiac disease    8. Chronic abdominal pain    9. Iron deficiency    10. Family history of leukemia      Plan:   Routine general medical examination at a health care facility  Heart healthy diet and reg exercise  HM reviewed  -     Vitamin B12; Future; Expected date: 05/07/2020  -     Iron and TIBC; Future; Expected date: 05/07/2020  -     Ferritin; Future; Expected date: 05/07/2020  -     Lipid Panel; Future; Expected date: 05/07/2020  -     HIV 1/2 Ag/Ab (4th Gen); Future; Expected date: 05/07/2020    Panic disorder  Up to date with psyc    Severe episode of recurrent major depressive disorder, without psychotic features  Stable  Up to date with psyc  Pernicious anemia  -     cyanocobalamin 1,000 mcg/mL injection; INJECT 1 ML INTRAMUSCULARLY EVERY 28 DAYS  Dispense: 10 mL; Refill: 0  -     Ambulatory referral/consult to Hematology / Oncology; Future; Expected date: 05/14/2020    Atopic dermatitis, unspecified type  Comments:  derm if no resolution.   Orders:  -     triamcinolone acetonide 0.1% (KENALOG) 0.1 % cream; Apply topically 2 (two) times daily. To affected area for 10 days  Dispense: 30 g; Refill: 0    Atrophic gastritis without hemorrhage  -     Ambulatory referral/consult to Gastroenterology; Future; Expected date: 05/14/2020    Positive autoantibody screening for celiac disease  -     Ambulatory referral/consult to  Gastroenterology; Future; Expected date: 05/14/2020    Chronic abdominal pain  -     Ambulatory referral/consult to Gastroenterology; Future; Expected date: 05/14/2020    Iron deficiency  -     Ambulatory referral/consult to Hematology / Oncology; Future; Expected date: 05/14/2020    Family history of leukemia  Would like to discuss possible bone marrow transplant donation to her brother  -     Ambulatory referral/consult to Hematology / Oncology; Future; Expected date: 05/14/2020        Lab Frequency Next Occurrence   MRI Brain Demyelinating W W/O Contrast Once 05/22/2019   Ambulatory referral/consult to Orthopedics Once 11/08/2019   Vitamin B12 Once 05/07/2020   Iron and TIBC Once 05/07/2020   Ferritin Once 05/07/2020   Lipid Panel Once 05/07/2020   HIV 1/2 Ag/Ab (4th Gen) Once 05/07/2020   Ambulatory referral/consult to Gastroenterology Once 05/14/2020   Ambulatory referral/consult to Hematology / Oncology Once 05/14/2020   Sjogrens syndrome-A extractable nuclear antibody     Sjogrens syndrome-B extractable nuclear antibody     NGOZI Screen w/Reflex         Problem List Items Addressed This Visit        Psychiatric    Panic disorder    Severe episode of recurrent major depressive disorder, without psychotic features       Oncology    Pernicious anemia (Chronic)    Relevant Medications    cyanocobalamin 1,000 mcg/mL injection    Other Relevant Orders    Ambulatory referral/consult to Hematology / Oncology      Other Visit Diagnoses     Routine general medical examination at a health care facility    -  Primary    Relevant Orders    Vitamin B12    Iron and TIBC    Ferritin    Lipid Panel    HIV 1/2 Ag/Ab (4th Gen)    Atopic dermatitis, unspecified type        derm if no resolution.     Relevant Medications    triamcinolone acetonide 0.1% (KENALOG) 0.1 % cream    Atrophic gastritis without hemorrhage        Relevant Orders    Ambulatory referral/consult to Gastroenterology    Positive autoantibody screening for celiac  disease        Relevant Orders    Ambulatory referral/consult to Gastroenterology    Chronic abdominal pain        Relevant Orders    Ambulatory referral/consult to Gastroenterology    Iron deficiency        Relevant Orders    Ambulatory referral/consult to Hematology / Oncology    Family history of leukemia        Relevant Orders    Ambulatory referral/consult to Hematology / Oncology          Follow up in about 1 year (around 5/7/2021), or if symptoms worsen or fail to improve.

## 2020-05-08 ENCOUNTER — PATIENT MESSAGE (OUTPATIENT)
Dept: INTERNAL MEDICINE | Facility: CLINIC | Age: 60
End: 2020-05-08

## 2020-05-11 ENCOUNTER — LAB VISIT (OUTPATIENT)
Dept: LAB | Facility: HOSPITAL | Age: 60
End: 2020-05-11
Attending: INTERNAL MEDICINE
Payer: COMMERCIAL

## 2020-05-11 DIAGNOSIS — Z00.00 ROUTINE GENERAL MEDICAL EXAMINATION AT A HEALTH CARE FACILITY: ICD-10-CM

## 2020-05-11 LAB
CHOLEST SERPL-MCNC: 209 MG/DL (ref 120–199)
CHOLEST/HDLC SERPL: 3.7 {RATIO} (ref 2–5)
FERRITIN SERPL-MCNC: 6 NG/ML (ref 20–300)
HDLC SERPL-MCNC: 56 MG/DL (ref 40–75)
HDLC SERPL: 26.8 % (ref 20–50)
HIV 1+2 AB+HIV1 P24 AG SERPL QL IA: NEGATIVE
IRON SERPL-MCNC: 61 UG/DL (ref 30–160)
LDLC SERPL CALC-MCNC: 126.6 MG/DL (ref 63–159)
NONHDLC SERPL-MCNC: 153 MG/DL
SATURATED IRON: 13 % (ref 20–50)
TOTAL IRON BINDING CAPACITY: 454 UG/DL (ref 250–450)
TRANSFERRIN SERPL-MCNC: 307 MG/DL (ref 200–375)
TRIGL SERPL-MCNC: 132 MG/DL (ref 30–150)
VIT B12 SERPL-MCNC: 292 PG/ML (ref 210–950)

## 2020-05-11 PROCEDURE — 80061 LIPID PANEL: CPT

## 2020-05-11 PROCEDURE — 82728 ASSAY OF FERRITIN: CPT

## 2020-05-11 PROCEDURE — 82607 VITAMIN B-12: CPT

## 2020-05-11 PROCEDURE — 36415 COLL VENOUS BLD VENIPUNCTURE: CPT

## 2020-05-11 PROCEDURE — 83540 ASSAY OF IRON: CPT

## 2020-05-11 PROCEDURE — 86703 HIV-1/HIV-2 1 RESULT ANTBDY: CPT

## 2020-05-12 ENCOUNTER — PATIENT OUTREACH (OUTPATIENT)
Dept: ADMINISTRATIVE | Facility: OTHER | Age: 60
End: 2020-05-12

## 2020-05-13 ENCOUNTER — PATIENT MESSAGE (OUTPATIENT)
Dept: HEMATOLOGY/ONCOLOGY | Facility: CLINIC | Age: 60
End: 2020-05-13

## 2020-05-14 ENCOUNTER — LAB VISIT (OUTPATIENT)
Dept: LAB | Facility: HOSPITAL | Age: 60
End: 2020-05-14
Attending: NURSE PRACTITIONER
Payer: COMMERCIAL

## 2020-05-14 ENCOUNTER — PATIENT MESSAGE (OUTPATIENT)
Dept: GASTROENTEROLOGY | Facility: CLINIC | Age: 60
End: 2020-05-14

## 2020-05-14 ENCOUNTER — OFFICE VISIT (OUTPATIENT)
Dept: GASTROENTEROLOGY | Facility: CLINIC | Age: 60
End: 2020-05-14
Payer: COMMERCIAL

## 2020-05-14 VITALS
WEIGHT: 131.38 LBS | SYSTOLIC BLOOD PRESSURE: 110 MMHG | DIASTOLIC BLOOD PRESSURE: 64 MMHG | HEART RATE: 60 BPM | BODY MASS INDEX: 25.79 KG/M2 | HEIGHT: 60 IN

## 2020-05-14 DIAGNOSIS — Z83.79 FAMILY HISTORY OF FATTY LIVER: ICD-10-CM

## 2020-05-14 DIAGNOSIS — R76.8 POSITIVE AUTOANTIBODY SCREENING FOR CELIAC DISEASE: ICD-10-CM

## 2020-05-14 DIAGNOSIS — E61.1 IRON DEFICIENCY: ICD-10-CM

## 2020-05-14 DIAGNOSIS — G89.29 CHRONIC ABDOMINAL PAIN: ICD-10-CM

## 2020-05-14 DIAGNOSIS — Z86.19 HISTORY OF HEPATITIS A: ICD-10-CM

## 2020-05-14 DIAGNOSIS — K29.40 ATROPHIC GASTRITIS WITHOUT HEMORRHAGE: ICD-10-CM

## 2020-05-14 DIAGNOSIS — R10.9 CHRONIC ABDOMINAL PAIN: ICD-10-CM

## 2020-05-14 DIAGNOSIS — E61.1 IRON DEFICIENCY: Primary | ICD-10-CM

## 2020-05-14 DIAGNOSIS — K59.04 CHRONIC IDIOPATHIC CONSTIPATION: Primary | ICD-10-CM

## 2020-05-14 PROCEDURE — 3008F PR BODY MASS INDEX (BMI) DOCUMENTED: ICD-10-PCS | Mod: CPTII,S$GLB,, | Performed by: NURSE PRACTITIONER

## 2020-05-14 PROCEDURE — 99214 PR OFFICE/OUTPT VISIT, EST, LEVL IV, 30-39 MIN: ICD-10-PCS | Mod: S$GLB,,, | Performed by: NURSE PRACTITIONER

## 2020-05-14 PROCEDURE — 99999 PR PBB SHADOW E&M-EST. PATIENT-LVL III: ICD-10-PCS | Mod: PBBFAC,,, | Performed by: NURSE PRACTITIONER

## 2020-05-14 PROCEDURE — 99214 OFFICE O/P EST MOD 30 MIN: CPT | Mod: S$GLB,,, | Performed by: NURSE PRACTITIONER

## 2020-05-14 PROCEDURE — 85025 COMPLETE CBC W/AUTO DIFF WBC: CPT

## 2020-05-14 PROCEDURE — 3008F BODY MASS INDEX DOCD: CPT | Mod: CPTII,S$GLB,, | Performed by: NURSE PRACTITIONER

## 2020-05-14 PROCEDURE — 99999 PR PBB SHADOW E&M-EST. PATIENT-LVL III: CPT | Mod: PBBFAC,,, | Performed by: NURSE PRACTITIONER

## 2020-05-14 PROCEDURE — 36415 COLL VENOUS BLD VENIPUNCTURE: CPT

## 2020-05-14 RX ORDER — SODIUM, POTASSIUM,MAG SULFATES 17.5-3.13G
SOLUTION, RECONSTITUTED, ORAL ORAL
Qty: 354 ML | Refills: 0 | Status: SHIPPED | OUTPATIENT
Start: 2020-05-14 | End: 2020-05-15

## 2020-05-14 NOTE — LETTER
May 14, 2020      Zheng Orozco MD  [address] [facility] [address]           [facility]  [address]  [place] [address]  Phone: [phone]  Fax: [phone]          Patient: Alma Mistry   MR Number: [id number]   YOB: 1960   Date of Visit: 5/14/2020       Dear Dr. Zheng Orozco:    Thank you for referring Alma Mistry to me for evaluation. Attached you will find relevant portions of my assessment and plan of care.    If you have questions, please do not hesitate to call me. I look forward to following Alma Mistry along with you.    Sincerely,    Kelin German, JANICE    Enclosure  CC:  No Recipients    If you would like to receive this communication electronically, please contact [email or web address] or [phone] to request more information on [employer] access.    For providers and/or their staff who would like to refer a patient to [facility], please contact us through our one-stop-shop provider referral line, [facility], at [phone].    If you feel you have received this communication in error or would no longer like to receive these types of communications, please e-mail [email or web address]

## 2020-05-14 NOTE — PROGRESS NOTES
Clinic Follow Up:  [facility] Follow Up Note    Reason for Follow Up:  The primary encounter diagnosis was Iron deficiency. Diagnoses of Atrophic gastritis without hemorrhage, Positive autoantibody screening for celiac disease, Chronic abdominal pain, Family history of fatty liver, and History of hepatitis A were also pertinent to this visit.    PCP: Zheng Orozco   [address] [facility] [place] [address]    HPI:  This is a 59 y.o. female here for follow up of the above. She presents to clinic for atrophic gastritis with Vitamin B 12 deficiency. This was diagnosed by prior GI in [place]. Her last studies were in 2015. She was supposed to repeat her EGD in 2 years for surveillance and colonoscopy in 5 years for surveillance. She is concerned about celiac disease as she recently had 9 days of diarrhea in April 2020. This has since resolved. She had negative TTG IgA and IgG. She states she does not eat bread and pasta but is not strictly avoiding all gluten. She is doing this for health reasons. Of note, she has significant iron deficiency. No overt signs of bleeding. No hematochezia, melena, hematemesis, hematuria, or epistaxis.  She is also concerned about having HARRIS. She has a family history of HARRIS in her brother. She drinks occasionally. No other significant risk factors for HARRIS. She states she had Hepatitis A when she was a teenager.     Review of Systems   Constitutional: Negative for activity change and appetite change.        As per interval history above   Respiratory: Negative for cough and shortness of breath.    Cardiovascular: Negative for chest pain.   Gastrointestinal: Negative for abdominal pain, blood in stool, constipation, diarrhea, nausea and vomiting.   Skin: Negative for color change and rash.       Medical History:  Past Medical History:   Diagnosis Date    Atrophic gastritis     B12 deficiency     Breast lump on left side at 3 o'clock position 03/25/2015     Fibrocystic breast     Lichen sclerosus 08/16/2012       Surgical History:   Past Surgical History:   Procedure Laterality Date    BREAST BIOPSY Right 2009    benign    COLONOSCOPY  09/10/2010    ESOPHAGOGASTRODUODENOSCOPY  09/10/2010    HYSTERECTOMY  2013    TONSILLECTOMY         Family History:   Family History   Problem Relation Age of Onset    Depression Mother     Nephritis Mother     Hyperthyroidism Mother     Hypotension Mother     Anemia Mother     Asthma Father     Thyroid disease Sister     Depression Sister     Leukemia Brother     Cirrhosis Brother     Multiple sclerosis Brother        Social History:   Social History     Tobacco Use    Smoking status: Never Smoker    Smokeless tobacco: Never Used   Substance Use Topics    Alcohol use: Not Currently     Frequency: 2-4 times a month     Drinks per session: Patient refused     Binge frequency: Never    Drug use: No       Allergies:   Review of patient's allergies indicates:   Allergen Reactions    Fluticasone        Home Medications:  Current Outpatient Medications on File Prior to Visit   Medication Sig Dispense Refill    aspirin-acetaminophen-caffeine 250-250-65 mg (EXCEDRIN MIGRAINE) 250-250-65 mg per tablet Take 1 tablet by mouth as needed for Pain.      CALCIUM CIT/MGOX/VIT D3/B6/MIN (CITRACAL PLUS ORAL) Take 1 tablet by mouth as needed.       cetirizine (ZYRTEC) 10 MG tablet Take 1 tablet (10 mg total) by mouth once daily. 60 tablet 0    CHOLECALCIFEROL, VITAMIN D3, (VITAMIN D3 ORAL) Take 1,000 Units by mouth daily as needed.       clobetasol 0.05% (TEMOVATE) 0.05 % Oint Apply topically as needed.       cyanocobalamin 1,000 mcg/mL injection INJECT 1 ML INTRAMUSCULARLY EVERY 28 DAYS 10 mL 0    DULoxetine (CYMBALTA) 20 MG capsule Take 1 capsule (20 mg total) by mouth 2 (two) times daily. 60 capsule 11    gabapentin (NEURONTIN) 100 MG capsule Take 3 capsules (300 mg total) by mouth every evening. 90 capsule 11     multivitamin (ONE DAILY MULTIVITAMIN) per tablet Take 1 tablet by mouth once daily.      naproxen (NAPROSYN) 500 MG tablet Take 1 tablet (500 mg total) by mouth 2 (two) times daily with meals. For pain and inflammation (Patient taking differently: Take 500 mg by mouth 2 (two) times daily as needed. For pain and inflammation) 30 tablet 0    triamcinolone (NASACORT) 55 mcg nasal inhaler 2 sprays by Nasal route once daily. 16.9 mL 1    triamcinolone acetonide 0.1% (KENALOG) 0.1 % cream Apply topically 2 (two) times daily. To affected area for 10 days 30 g 0    diclofenac sodium (VOLTAREN) 1 % Gel Apply 2 g topically 4 (four) times daily. 1 g 2    LORazepam (ATIVAN) 0.5 MG tablet Take 1 tablet (0.5 mg total) by mouth every 6 (six) hours as needed for Anxiety. 20 tablet 3    magnesium 30 mg Tab Take 1 tablet by mouth once.      [DISCONTINUED] sodium,potassium,mag sulfates (SUPREP BOWEL PREP KIT) 17.5-3.13-1.6 gram SolR As directed for colonoscopy (Patient not taking: Reported on 11/8/2019) 254 mL 0     No current facility-administered medications on file prior to visit.        /64   Pulse 60   Ht 5' (1.524 m)   Wt 59.6 kg (131 lb 6.3 oz)   BMI 25.66 kg/m²   Body mass index is 25.66 kg/m².  Physical Exam   Constitutional: She is oriented to person, place, and time and well-developed, well-nourished, and in no distress. No distress.   HENT:   Head: Normocephalic.   Eyes: Pupils are equal, round, and reactive to light. Conjunctivae are normal.   Cardiovascular: Normal rate, regular rhythm and normal heart sounds.   Pulmonary/Chest: Effort normal and breath sounds normal. No respiratory distress.   Abdominal: Soft. Bowel sounds are normal. She exhibits no distension. There is no tenderness.   Neurological: She is alert and oriented to person, place, and time. No cranial nerve deficit.   Skin: Skin is warm and dry. No rash noted.   Psychiatric: Mood and affect normal.       Labs: Pertinent labs  reviewed.    Assessment:  1. Iron deficiency    2. Atrophic gastritis without hemorrhage    3. Positive autoantibody screening for celiac disease    4. Chronic abdominal pain    5. Family history of fatty liver    6. History of hepatitis A        Recommendations:  Iron deficiency  - plan for EGD and colonoscopy for further evaluation   -     Case request GI: ESOPHAGOGASTRODUODENOSCOPY (EGD), COLONOSCOPY  -     CBC auto differential; Future; Expected date: 05/14/2020    Atrophic gastritis without hemorrhage  - as above   -     Ambulatory referral/consult to Gastroenterology  -     Case request GI: ESOPHAGOGASTRODUODENOSCOPY (EGD), COLONOSCOPY    Positive autoantibody screening for celiac disease  - check duodenal bx  - discussed that there may be false negative results if on gluten free diet at time of endoscopy   -     Ambulatory referral/consult to Gastroenterology    Chronic abdominal pain  -     Ambulatory referral/consult to Gastroenterology    Family history of fatty liver  History of hepatitis A  - plan for ultrasound for screening.   -     US Abdomen Limited; Future; Expected date: 05/14/2020    Other orders  -     SUPREP BOWEL PREP KIT 17.5-3.13-1.6 gram SolR; Use as directed  Dispense: 354 mL; Refill: 0        Return to Clinic:  No follow-ups on file.    Thank you for the opportunity to participate in the care of this patient.  ANA Morales

## 2020-05-15 LAB
BASOPHILS # BLD AUTO: 0.05 K/UL (ref 0–0.2)
BASOPHILS NFR BLD: 0.8 % (ref 0–1.9)
DIFFERENTIAL METHOD: ABNORMAL
EOSINOPHIL # BLD AUTO: 0.1 K/UL (ref 0–0.5)
EOSINOPHIL NFR BLD: 1.5 % (ref 0–8)
ERYTHROCYTE [DISTWIDTH] IN BLOOD BY AUTOMATED COUNT: 15.1 % (ref 11.5–14.5)
HCT VFR BLD AUTO: 39.3 % (ref 37–48.5)
HGB BLD-MCNC: 12.4 G/DL (ref 12–16)
IMM GRANULOCYTES # BLD AUTO: 0.02 K/UL (ref 0–0.04)
IMM GRANULOCYTES NFR BLD AUTO: 0.3 % (ref 0–0.5)
LYMPHOCYTES # BLD AUTO: 1.7 K/UL (ref 1–4.8)
LYMPHOCYTES NFR BLD: 26.7 % (ref 18–48)
MCH RBC QN AUTO: 26.7 PG (ref 27–31)
MCHC RBC AUTO-ENTMCNC: 31.6 G/DL (ref 32–36)
MCV RBC AUTO: 85 FL (ref 82–98)
MONOCYTES # BLD AUTO: 0.5 K/UL (ref 0.3–1)
MONOCYTES NFR BLD: 8.6 % (ref 4–15)
NEUTROPHILS # BLD AUTO: 3.9 K/UL (ref 1.8–7.7)
NEUTROPHILS NFR BLD: 62.1 % (ref 38–73)
NRBC BLD-RTO: 0 /100 WBC
PLATELET # BLD AUTO: 307 K/UL (ref 150–350)
PMV BLD AUTO: 12 FL (ref 9.2–12.9)
RBC # BLD AUTO: 4.65 M/UL (ref 4–5.4)
WBC # BLD AUTO: 6.19 K/UL (ref 3.9–12.7)

## 2020-05-18 ENCOUNTER — TELEPHONE (OUTPATIENT)
Dept: RADIOLOGY | Facility: HOSPITAL | Age: 60
End: 2020-05-18

## 2020-05-18 ENCOUNTER — OFFICE VISIT (OUTPATIENT)
Dept: HEMATOLOGY/ONCOLOGY | Facility: CLINIC | Age: 60
End: 2020-05-18
Payer: COMMERCIAL

## 2020-05-18 VITALS
TEMPERATURE: 98 F | DIASTOLIC BLOOD PRESSURE: 69 MMHG | OXYGEN SATURATION: 98 % | HEART RATE: 70 BPM | SYSTOLIC BLOOD PRESSURE: 105 MMHG | RESPIRATION RATE: 18 BRPM | HEIGHT: 60 IN | BODY MASS INDEX: 25.76 KG/M2 | WEIGHT: 131.19 LBS

## 2020-05-18 DIAGNOSIS — Z80.6 FAMILY HISTORY OF LEUKEMIA: ICD-10-CM

## 2020-05-18 DIAGNOSIS — D51.0 PERNICIOUS ANEMIA: Chronic | ICD-10-CM

## 2020-05-18 DIAGNOSIS — E61.1 IRON DEFICIENCY: ICD-10-CM

## 2020-05-18 DIAGNOSIS — D50.0 IRON DEFICIENCY ANEMIA DUE TO CHRONIC BLOOD LOSS: Primary | ICD-10-CM

## 2020-05-18 PROCEDURE — 99999 PR PBB SHADOW E&M-EST. PATIENT-LVL III: CPT | Mod: PBBFAC,,, | Performed by: INTERNAL MEDICINE

## 2020-05-18 PROCEDURE — 99999 PR PBB SHADOW E&M-EST. PATIENT-LVL III: ICD-10-PCS | Mod: PBBFAC,,, | Performed by: INTERNAL MEDICINE

## 2020-05-18 PROCEDURE — 99245 OFF/OP CONSLTJ NEW/EST HI 55: CPT | Mod: S$GLB,,, | Performed by: INTERNAL MEDICINE

## 2020-05-18 PROCEDURE — 99245 PR OFFICE CONSULTATION,LEVEL V: ICD-10-PCS | Mod: S$GLB,,, | Performed by: INTERNAL MEDICINE

## 2020-05-18 RX ORDER — HEPARIN 100 UNIT/ML
500 SYRINGE INTRAVENOUS
Status: CANCELLED | OUTPATIENT
Start: 2020-05-18

## 2020-05-18 RX ORDER — HEPARIN 100 UNIT/ML
500 SYRINGE INTRAVENOUS
Status: CANCELLED | OUTPATIENT
Start: 2020-05-27

## 2020-05-18 RX ORDER — SODIUM CHLORIDE 0.9 % (FLUSH) 0.9 %
10 SYRINGE (ML) INJECTION
Status: CANCELLED | OUTPATIENT
Start: 2020-05-27

## 2020-05-18 RX ORDER — SODIUM CHLORIDE 0.9 % (FLUSH) 0.9 %
10 SYRINGE (ML) INJECTION
Status: CANCELLED | OUTPATIENT
Start: 2020-05-18

## 2020-05-18 NOTE — PROGRESS NOTES
Subjective:       Patient ID: Alma Mistry is a 59 y.o. female.    Chief Complaint: Results and Anemia    HPI 59-year-old female referred by primary physician with previous diagnosis of vitamin B12 deficiency demonstrated by low B12 level 10 years ago when patient live in [place].  Patient has been on monthly B12 injections.  Documented iron deficiency again.  Previously treated with intravenous iron while in the [place] area    Past Medical History:   Diagnosis Date    Atrophic gastritis     B12 deficiency     Breast lump on left side at 3 o'clock position 03/25/2015    Fibrocystic breast     Lichen sclerosus 08/16/2012     Family History   Problem Relation Age of Onset    Depression Mother     Nephritis Mother     Hyperthyroidism Mother     Hypotension Mother     Anemia Mother     Asthma Father     Thyroid disease Sister     Depression Sister     Leukemia Brother     Cirrhosis Brother     Multiple sclerosis Brother      Social History     Socioeconomic History    Marital status:      Spouse name: Not on file    Number of children: Not on file    Years of education: Not on file    Highest education level: Not on file   Occupational History    Not on file   Social Needs    Financial resource strain: Not hard at all    Food insecurity:     Worry: Never true     Inability: Never true    Transportation needs:     Medical: No     Non-medical: No   Tobacco Use    Smoking status: Never Smoker    Smokeless tobacco: Never Used   Substance and Sexual Activity    Alcohol use: Not Currently     Frequency: 2-4 times a month     Drinks per session: Patient refused     Binge frequency: Never    Drug use: No    Sexual activity: Never   Lifestyle    Physical activity:     Days per week: 5 days     Minutes per session: 60 min    Stress: Rather much   Relationships    Social connections:     Talks on phone: More than three times a week     Gets together: Twice a week     Attends  Yarsanism service: Not on file     Active member of club or organization: Yes     Attends meetings of clubs or organizations: More than 4 times per year     Relationship status:    Other Topics Concern    Not on file   Social History Narrative    1 dog, no smokers; Originally from [facility].     Past Surgical History:   Procedure Laterality Date    BREAST BIOPSY Right 2009    benign    COLONOSCOPY  09/10/2010    ESOPHAGOGASTRODUODENOSCOPY  09/10/2010    HYSTERECTOMY  2013    TONSILLECTOMY         Labs:  Lab Results   Component Value Date    WBC 6.19 05/14/2020    HGB 12.4 05/14/2020    HCT 39.3 05/14/2020    MCV 85 05/14/2020     05/14/2020     BMP  Lab Results   Component Value Date     04/11/2019    K 4.6 04/11/2019     04/11/2019    CO2 22 (L) 04/11/2019    BUN 11 04/11/2019    CREATININE 0.8 04/11/2019    CALCIUM 9.9 04/11/2019    ANIONGAP 12 04/11/2019    ESTGFRAFRICA >60 04/11/2019    EGFRNONAA >60 04/11/2019     Lab Results   Component Value Date    ALT 28 10/12/2018    AST 34 10/12/2018    ALKPHOS 89 10/12/2018    BILITOT 0.5 10/12/2018       Lab Results   Component Value Date    IRON 61 05/11/2020    TIBC 454 (H) 05/11/2020    FERRITIN 6 (L) 05/11/2020     Lab Results   Component Value Date    IZFIPXOW00 292 05/11/2020     Lab Results   Component Value Date    FOLATE 12.8 10/12/2018     Lab Results   Component Value Date    TSH 0.750 05/22/2019         Review of Systems   Constitutional: Positive for activity change, appetite change and fatigue. Negative for chills, diaphoresis, fever and unexpected weight change.   HENT: Negative for congestion, dental problem, drooling, ear discharge, ear pain, facial swelling, hearing loss, mouth sores, nosebleeds, postnasal drip, rhinorrhea, sinus pressure, sneezing, sore throat, tinnitus, trouble swallowing and voice change.    Eyes: Negative for photophobia, pain, discharge, redness, itching and visual disturbance.   Respiratory:  Negative for cough, choking, chest tightness, shortness of breath, wheezing and stridor.    Cardiovascular: Negative for chest pain, palpitations and leg swelling.   Gastrointestinal: Negative for abdominal distention, abdominal pain, anal bleeding, blood in stool, constipation, diarrhea, nausea, rectal pain and vomiting.   Endocrine: Negative for cold intolerance, heat intolerance, polydipsia, polyphagia and polyuria.   Genitourinary: Negative for decreased urine volume, difficulty urinating, dyspareunia, dysuria, enuresis, flank pain, frequency, genital sores, hematuria, menstrual problem, pelvic pain, urgency, vaginal bleeding, vaginal discharge and vaginal pain.   Musculoskeletal: Positive for gait problem. Negative for arthralgias, back pain, joint swelling, myalgias, neck pain and neck stiffness.        History of restless leg syndrome   Skin: Negative for color change, pallor and rash.   Allergic/Immunologic: Negative for environmental allergies, food allergies and immunocompromised state.   Neurological: Positive for weakness. Negative for dizziness, tremors, seizures, syncope, facial asymmetry, speech difficulty, light-headedness, numbness and headaches.   Hematological: Negative for adenopathy. Does not bruise/bleed easily.   Psychiatric/Behavioral: Positive for dysphoric mood. Negative for agitation, behavioral problems, confusion, decreased concentration, hallucinations, self-injury, sleep disturbance and suicidal ideas. The patient is nervous/anxious. The patient is not hyperactive.        Objective:      Physical Exam   Constitutional: She is oriented to person, place, and time. She appears well-developed and well-nourished. She appears distressed.   HENT:   Head: Normocephalic and atraumatic.   Right Ear: External ear normal.   Left Ear: External ear normal.   Nose: Nose normal. Right sinus exhibits no maxillary sinus tenderness and no frontal sinus tenderness. Left sinus exhibits no maxillary sinus  tenderness and no frontal sinus tenderness.   Mouth/Throat: Oropharynx is clear and moist. No oropharyngeal exudate.   Eyes: Pupils are equal, round, and reactive to light. Conjunctivae, EOM and lids are normal. Right eye exhibits no discharge. Left eye exhibits no discharge. Right conjunctiva is not injected. Right conjunctiva has no hemorrhage. Left conjunctiva is not injected. Left conjunctiva has no hemorrhage. No scleral icterus.   Neck: Normal range of motion. Neck supple. No JVD present. No tracheal deviation present. No thyromegaly present.   Cardiovascular: Normal rate, regular rhythm and normal heart sounds.   Pulmonary/Chest: Effort normal and breath sounds normal. No stridor. No respiratory distress. She exhibits no tenderness.   Abdominal: Soft. Bowel sounds are normal. She exhibits no distension and no mass. There is no splenomegaly or hepatomegaly. There is no tenderness. There is no rebound.   Musculoskeletal: Normal range of motion. She exhibits no edema or tenderness.   Lymphadenopathy:     She has no cervical adenopathy.     She has no axillary adenopathy.        Right: No supraclavicular adenopathy present.        Left: No supraclavicular adenopathy present.   Neurological: She is alert and oriented to person, place, and time. No cranial nerve deficit. Coordination normal.   Skin: Skin is dry. No rash noted. She is not diaphoretic. No erythema.   Psychiatric: She has a normal mood and affect. Her behavior is normal. Judgment and thought content normal.   Vitals reviewed.          Assessment:      1. Iron deficiency anemia due to chronic blood loss    2. Pernicious anemia    3. Iron deficiency    4. Family history of leukemia           Plan:     Documented iron deficiency patient continues with B12 injection on a monthly basis.  At this point will treat with intravenous iron with documented iron deficiency intolerant of oral iron and response in previous to intravenous treatment.  Recommend  patient had EGD and colon to evaluate for GI blood loss.  Also would recommend that patient have methylmalonic acid level determine prior to next blood draw.  Her B12 level is relatively low which she has been taking monthly B12 injections.        Yan Sandoval Jr, MD FACP

## 2020-05-18 NOTE — LETTER
May 18, 2020      Zheng Orozco MD  [address] [place] [address]           [facility]  [address] [facility] [address]  Phone: [phone]  Fax: [phone]          Patient: Alma Mistry   MR Number: [id number]   YOB: 1960   Date of Visit: 5/18/2020       Dear Dr. Zheng Orozco:    Thank you for referring Alma Mistry to me for evaluation. Attached you will find relevant portions of my assessment and plan of care.    If you have questions, please do not hesitate to call me. I look forward to following Alma Mistry along with you.    Sincerely,    Yan Sandoval MD    Enclosure  CC:  No Recipients    If you would like to receive this communication electronically, please contact [email or web address] or [phone] to request more information on [employer] Link access.    For providers and/or their staff who would like to refer a patient to [facility], please contact us through our one-stop-shop provider referral line, [facility], at [phone].    If you feel you have received this communication in error or would no longer like to receive these types of communications, please e-mail [email or web address]

## 2020-05-19 ENCOUNTER — OFFICE VISIT (OUTPATIENT)
Dept: PSYCHIATRY | Facility: CLINIC | Age: 60
End: 2020-05-19
Payer: COMMERCIAL

## 2020-05-19 DIAGNOSIS — F41.0 PANIC ATTACKS: ICD-10-CM

## 2020-05-19 DIAGNOSIS — F41.0 GENERALIZED ANXIETY DISORDER WITH PANIC ATTACKS: ICD-10-CM

## 2020-05-19 DIAGNOSIS — F33.1 MODERATE EPISODE OF RECURRENT MAJOR DEPRESSIVE DISORDER: Primary | ICD-10-CM

## 2020-05-19 DIAGNOSIS — F41.1 GENERALIZED ANXIETY DISORDER WITH PANIC ATTACKS: ICD-10-CM

## 2020-05-19 PROCEDURE — 99214 OFFICE O/P EST MOD 30 MIN: CPT | Mod: S$GLB,,, | Performed by: PSYCHIATRY & NEUROLOGY

## 2020-05-19 PROCEDURE — 99214 PR OFFICE/OUTPT VISIT, EST, LEVL IV, 30-39 MIN: ICD-10-PCS | Mod: S$GLB,,, | Performed by: PSYCHIATRY & NEUROLOGY

## 2020-05-19 RX ORDER — DULOXETIN HYDROCHLORIDE 20 MG/1
20 CAPSULE, DELAYED RELEASE ORAL 2 TIMES DAILY
Qty: 60 CAPSULE | Refills: 11 | Status: SHIPPED | OUTPATIENT
Start: 2020-05-19 | End: 2020-07-23

## 2020-05-19 RX ORDER — LORAZEPAM 0.5 MG/1
0.5 TABLET ORAL EVERY 6 HOURS PRN
Qty: 20 TABLET | Refills: 3 | Status: SHIPPED | OUTPATIENT
Start: 2020-05-19 | End: 2021-06-14

## 2020-05-19 NOTE — PROGRESS NOTES
"ESTABLISHED OUTPATIENT VISIT   E/M LEVEL 4: [id number]    ENCOUNTER DATE: 2020  SITE: [facility] [place], [place].       HISTORY    CHIEF COMPLAINT   Alma Mistry is a 59 y.o. female who presents for followup of Depression and Anxiety    HPI   Has seen her primary and has lower levels of iron and ferritin  She has struggled with anemia   Has an endoscopy scheduled  Her symptoms of headaches and fatigue can be explained by her headaches  In retrospect she was starting to feel sleepy in February   She has been having to rely more on the ativan   She has been walking her dog three times a day and tried to go for a run, no energy   More irritability, and finds that she has problems   Still having some passive suicidal ideation in the from of "what is the point?" (no plan or intent)  She says that her protective factor is her daughter  Feels that she has more anger and is more irritable   Increased rumination, and increased worrying, but no panic attacks  No active suicidal ideation       ROS     Pertinent symptoms listed in HPI    PFSH  Past Medical History reviewed: Yes  Family History reviewed: Yes  Social History reviewed: Yes      Review of all of the above and there are no other changes    Neurological History:  Seizures:  DENIED  Head Trauma:  DENIED        Past Psychiatric History:   Has attended grief counseling and psychotherapy  One previous psychiatrist  One previous psych med but had si and no recollection of the medication : Currently tolerates Cymbalta and has been less reliant on ativan due to decreased frequency of panic attacks.      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   Financial: no difficulties    Relationship Status/Sexual Orientation: single, heterosexual    Children: one living daughter, one     Housing Status: lives alone  Yazdanism: Baptism, " currently not practicing  Recreational Activities: reading, currently not interested  Access to Gun: none     Substance Abuse History:   No substance abuse history        Allergies:   Review of patient's allergies indicates:   Allergen Reactions    Fluticasone         PSYCHOTROPIC MEDICATIONS   Current Outpatient Medications on File Prior to Visit   Medication Sig Dispense Refill    aspirin-acetaminophen-caffeine 250-250-65 mg (EXCEDRIN MIGRAINE) 250-250-65 mg per tablet Take 1 tablet by mouth as needed for Pain.      CALCIUM CIT/MGOX/VIT D3/B6/MIN (CITRACAL PLUS ORAL) Take 1 tablet by mouth as needed.       cetirizine (ZYRTEC) 10 MG tablet Take 1 tablet (10 mg total) by mouth once daily. 60 tablet 0    CHOLECALCIFEROL, VITAMIN D3, (VITAMIN D3 ORAL) Take 1,000 Units by mouth daily as needed.       clobetasol 0.05% (TEMOVATE) 0.05 % Oint Apply topically as needed.       cyanocobalamin 1,000 mcg/mL injection INJECT 1 ML INTRAMUSCULARLY EVERY 28 DAYS 10 mL 0    diclofenac sodium (VOLTAREN) 1 % Gel Apply 2 g topically 4 (four) times daily. 1 g 2    gabapentin (NEURONTIN) 100 MG capsule Take 3 capsules (300 mg total) by mouth every evening. 90 capsule 11    linaCLOtide (LINZESS) 145 mcg Cap capsule Take 1 capsule (145 mcg total) by mouth once daily. 30 capsule 11    magnesium 30 mg Tab Take 1 tablet by mouth once.      multivitamin (ONE DAILY MULTIVITAMIN) per tablet Take 1 tablet by mouth once daily.      naproxen (NAPROSYN) 500 MG tablet Take 1 tablet (500 mg total) by mouth 2 (two) times daily with meals. For pain and inflammation (Patient taking differently: Take 500 mg by mouth 2 (two) times daily as needed. For pain and inflammation) 30 tablet 0    triamcinolone (NASACORT) 55 mcg nasal inhaler 2 sprays by Nasal route once daily. 16.9 mL 1     No current facility-administered medications on file prior to visit.          EXAMINATION    [unfilled]  There were no vitals filed for this visit.  Wt Readings from  Last 2 Encounters:   06/04/20 58.6 kg (129 lb 3 oz)   05/18/20 59.5 kg (131 lb 2.8 oz)       CONSTITUTIONAL  General Appearance: well groomed    MUSCULOSKELETAL  No involuntary muscle movements  Normal gait    PSYCHIATRIC   Mental Status Exam:  Appearance: unremarkable, age appropriate  Behavior/Cooperation: appopriate normal, cooperative  Speech:  normal tone, normal rate, normal pitch, normal volume  Language: grossly intact with spontaneous speech  Mood: some tearfulness, grief,   Affect:  congruent with mood, tearful  Thought Process: linear, logical and goal oriented   Thought Content: no suicidality, no homicidality, no delusions, no paranoia  Sensorium:  Awake  Alert and Oriented: x3 person, place, situation  Memory:    Recent:  Intact; able to report recent events  Attention/concentration: appropriate for age/education.   Insight:Intact  Judgment:  Intact      MEDICAL DECISION MAKING    DIAGNOSES  Encounter Diagnoses   Name Primary?    Moderate episode of recurrent major depressive disorder Yes    Generalized anxiety disorder with panic attacks     Panic attacks          PROBLEM LIST AND MANAGEMENT PLANS   1) Cymbalta restart with the 40 mg daily  2) Continue ativan as needed for anxiety attacks, patient aware of the risk for chemical dependence. No indication of misuse of medication. Ativan .5 mg as needed            Orders Placed This Encounter    LORazepam (ATIVAN) 0.5 MG tablet    DULoxetine (CYMBALTA) 20 MG capsule         PRESCRIPTION DRUG MANAGEMENT  Compliance: yes  Side Effects: none  Regimen Adjustments: Continue Cymbalta 40 mg daily

## 2020-05-19 NOTE — PATIENT INSTRUCTIONS
Using Antidepressants  Depression is a mood disorder that affects the way you think and feel. The most common symptom is a feeling of deep sadness. This feeling does not go away or get better on its own. But most types of depression can be helped with therapy and antidepressant medicines. (Note: This covers antidepressant use in adults only.)    What do antidepressants do?  Antidepressants restore the balance of certain chemicals in your brain to help ease your depression. You will likely feel better in 4 to 6 weeks. But you may continue taking antidepressants for a year or more to keep your symptoms from coming back. Some people with depression need to take antidepressants for life. There are several types of antidepressants. The main types are described below.  Selective serotonin reuptake inhibitors (SSRIs)  SSRIs are the most effective medicines for the treatment of depression. They tend to have fewer side effects than other antidepressants. Possible side effects include anxiety, trouble sleeping, nausea, diarrhea, sexual dysfunction, and headaches. In rare cases, they may make you more depressed. SSRIs shouldnt be mixed with certain other medicines. Talk with your healthcare provider about all the medicines, herbs, and supplements you are taking.  Tricyclic antidepressants  Tricyclics help severe or long-term depression. They have been used for many years with good results. Possible side effects include blurred vision, dry mouth, and constipation.  Monoamine oxidase inhibitors (MAOIs)  If you dont respond to tricyclics or SSRIs, your healthcare provider may prescribe MAOIs. These medicines can be very effective. But people taking MAOIs must stay away from certain foods and medicines. Your healthcare provider can tell you more.  Lithium  If you have bipolar disorder, you may take a medicine called lithium. This medicine helps even out your mood. Possible side effects are weight gain, trembling, loose stool,  and nausea. Lithium is also used:  · For people who have unipolar depression and have not responded to other antidepressants  · For people who have a sudden (acute) episode of unipolar depression  · As a maintenance medicine to prevent unipolar depression from happening again  Things to avoid if you are taking MAOIs  If you are taking MAOIs, dont take any of the following:  · Beans  · Aged cheese  · Chocolate  · Red wine  · Most cold medicines  · Certain medicines (ask your healthcare provider)   To reduce the risk of lithium poisoning  You can reduce the risk of lithium poisoning by following this advice:  · Take only the prescribed amount of lithium. If your depressive symptoms get worse, contact your healthcare provider. Never increase your medicine on your own.  · Drink plenty of fluids other than coffee, tea, and soda.  · Limit salt in your diet.  · Before using other prescribed medicines or over-the-counter medicines, check with your pharmacist. This is to be sure the medicines wont interact with the lithium.  · Never share your medicines or use another person's medicines, even if it is the same medicine and dose.   If you have side effects  The side effects of antidepressants are usually mild. But if you have troubling side effects, call your healthcare provider. Changing the dosage or type of medicine may help. Never stop taking medicines on your own.  Date Last Reviewed: 5/1/2017  © 6353-2146 [employer]. [address] [place] [address], [place] [address]. All rights reserved. This information is not intended as a substitute for professional medical care. Always follow your healthcare professional's instructions.

## 2020-05-21 ENCOUNTER — PATIENT MESSAGE (OUTPATIENT)
Dept: GASTROENTEROLOGY | Facility: CLINIC | Age: 60
End: 2020-05-21

## 2020-05-22 ENCOUNTER — PATIENT MESSAGE (OUTPATIENT)
Dept: GASTROENTEROLOGY | Facility: CLINIC | Age: 60
End: 2020-05-22

## 2020-05-22 ENCOUNTER — TELEPHONE (OUTPATIENT)
Dept: GASTROENTEROLOGY | Facility: CLINIC | Age: 60
End: 2020-05-22

## 2020-05-22 DIAGNOSIS — E61.1 IRON DEFICIENCY: ICD-10-CM

## 2020-05-22 DIAGNOSIS — K29.40 ATROPHIC GASTRITIS WITHOUT HEMORRHAGE: Primary | ICD-10-CM

## 2020-05-23 ENCOUNTER — PATIENT MESSAGE (OUTPATIENT)
Dept: ENDOSCOPY | Facility: HOSPITAL | Age: 60
End: 2020-05-23

## 2020-05-26 ENCOUNTER — INFUSION (OUTPATIENT)
Dept: INFUSION THERAPY | Facility: HOSPITAL | Age: 60
End: 2020-05-26
Attending: INTERNAL MEDICINE
Payer: COMMERCIAL

## 2020-05-26 VITALS
HEART RATE: 58 BPM | TEMPERATURE: 98 F | DIASTOLIC BLOOD PRESSURE: 73 MMHG | RESPIRATION RATE: 17 BRPM | OXYGEN SATURATION: 98 % | SYSTOLIC BLOOD PRESSURE: 108 MMHG

## 2020-05-26 DIAGNOSIS — D50.0 IRON DEFICIENCY ANEMIA DUE TO CHRONIC BLOOD LOSS: Primary | ICD-10-CM

## 2020-05-26 PROCEDURE — 63600175 PHARM REV CODE 636 W HCPCS: Mod: JG | Performed by: INTERNAL MEDICINE

## 2020-05-26 PROCEDURE — 96365 THER/PROPH/DIAG IV INF INIT: CPT

## 2020-05-26 PROCEDURE — 25000003 PHARM REV CODE 250: Performed by: INTERNAL MEDICINE

## 2020-05-26 RX ADMIN — FERRIC CARBOXYMALTOSE INJECTION 750 MG: 50 INJECTION, SOLUTION INTRAVENOUS at 01:05

## 2020-05-26 NOTE — DISCHARGE INSTRUCTIONS
[facility]  [address] [address]  [address]  [phone] phone     [phone] fax  Hours of Operation: Monday- Friday 8:00am- 5:00pm  After hours phone  [phone]  Hematology / Oncology Physicians on call      JANICE Anguiano Dr., Dr., Dr., Dr., NP Cheree Bodden, NP Sydney Prescott, NP      Please call with any concerns regarding your appointment today.

## 2020-06-02 ENCOUNTER — LAB VISIT (OUTPATIENT)
Dept: OTOLARYNGOLOGY | Facility: CLINIC | Age: 60
End: 2020-06-02
Payer: COMMERCIAL

## 2020-06-02 ENCOUNTER — INFUSION (OUTPATIENT)
Dept: INFUSION THERAPY | Facility: HOSPITAL | Age: 60
End: 2020-06-02
Attending: INTERNAL MEDICINE
Payer: COMMERCIAL

## 2020-06-02 VITALS
HEART RATE: 58 BPM | OXYGEN SATURATION: 100 % | RESPIRATION RATE: 18 BRPM | SYSTOLIC BLOOD PRESSURE: 95 MMHG | DIASTOLIC BLOOD PRESSURE: 63 MMHG | TEMPERATURE: 98 F

## 2020-06-02 DIAGNOSIS — E61.1 IRON DEFICIENCY: ICD-10-CM

## 2020-06-02 DIAGNOSIS — D50.0 IRON DEFICIENCY ANEMIA DUE TO CHRONIC BLOOD LOSS: Primary | ICD-10-CM

## 2020-06-02 DIAGNOSIS — U07.1 COVID-19: Primary | ICD-10-CM

## 2020-06-02 DIAGNOSIS — K29.40 ATROPHIC GASTRITIS WITHOUT HEMORRHAGE: ICD-10-CM

## 2020-06-02 PROCEDURE — 25000003 PHARM REV CODE 250: Performed by: INTERNAL MEDICINE

## 2020-06-02 PROCEDURE — U0003 INFECTIOUS AGENT DETECTION BY NUCLEIC ACID (DNA OR RNA); SEVERE ACUTE RESPIRATORY SYNDROME CORONAVIRUS 2 (SARS-COV-2) (CORONAVIRUS DISEASE [COVID-19]), AMPLIFIED PROBE TECHNIQUE, MAKING USE OF HIGH THROUGHPUT TECHNOLOGIES AS DESCRIBED BY CMS-2020-01-R: HCPCS

## 2020-06-02 PROCEDURE — 96365 THER/PROPH/DIAG IV INF INIT: CPT

## 2020-06-02 PROCEDURE — 63600175 PHARM REV CODE 636 W HCPCS: Mod: JG | Performed by: INTERNAL MEDICINE

## 2020-06-02 PROCEDURE — 63600175 PHARM REV CODE 636 W HCPCS

## 2020-06-02 RX ADMIN — FERRIC CARBOXYMALTOSE INJECTION 750 MG: 50 INJECTION, SOLUTION INTRAVENOUS at 01:06

## 2020-06-02 NOTE — DISCHARGE INSTRUCTIONS
.[facility]  [address] [facility]  [address]  [phone] phone     [phone] fax  Hours of Operation: Monday- Friday 8:00am- 5:00pm  After hours phone  [phone]  Hematology / Oncology Physicians on call      Dr. Jaime Lamas, JANICE Zimmerman NP Tyesha Taylor, NP    Please call with any concerns regarding your appointment today.  .FALL PREVENTION   Falls often occur due to slipping, tripping or losing your balance. Here are ways to reduce your risk of falling again.   Was there anything that caused your fall that can be fixed, removed or replaced?   Make your home safe by keeping walkways clear of objects you may trip over.   Use non-slip pads under rugs.   Do not walk in poorly lit areas.   Do not stand on chairs or wobbly ladders.   Use caution when reaching overhead or looking upward. This position can cause a loss of balance.   Be sure your shoes fit properly, have non-slip bottoms and are in good condition.   Be cautious when going up and down stairs, curbs, and when walking on uneven sidewalks.   If your balance is poor, consider using a cane or walker.   If your fall was related to alcohol use, stop or limit alcohol intake.   If your fall was related to use of sleeping medicines, talk to your doctor about this. You may need to reduce your dosage at bedtime if you awaken during the night to go to the bathroom.   To reduce the need for nighttime bathroom trips:   Avoid drinking fluids for several hours before going to bed   Empty your bladder before going to bed   Men can keep a urinal at the bedside   © 1205-9533 [facility], [address] [place] [address], [place] [address]. All rights reserved. This information is not intended as a substitute for professional medical care. Always follow your healthcare professional's instructions.

## 2020-06-03 ENCOUNTER — ANESTHESIA EVENT (OUTPATIENT)
Dept: ENDOSCOPY | Facility: HOSPITAL | Age: 60
End: 2020-06-03
Payer: COMMERCIAL

## 2020-06-04 ENCOUNTER — HOSPITAL ENCOUNTER (OUTPATIENT)
Facility: HOSPITAL | Age: 60
Discharge: HOME | End: 2020-06-04
Attending: INTERNAL MEDICINE | Admitting: INTERNAL MEDICINE
Payer: COMMERCIAL

## 2020-06-04 ENCOUNTER — ANESTHESIA (OUTPATIENT)
Dept: ENDOSCOPY | Facility: HOSPITAL | Age: 60
End: 2020-06-04
Payer: COMMERCIAL

## 2020-06-04 VITALS
HEIGHT: 60 IN | HEART RATE: 72 BPM | WEIGHT: 129.19 LBS | OXYGEN SATURATION: 100 % | TEMPERATURE: 98 F | BODY MASS INDEX: 25.36 KG/M2 | RESPIRATION RATE: 18 BRPM | DIASTOLIC BLOOD PRESSURE: 59 MMHG | SYSTOLIC BLOOD PRESSURE: 111 MMHG

## 2020-06-04 DIAGNOSIS — D50.0 IRON DEFICIENCY ANEMIA DUE TO CHRONIC BLOOD LOSS: Primary | ICD-10-CM

## 2020-06-04 LAB — SARS-COV-2 RNA RESP QL NAA+PROBE: NOT DETECTED

## 2020-06-04 PROCEDURE — 88342 IMHCHEM/IMCYTCHM 1ST ANTB: CPT | Performed by: PATHOLOGY

## 2020-06-04 PROCEDURE — 37000008 HC ANESTHESIA 1ST 15 MINUTES: Performed by: INTERNAL MEDICINE

## 2020-06-04 PROCEDURE — D9220A PRA ANESTHESIA: ICD-10-PCS | Mod: CRNA,,, | Performed by: NURSE ANESTHETIST, CERTIFIED REGISTERED

## 2020-06-04 PROCEDURE — 45378 DIAGNOSTIC COLONOSCOPY: CPT | Mod: ,,, | Performed by: INTERNAL MEDICINE

## 2020-06-04 PROCEDURE — 88305 TISSUE EXAM BY PATHOLOGIST: ICD-10-PCS | Mod: 26,,, | Performed by: PATHOLOGY

## 2020-06-04 PROCEDURE — 37000009 HC ANESTHESIA EA ADD 15 MINS: Performed by: INTERNAL MEDICINE

## 2020-06-04 PROCEDURE — D9220A PRA ANESTHESIA: Mod: CRNA,,, | Performed by: NURSE ANESTHETIST, CERTIFIED REGISTERED

## 2020-06-04 PROCEDURE — 27201012 HC FORCEPS, HOT/COLD, DISP: Performed by: INTERNAL MEDICINE

## 2020-06-04 PROCEDURE — 88305 TISSUE EXAM BY PATHOLOGIST: CPT | Mod: 59 | Performed by: PATHOLOGY

## 2020-06-04 PROCEDURE — D9220A PRA ANESTHESIA: Mod: ANES,,, | Performed by: ANESTHESIOLOGY

## 2020-06-04 PROCEDURE — 88305 TISSUE EXAM BY PATHOLOGIST: CPT | Mod: 26,,, | Performed by: PATHOLOGY

## 2020-06-04 PROCEDURE — 45378 DIAGNOSTIC COLONOSCOPY: CPT | Performed by: INTERNAL MEDICINE

## 2020-06-04 PROCEDURE — 88342 IMHCHEM/IMCYTCHM 1ST ANTB: CPT | Mod: 26,,, | Performed by: PATHOLOGY

## 2020-06-04 PROCEDURE — 88342 CHG IMMUNOCYTOCHEMISTRY: ICD-10-PCS | Mod: 26,,, | Performed by: PATHOLOGY

## 2020-06-04 PROCEDURE — 45378 PR COLONOSCOPY,DIAGNOSTIC: ICD-10-PCS | Mod: ,,, | Performed by: INTERNAL MEDICINE

## 2020-06-04 PROCEDURE — 43239 PR EGD, FLEX, W/BIOPSY, SGL/MULTI: ICD-10-PCS | Mod: 51,,, | Performed by: INTERNAL MEDICINE

## 2020-06-04 PROCEDURE — 43239 EGD BIOPSY SINGLE/MULTIPLE: CPT | Mod: 51,,, | Performed by: INTERNAL MEDICINE

## 2020-06-04 PROCEDURE — 63600175 PHARM REV CODE 636 W HCPCS: Performed by: NURSE ANESTHETIST, CERTIFIED REGISTERED

## 2020-06-04 PROCEDURE — D9220A PRA ANESTHESIA: ICD-10-PCS | Mod: ANES,,, | Performed by: ANESTHESIOLOGY

## 2020-06-04 PROCEDURE — 43239 EGD BIOPSY SINGLE/MULTIPLE: CPT | Performed by: INTERNAL MEDICINE

## 2020-06-04 RX ORDER — SODIUM CHLORIDE 0.9 % (FLUSH) 0.9 %
10 SYRINGE (ML) INJECTION
Status: DISCONTINUED | OUTPATIENT
Start: 2020-06-04 | End: 2020-06-04 | Stop reason: HOSPADM

## 2020-06-04 RX ORDER — FENTANYL CITRATE 50 UG/ML
INJECTION, SOLUTION INTRAMUSCULAR; INTRAVENOUS
Status: DISCONTINUED | OUTPATIENT
Start: 2020-06-04 | End: 2020-06-04

## 2020-06-04 RX ORDER — ONDANSETRON 2 MG/ML
4 INJECTION INTRAMUSCULAR; INTRAVENOUS DAILY PRN
Status: DISCONTINUED | OUTPATIENT
Start: 2020-06-04 | End: 2020-06-04 | Stop reason: HOSPADM

## 2020-06-04 RX ORDER — PROPOFOL 10 MG/ML
INJECTION, EMULSION INTRAVENOUS
Status: DISCONTINUED | OUTPATIENT
Start: 2020-06-04 | End: 2020-06-04

## 2020-06-04 RX ORDER — LIDOCAINE HCL/PF 100 MG/5ML
SYRINGE (ML) INTRAVENOUS
Status: DISCONTINUED | OUTPATIENT
Start: 2020-06-04 | End: 2020-06-04

## 2020-06-04 RX ORDER — SODIUM CHLORIDE, SODIUM LACTATE, POTASSIUM CHLORIDE, CALCIUM CHLORIDE 600; 310; 30; 20 MG/100ML; MG/100ML; MG/100ML; MG/100ML
INJECTION, SOLUTION INTRAVENOUS CONTINUOUS PRN
Status: DISCONTINUED | OUTPATIENT
Start: 2020-06-04 | End: 2020-06-04

## 2020-06-04 RX ADMIN — FENTANYL CITRATE 50 MCG: 50 INJECTION, SOLUTION INTRAMUSCULAR; INTRAVENOUS at 09:06

## 2020-06-04 RX ADMIN — PROPOFOL 50 MG: 10 INJECTION, EMULSION INTRAVENOUS at 09:06

## 2020-06-04 RX ADMIN — PROPOFOL 100 MG: 10 INJECTION, EMULSION INTRAVENOUS at 09:06

## 2020-06-04 RX ADMIN — SODIUM CHLORIDE, SODIUM LACTATE, POTASSIUM CHLORIDE, AND CALCIUM CHLORIDE: 600; 310; 30; 20 INJECTION, SOLUTION INTRAVENOUS at 09:06

## 2020-06-04 RX ADMIN — Medication 50 MG: at 09:06

## 2020-06-04 NOTE — PROVATION PATIENT INSTRUCTIONS
Discharge Summary/Instructions after an Endoscopic Procedure  Patient Name: Alma Mistry  Patient MRN: [id number]  Patient YOB: 1960 Thursday, June 4, 2020  Almas Perez MD  RESTRICTIONS:  During your procedure today, you received medications for sedation.  These   medications may affect your judgment, balance and coordination.  Therefore,   for 24 hours, you have the following restrictions:   - DO NOT drive a car, operate machinery, make legal/financial decisions,   sign important papers or drink alcohol.    ACTIVITY:  Today: no heavy lifting, straining or running due to procedural   sedation/anesthesia.  The following day: return to full activity including work.  DIET:  Eat and drink normally unless instructed otherwise.     TREATMENT FOR COMMON SIDE EFFECTS:  - Mild abdominal pain, nausea, belching, bloating or excessive gas:  rest,   eat lightly and use a heating pad.  - Sore Throat: treat with throat lozenges and/or gargle with warm salt   water.  - Because air was used during the procedure, expelling large amounts of air   from your rectum or belching is normal.  - If a bowel prep was taken, you may not have a bowel movement for 1-3 days.    This is normal.  SYMPTOMS TO WATCH FOR AND REPORT TO YOUR PHYSICIAN:  1. Abdominal pain or bloating, other than gas cramps.  2. Chest pain.  3. Back pain.  4. Signs of infection such as: chills or fever occurring within 24 hours   after the procedure.  5. Rectal bleeding, which would show as bright red, maroon, or black stools.   (A tablespoon of blood from the rectum is not serious, especially if   hemorrhoids are present.)  6. Vomiting.  7. Weakness or dizziness.  GO DIRECTLY TO THE NEAREST EMERGENCY ROOM IF YOU HAVE ANY OF THE FOLLOWING:      Difficulty breathing              Chills and/or fever over 101 F   Persistent vomiting and/or vomiting blood   Severe abdominal pain   Severe chest pain   Black, tarry stools   Bleeding- more than one  tablespoon   Any other symptom or condition that you feel may need urgent attention  Your doctor recommends these additional instructions:  If any biopsies were taken, your doctors clinic will contact you in 1 to 2   weeks with any results.  - Await pathology results.   - Discharge patient to home.   - Resume previous diet.   - Continue present medications.   - Repeat upper endoscopy in 2 years for surveillance.  For questions, problems or results please call your physician Almas Perez MD at Work:  [phone]  If you have any questions about the above instructions, call the GI   department at [phone] or call the endoscopy unit at [phone]   from 7am until 3 pm.  [facility] [place], EMERGENCY ROOM PHONE NUMBER:   [phone]  IF A COMPLICATION OR EMERGENCY SITUATION ARISES AND YOU ARE UNABLE TO REACH   YOUR PHYSICIAN - GO DIRECTLY TO THE EMERGENCY ROOM.  I have read or have had read to me these discharge instructions for my   procedure and have received a written copy.  I understand these   instructions and will follow-up with my physician if I have any questions.     __________________________________       _____________________________________  Nurse Signature                                          Patient/Designated   Responsible Party Signature  MD Almas Garcia MD  6/4/2020 9:58:32 AM  This report has been verified and signed electronically.  [employer]ATION

## 2020-06-04 NOTE — DISCHARGE INSTRUCTIONS
Diverticulosis    Diverticulosis means that small pouches have formed in the wall of your large intestine (colon). Most often, this problem causes no symptoms and is common as people age. But the pouches in the colon are at risk of becoming infected. When this happens, the condition is called diverticulitis. Although most people with diverticulosis never develop diverticulitis, it is still not uncommon. Rectal bleeding can also occur and in less common situations, a type of colon inflammation called colitis.  While most people do not have symptoms, some people with diverticulosis may have:  · Abdominal cramps and pain  · Bloating  · Constipation  · Change in bowel habits  Causes  The exact cause of diverticulosis (and diverticulitis) has not been proved, but a few things are associated with the condition:  · Low-fiber diet  · Constipation  · Lack of exercise  Your healthcare provider will talk with you about how to manage your condition. Diet changes may be all that are needed to help control diverticulosis and prevent progression to diverticulitis. If you develop diverticulitis, you will likely need other treatments.  Home care  You may be told to take fiber supplements daily. Fiber adds bulk to the stool so that it passes through the colon more easily. Stool softeners may be recommended. You may also be given medications for pain relief. Be sure to take all medications as directed.  In the past, people were told to avoid corn, nuts, and seeds. This is no longer necessary.  Follow these guidelines when caring for yourself at home:  · Eat unprocessed foods that are high in fiber. Whole grains, fruits, and vegetables are good choices.  · Drink 6 to 8 glasses of water every day unless your healthcare provider has you limit how much fluid you should have.  · Watch for changes in your bowel movements. Tell your provider if you notice any changes.  · Begin an exercise program. Ask your provider how to get started.  Generally, walking is the best.  · Get plenty of rest and sleep.  Follow-up care  Follow up with your healthcare provider, or as advised. Regular visits may be needed to check on your health. Sometimes special procedures such as colonoscopy, are needed after an episode of diverticulitis or blooding. Be sure to keep all your appointments.  If a stool sample was taken, or cultures were done, you should be told if they are positive, or if your treatment needs to be changed. You can call as directed for the results.  If X-rays were done, a radiologist will look at them. You will be told if there is a change in your treatment.  If antibiotics were prescribed, be sure to finish them all.  When to seek medical advice  Call your healthcare provider right away if any of these occur:  · Fever of 100.4°F (38°C) or higher, or as directed by your healthcare provider  · Severe cramps in the lower left side of the abdomen or pain that is getting worse  · Tenderness in the lower left side of the abdomen or worsening pain throughout the abdomen  · Diarrhea or constipation that doesn't get better within 24 hours  · Nausea and vomiting  · Bleeding from the rectum  Call 911  Call emergency services if any of the following occur:  · Trouble breathing  · Confusion  · Very drowsy or trouble awakening  · Fainting or loss of consciousness  · Rapid heart rate  · Chest pain  Date Last Reviewed: 12/30/2015 © 2000-2017 [employer]. [address] [place] [address]. All rights reserved. This information is not intended as a substitute for professional medical care. Always follow your healthcare professional's instructions.        Gastritis (Adult)    Gastritis is inflammation and irritation of the stomach lining. It can be present for a short time (acute) or be long lasting (chronic). Gastritis is often caused by infection with bacteria called H pylori. More than a third of people in the [place] have this bacteria in their  bodies. In many cases, H pylori causes no problems or symptoms. In some people, though, the infection irritates the stomach lining and causes gastritis. Other causes of stomach irritation include drinking alcohol or taking pain-relieving medicines called NSAIDs (such as aspirin or ibuprofen).   Symptoms of gastritis can include:  · Abdominal pain or bloating  · Loss of appetite  · Nausea or vomiting  · Vomiting blood or having black stools  · Feeling more tired than usual  An inflamed and irritated stomach lining is more likely to develop a sore called an ulcer. To help prevent this, gastritis should be treated.  Home care  If needed, medicines may be prescribed. If you have H pylori infection, treating it will likely relieve your symptoms. Other changes can help reduce stomach irritation and help it heal.  · If you have been prescribed medicines for H pylori infection, take them as directed. Take all of the medicine until it is finished or your healthcare provider tells you to stop, even if you feel better.  · Your healthcare provider may recommend avoiding NSAIDs. If you take daily aspirin for your heart or other medical reasons, do not stop without talking to your healthcare provider first.  · Avoid drinking alcohol.  · Stop smoking. Smoking can irritate the stomach and delay healing. As much as possible, stay away from second hand smoke.  Follow-up care  Follow up with your healthcare provider, or as advised by our staff. Testing may be needed to check for inflammation or an ulcer.  When to seek medical advice  Call your healthcare provider for any of the following:  · Stomach pain that gets worse or moves to the lower right abdomen (appendix area)  · Chest pain that appears or gets worse, or spreads to the back, neck, shoulder, or arm  · Frequent vomiting (cant keep down liquids)  · Blood in the stool or vomit (red or black in color)  · Feeling weak or dizzy  · Fever of 100.4ºF (38ºC) or higher, or as directed  by your healthcare provider  Date Last Reviewed: 6/22/2015  © 4188-5634 [employer]. [address] [place] [address], [place] [address]. All rights reserved. This information is not intended as a substitute for professional medical care. Always follow your healthcare professional's instructions.

## 2020-06-04 NOTE — H&P
PRE PROCEDURE H&P    Patient Name: Alma Mistry  MRN: [id number]  : 1960  Date of Procedure:  2020  Referring Physician: Yan Sandoval MD  Primary Physician: Zheng Orozco MD  Procedure Physician: Almas Perez MD       Planned Procedure: Colonoscopy and EGD  Diagnosis: atrophic gastritis, anemia  Chief Complaint: Same as above    HPI: Patient is an 59 y.o. female is here for the above.     Last colonoscopy: 10 years ago  Past Medical History:   Past Medical History:   Diagnosis Date    Atrophic gastritis     B12 deficiency     Breast lump on left side at 3 o'clock position 2015    Fibrocystic breast     Lichen sclerosus 2012        Past Surgical History:  Past Surgical History:   Procedure Laterality Date    BREAST BIOPSY Right 2009    benign    COLONOSCOPY  09/10/2010    ESOPHAGOGASTRODUODENOSCOPY  09/10/2010    HYSTERECTOMY  2013    TONSILLECTOMY          Home Medications:  Prior to Admission medications    Medication Sig Start Date End Date Taking? Authorizing Provider   aspirin-acetaminophen-caffeine 250-250-65 mg (EXCEDRIN MIGRAINE) 250-250-65 mg per tablet Take 1 tablet by mouth as needed for Pain.   Yes Historical Provider, MD   CALCIUM CIT/MGOX/VIT D3/B6/MIN (CITRACAL PLUS ORAL) Take 1 tablet by mouth as needed.    Yes Historical Provider, MD   cetirizine (ZYRTEC) 10 MG tablet Take 1 tablet (10 mg total) by mouth once daily. 10/30/19 10/29/20 Yes Tra Renee MD   CHOLECALCIFEROL, VITAMIN D3, (VITAMIN D3 ORAL) Take 1,000 Units by mouth daily as needed.    Yes Historical Provider, MD   cyanocobalamin 1,000 mcg/mL injection INJECT 1 ML INTRAMUSCULARLY EVERY 28 DAYS 20  Yes Zheng Orozco MD   DULoxetine (CYMBALTA) 20 MG capsule Take 1 capsule (20 mg total) by mouth 2 (two) times daily. 20 Yes Sima Weinberg MD   gabapentin (NEURONTIN) 100 MG capsule Take 3 capsules (300 mg total) by mouth every evening. 19 Yes Lakhwinder Mtz MD    LORazepam (ATIVAN) 0.5 MG tablet Take 1 tablet (0.5 mg total) by mouth every 6 (six) hours as needed for Anxiety. 5/19/20 6/18/20 Yes Sima Weinberg MD   magnesium 30 mg Tab Take 1 tablet by mouth once.   Yes Historical Provider, MD   multivitamin (ONE DAILY MULTIVITAMIN) per tablet Take 1 tablet by mouth once daily.   Yes Historical Provider, MD   naproxen (NAPROSYN) 500 MG tablet Take 1 tablet (500 mg total) by mouth 2 (two) times daily with meals. For pain and inflammation  Patient taking differently: Take 500 mg by mouth 2 (two) times daily as needed. For pain and inflammation 9/17/18  Yes Zheng Orozco MD   triamcinolone (NASACORT) 55 mcg nasal inhaler 2 sprays by Nasal route once daily. 4/23/20  Yes Fernanda Spears NP   clobetasol 0.05% (TEMOVATE) 0.05 % Oint Apply topically as needed.     Historical Provider, MD   diclofenac sodium (VOLTAREN) 1 % Gel Apply 2 g topically 4 (four) times daily. 11/22/19 5/7/20  Lang Hernández MD   linaCLOtide (LINZESS) 145 mcg Cap capsule Take 1 capsule (145 mcg total) by mouth once daily. 5/15/20   Kelin German NP        Allergies:  Review of patient's allergies indicates:   Allergen Reactions    Fluticasone         Social History:   Social History     Socioeconomic History    Marital status:      Spouse name: Not on file    Number of children: Not on file    Years of education: Not on file    Highest education level: Not on file   Occupational History    Not on file   Social Needs    Financial resource strain: Not hard at all    Food insecurity:     Worry: Never true     Inability: Never true    Transportation needs:     Medical: No     Non-medical: No   Tobacco Use    Smoking status: Never Smoker    Smokeless tobacco: Never Used   Substance and Sexual Activity    Alcohol use: Not Currently     Frequency: 2-4 times a month     Drinks per session: Patient refused     Binge frequency: Never    Drug use: No    Sexual activity: Never    Lifestyle    Physical activity:     Days per week: 5 days     Minutes per session: 60 min    Stress: Rather much   Relationships    Social connections:     Talks on phone: More than three times a week     Gets together: Twice a week     Attends Congregational service: Not on file     Active member of club or organization: Yes     Attends meetings of clubs or organizations: More than 4 times per year     Relationship status:    Other Topics Concern    Not on file   Social History Narrative    1 dog, no smokers; Originally from [facility].       Family History:  Family History   Problem Relation Age of Onset    Depression Mother     Nephritis Mother     Hyperthyroidism Mother     Hypotension Mother     Anemia Mother     Asthma Father     Thyroid disease Sister     Depression Sister     Leukemia Brother     Cirrhosis Brother     Multiple sclerosis Brother        ROS: No acute cardiac events, no acute respiratory complaints.     Physical Exam (all patients):    /62 (BP Location: Right arm, Patient Position: Sitting)   Pulse 63   Temp 99.1 °F (37.3 °C) (Temporal)   Resp 18   Ht 5' (1.524 m)   Wt 58.6 kg (129 lb 3 oz)   SpO2 99%   Breastfeeding? No   BMI 25.23 kg/m²   Lungs: Clear to auscultation bilaterally, respirations unlabored  Heart: Regular rate and rhythm, S1 and S2 normal, no obvious murmurs  Abdomen:         Soft, non-tender, bowel sounds normal, no masses, no organomegaly    Lab Results   Component Value Date    WBC 6.19 05/14/2020    MCV 85 05/14/2020    RDW 15.1 (H) 05/14/2020     05/14/2020    GLU 73 04/11/2019    HGBA1C 4.6 09/21/2017    BUN 11 04/11/2019     04/11/2019    K 4.6 04/11/2019     04/11/2019        SEDATION PLAN: per anesthesia      History reviewed, vital signs satisfactory, cardiopulmonary status satisfactory, sedation options, risks and plans have been discussed with the patient  All their questions were answered and the patient agrees to  the sedation procedures as planned and the patient is deemed an appropriate candidate for the sedation as planned.    Procedure explained to patient, informed consent obtained and placed in chart.    Almas Perez  6/4/2020  8:33 AM

## 2020-06-04 NOTE — PROVATION PATIENT INSTRUCTIONS
Discharge Summary/Instructions after an Endoscopic Procedure  Patient Name: Alma Mistry  Patient MRN: [id number]  Patient YOB: 1960 Thursday, June 4, 2020  Almas Perez MD  RESTRICTIONS:  During your procedure today, you received medications for sedation.  These   medications may affect your judgment, balance and coordination.  Therefore,   for 24 hours, you have the following restrictions:   - DO NOT drive a car, operate machinery, make legal/financial decisions,   sign important papers or drink alcohol.    ACTIVITY:  Today: no heavy lifting, straining or running due to procedural   sedation/anesthesia.  The following day: return to full activity including work.  DIET:  Eat and drink normally unless instructed otherwise.     TREATMENT FOR COMMON SIDE EFFECTS:  - Mild abdominal pain, nausea, belching, bloating or excessive gas:  rest,   eat lightly and use a heating pad.  - Sore Throat: treat with throat lozenges and/or gargle with warm salt   water.  - Because air was used during the procedure, expelling large amounts of air   from your rectum or belching is normal.  - If a bowel prep was taken, you may not have a bowel movement for 1-3 days.    This is normal.  SYMPTOMS TO WATCH FOR AND REPORT TO YOUR PHYSICIAN:  1. Abdominal pain or bloating, other than gas cramps.  2. Chest pain.  3. Back pain.  4. Signs of infection such as: chills or fever occurring within 24 hours   after the procedure.  5. Rectal bleeding, which would show as bright red, maroon, or black stools.   (A tablespoon of blood from the rectum is not serious, especially if   hemorrhoids are present.)  6. Vomiting.  7. Weakness or dizziness.  GO DIRECTLY TO THE NEAREST EMERGENCY ROOM IF YOU HAVE ANY OF THE FOLLOWING:      Difficulty breathing              Chills and/or fever over 101 F   Persistent vomiting and/or vomiting blood   Severe abdominal pain   Severe chest pain   Black, tarry stools   Bleeding- more than one  tablespoon   Any other symptom or condition that you feel may need urgent attention  Your doctor recommends these additional instructions:  If any biopsies were taken, your doctors clinic will contact you in 1 to 2   weeks with any results.  - Discharge patient to home.   - Resume previous diet.   - Continue present medications.   - Repeat colonoscopy in 10 years for screening purposes.  For questions, problems or results please call your physician Almas Perez MD at Work:  [phone]  If you have any questions about the above instructions, call the GI   department at [phone] or call the endoscopy unit at [phone]   from 7am until 3 pm.  [facility] [place] [facility], EMERGENCY ROOM PHONE NUMBER:   [phone]  IF A COMPLICATION OR EMERGENCY SITUATION ARISES AND YOU ARE UNABLE TO REACH   YOUR PHYSICIAN - GO DIRECTLY TO THE EMERGENCY ROOM.  I have read or have had read to me these discharge instructions for my   procedure and have received a written copy.  I understand these   instructions and will follow-up with my physician if I have any questions.     __________________________________       _____________________________________  Nurse Signature                                          Patient/Designated   Responsible Party Signature  MD Almas Garcia MD  6/4/2020 10:01:58 AM  This report has been verified and signed electronically.  [employer]

## 2020-06-04 NOTE — TRANSFER OF CARE
Anesthesia Transfer of Care Note    Patient: Alma Mistry    Procedure(s) Performed: Procedure(s) (LRB):  ESOPHAGOGASTRODUODENOSCOPY (EGD) (N/A)  COLONOSCOPY (N/A)    Patient location: PACU    Anesthesia Type: MAC    Transport from OR: Transported from OR on room air with adequate spontaneous ventilation    Post pain: adequate analgesia    Post assessment: no apparent anesthetic complications and tolerated procedure well    Post vital signs: stable    Level of consciousness: sedated    Nausea/Vomiting: no nausea/vomiting    Complications: none    Transfer of care protocol was followed      Last vitals:   Visit Vitals  /62 (BP Location: Right arm, Patient Position: Sitting)   Pulse 63   Temp 37.3 °C (99.1 °F) (Temporal)   Resp 18   Ht 5' (1.524 m)   Wt 58.6 kg (129 lb 3 oz)   SpO2 99%   Breastfeeding? No   BMI 25.23 kg/m²

## 2020-06-04 NOTE — ANESTHESIA POSTPROCEDURE EVALUATION
Anesthesia Post Evaluation    Patient: Alma Mistry    Procedure(s) Performed: Procedure(s) (LRB):  ESOPHAGOGASTRODUODENOSCOPY (EGD) (N/A)  COLONOSCOPY (N/A)    Final Anesthesia Type: general    Patient location during evaluation: PACU  Patient participation: Yes- Able to Participate  Level of consciousness: awake and alert and oriented  Post-procedure vital signs: reviewed and stable  Pain management: adequate  Airway patency: patent    PONV status at discharge: No PONV  Anesthetic complications: no      Cardiovascular status: hemodynamically stable  Respiratory status: unassisted  Hydration status: euvolemic  Follow-up not needed.          Vitals Value Taken Time   /62 6/4/2020 10:22 AM   Temp 36.6 °C (97.9 °F) 6/4/2020  9:55 AM   Pulse 55 6/4/2020 10:23 AM   Resp 17 6/4/2020 10:23 AM   SpO2 100 % 6/4/2020 10:23 AM   Vitals shown include unvalidated device data.      No case tracking events are documented in the log.      Pain/Amado Score: Amado Score: 10 (6/4/2020 10:19 AM)

## 2020-06-04 NOTE — ANESTHESIA PREPROCEDURE EVALUATION
06/04/2020  Alma Mistry is a 59 y.o., female.    Anesthesia Evaluation    I have reviewed the Patient Summary Reports.    I have reviewed the Nursing Notes.    I have reviewed the Medications.     Review of Systems  Anesthesia Hx:  No problems with previous Anesthesia  Denies Family Hx of Anesthesia complications.   Denies Personal Hx of Anesthesia complications.   Social:  No Alcohol Use, Non-Smoker    Hematology/Oncology:         -- Anemia:   Cardiovascular:  Cardiovascular Normal  ECG has been reviewed.    Pulmonary:  Pulmonary Normal    Renal/:  Renal/ Normal     Hepatic/GI:  Hepatic/GI Normal    Neurological:   Peripheral Neuropathy    Psych:   Psychiatric History anxiety depression          Physical Exam  General:  Well nourished    Airway/Jaw/Neck:  Airway Findings: Mouth Opening: Normal Tongue: Normal  General Airway Assessment: Adult  Mallampati: II  TM Distance: Normal, at least 6 cm  Jaw/Neck Findings:  Neck ROM: Normal ROM  Neck Findings:     Eyes/Ears/Nose:  Eyes/Ears/Nose Findings:    Dental:  Dental Findings: In tact   Chest/Lungs:  Chest/Lungs Findings: Clear to auscultation, Normal Respiratory Rate     Heart/Vascular:  Heart Findings: Rate: Normal  Rhythm: Regular Rhythm  Sounds: Normal  Heart murmur: negative Vascular Findings: Normal    Abdomen:  Abdomen Findings: Normal    Musculoskeletal:  Musculoskeletal Findings: Normal   Skin:  Skin Findings: Normal    Mental Status:  Mental Status Findings:  Alert and Oriented         Anesthesia Plan  Type of Anesthesia, risks & benefits discussed:  Anesthesia Type:  general  Patient's Preference:   Intra-op Monitoring Plan: standard ASA monitors  Intra-op Monitoring Plan Comments:   Post Op Pain Control Plan: per primary service following discharge from PACU  Post Op Pain Control Plan Comments:   Induction:   IV  Beta Blocker:  Patient is not  currently on a Beta-Blocker (No further documentation required).       Informed Consent: Patient understands risks and agrees with Anesthesia plan.  Questions answered. Anesthesia consent signed with patient.  ASA Score: 2     Day of Surgery Review of History & Physical:    H&P update referred to the surgeon.         Ready For Surgery From Anesthesia Perspective.

## 2020-06-08 ENCOUNTER — NURSE TRIAGE (OUTPATIENT)
Dept: ADMINISTRATIVE | Facility: CLINIC | Age: 60
End: 2020-06-08

## 2020-06-08 NOTE — TELEPHONE ENCOUNTER
Patient scheduled to be contacted today on behalf of the Post Procedural Symptom Tracking Program. Patients procedure date was postponed and patient is currently not post op day 13 or 14 at this time.    Additional Information   Negative: Caller has already spoken with the PCP (or office), and has no further questions   Negative: Caller has already spoken with another triager and has no further questions   Negative: Caller has already spoken with another triager or PCP (or office), and has further questions and triager able to answer questions.   Negative: Busy signal.  First attempt to contact caller.  Follow-up call scheduled within 15 minutes.   Negative: No answer.  First attempt to contact caller.  Follow-up call scheduled within 15 minutes.   Negative: Message left on identified voicemail   Negative: Message left on unidentified voice mail. Phone number verified.   Negative: Message left with person in household   Negative: Wrong number reached. Phone number verified.   Negative: Second attempt to contact family AND no contact made. Phone number verified.   Negative: Cell phone out of range. Phone number verified.   Negative: Patient already left for the hospital/clinic   Negative: Caller has cancelled the call before the first contact   Negative: Unable to complete triage due to phone connection issues    Protocols used: NO CONTACT OR DUPLICATE CONTACT CALL-A-OH

## 2020-06-09 LAB
COMMENT: NORMAL
FINAL PATHOLOGIC DIAGNOSIS: NORMAL
GROSS: NORMAL

## 2020-06-17 ENCOUNTER — PATIENT OUTREACH (OUTPATIENT)
Dept: ADMINISTRATIVE | Facility: OTHER | Age: 60
End: 2020-06-17

## 2020-06-18 ENCOUNTER — LAB VISIT (OUTPATIENT)
Dept: LAB | Facility: HOSPITAL | Age: 60
End: 2020-06-18
Attending: INTERNAL MEDICINE
Payer: COMMERCIAL

## 2020-06-18 ENCOUNTER — OFFICE VISIT (OUTPATIENT)
Dept: GASTROENTEROLOGY | Facility: CLINIC | Age: 60
End: 2020-06-18
Payer: COMMERCIAL

## 2020-06-18 ENCOUNTER — TELEPHONE (OUTPATIENT)
Dept: GASTROENTEROLOGY | Facility: CLINIC | Age: 60
End: 2020-06-18

## 2020-06-18 VITALS
DIASTOLIC BLOOD PRESSURE: 60 MMHG | HEIGHT: 60 IN | SYSTOLIC BLOOD PRESSURE: 110 MMHG | HEART RATE: 60 BPM | WEIGHT: 132.06 LBS | BODY MASS INDEX: 25.93 KG/M2

## 2020-06-18 DIAGNOSIS — K29.40 ATROPHIC GASTRITIS WITHOUT HEMORRHAGE: ICD-10-CM

## 2020-06-18 DIAGNOSIS — K90.0 CELIAC DISEASE: ICD-10-CM

## 2020-06-18 DIAGNOSIS — E61.1 IRON DEFICIENCY: ICD-10-CM

## 2020-06-18 DIAGNOSIS — K29.40 ATROPHIC GASTRITIS WITHOUT HEMORRHAGE: Primary | ICD-10-CM

## 2020-06-18 PROCEDURE — 99999 PR PBB SHADOW E&M-EST. PATIENT-LVL IV: CPT | Mod: PBBFAC,,, | Performed by: INTERNAL MEDICINE

## 2020-06-18 PROCEDURE — 99214 OFFICE O/P EST MOD 30 MIN: CPT | Mod: S$GLB,,, | Performed by: INTERNAL MEDICINE

## 2020-06-18 PROCEDURE — 3008F PR BODY MASS INDEX (BMI) DOCUMENTED: ICD-10-PCS | Mod: CPTII,S$GLB,, | Performed by: INTERNAL MEDICINE

## 2020-06-18 PROCEDURE — 99999 PR PBB SHADOW E&M-EST. PATIENT-LVL IV: ICD-10-PCS | Mod: PBBFAC,,, | Performed by: INTERNAL MEDICINE

## 2020-06-18 PROCEDURE — 36415 COLL VENOUS BLD VENIPUNCTURE: CPT

## 2020-06-18 PROCEDURE — 83516 IMMUNOASSAY NONANTIBODY: CPT | Mod: 59

## 2020-06-18 PROCEDURE — 3008F BODY MASS INDEX DOCD: CPT | Mod: CPTII,S$GLB,, | Performed by: INTERNAL MEDICINE

## 2020-06-18 PROCEDURE — 99214 PR OFFICE/OUTPT VISIT, EST, LEVL IV, 30-39 MIN: ICD-10-PCS | Mod: S$GLB,,, | Performed by: INTERNAL MEDICINE

## 2020-06-18 NOTE — TELEPHONE ENCOUNTER
COVID Screening     1. Have you had a fever in the last 7 days or have you used fever reducing medicines for a fever in the last 7 days?  no    2. Are you experiencing shortness of breath, cough, muscle aches, loss of taste or loss of smell?  no    3. Are you residing with anyone who has tested positive for Covid?  no    If answered yes to any of the above questions, the pt must be scheduled for an appointment with their PCP.    A message also needs to be sent to the endoscopist to ensure the patient gets rescheduled at a later date.     ENDO screening    1. Have you been admitted overnight to the hospital in the past 3 months? no   If yes, schedule an appointment with PCP before scheduling endoscopic procedure.     2. Have you had a stent placed in the last 12 months? no   If yes, for a screening visit, cancel and message the ordering provider.  The patient will need a new order when the time is appropriate.     3. Have you had a stroke or heart attack in the past 6 months? no   If yes, cancel and refer patient to ordering provider for clearance, also message ordering provider to inform.     4. Have you had any chest pain in the past 3 months? no   If yes, Have you been evaluated by your PCP and/or cardiologist and it was determined to not be heart related? not applicable   If No, Pt needs to be seen by PCP or Cardiologist .  Pt can be scheduled once clearance obtained by either of those providers.     5. Do you take prescription weight loss medications?  no   If yes, must stop for 2 weeks prior to procedure.     6. Have you been diagnosed with diverticulitis within the past 3 months? no   If yes, must have been seen by GI within the last 3 months, if not schedule with GI [facility].    If pt has been seen by GI, schedule procedure 8-12 weeks post antibiotic treatment.     7. Are you on Dialysis? no  If yes, schedule procedure for the day AFTER dialysis.  Appt time should be 9am or later, patient arrival time is 2 hours  "prior.  Nulytely or    miralax prep for all patients with kidney disease.     8. Are you diabetic?  no   If yes, schedule morning appt. Advise pt to hold all diabetic meds day of procedure.     9. If pt is older than 80 years of age and HAS NOT been seen by GI or PCP within the last 6 months, needs appt with GI [facility].   If pt has been seen by the GI provider or PCP within the past 6  months AND meets criteria, schedule procedure AND send message to the endoscopist.     10. Is patient on a "high risk" medication (blood thinner/antiplatelet agent)?  no   If yes, has cardiac clearance been obtained within the last 60 days? N/A   If no, a new clearance needs to be obtained.       I have reviewed the last colonoscopy for recommendations regarding next procedure bowel prep.  yes  I have reviewed medications and allergies.  yes  I have verified the pharmacy information and appropriate prep sent if needed. yes  Prep instructions have been mailed or sent to portal per patient request. yes    If answers yes to any of the following, schedule at [facility] ONLY. If No, OK for either location.     Is BMI over 45? NO   Any complaints of chest pain, new onset or at rest?NO  Does pt have an AICD?NO  Is there a diagnosis of heart failure?NO  Does patient have an insulin pump? NO  If procedure for esophageal banding?No      "

## 2020-06-18 NOTE — PATIENT INSTRUCTIONS
Schedule a video capsule endoscopy  Schedule a repeat upper endoscopy in 2 months  Go to the lab in two months to assess the celiac disease antibodies  Schedule an appointment with the nutritionist to discuss a gluten free diet for celiac disease

## 2020-06-22 ENCOUNTER — TELEPHONE (OUTPATIENT)
Dept: GASTROENTEROLOGY | Facility: CLINIC | Age: 60
End: 2020-06-22

## 2020-06-23 LAB
GLIADIN PEPTIDE IGA SER-ACNC: 24 UNITS
GLIADIN PEPTIDE IGG SER-ACNC: 3 UNITS
IGA SERPL-MCNC: 216 MG/DL (ref 70–400)
TTG IGA SER-ACNC: 16 UNITS
TTG IGG SER-ACNC: 7 UNITS

## 2020-06-23 NOTE — PROGRESS NOTES
Clinic Consult:  [facility] Consultation Note    Reason for Consult:  The primary encounter diagnosis was Atrophic gastritis without hemorrhage. Diagnoses of Iron deficiency and Celiac disease were also pertinent to this visit.    PCP: Zheng Orozco       HPI:  This is a 59 y.o. female here for follow up.    Ms. Mistry is a 59 year old female with autoimmune atrophic gastritis diagnosed 10 years ago and had positive anti-parietal cell antibodies and anti intrinsic factor antibodies. She also had prior colon polyps. She had a positive TTG IgA a year ago and iron deficiency and underwent an EGD and colonsocopy with me recently. The duodenal biopsies showed Marsh 1-2 changes in the bulb consistent with celiac disease along with her +ve serology. The gastric biopsies showed intestinal metaplasia with no dysplasia. Colonoscopy showed a normal terminal ileum and normal colonic mucosa with no polyps.    Since then she started a gluten free diet on her own and read about celiac disease. She mentioned having symptoms of boating as well that have improved. She denied any nausea, emesis, abdominal pain, diarrhea, constipation, jaundice.    ROS:  CONSTITUTIONAL: Denies weight change,  fatigue, fevers, chills, night sweats.  EYES: No changes in vision.   ENT: No oral lesions or sore throat.  HEMATOLOGICAL/Lymph: Denies bleeding tendency, bruising tendency. No swellings or enlarged lymph nodes.  CARDIOVASCULAR: Denies chest pain, shortness of breath, orthopnea and edema.  RESPIRATORY: Denies cough, hemoptysis, dyspnea, and wheezing.  GI: See HPI.  : Denies dysuria and hematuria  MUSCULOSKELETAL: Denies joint pain or swelling, back pain and muscle pain.  SKIN: Denies rashes.  NEUROLOGIC: Denies headaches, seizures and numbness.  PSYCHIATRIC: Denies depression or anxiety.  ENDOCRINE: Denies heat or cold intolerance and excessive thirst or urination.    Medical History:   Past Medical History:   Diagnosis Date     Atrophic gastritis     B12 deficiency     Breast lump on left side at 3 o'clock position 03/25/2015    Fibrocystic breast     Lichen sclerosus 08/16/2012       Surgical History:  Past Surgical History:   Procedure Laterality Date    BREAST BIOPSY Right 2009    benign    COLONOSCOPY  09/10/2010    COLONOSCOPY N/A 6/4/2020    Procedure: COLONOSCOPY;  Surgeon: Almas Perez MD;  Location: [facility] ENDO;  Service: Endoscopy;  Laterality: N/A;    ESOPHAGOGASTRODUODENOSCOPY  09/10/2010    ESOPHAGOGASTRODUODENOSCOPY N/A 6/4/2020    Procedure: ESOPHAGOGASTRODUODENOSCOPY (EGD);  Surgeon: Almas Perez MD;  Location: [facility] ENDO;  Service: Endoscopy;  Laterality: N/A;    HYSTERECTOMY  2013    TONSILLECTOMY         Family History:   Family History   Problem Relation Age of Onset    Depression Mother     Nephritis Mother     Hyperthyroidism Mother     Hypotension Mother     Anemia Mother     Asthma Father     Thyroid disease Sister     Depression Sister     Leukemia Brother     Cirrhosis Brother     Multiple sclerosis Brother        Social History:   Social History     Tobacco Use    Smoking status: Never Smoker    Smokeless tobacco: Never Used   Substance Use Topics    Alcohol use: Not Currently     Frequency: 2-4 times a month     Drinks per session: Patient refused     Binge frequency: Never    Drug use: No       Allergies: Reviewed    Home Medications:   Medication List with Changes/Refills   Current Medications    ASPIRIN-ACETAMINOPHEN-CAFFEINE 250-250-65 MG (EXCEDRIN MIGRAINE) 250-250-65 MG PER TABLET    Take 1 tablet by mouth as needed for Pain.    CALCIUM CIT/MGOX/VIT D3/B6/MIN (CITRACAL PLUS ORAL)    Take 1 tablet by mouth as needed.     CETIRIZINE (ZYRTEC) 10 MG TABLET    Take 1 tablet (10 mg total) by mouth once daily.    CHOLECALCIFEROL, VITAMIN D3, (VITAMIN D3 ORAL)    Take 1,000 Units by mouth daily as needed.     CLOBETASOL 0.05% (TEMOVATE) 0.05 % OINT    Apply  topically as needed.     CYANOCOBALAMIN 1,000 MCG/ML INJECTION    INJECT 1 ML INTRAMUSCULARLY EVERY 28 DAYS    DICLOFENAC SODIUM (VOLTAREN) 1 % GEL    Apply 2 g topically 4 (four) times daily.    DULOXETINE (CYMBALTA) 20 MG CAPSULE    Take 1 capsule (20 mg total) by mouth 2 (two) times daily.    GABAPENTIN (NEURONTIN) 100 MG CAPSULE    Take 3 capsules (300 mg total) by mouth every evening.    LINACLOTIDE (LINZESS) 145 MCG CAP CAPSULE    Take 1 capsule (145 mcg total) by mouth once daily.    LORAZEPAM (ATIVAN) 0.5 MG TABLET    Take 1 tablet (0.5 mg total) by mouth every 6 (six) hours as needed for Anxiety.    MAGNESIUM 30 MG TAB    Take 1 tablet by mouth once.    MULTIVITAMIN (ONE DAILY MULTIVITAMIN) PER TABLET    Take 1 tablet by mouth once daily.    NAPROXEN (NAPROSYN) 500 MG TABLET    Take 1 tablet (500 mg total) by mouth 2 (two) times daily with meals. For pain and inflammation    TRIAMCINOLONE (NASACORT) 55 MCG NASAL INHALER    2 sprays by Nasal route once daily.         Physical Exam:  Vital Signs:  /60   Pulse 60   Ht 5' (1.524 m)   Wt 59.9 kg (132 lb 0.9 oz)   BMI 25.79 kg/m²   Body mass index is 25.79 kg/m².      GENERAL: No acute distress, A&Ox3  EYES: Anicteric, no pallor noted.  ENT: OP clear  NECK: Supple, no masses, no thyromegally.  CHEST: Equal breath sounds bilaterally, no wheezing.  CARDIOVASCULAR: Regular rate and rhythm. Murmurs, rubs and gallops absent.  ABDOMEN: soft, non-tender, non-distended, normal bowel sounds, no hepatosplenomegaly   EXTREMITIES: No clubbing, cyanosis or edema.  SKIN: Without lesion or erythema.  LYMPH: No cervical, axillary lymphadenopathy palpable.   NEUROLOGICAL: Grossly normal, no asterixis present.    Labs: Pertinent labs reviewed.    Assessment and Plan:  Atrophic gastritis without hemorrhage  -     Capsule Video Endoscopy; Future  -     Celiac Disease Panel; Future; Expected date: 07/18/2020    Iron deficiency  -     Capsule Video Endoscopy; Future  -      Celiac Disease Panel; Future; Expected date: 07/18/2020    Celiac disease  -     Capsule Video Endoscopy; Future  -     Ambulatory referral/consult to Nutrition Services; Future; Expected date: 06/25/2020  -     Case request GI: EGD (ESOPHAGOGASTRODUODENOSCOPY)    Ms. Mistry is a 59 year old female with autoimmune atrophic gastritis and takes supplemental vit B12, she undergone EGD/colonsocopy for SHO and positive prior TTG IgA for celiac disease and duodenal biopsies confirmed celiac disease.     In regards to celiac disease a repeat celiac serology was ordered to confirm. It is rare for celiac disease to occur in the setting of autoimmune atrophic gastritis and will confirm the diagnosis with VCE to examine the rest of the small bowel. A consult for nutritionist and dietitian to go over a gluten free diet was placed. If truly celiac then a gluten free diet would help improve mucosal inflammation and iron absorption. Repeat EGD to assess for duodenal mucosal improvement will be performed in 2 months after being on a gluten free diet.    In regards to her atrophic autoimmune gastritis a repeat EGD next year is needed to screen for gastric cancer. She had intestinal metaplasia without dysplasia on the last EGD recently.    For colon cancer screening she will require a repeat colonoscopy in 10 years.      No follow-ups on file.    Thank you so much for allowing me to participate in the care of Alma Mistry    Almas Perez MD  Gastroenterology

## 2020-06-24 ENCOUNTER — TELEPHONE (OUTPATIENT)
Dept: GASTROENTEROLOGY | Facility: CLINIC | Age: 60
End: 2020-06-24

## 2020-06-24 NOTE — TELEPHONE ENCOUNTER
Pt requesting an appointment and I am unable to schedule. Referral in system. Please contact pt to schedule.

## 2020-06-30 ENCOUNTER — PATIENT MESSAGE (OUTPATIENT)
Dept: RHEUMATOLOGY | Facility: CLINIC | Age: 60
End: 2020-06-30

## 2020-06-30 ENCOUNTER — PATIENT MESSAGE (OUTPATIENT)
Dept: INTERNAL MEDICINE | Facility: CLINIC | Age: 60
End: 2020-06-30

## 2020-06-30 ENCOUNTER — NUTRITION (OUTPATIENT)
Dept: NUTRITION | Facility: CLINIC | Age: 60
End: 2020-06-30
Payer: COMMERCIAL

## 2020-06-30 VITALS — WEIGHT: 129 LBS | BODY MASS INDEX: 25.32 KG/M2 | HEIGHT: 60 IN

## 2020-06-30 DIAGNOSIS — Z71.3 DIETARY COUNSELING: Primary | ICD-10-CM

## 2020-06-30 DIAGNOSIS — K90.0 CELIAC DISEASE: ICD-10-CM

## 2020-06-30 PROCEDURE — 99999 PR PBB SHADOW E&M-EST. PATIENT-LVL II: ICD-10-PCS | Mod: PBBFAC,,, | Performed by: DIETITIAN, REGISTERED

## 2020-06-30 PROCEDURE — 97802 MEDICAL NUTRITION INDIV IN: CPT | Mod: S$GLB,,, | Performed by: DIETITIAN, REGISTERED

## 2020-06-30 PROCEDURE — 97802 PR MED NUTR THER, 1ST, INDIV, EA 15 MIN: ICD-10-PCS | Mod: S$GLB,,, | Performed by: DIETITIAN, REGISTERED

## 2020-06-30 PROCEDURE — 99999 PR PBB SHADOW E&M-EST. PATIENT-LVL II: CPT | Mod: PBBFAC,,, | Performed by: DIETITIAN, REGISTERED

## 2020-06-30 NOTE — PROGRESS NOTES
Referring Physician:Almas Perez *     Reason for visit: Celiac Disease/Gluten Free Diet Education    Initial Visit    :1960     Allergies Reviewed  Meds Reviewed    Assessment:    Anthropometrics  Weight:58.5 kg (128 lb 15.5 oz)  Height:5' (1.524 m)  BMI:Body mass index is 25.19 kg/m².   IBW: 100 +/-10%    Meds:  Outpatient Medications Prior to Visit   Medication Sig Dispense Refill    aspirin-acetaminophen-caffeine 250-250-65 mg (EXCEDRIN MIGRAINE) 250-250-65 mg per tablet Take 1 tablet by mouth as needed for Pain.      CALCIUM CIT/MGOX/VIT D3/B6/MIN (CITRACAL PLUS ORAL) Take 1 tablet by mouth as needed.       cetirizine (ZYRTEC) 10 MG tablet Take 1 tablet (10 mg total) by mouth once daily. 60 tablet 0    CHOLECALCIFEROL, VITAMIN D3, (VITAMIN D3 ORAL) Take 1,000 Units by mouth daily as needed.       clobetasol 0.05% (TEMOVATE) 0.05 % Oint Apply topically as needed.       cyanocobalamin 1,000 mcg/mL injection INJECT 1 ML INTRAMUSCULARLY EVERY 28 DAYS 10 mL 0    diclofenac sodium (VOLTAREN) 1 % Gel Apply 2 g topically 4 (four) times daily. 1 g 2    DULoxetine (CYMBALTA) 20 MG capsule Take 1 capsule (20 mg total) by mouth 2 (two) times daily. 60 capsule 11    gabapentin (NEURONTIN) 100 MG capsule Take 3 capsules (300 mg total) by mouth every evening. 90 capsule 11    linaCLOtide (LINZESS) 145 mcg Cap capsule Take 1 capsule (145 mcg total) by mouth once daily. 30 capsule 11    LORazepam (ATIVAN) 0.5 MG tablet Take 1 tablet (0.5 mg total) by mouth every 6 (six) hours as needed for Anxiety. 20 tablet 3    magnesium 30 mg Tab Take 1 tablet by mouth once.      multivitamin (ONE DAILY MULTIVITAMIN) per tablet Take 1 tablet by mouth once daily.      naproxen (NAPROSYN) 500 MG tablet Take 1 tablet (500 mg total) by mouth 2 (two) times daily with meals. For pain and inflammation (Patient taking differently: Take 500 mg by mouth 2 (two) times daily as needed. For pain and inflammation) 30  tablet 0    triamcinolone (NASACORT) 55 mcg nasal inhaler 2 sprays by Nasal route once daily. 16.9 mL 1     No facility-administered medications prior to visit.        Food/Drug Interactions Noted:  Reviewed    Vitamins/Supplements/Herbs: MVI, Probiotic, D3, Garlic, Mg    Labs:   Antigliadin Abs, IgA: 24  MCH:26.7  MCHC:31.6  Cholesterol: 209 mg/dL  Ferritin: 6 ng/mL  TIBC: 454 ug/dL  Saturated Iron: 13%    Nutrition Prescription:  Calorie Needs (via [place] [address] Jeor):  Activity Factor:  1.2   - Maintenance: 9981-6882 kcal/d  Protein (via 0.8 g/kg):47-57 g  Fluid (30 ml/kg): 1797 ml or 60 oz    Support System:  Two nieces are dietitians in [facility]     Diet Hx: Grew up in [facility] and was raised Palestinien mom. Reports she ate very healthy as child. When she moved to [facility] 21 years ago she began to eat a less healthy diet. She was depressed when she first moved to the [facility] and lost a significant amount of weight. Reports she had PICA tendencies at this time and would eat pretzels and diet coke. She tries not to eat much processed foods. She is unaware if stomach symptoms started when she moved to [facility] or before. She eats a balanced diet now, lives alone, and stopped eating bread and pasta 5 years ago. Cut out gluten about 2 weeks ago.  This is significantly improved symptoms. Reports fear with sugar in fruit and desire to cut it out. Atrophic gastritis has been causing nausea and depression the past two days. Is currently trialing different schedules to take medications.     Snack:Coffee + Gluten Free Cookies or Gluten Free Rasin Bread  Brunch: Zucchini, Red Peppers, 2.5 egg whites, 1 yolk, 2 corn tortilla's  Snack: Coffee (with sugar) + Strawberries, Almonds, Oranges, Whipped Cream, Pepetas  Dinner: Corn Tortilla with Tuna Fish + Lime Juice    Fluid: Water + lime juice or ACV with av seeds, Homemade tea, Water (estimates 6-8 12-16 oz glasses of water)    Current activity level and/or physical  limitations:  Before COVID was consitently working out every day with vigourous cardio, more recently is walking dog 40 minutes a day.     Motivation to make changes/anticipated barriers and/or expected adherence:  Pt has a high expected compliance with minimal anticipated barriers. Eating at restaurants with friend may be a potential barrier and may lead to cross contamination.     Nutrition-Focus Physical Findings:  Pt is well nourished with good muscle tonality. Reports 4 lbs of intentional weight loss. No overt signs of deficiency at this time.         Nutrition Diagnosis: Less than optimal intake of types of proteins or amino acids (gluten) related to decreased gluten tolerance as evidenced by history of daily wheat based bread and cereal consumption.  Altered GI function related to celiac disease as evidenced by supplementation dependency of various vitamins and minerals such as Iron & B12      Recommendations:   1. Eliminate gluten from diet, medications for 2 months to trial if symptoms have improved.  2. Continue to supplement with vitamins and minerals.  3. Record food for two weeks and times that nausea/depression appears to pin point correlations.    Strategies Implemented:  Educated patient using [facility] resources for Celiac's Disease on healthy eating, label reading, and general guidelines. Provided list of foods that contain gluten or may contain gluten. Shared gluten free web site, gluten free products, and gluten friendly restaurants in the area. Pt reported understanding and asked probing questions.     Consultation Time:60 minutes.  Communicated with referring healthcare provider:  Consult note available in pt's Epic chart per MD discretion  Follow Up:PRN

## 2020-07-23 ENCOUNTER — OFFICE VISIT (OUTPATIENT)
Dept: PSYCHIATRY | Facility: CLINIC | Age: 60
End: 2020-07-23
Payer: COMMERCIAL

## 2020-07-23 DIAGNOSIS — F41.1 GENERALIZED ANXIETY DISORDER WITH PANIC ATTACKS: ICD-10-CM

## 2020-07-23 DIAGNOSIS — F43.21 COMPLICATED BEREAVEMENT: ICD-10-CM

## 2020-07-23 DIAGNOSIS — F41.0 GENERALIZED ANXIETY DISORDER WITH PANIC ATTACKS: ICD-10-CM

## 2020-07-23 DIAGNOSIS — F33.1 MODERATE EPISODE OF RECURRENT MAJOR DEPRESSIVE DISORDER: Primary | ICD-10-CM

## 2020-07-23 PROCEDURE — 99214 PR OFFICE/OUTPT VISIT, EST, LEVL IV, 30-39 MIN: ICD-10-PCS | Mod: S$GLB,,, | Performed by: PSYCHIATRY & NEUROLOGY

## 2020-07-23 PROCEDURE — 99214 OFFICE O/P EST MOD 30 MIN: CPT | Mod: S$GLB,,, | Performed by: PSYCHIATRY & NEUROLOGY

## 2020-07-23 RX ORDER — DULOXETIN HYDROCHLORIDE 30 MG/1
30 CAPSULE, DELAYED RELEASE ORAL 2 TIMES DAILY
Qty: 60 CAPSULE | Refills: 11 | Status: SHIPPED | OUTPATIENT
Start: 2020-07-23 | End: 2020-07-23

## 2020-07-23 RX ORDER — DULOXETIN HYDROCHLORIDE 60 MG/1
60 CAPSULE, DELAYED RELEASE ORAL DAILY
Qty: 30 CAPSULE | Refills: 11
Start: 2020-07-23 | End: 2020-11-09

## 2020-07-23 NOTE — PROGRESS NOTES
ESTABLISHED OUTPATIENT VISIT   E/M LEVEL 4: 99856    ENCOUNTER DATE: 2020  SITE: [facility], [place].       HISTORY    CHIEF COMPLAINT   Alma Mistry is a 59 y.o. 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  We discussed how that helps her mood, and she has decided to check it out and see if its something that would benefit her  Currently does endorse some depression in her mood  Mainly evidenced by lack of motivation, and decrease in ADL's     ROS     Pertinent symptoms listed in HPI    PFSH  Past Medical History reviewed: Yes  Family History reviewed: Yes  Social History reviewed: Yes      Review of all of the above and there are no other changes    Neurological History:  Seizures:  DENIED  Head Trauma:  DENIED        Past Psychiatric History:   Has attended grief counseling and psychotherapy  One previous psychiatrist  One previous psych med but had si and no recollection of the medication : Currently tolerates Cymbalta and has been less reliant on ativan due to decreased frequency of panic attacks.      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   Financial: no difficulties    Relationship Status/Sexual Orientation: single, heterosexual    Children: one living daughter, one     Housing Status: lives alone  Roman Catholic: [employer], currently not practicing  Recreational Activities: reading, currently not interested  Access to Gun: none     Substance Abuse History:   No substance abuse history        Allergies:   Review of  patient's allergies indicates:   Allergen Reactions    Fluticasone         PSYCHOTROPIC MEDICATIONS   Current Outpatient Medications on File Prior to Visit   Medication Sig Dispense Refill    aspirin-acetaminophen-caffeine 250-250-65 mg (EXCEDRIN MIGRAINE) 250-250-65 mg per tablet Take 1 tablet by mouth as needed for Pain.      CALCIUM CIT/MGOX/VIT D3/B6/MIN (CITRACAL PLUS ORAL) Take 1 tablet by mouth as needed.       CHOLECALCIFEROL, VITAMIN D3, (VITAMIN D3 ORAL) Take 1,000 Units by mouth daily as needed.       clobetasol 0.05% (TEMOVATE) 0.05 % Oint Apply topically as needed.       cyanocobalamin 1,000 mcg/mL injection INJECT 1 ML INTRAMUSCULARLY EVERY 28 DAYS 10 mL 0    diclofenac sodium (VOLTAREN) 1 % Gel Apply 2 g topically 4 (four) times daily. 1 g 2    gabapentin (NEURONTIN) 100 MG capsule Take 3 capsules (300 mg total) by mouth every evening. (Patient taking differently: Take 300 mg by mouth as needed. ) 90 capsule 11    LORazepam (ATIVAN) 0.5 MG tablet Take 1 tablet (0.5 mg total) by mouth every 6 (six) hours as needed for Anxiety. 20 tablet 3    magnesium 30 mg Tab Take 1 tablet by mouth once.      multivitamin (ONE DAILY MULTIVITAMIN) per tablet Take 1 tablet by mouth once daily.       No current facility-administered medications on file prior to visit.          EXAMINATION    [unfilled]  There were no vitals filed for this visit.  Wt Readings from Last 2 Encounters:   08/05/20 57.9 kg (127 lb 10.3 oz)   07/28/20 57 kg (125 lb 10.6 oz)       CONSTITUTIONAL  General Appearance: well groomed    MUSCULOSKELETAL  No involuntary muscle movements  Normal gait    PSYCHIATRIC   Mental Status Exam:  Appearance: unremarkable, age appropriate  Behavior/Cooperation: appopriate normal, cooperative  Speech:  normal tone, normal rate, normal pitch, normal volume  Language: grossly intact with spontaneous speech  Mood: dysthymic   Affect:  congruent with mood, restricted   Thought Process: linear, logical and goal  oriented   Thought Content: no suicidality, no homicidality, no delusions, no paranoia  Sensorium:  Awake  Alert and Oriented: x3 person, place, situation  Memory:    Recent:  Intact; able to report recent events  Attention/concentration: appropriate for age/education.   Insight:Intact  Judgment:  Intact      MEDICAL DECISION MAKING    DIAGNOSES  Encounter Diagnoses   Name Primary?    Moderate episode of recurrent major depressive disorder Yes    Generalized anxiety disorder with panic attacks     Complicated bereavement          PROBLEM LIST AND MANAGEMENT PLANS   1) Increase cymbalta   2) Continue ativan as needed for anxiety attacks, patient aware of the risk for chemical dependence. No indication of misuse of medication. Ativan .5 mg as needed    Orders Placed This Encounter    DULoxetine (CYMBALTA) 60 MG capsule             Orders Placed This Encounter    DULoxetine (CYMBALTA) 60 MG capsule         PRESCRIPTION DRUG MANAGEMENT  Compliance: yes  Side Effects: none  Regimen Adjustments: Continue Cymbalta 40 mg daily

## 2020-07-25 ENCOUNTER — LAB VISIT (OUTPATIENT)
Dept: OTOLARYNGOLOGY | Facility: CLINIC | Age: 60
End: 2020-07-25
Payer: COMMERCIAL

## 2020-07-25 DIAGNOSIS — K29.40 ATROPHIC GASTRITIS WITHOUT HEMORRHAGE: ICD-10-CM

## 2020-07-25 PROCEDURE — U0003 INFECTIOUS AGENT DETECTION BY NUCLEIC ACID (DNA OR RNA); SEVERE ACUTE RESPIRATORY SYNDROME CORONAVIRUS 2 (SARS-COV-2) (CORONAVIRUS DISEASE [COVID-19]), AMPLIFIED PROBE TECHNIQUE, MAKING USE OF HIGH THROUGHPUT TECHNOLOGIES AS DESCRIBED BY CMS-2020-01-R: HCPCS

## 2020-07-26 LAB — SARS-COV-2 RNA RESP QL NAA+PROBE: NOT DETECTED

## 2020-07-27 ENCOUNTER — ANESTHESIA EVENT (OUTPATIENT)
Dept: ENDOSCOPY | Facility: HOSPITAL | Age: 60
End: 2020-07-27
Payer: COMMERCIAL

## 2020-07-27 NOTE — ANESTHESIA PREPROCEDURE EVALUATION
07/28/2020  Alma Mistry is a 59 y.o., female.    Anesthesia Evaluation    I have reviewed the Patient Summary Reports.    I have reviewed the Nursing Notes.    I have reviewed the Medications.     Review of Systems  Anesthesia Hx:  No problems with previous Anesthesia  Denies Family Hx of Anesthesia complications.   Denies Personal Hx of Anesthesia complications.   Social:  No Alcohol Use, Non-Smoker    Hematology/Oncology:         -- Anemia:   Cardiovascular:  Cardiovascular Normal  ECG has been reviewed.    Pulmonary:  Pulmonary Normal    Renal/:  Renal/ Normal     Hepatic/GI:  Hepatic/GI Normal    Neurological:   Peripheral Neuropathy    Psych:   Psychiatric History anxiety depression          Physical Exam  General:  Well nourished    Airway/Jaw/Neck:  Airway Findings: Mouth Opening: Normal Tongue: Normal  General Airway Assessment: Adult  Mallampati: II  TM Distance: Normal, at least 6 cm  Jaw/Neck Findings:  Neck ROM: Normal ROM  Neck Findings:     Eyes/Ears/Nose:  Eyes/Ears/Nose Findings:    Dental:  Dental Findings: In tact   Chest/Lungs:  Chest/Lungs Findings: Clear to auscultation, Normal Respiratory Rate     Heart/Vascular:  Heart Findings: Rate: Normal  Rhythm: Regular Rhythm  Sounds: Normal  Heart murmur: negative Vascular Findings: Normal    Abdomen:  Abdomen Findings: Normal    Musculoskeletal:  Musculoskeletal Findings: Normal   Skin:  Skin Findings: Normal    Mental Status:  Mental Status Findings:  Alert and Oriented         Anesthesia Plan  Type of Anesthesia, risks & benefits discussed:  Anesthesia Type:  general  Patient's Preference:   Intra-op Monitoring Plan: standard ASA monitors  Intra-op Monitoring Plan Comments:   Post Op Pain Control Plan: per primary service following discharge from PACU  Post Op Pain Control Plan Comments:   Induction:   IV  Beta Blocker:  Patient is not  currently on a Beta-Blocker (No further documentation required).       Informed Consent: Patient understands risks and agrees with Anesthesia plan.  Questions answered. Anesthesia consent signed with patient.  ASA Score: 2     Day of Surgery Review of History & Physical:    H&P update referred to the surgeon.         Ready For Surgery From Anesthesia Perspective.                                                                                                                  07/27/2020  Alma Mistry is a 59 y.o., female.    Anesthesia Evaluation    I have reviewed the Patient Summary Reports.    I have reviewed the Nursing Notes. I have reviewed the NPO Status.   I have reviewed the Medications.     Review of Systems  Social:  Non-Smoker    Hematology/Oncology:         -- Anemia:   Psych:   anxiety depression          Physical Exam  General:  Well nourished    Airway/Jaw/Neck:  Airway Findings: Mouth Opening: Normal Tongue: Normal  General Airway Assessment: Adult  Mallampati: II  TM Distance: Normal, at least 6 cm  Jaw/Neck Findings:  Neck ROM: Normal ROM  Neck Findings:     Eyes/Ears/Nose:  Eyes/Ears/Nose Findings:    Dental:  Dental Findings: In tact   Chest/Lungs:  Chest/Lungs Findings: Clear to auscultation, Normal Respiratory Rate     Heart/Vascular:  Heart Findings: Rate: Normal  Rhythm: Regular Rhythm  Sounds: Normal  Heart murmur: negative Vascular Findings: Normal    Abdomen:  Abdomen Findings: Normal    Musculoskeletal:  Musculoskeletal Findings: Normal   Skin:  Skin Findings: Normal    Mental Status:  Mental Status Findings:  Alert and Oriented         Anesthesia Plan  Type of Anesthesia, risks & benefits discussed:  Anesthesia Type:  MAC  Patient's Preference:   Intra-op Monitoring Plan: standard ASA monitors  Intra-op Monitoring Plan Comments:   Post Op Pain Control Plan: per primary service following discharge from PACU  Post Op Pain Control Plan Comments:   Induction:   IV  Beta Blocker:  Patient is  not currently on a Beta-Blocker (No further documentation required).       Informed Consent: Patient understands risks and agrees with Anesthesia plan.  Questions answered. Anesthesia consent signed with patient.  ASA Score: 2     Day of Surgery Review of History & Physical:    H&P update referred to the provider.         Ready For Surgery From Anesthesia Perspective.

## 2020-07-27 NOTE — PRE-PROCEDURE INSTRUCTIONS
PAT call completed and patient educated on procedure instructions. Medical history discussed and patient informed of arrival times 0600. Pt will be accompanied by a  and is made aware of limited-visitor policy, and that  is to remain during entire visit. All questions and concerns addressed. Endoscopy instructions reviewed. NPO after midnight. Patient verbalizes understanding of teaching and all instructions. Pre-procedure Covid testing completed.

## 2020-07-28 ENCOUNTER — ANESTHESIA (OUTPATIENT)
Dept: ENDOSCOPY | Facility: HOSPITAL | Age: 60
End: 2020-07-28
Payer: COMMERCIAL

## 2020-07-28 ENCOUNTER — HOSPITAL ENCOUNTER (OUTPATIENT)
Facility: HOSPITAL | Age: 60
Discharge: HOME | End: 2020-07-28
Attending: INTERNAL MEDICINE | Admitting: INTERNAL MEDICINE
Payer: COMMERCIAL

## 2020-07-28 VITALS
BODY MASS INDEX: 24.68 KG/M2 | HEIGHT: 60 IN | WEIGHT: 125.69 LBS | DIASTOLIC BLOOD PRESSURE: 74 MMHG | SYSTOLIC BLOOD PRESSURE: 112 MMHG | TEMPERATURE: 99 F | RESPIRATION RATE: 18 BRPM | OXYGEN SATURATION: 100 % | HEART RATE: 73 BPM

## 2020-07-28 DIAGNOSIS — K90.0 CELIAC DISEASE: Primary | ICD-10-CM

## 2020-07-28 PROCEDURE — 63600175 PHARM REV CODE 636 W HCPCS: Performed by: ANESTHESIOLOGY

## 2020-07-28 PROCEDURE — D9220A PRA ANESTHESIA: ICD-10-PCS | Mod: CRNA,,, | Performed by: NURSE ANESTHETIST, CERTIFIED REGISTERED

## 2020-07-28 PROCEDURE — 43239 PR EGD, FLEX, W/BIOPSY, SGL/MULTI: ICD-10-PCS | Mod: ,,, | Performed by: INTERNAL MEDICINE

## 2020-07-28 PROCEDURE — 37000008 HC ANESTHESIA 1ST 15 MINUTES: Performed by: INTERNAL MEDICINE

## 2020-07-28 PROCEDURE — 25000003 PHARM REV CODE 250: Performed by: NURSE ANESTHETIST, CERTIFIED REGISTERED

## 2020-07-28 PROCEDURE — 88305 TISSUE EXAM BY PATHOLOGIST: ICD-10-PCS | Mod: 26,,, | Performed by: PATHOLOGY

## 2020-07-28 PROCEDURE — 27201012 HC FORCEPS, HOT/COLD, DISP: Performed by: INTERNAL MEDICINE

## 2020-07-28 PROCEDURE — 43239 EGD BIOPSY SINGLE/MULTIPLE: CPT | Performed by: INTERNAL MEDICINE

## 2020-07-28 PROCEDURE — 37000009 HC ANESTHESIA EA ADD 15 MINS: Performed by: INTERNAL MEDICINE

## 2020-07-28 PROCEDURE — 88305 TISSUE EXAM BY PATHOLOGIST: CPT | Mod: 59 | Performed by: PATHOLOGY

## 2020-07-28 PROCEDURE — D9220A PRA ANESTHESIA: ICD-10-PCS | Mod: ANES,,, | Performed by: ANESTHESIOLOGY

## 2020-07-28 PROCEDURE — D9220A PRA ANESTHESIA: Mod: CRNA,,, | Performed by: NURSE ANESTHETIST, CERTIFIED REGISTERED

## 2020-07-28 PROCEDURE — 88305 TISSUE EXAM BY PATHOLOGIST: CPT | Mod: 26,,, | Performed by: PATHOLOGY

## 2020-07-28 PROCEDURE — D9220A PRA ANESTHESIA: Mod: ANES,,, | Performed by: ANESTHESIOLOGY

## 2020-07-28 PROCEDURE — 63600175 PHARM REV CODE 636 W HCPCS: Performed by: NURSE ANESTHETIST, CERTIFIED REGISTERED

## 2020-07-28 PROCEDURE — 43239 EGD BIOPSY SINGLE/MULTIPLE: CPT | Mod: ,,, | Performed by: INTERNAL MEDICINE

## 2020-07-28 RX ORDER — LIDOCAINE HYDROCHLORIDE 10 MG/ML
0.5 INJECTION, SOLUTION EPIDURAL; INFILTRATION; INTRACAUDAL; PERINEURAL ONCE
Status: DISCONTINUED | OUTPATIENT
Start: 2020-07-28 | End: 2020-07-28 | Stop reason: HOSPADM

## 2020-07-28 RX ORDER — GLYCOPYRROLATE 0.2 MG/ML
INJECTION INTRAMUSCULAR; INTRAVENOUS
Status: DISCONTINUED | OUTPATIENT
Start: 2020-07-28 | End: 2020-07-28

## 2020-07-28 RX ORDER — SODIUM CHLORIDE 0.9 % (FLUSH) 0.9 %
10 SYRINGE (ML) INJECTION
Status: DISCONTINUED | OUTPATIENT
Start: 2020-07-28 | End: 2020-07-28 | Stop reason: HOSPADM

## 2020-07-28 RX ORDER — PROPOFOL 10 MG/ML
VIAL (ML) INTRAVENOUS CONTINUOUS PRN
Status: DISCONTINUED | OUTPATIENT
Start: 2020-07-28 | End: 2020-07-28

## 2020-07-28 RX ORDER — SODIUM CHLORIDE, SODIUM LACTATE, POTASSIUM CHLORIDE, CALCIUM CHLORIDE 600; 310; 30; 20 MG/100ML; MG/100ML; MG/100ML; MG/100ML
INJECTION, SOLUTION INTRAVENOUS CONTINUOUS
Status: DISCONTINUED | OUTPATIENT
Start: 2020-07-28 | End: 2020-07-28 | Stop reason: HOSPADM

## 2020-07-28 RX ORDER — ONDANSETRON 2 MG/ML
4 INJECTION INTRAMUSCULAR; INTRAVENOUS DAILY PRN
Status: DISCONTINUED | OUTPATIENT
Start: 2020-07-28 | End: 2020-07-28 | Stop reason: HOSPADM

## 2020-07-28 RX ORDER — LIDOCAINE HYDROCHLORIDE 20 MG/ML
INJECTION INTRAVENOUS
Status: DISCONTINUED | OUTPATIENT
Start: 2020-07-28 | End: 2020-07-28

## 2020-07-28 RX ADMIN — SODIUM CHLORIDE, SODIUM LACTATE, POTASSIUM CHLORIDE, AND CALCIUM CHLORIDE: 600; 310; 30; 20 INJECTION, SOLUTION INTRAVENOUS at 07:07

## 2020-07-28 RX ADMIN — Medication 75 MG: at 07:07

## 2020-07-28 RX ADMIN — GLYCOPYRROLATE 0.2 MG: 0.2 INJECTION INTRAMUSCULAR; INTRAVENOUS at 07:07

## 2020-07-28 RX ADMIN — PROPOFOL 150 MCG/KG/MIN: 10 INJECTION, EMULSION INTRAVENOUS at 07:07

## 2020-07-28 NOTE — ANESTHESIA POSTPROCEDURE EVALUATION
Anesthesia Post Evaluation    Patient: Alma Mistry    Procedure(s) Performed: Procedure(s) (LRB):  EGD (ESOPHAGOGASTRODUODENOSCOPY) (N/A)    Final Anesthesia Type: general    Patient location during evaluation: PACU  Patient participation: Yes- Able to Participate  Level of consciousness: awake and alert and oriented  Post-procedure vital signs: reviewed and stable  Pain management: adequate  Airway patency: patent    PONV status at discharge: No PONV  Anesthetic complications: no      Cardiovascular status: blood pressure returned to baseline, stable and hemodynamically stable  Respiratory status: unassisted  Hydration status: euvolemic  Follow-up not needed.          Vitals Value Taken Time   /74 07/28/20 0832   Temp 37.2 °C (99 °F) 07/28/20 0802   Pulse 73 07/28/20 0832   Resp 18 07/28/20 0832   SpO2 100 % 07/28/20 0832         Event Time   Out of Recovery 08:39:00         Pain/Amado Score: Amado Score: 10 (7/28/2020  8:32 AM)        
unk

## 2020-07-28 NOTE — TRANSFER OF CARE
Anesthesia Transfer of Care Note    Patient: Alma Mistry    Procedure(s) Performed: Procedure(s) (LRB):  EGD (ESOPHAGOGASTRODUODENOSCOPY) (N/A)    Patient location: PACU    Anesthesia Type: general    Transport from OR: Transported from OR on 2-3 L/min O2 by NC with adequate spontaneous ventilation    Post pain: adequate analgesia    Post assessment: no apparent anesthetic complications    Post vital signs: stable    Level of consciousness: awake    Nausea/Vomiting: no nausea/vomiting    Complications: none    Transfer of care protocol was followed      Last vitals:   Visit Vitals  /61 (BP Location: Left arm, Patient Position: Lying)   Pulse 69   Temp 37.2 °C (99 °F) (Temporal)   Resp 18   Ht 5' (1.524 m)   Wt 57 kg (125 lb 10.6 oz)   SpO2 98%   Breastfeeding No   BMI 24.54 kg/m²

## 2020-07-28 NOTE — [employer] PATIENT INSTRUCTIONS
Discharge Summary/Instructions after an Endoscopic Procedure  Patient Name: Alma Mistry  Patient MRN: [id number]  Patient YOB: 1960 Tuesday, July 28, 2020  Almas Perez MD  RESTRICTIONS:  During your procedure today, you received medications for sedation.  These   medications may affect your judgment, balance and coordination.  Therefore,   for 24 hours, you have the following restrictions:   - DO NOT drive a car, operate machinery, make legal/financial decisions,   sign important papers or drink alcohol.    ACTIVITY:  Today: no heavy lifting, straining or running due to procedural   sedation/anesthesia.  The following day: return to full activity including work.  DIET:  Eat and drink normally unless instructed otherwise.     TREATMENT FOR COMMON SIDE EFFECTS:  - Mild abdominal pain, nausea, belching, bloating or excessive gas:  rest,   eat lightly and use a heating pad.  - Sore Throat: treat with throat lozenges and/or gargle with warm salt   water.  - Because air was used during the procedure, expelling large amounts of air   from your rectum or belching is normal.  - If a bowel prep was taken, you may not have a bowel movement for 1-3 days.    This is normal.  SYMPTOMS TO WATCH FOR AND REPORT TO YOUR PHYSICIAN:  1. Abdominal pain or bloating, other than gas cramps.  2. Chest pain.  3. Back pain.  4. Signs of infection such as: chills or fever occurring within 24 hours   after the procedure.  5. Rectal bleeding, which would show as bright red, maroon, or black stools.   (A tablespoon of blood from the rectum is not serious, especially if   hemorrhoids are present.)  6. Vomiting.  7. Weakness or dizziness.  GO DIRECTLY TO THE NEAREST EMERGENCY ROOM IF YOU HAVE ANY OF THE FOLLOWING:      Difficulty breathing              Chills and/or fever over 101 F   Persistent vomiting and/or vomiting blood   Severe abdominal pain   Severe chest pain   Black, tarry stools   Bleeding- more than one  tablespoon   Any other symptom or condition that you feel may need urgent attention  Your doctor recommends these additional instructions:  If any biopsies were taken, your doctors clinic will contact you in 1 to 2   weeks with any results.  - Await pathology results.   - Discharge patient to home.   - Resume previous diet.   - Continue present medications.   - Repeat upper endoscopy in 2 years for surveillance.   - Go to the lab this week to check celiac serologies.  For questions, problems or results please call your physician Almas Perez MD at Work:  [phone]  If you have any questions about the above instructions, call the GI   department at [phone] or call the endoscopy unit at [phone]   from 7am until 3 pm.  [facility] [place], EMERGENCY ROOM PHONE NUMBER:   [phone]  IF A COMPLICATION OR EMERGENCY SITUATION ARISES AND YOU ARE UNABLE TO REACH   YOUR PHYSICIAN - GO DIRECTLY TO THE EMERGENCY ROOM.  I have read or have had read to me these discharge instructions for my   procedure and have received a written copy.  I understand these   instructions and will follow-up with my physician if I have any questions.     __________________________________       _____________________________________  Nurse Signature                                          Patient/Designated   Responsible Party Signature  MD Almas Garcia MD  7/28/2020 7:59:57 AM  This report has been verified and signed electronically.  [employer]

## 2020-07-28 NOTE — H&P
PRE PROCEDURE H&P    Patient Name: Alma Mistry  MRN: [id number]  : 1960  Date of Procedure:  2020  Referring Physician: Almas Perez *  Primary Physician: Zheng Orozco MD  Procedure Physician: Almas Perez MD       Planned Procedure: EGD  Diagnosis: celiac disease  Chief Complaint: Same as above    HPI: Patient is an 59 y.o. female is here for the above.         Past Medical History:   Past Medical History:   Diagnosis Date    Atrophic gastritis     B12 deficiency     Breast lump on left side at 3 o'clock position 2015    Fibrocystic breast     Lichen sclerosus 2012        Past Surgical History:  Past Surgical History:   Procedure Laterality Date    BREAST BIOPSY Right 2009    benign    COLONOSCOPY  09/10/2010    COLONOSCOPY N/A 2020    Procedure: COLONOSCOPY;  Surgeon: Almas Perez MD;  Location: [facility];  Service: Endoscopy;  Laterality: N/A;    ESOPHAGOGASTRODUODENOSCOPY  09/10/2010    ESOPHAGOGASTRODUODENOSCOPY N/A 2020    Procedure: ESOPHAGOGASTRODUODENOSCOPY (EGD);  Surgeon: Almas Perez MD;  Location: [facility];  Service: Endoscopy;  Laterality: N/A;    HYSTERECTOMY  2013    TONSILLECTOMY          Home Medications:  Prior to Admission medications    Medication Sig Start Date End Date Taking? Authorizing Provider   CALCIUM CIT/MGOX/VIT D3/B6/MIN (CITRACAL PLUS ORAL) Take 1 tablet by mouth as needed.    Yes Historical Provider, MD   CHOLECALCIFEROL, VITAMIN D3, (VITAMIN D3 ORAL) Take 1,000 Units by mouth daily as needed.    Yes Historical Provider, MD   cyanocobalamin 1,000 mcg/mL injection INJECT 1 ML INTRAMUSCULARLY EVERY 28 DAYS 20  Yes Zheng Orozco MD   DULoxetine (CYMBALTA) 60 MG capsule Take 1 capsule (60 mg total) by mouth once daily. 20  Yes Sima Weinberg MD   gabapentin (NEURONTIN) 100 MG capsule Take 3 capsules (300 mg total) by mouth every evening. 19 Yes Lakhwinder Mtz MD    LORazepam (ATIVAN) 0.5 MG tablet Take 1 tablet (0.5 mg total) by mouth every 6 (six) hours as needed for Anxiety. 5/19/20 7/28/20 Yes Sima Weinberg MD   magnesium 30 mg Tab Take 1 tablet by mouth once.   Yes Historical Provider, MD   multivitamin (ONE DAILY MULTIVITAMIN) per tablet Take 1 tablet by mouth once daily.   Yes Historical Provider, MD   aspirin-acetaminophen-caffeine 250-250-65 mg (EXCEDRIN MIGRAINE) 250-250-65 mg per tablet Take 1 tablet by mouth as needed for Pain.    Historical Provider, MD   clobetasol 0.05% (TEMOVATE) 0.05 % Oint Apply topically as needed.     Historical Provider, MD   diclofenac sodium (VOLTAREN) 1 % Gel Apply 2 g topically 4 (four) times daily. 11/22/19 5/7/20  Lang Hernández MD        Allergies:  Review of patient's allergies indicates:   Allergen Reactions    Fluticasone         Social History:   Social History     Socioeconomic History    Marital status:      Spouse name: Not on file    Number of children: Not on file    Years of education: Not on file    Highest education level: Not on file   Occupational History    Not on file   Social Needs    Financial resource strain: Not hard at all    Food insecurity     Worry: Never true     Inability: Never true    Transportation needs     Medical: No     Non-medical: No   Tobacco Use    Smoking status: Never Smoker    Smokeless tobacco: Never Used   Substance and Sexual Activity    Alcohol use: Not Currently     Frequency: 2-4 times a month     Drinks per session: Patient refused     Binge frequency: Never    Drug use: No    Sexual activity: Never   Lifestyle    Physical activity     Days per week: 5 days     Minutes per session: 60 min    Stress: Rather much   Relationships    Social connections     Talks on phone: More than three times a week     Gets together: Twice a week     Attends Congregation service: Not on file     Active member of club or organization: Yes     Attends meetings of clubs or  organizations: More than 4 times per year     Relationship status:    Other Topics Concern    Not on file   Social History Narrative    1 dog, no smokers; Originally from [facility].       Family History:  Family History   Problem Relation Age of Onset    Depression Mother     Nephritis Mother     Hyperthyroidism Mother     Hypotension Mother     Anemia Mother     Asthma Father     Thyroid disease Sister     Depression Sister     Leukemia Brother     Cirrhosis Brother     Multiple sclerosis Brother        ROS: No acute cardiac events, no acute respiratory complaints.     Physical Exam (all patients):    /60 (BP Location: Right arm, Patient Position: Sitting)   Pulse 70   Temp 99 °F (37.2 °C) (Temporal)   Resp 18   Ht 5' (1.524 m)   Wt 57 kg (125 lb 10.6 oz)   SpO2 98%   Breastfeeding No   BMI 24.54 kg/m²   Lungs: Clear to auscultation bilaterally, respirations unlabored  Heart: Regular rate and rhythm, S1 and S2 normal, no obvious murmurs  Abdomen:         Soft, non-tender, bowel sounds normal, no masses, no organomegaly    Lab Results   Component Value Date    WBC 6.19 05/14/2020    MCV 85 05/14/2020    RDW 15.1 (H) 05/14/2020     05/14/2020    GLU 73 04/11/2019    HGBA1C 4.6 09/21/2017    BUN 11 04/11/2019     04/11/2019    K 4.6 04/11/2019     04/11/2019        SEDATION PLAN: per anesthesia      History reviewed, vital signs satisfactory, cardiopulmonary status satisfactory, sedation options, risks and plans have been discussed with the patient  All their questions were answered and the patient agrees to the sedation procedures as planned and the patient is deemed an appropriate candidate for the sedation as planned.    Procedure explained to patient, informed consent obtained and placed in chart.    Almas Perez  7/28/2020  7:36 AM

## 2020-07-28 NOTE — DISCHARGE INSTRUCTIONS
Celiac Disease     With celiac disease, villi inside the small intestine become damaged and cannot absorb nutrients properly.     Celiac disease is caused by a sensitivity or allergy to gluten. This is a protein found in many grains such as wheat, barley, and rye. Celiac disease affects villi (tiny, fingerlike stalks) in the small bowel (intestine). Normally, the villi make it possible for the small bowel to absorb nutrients from the food you eat. But celiac disease damages the villi. As a result, you cant absorb the nutrients you need, even if you eat plenty of food. Celiac disease is an autoimmune disease. You can manage the disease by removing gluten from your diet. This relieves your symptoms. It also reverses the damage to your small bowel. Celiac disease is sometimes called celiac sprue.  Causes of celiac disease  Celiac disease may have a genetic component. This means it can be passed down in families. If your healthcare provider thinks that you have celiac disease, he or she may advise that other members of your family be checked for it as well.  Signs and symptoms of celiac disease  The symptoms of celiac disease can vary for each person. Some people have no symptoms at all. If symptoms do happen, they can include:  · Diarrhea, constipation, or both  · Light colored, foul-smelling or fatty stool  · Belly pain and cramping  · Belly swelling or bloating  · Weight loss  · Bone or joint pain  · Iron deficiency  · Headaches  · Tiredness and loss of energy  · Mood changes, irritability, and depression  · Infertility  · Unexplained elevated liver tests  · Canker sores  · Skin rash  · Tooth enamel problems  Diagnosing celiac disease  Your healthcare provider will ask about your symptoms and health history. Youll also have a physical exam. Tests are then done to confirm the problem. These can include:  · Blood tests. These help check for specific proteins in the blood that are present with celiac disease. They  also check for anemia and help rule out other problems. The tests are done by taking a blood sample.  · Upper endoscopy with biopsy. This is done to see inside the stomach and duodenum (first part of the small bowel). For the test, an endoscope is used. This is a thin, flexible tube with a tiny camera on the end. Its inserted through the mouth and down into the stomach and duodenum. Tools are passed through the endoscope to remove tiny tissue samples (biopsy). The tissue samples are taken to a lab and looked at under a microscope. This is to check the tiny villi for damage. This test must be done while you are still eating food with gluten. This is the only way to see whether the presence of gluten is damaging the villi.  · Genetic tests. These check for problems with specific genes linked to celiac disease. They are done by taking blood samples.  Treating celiac disease  To treat celiac disease, you must remove all sources of gluten from your diet. This will allow the villi to heal, so that nutrients can be absorbed normally. Its important to follow a strict, gluten-free diet daily, even if you dont have symptoms. If you dont do this, the small bowel can become permanently damaged, which can lead to serious health problems. These include bone disease, cancer of the small bowel, and various nervous system disorders.  Sources of gluten  Gluten is found in wheat, barley, and rye. The most common foods with gluten are those made with wheat flour. These include bread, pasta, cake, and cereal. Gluten is also often found in beer, gravies, salad dressings, and most packaged foods. It is even found in some nonfood products, such as certain medicines and cosmetics. Your healthcare provider can refer you to a dietitian to  you about what you should avoid. The resources below will also give you lists of food and products that contain gluten.  Follow-up  Youll meet with your healthcare provider periodically to  monitor your health. During these visits, routine blood tests are often done to make sure your condition is under control. Your healthcare provider can also refer you to other healthcare providers or support and advocacy groups to help you cope with your condition.  Learning more about celiac disease  The following resources can help you learn more about celiac disease and how to manage it:  · Celiac Disease Foundation, www.celiac.org  · Celiac Sprue Association, www.csaceliacs.org  · Gluten Intolerance Group, www.gluten.net  · National [place] of Diabetes and Digestive and Kidney Diseases, www2.niddk.nih.gov   Date Last Reviewed: 7/1/2016  © 4464-0324 [employer]. [address] [place] [address], [place] [address]. All rights reserved. This information is not intended as a substitute for professional medical care. Always follow your healthcare professional's instructions.

## 2020-07-31 ENCOUNTER — LAB VISIT (OUTPATIENT)
Dept: LAB | Facility: HOSPITAL | Age: 60
End: 2020-07-31
Attending: INTERNAL MEDICINE
Payer: COMMERCIAL

## 2020-07-31 DIAGNOSIS — K90.0 CELIAC DISEASE: ICD-10-CM

## 2020-07-31 LAB
COMMENT: NORMAL
FINAL PATHOLOGIC DIAGNOSIS: NORMAL
GROSS: NORMAL

## 2020-07-31 PROCEDURE — 36415 COLL VENOUS BLD VENIPUNCTURE: CPT

## 2020-07-31 PROCEDURE — 83516 IMMUNOASSAY NONANTIBODY: CPT | Mod: 59

## 2020-08-01 NOTE — PROGRESS NOTES
Biopsies still showe evidence of celiac disease. She will need to get lab testing, order is placed for celiac serological testing. Will follow up after.    Almas Perez MD  Gastroenterology

## 2020-08-03 LAB
GLIADIN PEPTIDE IGA SER-ACNC: 20 UNITS
GLIADIN PEPTIDE IGG SER-ACNC: 3 UNITS
IGA SERPL-MCNC: 229 MG/DL (ref 70–400)
TTG IGA SER-ACNC: 17 UNITS
TTG IGG SER-ACNC: 7 UNITS

## 2020-08-05 ENCOUNTER — OFFICE VISIT (OUTPATIENT)
Dept: INTERNAL MEDICINE | Facility: CLINIC | Age: 60
End: 2020-08-05
Payer: COMMERCIAL

## 2020-08-05 VITALS
OXYGEN SATURATION: 96 % | BODY MASS INDEX: 24.93 KG/M2 | TEMPERATURE: 97 F | DIASTOLIC BLOOD PRESSURE: 66 MMHG | WEIGHT: 127.63 LBS | HEART RATE: 78 BPM | SYSTOLIC BLOOD PRESSURE: 102 MMHG

## 2020-08-05 DIAGNOSIS — R68.2 DRY MOUTH: ICD-10-CM

## 2020-08-05 DIAGNOSIS — R13.12 OROPHARYNGEAL DYSPHAGIA: ICD-10-CM

## 2020-08-05 DIAGNOSIS — H04.123 DRY EYES: Primary | ICD-10-CM

## 2020-08-05 DIAGNOSIS — R59.0 LYMPHADENOPATHY, CERVICAL: ICD-10-CM

## 2020-08-05 PROCEDURE — 99214 PR OFFICE/OUTPT VISIT, EST, LEVL IV, 30-39 MIN: ICD-10-PCS | Mod: S$GLB,,, | Performed by: INTERNAL MEDICINE

## 2020-08-05 PROCEDURE — 3008F BODY MASS INDEX DOCD: CPT | Mod: CPTII,S$GLB,, | Performed by: INTERNAL MEDICINE

## 2020-08-05 PROCEDURE — 99999 PR PBB SHADOW E&M-EST. PATIENT-LVL V: CPT | Mod: PBBFAC,,, | Performed by: INTERNAL MEDICINE

## 2020-08-05 PROCEDURE — 99999 PR PBB SHADOW E&M-EST. PATIENT-LVL V: ICD-10-PCS | Mod: PBBFAC,,, | Performed by: INTERNAL MEDICINE

## 2020-08-05 PROCEDURE — 3008F PR BODY MASS INDEX (BMI) DOCUMENTED: ICD-10-PCS | Mod: CPTII,S$GLB,, | Performed by: INTERNAL MEDICINE

## 2020-08-05 PROCEDURE — 99214 OFFICE O/P EST MOD 30 MIN: CPT | Mod: S$GLB,,, | Performed by: INTERNAL MEDICINE

## 2020-08-05 NOTE — PROGRESS NOTES
Subjective:      Patient ID: Alma Mistry is a 59 y.o. female.    Chief Complaint: Swollen lymph node    HPI     60 yo with   Patient Active Problem List   Diagnosis    Pernicious anemia    History of iron deficiency anemia    Overweight    Lichen sclerosus    Panic disorder    Polyneuropathy    RLS (restless legs syndrome)    Neuropathic pain    Arthralgia    Severe episode of recurrent major depressive disorder, without psychotic features    Generalized anxiety disorder with panic attacks    Complicated bereavement    Neuropathy of left superficial peroneal nerve    Iron deficiency anemia due to chronic blood loss     Past Medical History:   Diagnosis Date    Atrophic gastritis     B12 deficiency     Breast lump on left side at 3 o'clock position 03/25/2015    Fibrocystic breast     Lichen sclerosus 08/16/2012     Here today reporting advised by her gastroenterologist to see primary care for right cervical lymphadenopathy.  Not recognized by patient.  There is no pain or tenderness.  There has been no trauma to the area.  She reports that she has had dry eyes for years but she has had increasingly dry mouth over the last several weeks.  She is not currently taking any antihistamines.  No new medications.  She also reports some oropharyngeal dysphagia with solids.  Having to cut her food into small pieces.  No difficulty with liquids.    Last week had intestinal biopsy.     Fatigue, weakness, headaches resolved since iron infusion. Up to date with heme/onc. .     Review of Systems   Constitutional: Negative for activity change and unexpected weight change.   HENT: Positive for trouble swallowing. Negative for hearing loss and rhinorrhea.    Eyes: Negative for discharge and visual disturbance.   Respiratory: Negative for chest tightness and wheezing.    Cardiovascular: Negative for chest pain and palpitations.   Gastrointestinal: Negative for blood in stool, constipation, diarrhea and vomiting.    Endocrine: Negative for polydipsia and polyuria.   Genitourinary: Negative for difficulty urinating, dysuria, hematuria and menstrual problem.   Musculoskeletal: Positive for arthralgias. Negative for joint swelling and neck pain.   Neurological: Negative for weakness and headaches.   Psychiatric/Behavioral: Negative for confusion and dysphoric mood.     Objective:   /66 (BP Location: Left arm, Patient Position: Sitting, BP Method: Medium (Manual))   Pulse 78   Temp 96.5 °F (35.8 °C) (Tympanic)   Wt 57.9 kg (127 lb 10.3 oz)   SpO2 96%   BMI 24.93 kg/m²     Physical Exam  Constitutional:       General: She is not in acute distress.     Appearance: She is well-developed.   HENT:      Head: Normocephalic and atraumatic.      Right Ear: External ear normal.      Left Ear: External ear normal.      Mouth/Throat:      Mouth: Mucous membranes are moist. No oral lesions.   Eyes:      Pupils: Pupils are equal, round, and reactive to light.   Neck:      Musculoskeletal: Neck supple.      Thyroid: No thyromegaly.     Cardiovascular:      Rate and Rhythm: Normal rate and regular rhythm.   Pulmonary:      Effort: Pulmonary effort is normal.      Breath sounds: Normal breath sounds. No wheezing or rales.   Abdominal:      General: Bowel sounds are normal.      Palpations: Abdomen is soft.      Tenderness: There is no abdominal tenderness.   Lymphadenopathy:      Head:      Right side of head: No submental, submandibular, preauricular, posterior auricular or occipital adenopathy.      Left side of head: No submental, submandibular, preauricular, posterior auricular or occipital adenopathy.      Upper Body:      Right upper body: No supraclavicular, axillary or epitrochlear adenopathy.      Left upper body: No supraclavicular, axillary or epitrochlear adenopathy.   Skin:     General: Skin is warm and dry.   Neurological:      Mental Status: She is alert and oriented to person, place, and time.   Psychiatric:          Behavior: Behavior normal.         Assessment:     1. Dry eyes    2. Dry mouth    3. Lymphadenopathy, cervical    4. Oropharyngeal dysphagia      Plan:   Dry eyes  -     ANTI-SSB ANTIBODY; Future; Expected date: 08/05/2020  -     ANTI -SSA ANTIBODY; Future; Expected date: 08/05/2020  -     Rheumatoid factor; Future; Expected date: 08/05/2020  -     NGOZI Screen w/Reflex; Future; Expected date: 08/05/2020    Dry mouth  -     ANTI-SSB ANTIBODY; Future; Expected date: 08/05/2020  -     ANTI -SSA ANTIBODY; Future; Expected date: 08/05/2020  -     Rheumatoid factor; Future; Expected date: 08/05/2020  -     NGOZI Screen w/Reflex; Future; Expected date: 08/05/2020    Lymphadenopathy, cervical  -     US Soft Tissue Head Neck Thyroid; Future; Expected date: 08/05/2020  -     Sedimentation rate; Future; Expected date: 08/05/2020  -     C-Reactive Protein; Future; Expected date: 08/05/2020    Oropharyngeal dysphagia  -     Ambulatory referral/consult to ENT; Future; Expected date: 08/12/2020        Lab Frequency Next Occurrence   Ambulatory referral/consult to Orthopedics Once 11/08/2019   US Abdomen Limited Once 05/14/2020   Ferritin Once 05/18/2020   CBC auto differential Once 05/18/2020   Iron and TIBC Once 05/18/2020   MMA Once 05/18/2020   Sjogrens syndrome-A extractable nuclear antibody     Sjogrens syndrome-B extractable nuclear antibody     NGOZI Screen w/Reflex         Problem List Items Addressed This Visit     None      Visit Diagnoses     Dry eyes    -  Primary    Relevant Orders    ANTI-SSB ANTIBODY    ANTI -SSA ANTIBODY    Rheumatoid factor    NGOZI Screen w/Reflex    Dry mouth        Relevant Orders    ANTI-SSB ANTIBODY    ANTI -SSA ANTIBODY    Rheumatoid factor    NGOZI Screen w/Reflex    Lymphadenopathy, cervical        Relevant Orders    US Soft Tissue Head Neck Thyroid    Sedimentation rate    C-Reactive Protein    Oropharyngeal dysphagia        Relevant Orders    Ambulatory referral/consult to ENT          No  follow-ups on file.

## 2020-08-06 ENCOUNTER — LAB VISIT (OUTPATIENT)
Dept: LAB | Facility: HOSPITAL | Age: 60
End: 2020-08-06
Attending: INTERNAL MEDICINE
Payer: COMMERCIAL

## 2020-08-06 DIAGNOSIS — D50.0 IRON DEFICIENCY ANEMIA DUE TO CHRONIC BLOOD LOSS: ICD-10-CM

## 2020-08-06 DIAGNOSIS — Z80.6 FAMILY HISTORY OF LEUKEMIA: ICD-10-CM

## 2020-08-06 DIAGNOSIS — E61.1 IRON DEFICIENCY: ICD-10-CM

## 2020-08-06 DIAGNOSIS — D51.0 PERNICIOUS ANEMIA: Chronic | ICD-10-CM

## 2020-08-06 DIAGNOSIS — H04.123 DRY EYES: ICD-10-CM

## 2020-08-06 DIAGNOSIS — R59.0 LYMPHADENOPATHY, CERVICAL: ICD-10-CM

## 2020-08-06 DIAGNOSIS — R68.2 DRY MOUTH: ICD-10-CM

## 2020-08-06 LAB
BASOPHILS # BLD AUTO: 0.05 K/UL (ref 0–0.2)
BASOPHILS NFR BLD: 0.8 % (ref 0–1.9)
CRP SERPL-MCNC: 0.8 MG/L (ref 0–8.2)
DIFFERENTIAL METHOD: ABNORMAL
EOSINOPHIL # BLD AUTO: 0.1 K/UL (ref 0–0.5)
EOSINOPHIL NFR BLD: 1.1 % (ref 0–8)
ERYTHROCYTE [DISTWIDTH] IN BLOOD BY AUTOMATED COUNT: 13.5 % (ref 11.5–14.5)
ERYTHROCYTE [SEDIMENTATION RATE] IN BLOOD BY WESTERGREN METHOD: 10 MM/HR (ref 0–20)
HCT VFR BLD AUTO: 35.7 % (ref 37–48.5)
HGB BLD-MCNC: 12.1 G/DL (ref 12–16)
IMM GRANULOCYTES # BLD AUTO: 0.03 K/UL (ref 0–0.04)
IMM GRANULOCYTES NFR BLD AUTO: 0.5 % (ref 0–0.5)
IRON SERPL-MCNC: 72 UG/DL (ref 30–160)
LYMPHOCYTES # BLD AUTO: 1.6 K/UL (ref 1–4.8)
LYMPHOCYTES NFR BLD: 25.1 % (ref 18–48)
MCH RBC QN AUTO: 28.4 PG (ref 27–31)
MCHC RBC AUTO-ENTMCNC: 33.9 G/DL (ref 32–36)
MCV RBC AUTO: 84 FL (ref 82–98)
MONOCYTES # BLD AUTO: 0.5 K/UL (ref 0.3–1)
MONOCYTES NFR BLD: 7.2 % (ref 4–15)
NEUTROPHILS # BLD AUTO: 4.3 K/UL (ref 1.8–7.7)
NEUTROPHILS NFR BLD: 65.3 % (ref 38–73)
NRBC BLD-RTO: 0 /100 WBC
PLATELET # BLD AUTO: 279 K/UL (ref 150–350)
PMV BLD AUTO: 9.7 FL (ref 9.2–12.9)
RBC # BLD AUTO: 4.26 M/UL (ref 4–5.4)
SATURATED IRON: 25 % (ref 20–50)
TOTAL IRON BINDING CAPACITY: 293 UG/DL (ref 250–450)
TRANSFERRIN SERPL-MCNC: 198 MG/DL (ref 200–375)
WBC # BLD AUTO: 6.54 K/UL (ref 3.9–12.7)

## 2020-08-06 PROCEDURE — 86039 ANTINUCLEAR ANTIBODIES (ANA): CPT

## 2020-08-06 PROCEDURE — 86235 NUCLEAR ANTIGEN ANTIBODY: CPT | Mod: 59

## 2020-08-06 PROCEDURE — 36415 COLL VENOUS BLD VENIPUNCTURE: CPT

## 2020-08-06 PROCEDURE — 86038 ANTINUCLEAR ANTIBODIES: CPT

## 2020-08-06 PROCEDURE — 86225 DNA ANTIBODY NATIVE: CPT | Mod: 59

## 2020-08-06 PROCEDURE — 85025 COMPLETE CBC W/AUTO DIFF WBC: CPT

## 2020-08-06 PROCEDURE — 83540 ASSAY OF IRON: CPT

## 2020-08-06 PROCEDURE — 86235 NUCLEAR ANTIGEN ANTIBODY: CPT

## 2020-08-06 PROCEDURE — 85651 RBC SED RATE NONAUTOMATED: CPT

## 2020-08-06 PROCEDURE — 86140 C-REACTIVE PROTEIN: CPT

## 2020-08-06 PROCEDURE — 82728 ASSAY OF FERRITIN: CPT

## 2020-08-06 PROCEDURE — 86431 RHEUMATOID FACTOR QUANT: CPT

## 2020-08-07 LAB
FERRITIN SERPL-MCNC: 321 NG/ML (ref 20–300)
RHEUMATOID FACT SERPL-ACNC: <10 IU/ML (ref 0–15)

## 2020-08-08 LAB
ANA PATTERN 1: NORMAL
ANA SER QL IF: POSITIVE
ANA TITR SER IF: NORMAL {TITER}
ANTI-SSA ANTIBODY: 0.08 RATIO (ref 0–0.99)
ANTI-SSA INTERPRETATION: NEGATIVE
ANTI-SSB ANTIBODY: 0.08 RATIO (ref 0–0.99)
ANTI-SSB INTERPRETATION: NEGATIVE

## 2020-08-13 ENCOUNTER — PATIENT MESSAGE (OUTPATIENT)
Dept: INTERNAL MEDICINE | Facility: CLINIC | Age: 60
End: 2020-08-13

## 2020-08-13 LAB
ANTI SM ANTIBODY: 0.09 RATIO (ref 0–0.99)
ANTI SM/RNP ANTIBODY: 0.12 RATIO (ref 0–0.99)
ANTI-SM INTERPRETATION: NEGATIVE
ANTI-SM/RNP INTERPRETATION: NEGATIVE
ANTI-SSA ANTIBODY: 0.08 RATIO (ref 0–0.99)
ANTI-SSA INTERPRETATION: NEGATIVE
ANTI-SSB ANTIBODY: 0.08 RATIO (ref 0–0.99)
ANTI-SSB INTERPRETATION: NEGATIVE
DNA TITER: NORMAL
DSDNA AB SER-ACNC: POSITIVE [IU]/ML

## 2020-08-14 ENCOUNTER — PATIENT MESSAGE (OUTPATIENT)
Dept: RHEUMATOLOGY | Facility: CLINIC | Age: 60
End: 2020-08-14

## 2020-08-14 NOTE — TELEPHONE ENCOUNTER
NGOZI result is similar to previous testing however this time ds DNA is also positive and should discuss further with her rheumatologist.  Dr. Downs requested a follow-up visit with her in April of 2020.  She needs to reschedule this appointment.

## 2020-08-25 ENCOUNTER — OFFICE VISIT (OUTPATIENT)
Dept: RHEUMATOLOGY | Facility: CLINIC | Age: 60
End: 2020-08-25
Payer: COMMERCIAL

## 2020-08-25 DIAGNOSIS — R76.8 POSITIVE ANA (ANTINUCLEAR ANTIBODY): Primary | ICD-10-CM

## 2020-08-25 PROCEDURE — 99214 PR OFFICE/OUTPT VISIT, EST, LEVL IV, 30-39 MIN: ICD-10-PCS | Mod: 95,,, | Performed by: INTERNAL MEDICINE

## 2020-08-25 PROCEDURE — 99214 OFFICE O/P EST MOD 30 MIN: CPT | Mod: 95,,, | Performed by: INTERNAL MEDICINE

## 2020-08-25 RX ORDER — HYDROXYCHLOROQUINE SULFATE 200 MG/1
200 TABLET, FILM COATED ORAL 2 TIMES DAILY
Qty: 60 TABLET | Refills: 3 | Status: SHIPPED | OUTPATIENT
Start: 2020-08-25 | End: 2020-09-24

## 2020-08-25 NOTE — PATIENT INSTRUCTIONS
Sjögren Syndrome  Sjögren syndrome is an autoimmune health problem. Its a disease where the bodys immune system attacks its own cells and tissues. With Sjögren syndrome, white blood cells fight the glands that make moisture in the body and primarily attack the tear glands and salivary glands. It most often affects women over age 40.  Types of Sjögren syndrome  Sjögren syndrome has 2 types:  · Primary Sjögren. This is Sjögren syndrome that happens by itself, with no other disease or illness. About half of Sjögren syndrome cases are primary Sjögren.  · Secondary Sjögren. This is Sjögren syndrome that happens along with another disease. It most often happens along with other autoimmune health problems. These may be scleroderma, lupus, or rheumatoid arthritis (RA).  What causes Sjögren syndrome?  Researchers are still learning about the cause of Sjögren syndrome. It may be caused by a combination of genes and things in the environment. For example, a virus may trigger the syndrome in a person with a certain gene. You may be more at risk for Sjögren if you have a rheumatic disease. Examples are lupus and RA.  Symptoms of Sjögren syndrome  Symptoms can range from mild to severe. Severe symptoms can affect quality of life. The symptoms can include:  · Dry mouth, which can lead to trouble with talking, chewing, or swallowing  · Dry eyes that can have a gritty or burning feeling  · Dry, peeling lips  · Pain or cracking on the tongue  · Dry or sore throat  · Tooth decay  · Dry skin  · Vaginal dryness  · Dry nose  · Changes in how well you taste or smell  · Tiredness  · Joint pain  · Digestive problems  Diagnosing Sjögren syndrome  Diagnosis may be done by a rheumatologist, primary healthcare provider, or other specialist. A rheumatologist is a healthcare provider who treats rheumatic diseases. These are complex health problems that affect many parts of the body.  Sjögren syndrome is often hard to diagnose. Thats because  the symptoms can be like those of other health problems. Similar symptoms can be caused by chronic fatigue syndrome (CFS), fibromyalgia, lupus, RA, or multiple sclerosis (MS).  A point-based test is used to see if your symptoms may be from Sjögren syndrome. The more points you have, the more likely it is that you have the disease. You may also have blood tests, eye tests, and dental tests. These are done to take a closer look at eye and mouth symptoms.  Treatment for Sjögren syndrome  There is no cure for Sjögren syndrome. Treatment is done to help ease symptoms.  Eye and mouth symptoms may be treated with over-the-counter (OTC) eye and mouth drops. Your healthcare provider may order stronger medicine if OTC versions dont help. You may also use pain medicine. Punctal occlusion can also be used to decrease ocular dryness. This is a procedure to plug the tear ducts that drain tears from the eyes.   If your symptoms affect your whole body, you will be treated with special medicines. These are called immunosuppressive medicines. They are used to treat autoimmune health problems. Your healthcare provider will tell you more about the risks, benefits, and side effects of these medicines.  Living with Sjögren syndrome  Things you eat and drink may make symptoms of Sjögren syndrome worse. You may want to:  · Not eat foods that are spicy, hard, crunchy, or acidic  · Eat more smooth, soft, and creamy foods such as soups, casseroles, and pasta dishes  · Not eat gluten if you also have celiac disease  · Not drink alcohol  · Eat more foods with omega-3 fatty acids  · Not drink carbonated or acidic drinks  · Drink water to help with dry mouth   You can soothe dry eyes by:  · Putting moist, warm compresses on your eyes  · Using eye lubricants every day  · Using prescription eye gel when you sleep  · Not taking medicines that can dry your eyes, such as antihistamines  · Not sitting near air conditioning or heating vents  · Using a  humidifier at home  Mouth dryness can lead to cavities. You can help prevent cavities by:  · Using products that can help create mouth moisture  · Chewing sugarless gum  · Brushing your teeth after each meal  · Flossing your teeth every day  · Getting dental checkups regularly  · Sugar-free lemon candies can help to stimulate saliva production   Date Last Reviewed: 2/3/2016  © 3743-8893 [employer]. [address] [place] [address], [place] [address]. All rights reserved. This information is not intended as a substitute for professional medical care. Always follow your healthcare professional's instructions.        Hydroxychloroquine tablets  What is this medicine?  HYDROXYCHLOROQUINE (diana drox ee KLOR oh kwin) is used to treat rheumatoid arthritis and systemic lupus erythematosus. It is also used to treat malaria.  How should I use this medicine?  Take this medicine by mouth with a glass of water. Follow the directions on the prescription label. If this medicine upsets your stomach take it with food or milk. Take your doses at regular intervals. Do not take your medicine more often than directed.  Talk to your pediatrician regarding the use of this medicine in children. Special care may be needed.  What side effects may I notice from receiving this medicine?  Side effects that you should report to your doctor or health care professional as soon as possible:  · allergic reactions like skin rash, itching or hives, swelling of the face, lips, or tongue  · change in vision  · fever, infection  · hearing loss or ringing  · muscle weakness, tremor, or numbness  · redness, blistering, peeling or loosening of the skin, including inside the mouth  · seizures  · unusual bleeding or bruising  · unusually weak or tired  Side effects that usually do not require medical attention (report to your doctor or health care professional if they continue or are bothersome):  · change in coloration of the mouth or  skin  · dizziness  · hair loss, lightening  · headache  · irritability, nervousness, nightmares  · loss of appetite  · stomach upset, diarrhea  What may interact with this medicine?  · antacids  · botulinum toxins  · digoxin  · kaolin  · penicillamine  What if I miss a dose?  If you miss a dose, take it as soon as you can. If it is almost time for your next dose, take only that dose. Do not take double or extra doses.  Where should I keep my medicine?  Keep out of the reach of children. In children, this medicine can cause overdose with small doses.  Store at room temperature between 15 and 30 degrees C (59 and 86 degrees F). Protect from moisture and light. Throw away any unused medicine after the expiration date.  What should I tell my health care provider before I take this medicine?  They need to know if you have any of these conditions:  · alcoholism  · anemia or other blood disorder  · eye disease  · glucose 6-phosphate dehydrogenase (G6PD) deficiency  · liver disease  · porphyria  · psoriasis  · an unusual or allergic reaction to chloroquine, hydroxychloroquine, other medicines, foods, dyes, or preservatives  · pregnant or trying to get pregnant  · breast-feeding  What should I watch for while using this medicine?  Visit your doctor or health care professional for regular check ups. Tell your doctor if your symptoms do not improve. Arthritis symptoms may take several weeks to improve. If you are taking this medicine for a long time, you will need important blood work done. You will also need to have your eyes checked as directed.  This medicine can make you more sensitive to the sun. Keep out of the sun. If you cannot avoid being in the sun, wear protective clothing and use sunscreen. Do not use sun lamps or tanning beds/booths.  Avoid antacids and kaolin containing products for 2 hours before and after taking a dose of this medicine.  NOTE:This sheet is a summary. It may not cover all possible information.  If you have questions about this medicine, talk to your doctor, pharmacist, or health care provider. Copyright© 2017 [employer]

## 2020-08-25 NOTE — PROGRESS NOTES
The patient location is: [place]  The chief complaint leading to consultation is:  Abnormal lab results  Visit type: audiovisual  Face to Face time with patient: 14  25 minutes of total time spent on the encounter, which includes face to face time and non-face to face time preparing to see the patient (eg, review of tests), Obtaining and/or reviewing separately obtained history, Documenting clinical information in the electronic or other health record, Independently interpreting results (not separately reported) and communicating results to the patient/family/caregiver, or Care coordination (not separately reported).   Each patient to whom he or she provides medical services by telemedicine is:  (1) informed of the relationship between the physician and patient and the respective role of any other health care provider with respect to management of the patient; and (2) notified that he or she may decline to receive medical services by telemedicine and may withdraw from such care at any time.    RHEUMATOLOGY OUTPATIENT CLINIC NOTE    8/25/2020    Attending Rheumatologist: Lang Hernández  Primary Care Provider: Zheng Orozco MD   Physician Requesting Consultation: No referring provider defined for this encounter.  Chief Complaint/Reason For Consultation:  Abnormal lab results    Subjective:       HIRAM Mistry is a 59 y.o. White female with positive NGOZI and chronic pain for follow-up visit.    Today  Last seen on November.  Recommendations given for chronic pain with neuropathic features.  Recommend to follow with Dermatology for history of rash.  Worsening rash despite topical therapy for dermatitis.  Main complaint of chronic arthralgias unchanged since last visit, unable to select 1 area of most concern.  Refers worsening oral sicca symptoms.  Denies fever, history of sudden symptoms,  or GI complaints.    Review of Systems   Constitutional: Negative for chills, fever and malaise/fatigue.   Eyes: Negative for  pain and redness.   Respiratory: Negative for cough, hemoptysis and shortness of breath.    Cardiovascular: Negative for chest pain and leg swelling.   Gastrointestinal: Negative for abdominal pain, blood in stool and melena.   Genitourinary: Negative for dysuria and hematuria.   Musculoskeletal: Positive for joint pain (Generalized arthralgias, worst on knees.  Prominent DJD features). Negative for falls.   Skin: Positive for rash (Pruritic rash perioral area.).        Describes intermittent photosensitivity   Neurological: Positive for tingling (Right hand left foot.  Chronic, positional, and intermittent.). Negative for focal weakness.   Psychiatric/Behavioral: Negative for memory loss. The patient does not have insomnia.      Chronic comorbid conditions affecting medical decision making today:  Past Medical History:   Diagnosis Date    Atrophic gastritis     B12 deficiency     Breast lump on left side at 3 o'clock position 03/25/2015    Fibrocystic breast     Lichen sclerosus 08/16/2012   · Anxiety/depression  · Panic attacks  · Restless leg syndrome  · History of iron deficiency anemia  · Chronic paresthesias    Past Surgical History:   Procedure Laterality Date    BREAST BIOPSY Right 2009    benign    COLONOSCOPY  09/10/2010    COLONOSCOPY N/A 6/4/2020    Procedure: COLONOSCOPY;  Surgeon: Almas Perez MD;  Location: [facility];  Service: Endoscopy;  Laterality: N/A;    ESOPHAGOGASTRODUODENOSCOPY  09/10/2010    ESOPHAGOGASTRODUODENOSCOPY N/A 6/4/2020    Procedure: ESOPHAGOGASTRODUODENOSCOPY (EGD);  Surgeon: Almas Perez MD;  Location: [facility];  Service: Endoscopy;  Laterality: N/A;    ESOPHAGOGASTRODUODENOSCOPY N/A 7/28/2020    Procedure: EGD (ESOPHAGOGASTRODUODENOSCOPY);  Surgeon: Almas Perez MD;  Location: [facility];  Service: Endoscopy;  Laterality: N/A;    HYSTERECTOMY  2013    TONSILLECTOMY       Family History   Problem Relation Age of Onset     Depression Mother     Nephritis Mother     Hyperthyroidism Mother     Hypotension Mother     Anemia Mother     Asthma Father     Thyroid disease Sister     Depression Sister     Leukemia Brother     Cirrhosis Brother     Multiple sclerosis Brother      Social History     Substance and Sexual Activity   Alcohol Use Not Currently    Frequency: 2-4 times a month    Drinks per session: Patient refused    Binge frequency: Never     Social History     Tobacco Use   Smoking Status Never Smoker   Smokeless Tobacco Never Used     Social History     Substance and Sexual Activity   Drug Use No       Current Outpatient Medications:     aspirin-acetaminophen-caffeine 250-250-65 mg (EXCEDRIN MIGRAINE) 250-250-65 mg per tablet, Take 1 tablet by mouth as needed for Pain., Disp: , Rfl:     CALCIUM CIT/MGOX/VIT D3/B6/MIN (CITRACAL PLUS ORAL), Take 1 tablet by mouth as needed. , Disp: , Rfl:     CHOLECALCIFEROL, VITAMIN D3, (VITAMIN D3 ORAL), Take 1,000 Units by mouth daily as needed. , Disp: , Rfl:     clobetasol 0.05% (TEMOVATE) 0.05 % Oint, Apply topically as needed. , Disp: , Rfl:     cyanocobalamin 1,000 mcg/mL injection, INJECT 1 ML INTRAMUSCULARLY EVERY 28 DAYS, Disp: 10 mL, Rfl: 0    diclofenac sodium (VOLTAREN) 1 % Gel, Apply 2 g topically 4 (four) times daily., Disp: 1 g, Rfl: 2    DULoxetine (CYMBALTA) 60 MG capsule, Take 1 capsule (60 mg total) by mouth once daily., Disp: 30 capsule, Rfl: 11    gabapentin (NEURONTIN) 100 MG capsule, Take 3 capsules (300 mg total) by mouth every evening. (Patient taking differently: Take 300 mg by mouth as needed. ), Disp: 90 capsule, Rfl: 11    hydrOXYchloroQUINE (PLAQUENIL) 200 mg tablet, Take 1 tablet (200 mg total) by mouth 2 (two) times daily., Disp: 60 tablet, Rfl: 3    LORazepam (ATIVAN) 0.5 MG tablet, Take 1 tablet (0.5 mg total) by mouth every 6 (six) hours as needed for Anxiety., Disp: 20 tablet, Rfl: 3    magnesium 30 mg Tab, Take 1 tablet by mouth once.,  Disp: , Rfl:     multivitamin (ONE DAILY MULTIVITAMIN) per tablet, Take 1 tablet by mouth once daily., Disp: , Rfl:      Objective:         There were no vitals filed for this visit.  Physical Exam   Constitutional: No distress.   Estimated body mass index is 24.93 kg/m² as calculated from the following:    Height as of 7/28/20: 5' (1.524 m).    Weight as of 8/5/20: 57.9 kg (127 lb 10.3 oz).    Wt Readings from Last 1 Encounters:  08/05/20 1029 : 57.9 kg (127 lb 10.3 oz)     HENT:   Head: Normocephalic and atraumatic.   Eyes: Conjunctivae are normal.   Neck: Normal range of motion.   Pulmonary/Chest: Effort normal. No respiratory distress.   Neurological: She is alert. Gait normal.   Skin: No rash noted. No erythema.     Musculoskeletal: Normal range of motion.      Comments: :  Able to make full fists without particular difficulty.  No synovitis appreciated       Reviewed old and all outside pertinent medical records available.    All lab results personally reviewed and interpreted by me.  Lab Results   Component Value Date    WBC 6.54 08/06/2020    HGB 12.1 08/06/2020    HCT 35.7 (L) 08/06/2020    MCV 84 08/06/2020    MCH 28.4 08/06/2020    MCHC 33.9 08/06/2020    RDW 13.5 08/06/2020     08/06/2020    MPV 9.7 08/06/2020       Lab Results   Component Value Date     04/11/2019    K 4.6 04/11/2019     04/11/2019    CO2 22 (L) 04/11/2019    GLU 73 04/11/2019    BUN 11 04/11/2019    CALCIUM 9.9 04/11/2019    PROT 7.5 10/12/2018    ALBUMIN 4.2 10/12/2018    BILITOT 0.5 10/12/2018    AST 34 10/12/2018    ALKPHOS 89 10/12/2018    ALT 28 10/12/2018       No results found for: COLORU, APPEARANCEUA, SPECGRAV, PHUR, PROTEINUA, GLUCOSEU, KETONESU, BLOODU, LEUKOCYTESUR, NITRITE, UROBILINOGEN    Lab Results   Component Value Date    CRP 0.8 08/06/2020       Lab Results   Component Value Date    SEDRATE 10 08/06/2020       Lab Results   Component Value Date    RF <10.0 08/06/2020    SEDRATE 10 08/06/2020        No components found for: 25OHVITDTOT, 95RPXDJR7, 82QRWQQS2, METHODNOTE    Lab Results   Component Value Date    URICACID 5.6 01/16/2019       No components found for: TSPOTTB    · TSH within normal range    Rheum Labs:  NGOZI 1 in 160 speckled pattern-> 1 in 320 speckled  ZOË negative-> dsDNA 1:10, rest of panel negative  SSA, SSB negative     Infectious Labs:  Hepatitis-C virus nonreactive September 2017   Lyme antibody negative    Imaging:  All imaging reviewed and independently  interpreted by me.    X-ray knee February 2018  equivocal minimal joint space narrowing involving the medial compartment of either knee.  No acute fracture or dislocation.  There is minimal lateral subluxation of either patella on the sunrise view.  Very minimal osteophyte formation associated with the superior pole of the left patella.  No joint effusion.    X-ray C-spine February 2019  The vertebral bodies demonstrate a normal height and alignment.  Moderate disc space narrowing and minimal spondylosis present at the C5-6 and C6-7 levels.  No significant facet arthropathy identified.  The C1-C2 articulation is within normal limits.    EMG / nerve conduction study January 2019  no definitive electrophysiologic evidence of large fiber polyneuropathy.     ASSESSMENT / PLAN:     Alma Mistry is a 59 y.o. White female with:    1. Positive NGOZI  - family history of SLE and APS.  Chronic arthralgias and sicca symptoms, which appear to be worsening.  - on chronic pharmacotherapy for mental health which could increase sicca symptoms  - repeat ZOË with double-stranded DNA 1 in 10.  - personal history of Lichen sclerosus and Dermatitis, on topical therapy per Dermatology.  - remains without generalized synovitis, serositis, fever, or significant cytopenias  - follow with Dermatology, consider for biopsy to assess for interface dermatitis  - awaiting ultrasound soft tissue neck for history of lymphadenopathy, also provided with ENT referral  -  Ocular: Eye drops w/o polyvinyl alcohol and/or vasoconstrictors (Visine).   - Oral: Good dental care, sugar-free gum, lemon drops, saliva substitute, hydration  - Arthralgias: NSAIDs p.r.n. short course.  Trial of Plaquenil per body weight.  - Counseled on reducing caffeine intake and smoking. Limit time at computer and turn off ceiling fans. Eliminate offending medications.  - CBC, CMP  - U/A, Protein:Cr ratio   - C3, C4, dsDNA  - NGOZI, ZOË panel  - APS panel     2. Other specified counseling  - Immunization counseling done.  - malignancy screening.  Weight loss counseling done.  - Limitation of alcohol consumption.  - Regular exercise:  Low impact, aerobic and resistance.  - Medication counseling provided.    RTC 2 months    Method of contact with patient concerns: [facility]    Disclaimer:  This note is prepared using voice recognition software and as such is likely to have errors and has not been proof read. Please contact me for questions.     Lang Hernández M.D.  Rheumatology Department   [facility]

## 2020-08-31 ENCOUNTER — TELEPHONE (OUTPATIENT)
Dept: PSYCHIATRY | Facility: CLINIC | Age: 60
End: 2020-08-31

## 2020-08-31 ENCOUNTER — PATIENT OUTREACH (OUTPATIENT)
Dept: OTHER | Facility: OTHER | Age: 60
End: 2020-08-31

## 2020-09-01 DIAGNOSIS — Z13.79 GENETIC SCREENING: ICD-10-CM

## 2020-09-03 ENCOUNTER — TELEPHONE (OUTPATIENT)
Dept: HEMATOLOGY/ONCOLOGY | Facility: CLINIC | Age: 60
End: 2020-09-03

## 2020-09-03 NOTE — TELEPHONE ENCOUNTER
Attempted to reach the patient patient did not answer I gave her the information again about the virtual visit via voicemail. I ask that the patient return my phone call if she wishes to come in for her visit.

## 2020-09-04 ENCOUNTER — TELEPHONE (OUTPATIENT)
Dept: HEMATOLOGY/ONCOLOGY | Facility: CLINIC | Age: 60
End: 2020-09-04

## 2020-09-04 NOTE — TELEPHONE ENCOUNTER
Attempted to reach the patient in regards to her appt that she canceled on yesterday patient did answer again today. However she did send a portal message about an appt reschedule. Due to Dr. Sandoval being out on all next week I asked that she call me so that we can discuss a date the following week to come in.

## 2020-09-14 ENCOUNTER — PATIENT OUTREACH (OUTPATIENT)
Dept: ADMINISTRATIVE | Facility: OTHER | Age: 60
End: 2020-09-14

## 2020-09-14 ENCOUNTER — TELEPHONE (OUTPATIENT)
Dept: OTOLARYNGOLOGY | Facility: CLINIC | Age: 60
End: 2020-09-14

## 2020-09-14 DIAGNOSIS — R59.0 CERVICAL LYMPHADENOPATHY: Primary | ICD-10-CM

## 2020-09-14 NOTE — TELEPHONE ENCOUNTER
Called and left Voicemail for patient explaining that she needed to schedule her US before we can see her in the office.

## 2020-09-14 NOTE — TELEPHONE ENCOUNTER
Spoke with patient and got her scheduled tomorrow for her US and rescheduled her appt with Selina for Wednesday next week.

## 2020-09-14 NOTE — PROGRESS NOTES
Health Maintenance Due   Topic Date Due    Shingles Vaccine (1 of 2) 11/26/2012    Influenza Vaccine (1) 08/01/2020     Updates were requested from care everywhere.  Chart was reviewed for overdue Proactive [facility] Encounters ([facility]) topics (CRS, Breast Cancer Screening, Eye exam)  Health Maintenance has been updated.  LINKS immunization registry triggered.  Immunizations were reconciled.

## 2020-09-15 ENCOUNTER — HOSPITAL ENCOUNTER (OUTPATIENT)
Dept: RADIOLOGY | Facility: HOSPITAL | Age: 60
Discharge: HOME | End: 2020-09-15
Attending: PHYSICIAN ASSISTANT
Payer: COMMERCIAL

## 2020-09-15 ENCOUNTER — PATIENT MESSAGE (OUTPATIENT)
Dept: OTOLARYNGOLOGY | Facility: CLINIC | Age: 60
End: 2020-09-15

## 2020-09-15 DIAGNOSIS — R59.0 CERVICAL LYMPHADENOPATHY: ICD-10-CM

## 2020-09-15 PROCEDURE — 76536 US EXAM OF HEAD AND NECK: CPT | Mod: 26,,, | Performed by: RADIOLOGY

## 2020-09-15 PROCEDURE — 76536 US SOFT TISSUE HEAD NECK THYROID: ICD-10-PCS | Mod: 26,,, | Performed by: RADIOLOGY

## 2020-09-15 PROCEDURE — 76536 US EXAM OF HEAD AND NECK: CPT | Mod: TC

## 2020-09-16 ENCOUNTER — PATIENT MESSAGE (OUTPATIENT)
Dept: OTOLARYNGOLOGY | Facility: CLINIC | Age: 60
End: 2020-09-16

## 2020-09-16 ENCOUNTER — TELEPHONE (OUTPATIENT)
Dept: OTOLARYNGOLOGY | Facility: CLINIC | Age: 60
End: 2020-09-16

## 2020-09-16 DIAGNOSIS — Z01.818 PRE-PROCEDURAL EXAMINATION: Primary | ICD-10-CM

## 2020-09-16 NOTE — TELEPHONE ENCOUNTER
I called and spoke to Ms. Mistry regarding her normal u/s report. She states that her biggest compliant is difficulty swallowing. We discussed that she will need a scope evaluation. We will schedule for her to return to see Dr. Andrade.     Selina Pollack PA-C

## 2020-09-16 NOTE — TELEPHONE ENCOUNTER
Attempted return call to patient. Detailed message left advising of appointment details for Covid swab on Friday 09/25/20 at 9:50 am at the [place] [id number] as well as the appointment for Dr. Andrade for the scope exam on Monday 09/28/20 at 11:00 am at the [place] [address]. Advised if she will need to change or reschedule the scheduled appointments she may do so by contacting [phone].

## 2020-09-21 ENCOUNTER — OFFICE VISIT (OUTPATIENT)
Dept: HEMATOLOGY/ONCOLOGY | Facility: CLINIC | Age: 60
End: 2020-09-21
Payer: COMMERCIAL

## 2020-09-21 VITALS
TEMPERATURE: 99 F | HEIGHT: 60 IN | BODY MASS INDEX: 26.01 KG/M2 | OXYGEN SATURATION: 98 % | RESPIRATION RATE: 18 BRPM | SYSTOLIC BLOOD PRESSURE: 111 MMHG | HEART RATE: 64 BPM | DIASTOLIC BLOOD PRESSURE: 68 MMHG | WEIGHT: 132.5 LBS

## 2020-09-21 DIAGNOSIS — E61.1 IRON DEFICIENCY: Primary | ICD-10-CM

## 2020-09-21 PROCEDURE — 3008F PR BODY MASS INDEX (BMI) DOCUMENTED: ICD-10-PCS | Mod: CPTII,S$GLB,, | Performed by: INTERNAL MEDICINE

## 2020-09-21 PROCEDURE — 99213 PR OFFICE/OUTPT VISIT, EST, LEVL III, 20-29 MIN: ICD-10-PCS | Mod: S$GLB,,, | Performed by: INTERNAL MEDICINE

## 2020-09-21 PROCEDURE — 99999 PR PBB SHADOW E&M-EST. PATIENT-LVL IV: ICD-10-PCS | Mod: PBBFAC,,, | Performed by: INTERNAL MEDICINE

## 2020-09-21 PROCEDURE — 3008F BODY MASS INDEX DOCD: CPT | Mod: CPTII,S$GLB,, | Performed by: INTERNAL MEDICINE

## 2020-09-21 PROCEDURE — 99999 PR PBB SHADOW E&M-EST. PATIENT-LVL IV: CPT | Mod: PBBFAC,,, | Performed by: INTERNAL MEDICINE

## 2020-09-21 PROCEDURE — 99213 OFFICE O/P EST LOW 20 MIN: CPT | Mod: S$GLB,,, | Performed by: INTERNAL MEDICINE

## 2020-09-21 NOTE — PROGRESS NOTES
Subjective:       Patient ID: Alma Mistry is a 59 y.o. female.    Chief Complaint: Results and Anemia    HPI 59-year-old female recently diagnosed with celiac disease patient had history of iron deficiency.  Recommended to have video capsule but insurance declined returns for clinical follow-up    Past Medical History:   Diagnosis Date    Atrophic gastritis     B12 deficiency     Breast lump on left side at 3 o'clock position 03/25/2015    Fibrocystic breast     Lichen sclerosus 08/16/2012     Family History   Problem Relation Age of Onset    Depression Mother     Nephritis Mother     Hyperthyroidism Mother     Hypotension Mother     Anemia Mother     Asthma Father     Thyroid disease Sister     Depression Sister     Leukemia Brother     Cirrhosis Brother     Multiple sclerosis Brother      Social History     Socioeconomic History    Marital status:      Spouse name: Not on file    Number of children: Not on file    Years of education: Not on file    Highest education level: Not on file   Occupational History    Not on file   Social Needs    Financial resource strain: Not hard at all    Food insecurity     Worry: Never true     Inability: Never true    Transportation needs     Medical: No     Non-medical: No   Tobacco Use    Smoking status: Never Smoker    Smokeless tobacco: Never Used   Substance and Sexual Activity    Alcohol use: Not Currently     Frequency: 2-4 times a month     Drinks per session: Patient refused     Binge frequency: Never    Drug use: No    Sexual activity: Never   Lifestyle    Physical activity     Days per week: 5 days     Minutes per session: 60 min    Stress: Rather much   Relationships    Social connections     Talks on phone: More than three times a week     Gets together: Twice a week     Attends Gnosticism service: Not on file     Active member of club or organization: Yes     Attends meetings of clubs or organizations: More than 4 times per year      Relationship status:    Other Topics Concern    Not on file   Social History Narrative    1 dog, no smokers; Originally from [facility].     Past Surgical History:   Procedure Laterality Date    BREAST BIOPSY Right 2009    benign    COLONOSCOPY  09/10/2010    COLONOSCOPY N/A 6/4/2020    Procedure: COLONOSCOPY;  Surgeon: Almas Perez MD;  Location: [facility];  Service: Endoscopy;  Laterality: N/A;    ESOPHAGOGASTRODUODENOSCOPY  09/10/2010    ESOPHAGOGASTRODUODENOSCOPY N/A 6/4/2020    Procedure: ESOPHAGOGASTRODUODENOSCOPY (EGD);  Surgeon: Almas Perez MD;  Location: [facility];  Service: Endoscopy;  Laterality: N/A;    ESOPHAGOGASTRODUODENOSCOPY N/A 7/28/2020    Procedure: EGD (ESOPHAGOGASTRODUODENOSCOPY);  Surgeon: Almas Perez MD;  Location: [facility];  Service: Endoscopy;  Laterality: N/A;    HYSTERECTOMY  2013    TONSILLECTOMY         Labs:  Lab Results   Component Value Date    WBC 6.54 08/06/2020    HGB 12.1 08/06/2020    HCT 35.7 (L) 08/06/2020    MCV 84 08/06/2020     08/06/2020     BMP  Lab Results   Component Value Date     04/11/2019    K 4.6 04/11/2019     04/11/2019    CO2 22 (L) 04/11/2019    BUN 11 04/11/2019    CREATININE 0.8 04/11/2019    CALCIUM 9.9 04/11/2019    ANIONGAP 12 04/11/2019    ESTGFRAFRICA >60 04/11/2019    EGFRNONAA >60 04/11/2019     Lab Results   Component Value Date    ALT 28 10/12/2018    AST 34 10/12/2018    ALKPHOS 89 10/12/2018    BILITOT 0.5 10/12/2018       Lab Results   Component Value Date    IRON 72 08/06/2020    TIBC 293 08/06/2020    FERRITIN 321 (H) 08/06/2020     Lab Results   Component Value Date    ATICQLEJ62 292 05/11/2020     Lab Results   Component Value Date    FOLATE 12.8 10/12/2018     Lab Results   Component Value Date    TSH 0.750 05/22/2019         Review of Systems   Constitutional: Negative for activity change, appetite change, chills, diaphoresis, fatigue, fever and unexpected  weight change.   HENT: Negative for congestion, dental problem, drooling, ear discharge, ear pain, facial swelling, hearing loss, mouth sores, nosebleeds, postnasal drip, rhinorrhea, sinus pressure, sneezing, sore throat, tinnitus, trouble swallowing and voice change.    Eyes: Negative for photophobia, pain, discharge, redness, itching and visual disturbance.   Respiratory: Negative for cough, choking, chest tightness, shortness of breath, wheezing and stridor.    Cardiovascular: Negative for chest pain, palpitations and leg swelling.   Gastrointestinal: Negative for abdominal distention, abdominal pain, anal bleeding, blood in stool, constipation, diarrhea, nausea, rectal pain and vomiting.   Endocrine: Negative for cold intolerance, heat intolerance, polydipsia, polyphagia and polyuria.   Genitourinary: Negative for decreased urine volume, difficulty urinating, dyspareunia, dysuria, enuresis, flank pain, frequency, genital sores, hematuria, menstrual problem, pelvic pain, urgency, vaginal bleeding, vaginal discharge and vaginal pain.   Musculoskeletal: Negative for arthralgias, back pain, gait problem, joint swelling, myalgias, neck pain and neck stiffness.   Skin: Negative for color change, pallor and rash.   Allergic/Immunologic: Negative for environmental allergies, food allergies and immunocompromised state.   Neurological: Negative for dizziness, tremors, seizures, syncope, facial asymmetry, speech difficulty, weakness, light-headedness, numbness and headaches.   Hematological: Negative for adenopathy. Does not bruise/bleed easily.   Psychiatric/Behavioral: Positive for dysphoric mood. Negative for agitation, behavioral problems, confusion, decreased concentration, hallucinations, self-injury, sleep disturbance and suicidal ideas. The patient is nervous/anxious. The patient is not hyperactive.        Objective:      Physical Exam  Vitals signs reviewed.   Constitutional:       General: She is not in acute  distress.     Appearance: She is well-developed. She is not diaphoretic.   HENT:      Head: Normocephalic and atraumatic.      Right Ear: External ear normal.      Left Ear: External ear normal.      Nose: Nose normal.      Right Sinus: No maxillary sinus tenderness or frontal sinus tenderness.      Left Sinus: No maxillary sinus tenderness or frontal sinus tenderness.      Mouth/Throat:      Pharynx: No oropharyngeal exudate.   Eyes:      General: Lids are normal. No scleral icterus.        Right eye: No discharge.         Left eye: No discharge.      Conjunctiva/sclera: Conjunctivae normal.      Right eye: Right conjunctiva is not injected. No hemorrhage.     Left eye: Left conjunctiva is not injected. No hemorrhage.     Pupils: Pupils are equal, round, and reactive to light.   Neck:      Musculoskeletal: Normal range of motion and neck supple.      Thyroid: No thyromegaly.      Vascular: No JVD.      Trachea: No tracheal deviation.   Cardiovascular:      Rate and Rhythm: Normal rate.   Pulmonary:      Effort: Pulmonary effort is normal. No respiratory distress.      Breath sounds: No stridor.   Chest:      Chest wall: No tenderness.   Abdominal:      General: Bowel sounds are normal. There is no distension.      Palpations: Abdomen is soft. There is no hepatomegaly, splenomegaly or mass.      Tenderness: There is no abdominal tenderness. There is no rebound.   Musculoskeletal: Normal range of motion.         General: No tenderness.   Lymphadenopathy:      Cervical: No cervical adenopathy.      Upper Body:      Right upper body: No supraclavicular adenopathy.      Left upper body: No supraclavicular adenopathy.   Skin:     General: Skin is dry.      Findings: No erythema or rash.   Neurological:      Mental Status: She is alert and oriented to person, place, and time.      Cranial Nerves: No cranial nerve deficit.      Coordination: Coordination normal.   Psychiatric:         Behavior: Behavior normal.          Thought Content: Thought content normal.         Judgment: Judgment normal.             Assessment:      1. Iron deficiency           Plan:     Reviewed results of laboratory studies demonstrate iron repletion.  Will check CBC and iron status in 6 months if patient wishes to have done sooner will do so otherwise reassurance given discussed implications as above        Yan Sandoval Jr, MD FACP

## 2020-09-22 ENCOUNTER — PATIENT MESSAGE (OUTPATIENT)
Dept: DERMATOLOGY | Facility: CLINIC | Age: 60
End: 2020-09-22

## 2020-09-22 ENCOUNTER — PATIENT MESSAGE (OUTPATIENT)
Dept: INTERNAL MEDICINE | Facility: CLINIC | Age: 60
End: 2020-09-22

## 2020-09-24 ENCOUNTER — OFFICE VISIT (OUTPATIENT)
Dept: DERMATOLOGY | Facility: CLINIC | Age: 60
End: 2020-09-24
Payer: COMMERCIAL

## 2020-09-24 ENCOUNTER — OFFICE VISIT (OUTPATIENT)
Dept: PSYCHIATRY | Facility: CLINIC | Age: 60
End: 2020-09-24
Payer: COMMERCIAL

## 2020-09-24 DIAGNOSIS — L29.9 PRURITUS: Primary | ICD-10-CM

## 2020-09-24 DIAGNOSIS — L50.9 URTICARIA: ICD-10-CM

## 2020-09-24 DIAGNOSIS — F33.1 MODERATE EPISODE OF RECURRENT MAJOR DEPRESSIVE DISORDER: Primary | ICD-10-CM

## 2020-09-24 DIAGNOSIS — F41.1 GENERALIZED ANXIETY DISORDER WITH PANIC ATTACKS: ICD-10-CM

## 2020-09-24 DIAGNOSIS — F41.0 GENERALIZED ANXIETY DISORDER WITH PANIC ATTACKS: ICD-10-CM

## 2020-09-24 DIAGNOSIS — L90.0 LICHEN SCLEROSUS: ICD-10-CM

## 2020-09-24 PROCEDURE — 99214 OFFICE O/P EST MOD 30 MIN: CPT | Mod: S$GLB,,, | Performed by: STUDENT IN AN ORGANIZED HEALTH CARE EDUCATION/TRAINING PROGRAM

## 2020-09-24 PROCEDURE — 99999 PR PBB SHADOW E&M-EST. PATIENT-LVL II: ICD-10-PCS | Mod: PBBFAC,,, | Performed by: STUDENT IN AN ORGANIZED HEALTH CARE EDUCATION/TRAINING PROGRAM

## 2020-09-24 PROCEDURE — 99999 PR PBB SHADOW E&M-EST. PATIENT-LVL II: CPT | Mod: PBBFAC,,, | Performed by: STUDENT IN AN ORGANIZED HEALTH CARE EDUCATION/TRAINING PROGRAM

## 2020-09-24 PROCEDURE — 99214 PR OFFICE/OUTPT VISIT, EST, LEVL IV, 30-39 MIN: ICD-10-PCS | Mod: S$GLB,,, | Performed by: STUDENT IN AN ORGANIZED HEALTH CARE EDUCATION/TRAINING PROGRAM

## 2020-09-24 PROCEDURE — 99214 PR OFFICE/OUTPT VISIT, EST, LEVL IV, 30-39 MIN: ICD-10-PCS | Mod: S$GLB,,, | Performed by: PSYCHIATRY & NEUROLOGY

## 2020-09-24 PROCEDURE — 99214 OFFICE O/P EST MOD 30 MIN: CPT | Mod: S$GLB,,, | Performed by: PSYCHIATRY & NEUROLOGY

## 2020-09-24 RX ORDER — TRIAMCINOLONE ACETONIDE 1 MG/G
CREAM TOPICAL 2 TIMES DAILY
Qty: 30 G | Refills: 1 | Status: SHIPPED | OUTPATIENT
Start: 2020-09-24

## 2020-09-24 RX ORDER — CLOBETASOL PROPIONATE 0.5 MG/G
OINTMENT TOPICAL
Qty: 45 G | Refills: 2 | Status: SHIPPED | OUTPATIENT
Start: 2020-09-24

## 2020-09-24 NOTE — PROGRESS NOTES
Subjective:       Patient ID:  Alma Mistry is a 59 y.o. female who presents for   Chief Complaint   Patient presents with    Rash     sporadic, itching     History of Present Illness: The patient presents with chief complaint of rash.  Location: L face, left toe  Duration: 1 yr  Signs/Symptoms: itching, photosensitive  Prior treatments: currently on plaquenil    Ms. Marquez is a 58 yo F who presents with a complaint of a 1 year rash. Initially, it started when she was in [place] on her left upper arm. The rash gradually then progressed to bilateral AC fossa and face. She gets an intermittent rash when she get exposed to the sun. Over the past year, she has had many workup, including a +NGOZI, +dsDNA. She also reports dry eyes/dry mouth with improvement on oral Plaquenil. She currently sees Dr. Hernández for further workup of suspected autoimmune disorder.     She reports wearing daily sunscreen SPF>50.         Review of Systems   Skin: Positive for itching, rash and daily sunscreen use. Negative for recent sunburn.   Hematologic/Lymphatic: Bruises/bleeds easily.        Objective:    Physical Exam   Constitutional: She appears well-developed and well-nourished. No distress.   Neurological: She is alert and oriented to person, place, and time. She is not disoriented.   Psychiatric: She has a normal mood and affect.   Skin:   Areas Examined (abnormalities noted in diagram):   Head / Face Inspection Performed  Neck Inspection Performed  Chest / Axilla Inspection Performed  Back Inspection Performed  RUE Inspected  LUE Inspection Performed              Diagram Legend     Erythematous scaling macule/papule c/w actinic keratosis       Vascular papule c/w angioma      Pigmented verrucoid papule/plaque c/w seborrheic keratosis      Yellow umbilicated papule c/w sebaceous hyperplasia      Irregularly shaped tan macule c/w lentigo     1-2 mm smooth white papules consistent with Milia      Movable subcutaneous cyst with punctum c/w  epidermal inclusion cyst      Subcutaneous movable cyst c/w pilar cyst      Firm pink to brown papule c/w dermatofibroma      Pedunculated fleshy papule(s) c/w skin tag(s)      Evenly pigmented macule c/w junctional nevus     Mildly variegated pigmented, slightly irregular-bordered macule c/w mildly atypical nevus      Flesh colored to evenly pigmented papule c/w intradermal nevus       Pink pearly papule/plaque c/w basal cell carcinoma      Erythematous hyperkeratotic cursted plaque c/w SCC      Surgical scar with no sign of skin cancer recurrence      Open and closed comedones      Inflammatory papules and pustules      Verrucoid papule consistent consistent with wart     Erythematous eczematous patches and plaques     Dystrophic onycholytic nail with subungual debris c/w onychomycosis     Umbilicated papule    Erythematous-base heme-crusted tan verrucoid plaque consistent with inflamed seborrheic keratosis     Erythematous Silvery Scaling Plaque c/w Psoriasis     See annotation      Assessment / Plan:        Pruritus  -     triamcinolone acetonide 0.1% (KENALOG) 0.1 % cream; Apply topically 2 (two) times daily. Apply two weeks at a time prn itching.  Dispense: 30 g; Refill: 1    Urticaria  - Does not think this is sun-induced as it affects areas protected by the sun however does note that patient reports it gets worse when exposed to sun. Noted +NGOZI and +dsDNA; however facial rash does not appear to be c/w malar rash from cutaneous lupus. Also considered diagnosis of PMLE however she does not have clinical features of such. Patient had +dermatographism and develops hive-like lesions when she scratches her skin. At this time, suspect that this is pressure/heat/sun induced urticaria. Recommend topical benadryl. Also prescribing topical steroids for symptom relief. If she develops a sustained rash, patient is to call to come into clinic for biopsy.    Lichen sclerosus  - Per biopsy from previous OBGYN [place]  - Rx clobetasol ointment  - Refer to [facility] for monitoring            Follow up in about 6 months (around 3/24/2021).

## 2020-09-24 NOTE — PROGRESS NOTES
ESTABLISHED OUTPATIENT VISIT   E/M LEVEL 4: [id number]    ENCOUNTER DATE: 10/28/2020  SITE: [facility] [place].       HISTORY    CHIEF COMPLAINT   Alma Mistry is a 59 y.o. female who presents for followup of Depression and Anxiety    HPI     She felt that things that she has been struggling more with depression  She feels bored and stuck in monotonous  She feels that her daughter is further away from her  Finds that her and her daughter is more emotionally unavailable   Increased irritability  She has been dealing with sense of loss   She feels alone    She is no longer taking naps   She is doing art at home   Having three anxiety attacks at one   She is reading a lot   She is happy when she is doing     No suicidal ideation   No homicidal ideation     ROS     Pertinent symptoms listed in HPI    PFSH  Past Medical History reviewed: Yes  Family History reviewed: Yes  Social History reviewed: Yes      Review of all of the above and there are no other changes    Neurological History:  Seizures:  DENIED  Head Trauma:  DENIED        Past Psychiatric History:   Has attended grief counseling and psychotherapy  One previous psychiatrist  One previous psych med but had si and no recollection of the medication : Currently tolerates Cymbalta and has been less reliant on ativan due to decreased frequency of panic attacks.      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   Financial: no difficulties    Relationship Status/Sexual Orientation: single, heterosexual    Children: one living daughter, one     Housing Status: lives alone  Mandaeism: Presybeterian, currently not practicing  Recreational Activities: reading, currently not interested  Access to Gun: none     Substance Abuse History:   No substance abuse history        Allergies:   Review of patient's allergies indicates:   Allergen  Reactions    Fluticasone         PSYCHOTROPIC MEDICATIONS   Current Outpatient Medications on File Prior to Visit   Medication Sig Dispense Refill    aspirin-acetaminophen-caffeine 250-250-65 mg (EXCEDRIN MIGRAINE) 250-250-65 mg per tablet Take 1 tablet by mouth as needed for Pain.      CALCIUM CIT/MGOX/VIT D3/B6/MIN (CITRACAL PLUS ORAL) Take 1 tablet by mouth as needed.       CHOLECALCIFEROL, VITAMIN D3, (VITAMIN D3 ORAL) Take 1,000 Units by mouth daily as needed.       cyanocobalamin 1,000 mcg/mL injection INJECT 1 ML INTRAMUSCULARLY EVERY 28 DAYS 10 mL 0    diclofenac sodium (VOLTAREN) 1 % Gel Apply 2 g topically 4 (four) times daily. 1 g 2    DULoxetine (CYMBALTA) 60 MG capsule Take 1 capsule (60 mg total) by mouth once daily. 30 capsule 11    gabapentin (NEURONTIN) 100 MG capsule Take 3 capsules (300 mg total) by mouth every evening. (Patient taking differently: Take 300 mg by mouth as needed. ) 90 capsule 11    LORazepam (ATIVAN) 0.5 MG tablet Take 1 tablet (0.5 mg total) by mouth every 6 (six) hours as needed for Anxiety. 20 tablet 3    magnesium 30 mg Tab Take 1 tablet by mouth once.      multivitamin (ONE DAILY MULTIVITAMIN) per tablet Take 1 tablet by mouth once daily.       No current facility-administered medications on file prior to visit.          EXAMINATION    [unfilled]  There were no vitals filed for this visit.  Wt Readings from Last 2 Encounters:   10/28/20 58.9 kg (129 lb 13.6 oz)   09/21/20 60.1 kg (132 lb 7.9 oz)       CONSTITUTIONAL  General Appearance: well groomed    MUSCULOSKELETAL  No involuntary muscle movements  Normal gait    PSYCHIATRIC   Mental Status Exam:  Appearance: unremarkable, age appropriate  Behavior/Cooperation: appopriate normal, cooperative  Speech:  normal tone, normal rate, normal pitch, normal volume  Language: grossly intact with spontaneous speech  Mood: dysthymic   Affect:  congruent with mood, restricted   Thought Process: linear, logical and goal oriented    Thought Content: no suicidality, no homicidality, no delusions, no paranoia  Sensorium:  Awake  Alert and Oriented: x3 person, place, situation  Memory:    Recent:  Intact; able to report recent events  Attention/concentration: appropriate for age/education.   Insight:Intact  Judgment:  Intact      MEDICAL DECISION MAKING    DIAGNOSES  Encounter Diagnoses   Name Primary?    Moderate episode of recurrent major depressive disorder Yes    Generalized anxiety disorder with panic attacks          PROBLEM LIST AND MANAGEMENT PLANS   1) continue medication   2) Continue ativan as needed for anxiety attacks, patient aware of the risk for chemical dependence. No indication of misuse of medication. Ativan .5 mg as needed  3) gene sight testing     PRESCRIPTION DRUG MANAGEMENT  Compliance: yes  Side Effects: none  Regimen Adjustments: Continue Cymbalta 40 mg daily

## 2020-10-18 ENCOUNTER — PATIENT MESSAGE (OUTPATIENT)
Dept: INTERNAL MEDICINE | Facility: CLINIC | Age: 60
End: 2020-10-18

## 2020-10-21 ENCOUNTER — LAB VISIT (OUTPATIENT)
Dept: LAB | Facility: HOSPITAL | Age: 60
End: 2020-10-21
Attending: INTERNAL MEDICINE
Payer: COMMERCIAL

## 2020-10-21 DIAGNOSIS — R76.8 POSITIVE ANA (ANTINUCLEAR ANTIBODY): ICD-10-CM

## 2020-10-21 LAB
ALBUMIN SERPL BCP-MCNC: 4.4 G/DL (ref 3.5–5.2)
ALP SERPL-CCNC: 80 U/L (ref 55–135)
ALT SERPL W/O P-5'-P-CCNC: 24 U/L (ref 10–44)
ANION GAP SERPL CALC-SCNC: 11 MMOL/L (ref 8–16)
AST SERPL-CCNC: 26 U/L (ref 10–40)
BASOPHILS # BLD AUTO: 0.04 K/UL (ref 0–0.2)
BASOPHILS NFR BLD: 0.9 % (ref 0–1.9)
BILIRUB SERPL-MCNC: 0.5 MG/DL (ref 0.1–1)
BUN SERPL-MCNC: 10 MG/DL (ref 6–20)
C3 SERPL-MCNC: 98 MG/DL (ref 50–180)
C4 SERPL-MCNC: 28 MG/DL (ref 11–44)
CALCIUM SERPL-MCNC: 9.5 MG/DL (ref 8.7–10.5)
CHLORIDE SERPL-SCNC: 102 MMOL/L (ref 95–110)
CO2 SERPL-SCNC: 25 MMOL/L (ref 23–29)
CREAT SERPL-MCNC: 0.7 MG/DL (ref 0.5–1.4)
DIFFERENTIAL METHOD: NORMAL
EOSINOPHIL # BLD AUTO: 0.1 K/UL (ref 0–0.5)
EOSINOPHIL NFR BLD: 1.6 % (ref 0–8)
ERYTHROCYTE [DISTWIDTH] IN BLOOD BY AUTOMATED COUNT: 12 % (ref 11.5–14.5)
EST. GFR  (AFRICAN AMERICAN): >60 ML/MIN/1.73 M^2
EST. GFR  (NON AFRICAN AMERICAN): >60 ML/MIN/1.73 M^2
GLUCOSE SERPL-MCNC: 78 MG/DL (ref 70–110)
HCT VFR BLD AUTO: 38.6 % (ref 37–48.5)
HGB BLD-MCNC: 13 G/DL (ref 12–16)
IMM GRANULOCYTES # BLD AUTO: 0.01 K/UL (ref 0–0.04)
IMM GRANULOCYTES NFR BLD AUTO: 0.2 % (ref 0–0.5)
LYMPHOCYTES # BLD AUTO: 1.3 K/UL (ref 1–4.8)
LYMPHOCYTES NFR BLD: 31.2 % (ref 18–48)
MCH RBC QN AUTO: 28.5 PG (ref 27–31)
MCHC RBC AUTO-ENTMCNC: 33.7 G/DL (ref 32–36)
MCV RBC AUTO: 85 FL (ref 82–98)
MONOCYTES # BLD AUTO: 0.4 K/UL (ref 0.3–1)
MONOCYTES NFR BLD: 8.4 % (ref 4–15)
NEUTROPHILS # BLD AUTO: 2.5 K/UL (ref 1.8–7.7)
NEUTROPHILS NFR BLD: 57.7 % (ref 38–73)
NRBC BLD-RTO: 0 /100 WBC
PLATELET # BLD AUTO: 288 K/UL (ref 150–350)
PMV BLD AUTO: 10 FL (ref 9.2–12.9)
POTASSIUM SERPL-SCNC: 4 MMOL/L (ref 3.5–5.1)
PROT SERPL-MCNC: 7.2 G/DL (ref 6–8.4)
RBC # BLD AUTO: 4.56 M/UL (ref 4–5.4)
SODIUM SERPL-SCNC: 138 MMOL/L (ref 136–145)
WBC # BLD AUTO: 4.29 K/UL (ref 3.9–12.7)

## 2020-10-21 PROCEDURE — 36415 COLL VENOUS BLD VENIPUNCTURE: CPT

## 2020-10-21 PROCEDURE — 86147 CARDIOLIPIN ANTIBODY EA IG: CPT

## 2020-10-21 PROCEDURE — 86225 DNA ANTIBODY NATIVE: CPT

## 2020-10-21 PROCEDURE — 85613 RUSSELL VIPER VENOM DILUTED: CPT

## 2020-10-21 PROCEDURE — 86160 COMPLEMENT ANTIGEN: CPT | Mod: 59

## 2020-10-21 PROCEDURE — 80053 COMPREHEN METABOLIC PANEL: CPT

## 2020-10-21 PROCEDURE — 86160 COMPLEMENT ANTIGEN: CPT

## 2020-10-21 PROCEDURE — 85025 COMPLETE CBC W/AUTO DIFF WBC: CPT

## 2020-10-21 PROCEDURE — 86146 BETA-2 GLYCOPROTEIN ANTIBODY: CPT | Mod: 59

## 2020-10-22 LAB
DSDNA AB SER-ACNC: NORMAL [IU]/ML
LA PPP-IMP: NEGATIVE

## 2020-10-24 LAB
B2 GLYCOPROT1 IGA SER QL: <9 SAU
B2 GLYCOPROT1 IGG SER QL: <9 SGU
B2 GLYCOPROT1 IGM SER QL: <9 SMU

## 2020-10-26 LAB
CARDIOLIPIN IGG SER IA-ACNC: <9.4 GPL (ref 0–14.99)
CARDIOLIPIN IGM SER IA-ACNC: <9.4 MPL (ref 0–12.49)

## 2020-10-27 ENCOUNTER — PATIENT OUTREACH (OUTPATIENT)
Dept: ADMINISTRATIVE | Facility: OTHER | Age: 60
End: 2020-10-27

## 2020-10-27 ENCOUNTER — TELEPHONE (OUTPATIENT)
Dept: RHEUMATOLOGY | Facility: CLINIC | Age: 60
End: 2020-10-27

## 2020-10-27 ENCOUNTER — PATIENT MESSAGE (OUTPATIENT)
Dept: RHEUMATOLOGY | Facility: CLINIC | Age: 60
End: 2020-10-27

## 2020-10-27 NOTE — TELEPHONE ENCOUNTER
Left message for patient to confirm 10-28 appointment with Dr. Hernández at the [facility] location . Message also sent through portal.

## 2020-10-28 ENCOUNTER — OFFICE VISIT (OUTPATIENT)
Dept: RHEUMATOLOGY | Facility: CLINIC | Age: 60
End: 2020-10-28
Payer: COMMERCIAL

## 2020-10-28 ENCOUNTER — HOSPITAL ENCOUNTER (OUTPATIENT)
Dept: RADIOLOGY | Facility: HOSPITAL | Age: 60
Discharge: HOME | End: 2020-10-28
Attending: INTERNAL MEDICINE
Payer: COMMERCIAL

## 2020-10-28 VITALS — BODY MASS INDEX: 25.5 KG/M2 | WEIGHT: 129.88 LBS | HEIGHT: 60 IN

## 2020-10-28 VITALS
HEIGHT: 60 IN | SYSTOLIC BLOOD PRESSURE: 108 MMHG | WEIGHT: 129.88 LBS | HEART RATE: 64 BPM | BODY MASS INDEX: 25.5 KG/M2 | DIASTOLIC BLOOD PRESSURE: 70 MMHG

## 2020-10-28 DIAGNOSIS — Z12.39 SCREENING FOR BREAST CANCER: ICD-10-CM

## 2020-10-28 DIAGNOSIS — M35.00 SICCA SYNDROME: ICD-10-CM

## 2020-10-28 DIAGNOSIS — Z71.89 COUNSELING ON HEALTH PROMOTION AND DISEASE PREVENTION: ICD-10-CM

## 2020-10-28 DIAGNOSIS — R76.8 POSITIVE ANA (ANTINUCLEAR ANTIBODY): Primary | ICD-10-CM

## 2020-10-28 PROCEDURE — 99214 PR OFFICE/OUTPT VISIT, EST, LEVL IV, 30-39 MIN: ICD-10-PCS | Mod: S$GLB,,, | Performed by: INTERNAL MEDICINE

## 2020-10-28 PROCEDURE — 3008F PR BODY MASS INDEX (BMI) DOCUMENTED: ICD-10-PCS | Mod: CPTII,S$GLB,, | Performed by: INTERNAL MEDICINE

## 2020-10-28 PROCEDURE — 99214 OFFICE O/P EST MOD 30 MIN: CPT | Mod: S$GLB,,, | Performed by: INTERNAL MEDICINE

## 2020-10-28 PROCEDURE — 99999 PR PBB SHADOW E&M-EST. PATIENT-LVL III: ICD-10-PCS | Mod: PBBFAC,,, | Performed by: INTERNAL MEDICINE

## 2020-10-28 PROCEDURE — 77067 MAMMO DIGITAL SCREENING BILAT WITH TOMO: ICD-10-PCS | Mod: 26,,, | Performed by: RADIOLOGY

## 2020-10-28 PROCEDURE — 77067 SCR MAMMO BI INCL CAD: CPT | Mod: TC

## 2020-10-28 PROCEDURE — 77063 MAMMO DIGITAL SCREENING BILAT WITH TOMO: ICD-10-PCS | Mod: 26,,, | Performed by: RADIOLOGY

## 2020-10-28 PROCEDURE — 77067 SCR MAMMO BI INCL CAD: CPT | Mod: 26,,, | Performed by: RADIOLOGY

## 2020-10-28 PROCEDURE — 77063 BREAST TOMOSYNTHESIS BI: CPT | Mod: 26,,, | Performed by: RADIOLOGY

## 2020-10-28 PROCEDURE — 3008F BODY MASS INDEX DOCD: CPT | Mod: CPTII,S$GLB,, | Performed by: INTERNAL MEDICINE

## 2020-10-28 PROCEDURE — 99999 PR PBB SHADOW E&M-EST. PATIENT-LVL III: CPT | Mod: PBBFAC,,, | Performed by: INTERNAL MEDICINE

## 2020-10-28 NOTE — PROGRESS NOTES
RHEUMATOLOGY OUTPATIENT CLINIC NOTE    10/28/2020    Attending Rheumatologist: Lang Hernández  Primary Care Provider: Zheng Orozco MD   Physician Requesting Consultation: No referring provider defined for this encounter.  Chief Complaint/Reason For Consultation:  Abnormal lab results    Subjective:       HPI  Alma Mistry is a 59 y.o. White female with positive NGOZI and chronic pain for follow-up visit.    Today  Last under via telemedicine on late August.  Recommended trial of Plaquenil which she is taking without current significant side effects.  Refers feeling great without arthralgias or rash.  Refers improvement to sicca symptoms as well.  Denies fever,  or GI complaints.      Review of Systems   Constitutional: Negative for chills, fever and malaise/fatigue.   Eyes: Negative for pain and redness.   Respiratory: Negative for cough, hemoptysis and shortness of breath.    Cardiovascular: Negative for chest pain and leg swelling.   Gastrointestinal: Negative for abdominal pain, blood in stool and melena.   Genitourinary: Negative for dysuria and hematuria.   Musculoskeletal: Negative for falls and joint pain.   Skin: Negative for rash.   Neurological: Negative for tingling, focal weakness and weakness.   Psychiatric/Behavioral: Negative for memory loss. The patient does not have insomnia.      Chronic comorbid conditions affecting medical decision making today:  Past Medical History:   Diagnosis Date    Atrophic gastritis     B12 deficiency     Breast lump on left side at 3 o'clock position 03/25/2015    Fibrocystic breast     Lichen sclerosus 08/16/2012   · Anxiety/depression  · Panic attacks  · Restless leg syndrome  · History of iron deficiency anemia  · Chronic paresthesias    Past Surgical History:   Procedure Laterality Date    BREAST BIOPSY Right 2009    benign    COLONOSCOPY  09/10/2010    COLONOSCOPY N/A 6/4/2020    Procedure: COLONOSCOPY;  Surgeon: Almas Perez MD;  Location:  [facility] END[facility];  Service: Endoscopy;  Laterality: N/A;    ESOPHAGOGASTRODUODENOSCOPY  09/10/2010    ESOPHAGOGASTRODUODENOSCOPY N/A 6/4/2020    Procedure: ESOPHAGOGASTRODUODENOSCOPY (EGD);  Surgeon: Almas Perez MD;  Location: [facility] ENDO;  Service: Endoscopy;  Laterality: N/A;    ESOPHAGOGASTRODUODENOSCOPY N/A 7/28/2020    Procedure: EGD (ESOPHAGOGASTRODUODENOSCOPY);  Surgeon: Almas Perez MD;  Location: [facility] ENDO;  Service: Endoscopy;  Laterality: N/A;    HYSTERECTOMY  2013    TONSILLECTOMY       Family History   Problem Relation Age of Onset    Depression Mother     Nephritis Mother     Hyperthyroidism Mother     Hypotension Mother     Anemia Mother     Asthma Father     Thyroid disease Sister     Depression Sister     Leukemia Brother     Cirrhosis Brother     Multiple sclerosis Brother      Social History     Substance and Sexual Activity   Alcohol Use Not Currently    Frequency: 2-4 times a month    Drinks per session: Patient refused    Binge frequency: Never     Social History     Tobacco Use   Smoking Status Never Smoker   Smokeless Tobacco Never Used     Social History     Substance and Sexual Activity   Drug Use No       Current Outpatient Medications:     aspirin-acetaminophen-caffeine 250-250-65 mg (EXCEDRIN MIGRAINE) 250-250-65 mg per tablet, Take 1 tablet by mouth as needed for Pain., Disp: , Rfl:     CALCIUM CIT/MGOX/VIT D3/B6/MIN (CITRACAL PLUS ORAL), Take 1 tablet by mouth as needed. , Disp: , Rfl:     CHOLECALCIFEROL, VITAMIN D3, (VITAMIN D3 ORAL), Take 1,000 Units by mouth daily as needed. , Disp: , Rfl:     clobetasol 0.05% (TEMOVATE) 0.05 % Oint, Apply topically as needed., Disp: 45 g, Rfl: 2    cyanocobalamin 1,000 mcg/mL injection, INJECT 1 ML INTRAMUSCULARLY EVERY 28 DAYS, Disp: 10 mL, Rfl: 0    DULoxetine (CYMBALTA) 60 MG capsule, Take 1 capsule (60 mg total) by mouth once daily., Disp: 30 capsule, Rfl: 11    gabapentin (NEURONTIN) 100 MG capsule,  Take 3 capsules (300 mg total) by mouth every evening. (Patient taking differently: Take 300 mg by mouth as needed. ), Disp: 90 capsule, Rfl: 11    magnesium 30 mg Tab, Take 1 tablet by mouth once., Disp: , Rfl:     multivitamin (ONE DAILY MULTIVITAMIN) per tablet, Take 1 tablet by mouth once daily., Disp: , Rfl:     triamcinolone acetonide 0.1% (KENALOG) 0.1 % cream, Apply topically 2 (two) times daily. Apply two weeks at a time prn itching., Disp: 30 g, Rfl: 1    diclofenac sodium (VOLTAREN) 1 % Gel, Apply 2 g topically 4 (four) times daily., Disp: 1 g, Rfl: 2    LORazepam (ATIVAN) 0.5 MG tablet, Take 1 tablet (0.5 mg total) by mouth every 6 (six) hours as needed for Anxiety., Disp: 20 tablet, Rfl: 3     Objective:         Vitals:    10/28/20 1016   BP: 108/70   Pulse: 64     Physical Exam   Constitutional: No distress.   Estimated body mass index is 25.36 kg/m² as calculated from the following:    Height as of this encounter: 5' (1.524 m).    Weight as of this encounter: 58.9 kg (129 lb 13.6 oz).    Wt Readings from Last 1 Encounters:  10/28/20 1016 : 58.9 kg (129 lb 13.6 oz)     HENT:   Head: Normocephalic and atraumatic.   Eyes: Conjunctivae are normal. Pupils are equal, round, and reactive to light.   Neck: Normal range of motion.   Cardiovascular: Normal rate and intact distal pulses.    Pulmonary/Chest: Effort normal. No respiratory distress.   Abdominal: Soft. She exhibits no distension.   Neurological: She is alert. Gait normal.   Skin: No rash noted. No erythema.     Musculoskeletal: Normal range of motion. No tenderness.      Comments: : strong  No synovitis or significant squeeze tenderness    AROM: intact  PROM: intact    Devices used by patient: none       Reviewed old and all outside pertinent medical records available.    All lab results personally reviewed and interpreted by me.  Lab Results   Component Value Date    WBC 4.29 10/21/2020    HGB 13.0 10/21/2020    HCT 38.6 10/21/2020    MCV  85 10/21/2020    MCH 28.5 10/21/2020    MCHC 33.7 10/21/2020    RDW 12.0 10/21/2020     10/21/2020    MPV 10.0 10/21/2020       Lab Results   Component Value Date     10/21/2020    K 4.0 10/21/2020     10/21/2020    CO2 25 10/21/2020    GLU 78 10/21/2020    BUN 10 10/21/2020    CALCIUM 9.5 10/21/2020    PROT 7.2 10/21/2020    ALBUMIN 4.4 10/21/2020    BILITOT 0.5 10/21/2020    AST 26 10/21/2020    ALKPHOS 80 10/21/2020    ALT 24 10/21/2020       Lab Results   Component Value Date    COLORU Yellow 10/21/2020    APPEARANCEUA Clear 10/21/2020    SPECGRAV 1.010 10/21/2020    PHUR 6.0 10/21/2020    PROTEINUA Negative 10/21/2020    KETONESU Negative 10/21/2020    LEUKOCYTESUR Trace (A) 10/21/2020    NITRITE Negative 10/21/2020       Lab Results   Component Value Date    CRP 0.8 08/06/2020       Lab Results   Component Value Date    SEDRATE 10 08/06/2020       Lab Results   Component Value Date    RF <10.0 08/06/2020    SEDRATE 10 08/06/2020       No components found for: 25OHVITDTOT, 05JKFXFZ6, 31IAUVEC5, METHODNOTE    Lab Results   Component Value Date    URICACID 5.6 01/16/2019       No components found for: TSPOTTB  ·     Rheum Labs:  NGOZI 1 in 160 speckled pattern-> 1 in 320 speckled  ZOË negative  dsDNA 1:10 (ZOË)->  negative  SSA, SSB negative   APS panel negative 10/2020    Infectious Labs:  Hepatitis-C virus nonreactive September 2017   Lyme antibody negative    Imaging:  All imaging reviewed and independently  interpreted by me.    X-ray knee February 2018  equivocal minimal joint space narrowing involving the medial compartment of either knee.  No acute fracture or dislocation.  There is minimal lateral subluxation of either patella on the sunrise view.  Very minimal osteophyte formation associated with the superior pole of the left patella.  No joint effusion.    X-ray C-spine February 2019  The vertebral bodies demonstrate a normal height and alignment.  Moderate disc space narrowing and  minimal spondylosis present at the C5-6 and C6-7 levels.  No significant facet arthropathy identified.  The C1-C2 articulation is within normal limits.    EMG / nerve conduction study January 2019  no definitive electrophysiologic evidence of large fiber polyneuropathy.    Ultrasound soft tissue head and neck September 2020  Unremarkable thyroid.  Normal bilateral lymph nodes identified.     ASSESSMENT / PLAN:     Alma Mistry is a 59 y.o. White female with:    1. Positive NGOZI  - family history of SLE and APS.  Personal history of atrophy gastritis, gluten sensitivity?  - Chronic arthralgias and sicca symptoms, ZOË with dsDNA1:10  - recommended trial of Plaquenil late August.  Taking without side effects, resolution of symptoms.  - repeat double-stranded negative and complements within normal range.  No features of cytopenias or suspicious rash.  - consider for incomplete SLE.  Will continue with Plaquenil unchanged.  Follow with Ophthalmology to monitor for retinal toxicity  - Ocular: Eye drops w/o polyvinyl alcohol and/or vasoconstrictors (Visine).   - Oral: Good dental care, sugar-free gum, lemon drops, saliva substitute, hydration  - Arthralgias: NSAIDs p.r.n. short course.    - Counseled on reducing caffeine intake and smoking. Limit time at computer and turn off ceiling fans. Eliminate offending medications.    2. Other specified counseling  - Immunization counseling done.  - malignancy screening per PMD  - Limitation of alcohol consumption.  - Regular exercise:  Low impact, aerobic and resistance.  - Medication counseling provided.    RTC 3 months    Method of contact with patient concerns: [facility]    Disclaimer:  This note is prepared using voice recognition software and as such is likely to have errors and has not been proof read. Please contact me for questions.     Time spent: 25 minutes in face to face discussion concerning diagnosis, prognosis, review of lab and test results, benefits  of treatment as well as management of disease, counseling of patient and coordination of care between various health care providers.  Greater than half the time spent was used for coordination of care and counseling of patient.    Lang Hernández M.D.  Rheumatology Department   [facility]

## 2020-11-05 ENCOUNTER — PATIENT MESSAGE (OUTPATIENT)
Dept: PSYCHIATRY | Facility: CLINIC | Age: 60
End: 2020-11-05

## 2020-11-09 ENCOUNTER — OFFICE VISIT (OUTPATIENT)
Dept: PSYCHIATRY | Facility: CLINIC | Age: 60
End: 2020-11-09
Payer: COMMERCIAL

## 2020-11-09 ENCOUNTER — PATIENT MESSAGE (OUTPATIENT)
Dept: HEMATOLOGY/ONCOLOGY | Facility: CLINIC | Age: 60
End: 2020-11-09

## 2020-11-09 DIAGNOSIS — F41.0 GENERALIZED ANXIETY DISORDER WITH PANIC ATTACKS: ICD-10-CM

## 2020-11-09 DIAGNOSIS — F43.21 COMPLICATED BEREAVEMENT: ICD-10-CM

## 2020-11-09 DIAGNOSIS — F41.0 PANIC ATTACKS: ICD-10-CM

## 2020-11-09 DIAGNOSIS — F33.1 MODERATE EPISODE OF RECURRENT MAJOR DEPRESSIVE DISORDER: Primary | ICD-10-CM

## 2020-11-09 DIAGNOSIS — F41.1 GENERALIZED ANXIETY DISORDER WITH PANIC ATTACKS: ICD-10-CM

## 2020-11-09 PROCEDURE — 99214 PR OFFICE/OUTPT VISIT, EST, LEVL IV, 30-39 MIN: ICD-10-PCS | Mod: 95,,, | Performed by: PSYCHIATRY & NEUROLOGY

## 2020-11-09 PROCEDURE — 99214 OFFICE O/P EST MOD 30 MIN: CPT | Mod: 95,,, | Performed by: PSYCHIATRY & NEUROLOGY

## 2020-11-09 RX ORDER — DULOXETINE 40 MG/1
40 CAPSULE, DELAYED RELEASE ORAL DAILY
Qty: 30 CAPSULE | Refills: 11 | Status: SHIPPED | OUTPATIENT
Start: 2020-11-09 | End: 2020-11-11

## 2020-11-09 NOTE — PROGRESS NOTES
ESTABLISHED OUTPATIENT VISIT   E/M LEVEL 4: [id number]    ENCOUNTER DATE: 11/9/2020  SITE: [facility]         The patient location is:  Patient Home   The chief complaint leading to consultation is: anxiety   Visit type: Virtual visit with synchronous audio and video  Total time spent with patient: 30  Each patient to whom he or she provides medical services by telemedicine is:  (1) informed of the relationship between the physician and patient and the respective role of any other health care provider with respect to management of the patient; and (2) notified that he or she may decline to receive medical services by telemedicine and may withdraw from such care at any time.     HISTORY    CHIEF COMPLAINT   Alma Mistry is a 59 y.o. female who presents for followup of Depression and Anxiety    HPI     Had a really bad week last week   She was watching TV and she started feeling an intense anxiety attack   She was compounded, by withdrawal of her medication  She felt that of withdrawal of medications  She had the jittery of the legs  She went to get a benzo to stop it   Had a panic attack last week, she was trying to get off the ativan   She admits that she was taking 20 mg for two to three months   After she came home, she realized that she was under dosing   No suicidal ideation   No homicidal ideation       GAD7 11/9/2020   1. Feeling nervous, anxious, or on edge? 1   2. Not being able to stop or control worrying? 1   3. Worrying too much about different things? 1   4. Trouble relaxing? 3   5. Being so restless that it is hard to sit still? 1   6. Becoming easily annoyed or irritable? 1   7. Feeling afraid as if something awful might happen? 1   JACLYN-7 Score 9     Little interest or pleasure in doing things: (P) More than half the days  Feeling down, depressed, or hopeless: (P) Several days  Trouble falling or staying asleep, or sleeping too much: (P) More than half the days  Feeling tired or having  little energy: (P) Several days  Poor appetite or overeating: (P) Several days  Feeling bad about yourself - or that you are a failure or have let yourself or your family down: (P) Not at all  Trouble concentrating on things, such as reading the newspaper or watching television: (P) Several days  Moving or speaking so slowly that other people could have noticed. Or the opposite - being so fidgety or restless that you have been moving around a lot more than usual: (P) Not at all  PHQ-9 Total Score: (P) 8  ROS     Pertinent symptoms listed in HPI    PFSH  Past Medical History reviewed: Yes  Family History reviewed: Yes  Social History reviewed: Yes      Review of all of the above and there are no other changes    Neurological History:  Seizures:  DENIED  Head Trauma:  DENIED        Past Psychiatric History:   Has attended grief counseling and psychotherapy  One previous psychiatrist  One previous psych med but had si and no recollection of the medication : Currently tolerates Cymbalta and has been less reliant on ativan due to decreased frequency of panic attacks.      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   Financial: no difficulties    Relationship Status/Sexual Orientation: single, heterosexual    Children: one living daughter, one     Housing Status: lives alone  Latter-day: Shinto, currently not practicing  Recreational Activities: reading, currently not interested  Access to Gun: none     Substance Abuse History:   No substance abuse history        Allergies:   Review of patient's allergies indicates:   Allergen Reactions    Fluticasone         PSYCHOTROPIC MEDICATIONS   Current Outpatient Medications on File Prior to Visit   Medication Sig Dispense Refill    aspirin-acetaminophen-caffeine 250-250-65 mg (EXCEDRIN MIGRAINE) 250-250-65 mg per tablet Take 1 tablet by  mouth as needed for Pain.      CALCIUM CIT/MGOX/VIT D3/B6/MIN (CITRACAL PLUS ORAL) Take 1 tablet by mouth as needed.       CHOLECALCIFEROL, VITAMIN D3, (VITAMIN D3 ORAL) Take 1,000 Units by mouth daily as needed.       clobetasol 0.05% (TEMOVATE) 0.05 % Oint Apply topically as needed. 45 g 2    cyanocobalamin 1,000 mcg/mL injection INJECT 1 ML INTRAMUSCULARLY EVERY 28 DAYS 10 mL 0    diclofenac sodium (VOLTAREN) 1 % Gel Apply 2 g topically 4 (four) times daily. 1 g 2    gabapentin (NEURONTIN) 100 MG capsule Take 3 capsules (300 mg total) by mouth every evening. (Patient taking differently: Take 300 mg by mouth as needed. ) 90 capsule 11    LORazepam (ATIVAN) 0.5 MG tablet Take 1 tablet (0.5 mg total) by mouth every 6 (six) hours as needed for Anxiety. 20 tablet 3    magnesium 30 mg Tab Take 1 tablet by mouth once.      multivitamin (ONE DAILY MULTIVITAMIN) per tablet Take 1 tablet by mouth once daily.      triamcinolone acetonide 0.1% (KENALOG) 0.1 % cream Apply topically 2 (two) times daily. Apply two weeks at a time prn itching. 30 g 1    [DISCONTINUED] DULoxetine (CYMBALTA) 60 MG capsule Take 1 capsule (60 mg total) by mouth once daily. 30 capsule 11     No current facility-administered medications on file prior to visit.          EXAMINATION    [unfilled]  There were no vitals filed for this visit.  Wt Readings from Last 2 Encounters:   10/28/20 58.9 kg (129 lb 13.6 oz)   10/28/20 58.9 kg (129 lb 13.6 oz)       CONSTITUTIONAL  General Appearance: well groomed    MUSCULOSKELETAL  No involuntary muscle movements  Normal gait    PSYCHIATRIC   Mental Status Exam:  Appearance: unremarkable, age appropriate  Behavior/Cooperation: appopriate normal, cooperative  Speech:  normal tone, normal rate, normal pitch, normal volume  Language: grossly intact with spontaneous speech  Mood: euthymic, anxiety   Affect:  congruent with mood, full range   Thought Process: linear, logical and goal oriented   Thought Content:  no suicidality, no homicidality, no delusions, no paranoia  Sensorium:  Awake  Alert and Oriented: x3 person, place, situation  Memory:    Recent:  Intact; able to report recent events  Attention/concentration: appropriate for age/education.   Insight:Intact  Judgment:  Intact      MEDICAL DECISION MAKING    DIAGNOSES  Encounter Diagnoses   Name Primary?    Moderate episode of recurrent major depressive disorder Yes    Generalized anxiety disorder with panic attacks     Complicated bereavement     Panic attacks          PROBLEM LIST AND MANAGEMENT PLANS   1) continue medication   2) Continue ativan as needed for anxiety attacks, patient aware of the risk for chemical dependence. No indication of misuse of medication. Ativan .5 mg as needed  3) gene sight testing: has not done it

## 2020-11-11 RX ORDER — DULOXETIN HYDROCHLORIDE 20 MG/1
40 CAPSULE, DELAYED RELEASE ORAL DAILY
Qty: 60 CAPSULE | Refills: 11 | Status: SHIPPED | OUTPATIENT
Start: 2020-11-11 | End: 2021-12-09 | Stop reason: SDUPTHER

## 2020-12-23 ENCOUNTER — PATIENT MESSAGE (OUTPATIENT)
Dept: INTERNAL MEDICINE | Facility: CLINIC | Age: 60
End: 2020-12-23

## 2020-12-23 ENCOUNTER — OFFICE VISIT (OUTPATIENT)
Dept: INTERNAL MEDICINE | Facility: CLINIC | Age: 60
End: 2020-12-23
Payer: COMMERCIAL

## 2020-12-23 VITALS
HEART RATE: 67 BPM | OXYGEN SATURATION: 99 % | DIASTOLIC BLOOD PRESSURE: 80 MMHG | SYSTOLIC BLOOD PRESSURE: 106 MMHG | BODY MASS INDEX: 25.66 KG/M2 | TEMPERATURE: 98 F | WEIGHT: 131.38 LBS

## 2020-12-23 DIAGNOSIS — Z22.322 NASAL COLONIZATION WITH METHICILLIN-RESISTANT STAPHYLOCOCCUS AUREUS: ICD-10-CM

## 2020-12-23 DIAGNOSIS — J06.9 UPPER RESPIRATORY TRACT INFECTION, UNSPECIFIED TYPE: Primary | ICD-10-CM

## 2020-12-23 DIAGNOSIS — Z23 NEED FOR INFLUENZA VACCINATION: ICD-10-CM

## 2020-12-23 PROCEDURE — 90471 IMMUNIZATION ADMIN: CPT | Mod: S$GLB,,, | Performed by: INTERNAL MEDICINE

## 2020-12-23 PROCEDURE — 1126F PR PAIN SEVERITY QUANTIFIED, NO PAIN PRESENT: ICD-10-PCS | Mod: S$GLB,,, | Performed by: INTERNAL MEDICINE

## 2020-12-23 PROCEDURE — 90686 IIV4 VACC NO PRSV 0.5 ML IM: CPT | Mod: S$GLB,,, | Performed by: INTERNAL MEDICINE

## 2020-12-23 PROCEDURE — 90686 FLU VACCINE (QUAD) GREATER THAN OR EQUAL TO 3YO PRESERVATIVE FREE IM: ICD-10-PCS | Mod: S$GLB,,, | Performed by: INTERNAL MEDICINE

## 2020-12-23 PROCEDURE — 99999 PR PBB SHADOW E&M-EST. PATIENT-LVL III: ICD-10-PCS | Mod: PBBFAC,,, | Performed by: INTERNAL MEDICINE

## 2020-12-23 PROCEDURE — 99214 OFFICE O/P EST MOD 30 MIN: CPT | Mod: 25,S$GLB,, | Performed by: INTERNAL MEDICINE

## 2020-12-23 PROCEDURE — 1126F AMNT PAIN NOTED NONE PRSNT: CPT | Mod: S$GLB,,, | Performed by: INTERNAL MEDICINE

## 2020-12-23 PROCEDURE — 3008F PR BODY MASS INDEX (BMI) DOCUMENTED: ICD-10-PCS | Mod: CPTII,S$GLB,, | Performed by: INTERNAL MEDICINE

## 2020-12-23 PROCEDURE — 3008F BODY MASS INDEX DOCD: CPT | Mod: CPTII,S$GLB,, | Performed by: INTERNAL MEDICINE

## 2020-12-23 PROCEDURE — 99214 PR OFFICE/OUTPT VISIT, EST, LEVL IV, 30-39 MIN: ICD-10-PCS | Mod: 25,S$GLB,, | Performed by: INTERNAL MEDICINE

## 2020-12-23 PROCEDURE — 99999 PR PBB SHADOW E&M-EST. PATIENT-LVL III: CPT | Mod: PBBFAC,,, | Performed by: INTERNAL MEDICINE

## 2020-12-23 PROCEDURE — 90471 FLU VACCINE (QUAD) GREATER THAN OR EQUAL TO 3YO PRESERVATIVE FREE IM: ICD-10-PCS | Mod: S$GLB,,, | Performed by: INTERNAL MEDICINE

## 2020-12-23 RX ORDER — HYDROXYCHLOROQUINE SULFATE 200 MG/1
TABLET, FILM COATED ORAL
COMMUNITY
Start: 2020-12-05 | End: 2021-01-06 | Stop reason: SDUPTHER

## 2020-12-23 RX ORDER — AZELASTINE 1 MG/ML
1 SPRAY, METERED NASAL 2 TIMES DAILY
Qty: 30 ML | Refills: 0 | Status: SHIPPED | OUTPATIENT
Start: 2020-12-23 | End: 2022-06-07

## 2020-12-23 RX ORDER — MUPIROCIN 20 MG/G
OINTMENT TOPICAL
Qty: 30 G | Refills: 1 | Status: SHIPPED | OUTPATIENT
Start: 2020-12-23 | End: 2021-12-09

## 2020-12-23 NOTE — PROGRESS NOTES
Subjective:      Patient ID: Alma Mistry is a 60 y.o. female.    Chief Complaint: Follow-up (on covid results )    HPI     61 yo with   Patient Active Problem List   Diagnosis    Pernicious anemia    History of iron deficiency anemia    Overweight    Lichen sclerosus    Panic disorder    Polyneuropathy    RLS (restless legs syndrome)    Neuropathic pain    Arthralgia    Severe episode of recurrent major depressive disorder, without psychotic features    Generalized anxiety disorder with panic attacks    Complicated bereavement    Neuropathy of left superficial peroneal nerve    Iron deficiency anemia due to chronic blood loss     Past Medical History:   Diagnosis Date    Atrophic gastritis     B12 deficiency     Breast lump on left side at 3 o'clock position 03/25/2015    Fibrocystic breast     Lichen sclerosus 08/16/2012    Lupus      Here today c/o starting with chills and headache over one week ago. Sore throat starting yesterday. No cough. No fever. Last Thursday started with some nasal congestion. Still some nasal stuffiness, ha, runny nose. All improving. Had drive through nasal swab. Flu and covid neg. Pos staff aureus. No sensitivity info given.     Review of Systems   Constitutional: Negative for chills and fever.   HENT: Negative for ear pain and sore throat.    Respiratory: Negative for cough.    Cardiovascular: Negative for chest pain.   Gastrointestinal: Negative for abdominal pain and blood in stool.   Genitourinary: Negative for dysuria and hematuria.   Neurological: Negative for seizures and syncope.     Objective:   /80 (BP Location: Right arm, Patient Position: Sitting, BP Method: Medium (Manual))   Pulse 67   Temp 97.9 °F (36.6 °C) (Tympanic)   Wt 59.6 kg (131 lb 6.3 oz)   SpO2 99%   BMI 25.66 kg/m²     Physical Exam  Constitutional:       General: She is not in acute distress.     Appearance: She is well-developed.   HENT:      Head: Normocephalic and atraumatic.       Jaw: No trismus.      Right Ear: Tympanic membrane normal.      Left Ear: Tympanic membrane normal.      Nose:      Right Sinus: No maxillary sinus tenderness or frontal sinus tenderness.      Left Sinus: No maxillary sinus tenderness or frontal sinus tenderness.      Mouth/Throat:      Pharynx: No pharyngeal swelling, oropharyngeal exudate, posterior oropharyngeal erythema or uvula swelling.   Eyes:      Extraocular Movements: Extraocular movements intact.      Pupils: Pupils are equal, round, and reactive to light.   Cardiovascular:      Rate and Rhythm: Normal rate.   Pulmonary:      Effort: Pulmonary effort is normal.   Musculoskeletal:         General: No swelling.   Skin:     General: Skin is warm and dry.   Neurological:      General: No focal deficit present.      Mental Status: She is alert and oriented to person, place, and time.   Psychiatric:         Mood and Affect: Mood normal.         Behavior: Behavior normal.         Assessment:     1. Upper respiratory tract infection, unspecified type    2. Need for influenza vaccination    3. Nasal colonization with methicillin-resistant Staphylococcus aureus      Plan:   Upper respiratory tract infection, unspecified type  -     azelastine (ASTELIN) 137 mcg (0.1 %) nasal spray; 1 spray (137 mcg total) by Nasal route 2 (two) times daily. For runny nose  Dispense: 30 mL; Refill: 0    Need for influenza vaccination  -     Influenza - Quadrivalent *Preferred* (6 months+) (PF)    Nasal colonization with methicillin-resistant Staphylococcus aureus  -     mupirocin (BACTROBAN) 2 % ointment; Apply cotton swab amount to each nostril twice daily for 10 days.  Dispense: 30 g; Refill: 1        Lab Frequency Next Occurrence   US Abdomen Limited Once 05/14/2020   MMA Once 05/18/2020   US Soft Tissue Head Neck Thyroid Once 08/05/2020   Ambulatory referral/consult to ENT Once 08/12/2020   COVID-19 Routine Screening Once 09/16/2020   Ambulatory referral/consult to Obstetrics /  Gynecology Once 10/01/2020   Sjogrens syndrome-A extractable nuclear antibody     Sjogrens syndrome-B extractable nuclear antibody     NGOZI Screen w/Reflex     C3 complement     C4 complement     Anti-DNA antibody, double-stranded     Urinalysis     Protein / creatinine ratio, urine     CBC auto differential     Comprehensive metabolic panel     DRVVT     Cardiolipin antibody     Beta-2 Glycoprotein Abs (IgA, IgG, IgM)     Ferritin Every 12 Weeks 3/5/2021, 5/28/2021, 8/20/2021, 11/12/2021, 2/4/2022, 4/29/2022, 7/22/2022, 10/14/2022, 1/6/2023, 3/31/2023   CBC auto differential Every 12 Weeks 3/5/2021, 5/28/2021, 8/20/2021, 11/12/2021, 2/4/2022, 4/29/2022, 7/22/2022, 10/14/2022, 1/6/2023, 3/31/2023   Iron and TIBC Every 12 Weeks 3/5/2021, 5/28/2021, 8/20/2021, 11/12/2021, 2/4/2022, 4/29/2022, 7/22/2022, 10/14/2022, 1/6/2023, 3/31/2023       Problem List Items Addressed This Visit     None      Visit Diagnoses     Upper respiratory tract infection, unspecified type    -  Primary    Relevant Medications    azelastine (ASTELIN) 137 mcg (0.1 %) nasal spray    Need for influenza vaccination        Relevant Orders    Influenza - Quadrivalent *Preferred* (6 months+) (PF) (Completed)    Nasal colonization with methicillin-resistant Staphylococcus aureus        Relevant Medications    mupirocin (BACTROBAN) 2 % ointment          Follow up if symptoms worsen or fail to improve.

## 2021-01-06 ENCOUNTER — PATIENT MESSAGE (OUTPATIENT)
Dept: ADMINISTRATIVE | Facility: OTHER | Age: 61
End: 2021-01-06

## 2021-01-07 RX ORDER — HYDROXYCHLOROQUINE SULFATE 200 MG/1
200 TABLET, FILM COATED ORAL 2 TIMES DAILY
Qty: 180 TABLET | Refills: 1 | Status: SHIPPED | OUTPATIENT
Start: 2021-01-07 | End: 2021-04-07

## 2021-02-08 ENCOUNTER — TELEPHONE (OUTPATIENT)
Dept: NEUROLOGY | Facility: CLINIC | Age: 61
End: 2021-02-08

## 2021-02-08 DIAGNOSIS — G62.9 POLYNEUROPATHY: ICD-10-CM

## 2021-02-08 DIAGNOSIS — M79.2 NEUROPATHIC PAIN: ICD-10-CM

## 2021-02-08 DIAGNOSIS — G25.81 RLS (RESTLESS LEGS SYNDROME): ICD-10-CM

## 2021-02-08 RX ORDER — GABAPENTIN 100 MG/1
300 CAPSULE ORAL NIGHTLY
Qty: 90 CAPSULE | Refills: 3 | Status: SHIPPED | OUTPATIENT
Start: 2021-02-08 | End: 2021-06-14

## 2021-04-13 ENCOUNTER — LAB VISIT (OUTPATIENT)
Dept: LAB | Facility: HOSPITAL | Age: 61
End: 2021-04-13
Attending: INTERNAL MEDICINE
Payer: COMMERCIAL

## 2021-04-13 DIAGNOSIS — E61.1 IRON DEFICIENCY: ICD-10-CM

## 2021-04-13 LAB
BASOPHILS # BLD AUTO: 0.05 K/UL (ref 0–0.2)
BASOPHILS NFR BLD: 0.9 % (ref 0–1.9)
DIFFERENTIAL METHOD: NORMAL
EOSINOPHIL # BLD AUTO: 0.1 K/UL (ref 0–0.5)
EOSINOPHIL NFR BLD: 2.1 % (ref 0–8)
ERYTHROCYTE [DISTWIDTH] IN BLOOD BY AUTOMATED COUNT: 12.7 % (ref 11.5–14.5)
HCT VFR BLD AUTO: 39.7 % (ref 37–48.5)
HGB BLD-MCNC: 13.2 G/DL (ref 12–16)
IMM GRANULOCYTES # BLD AUTO: 0.01 K/UL (ref 0–0.04)
IMM GRANULOCYTES NFR BLD AUTO: 0.2 % (ref 0–0.5)
LYMPHOCYTES # BLD AUTO: 1.6 K/UL (ref 1–4.8)
LYMPHOCYTES NFR BLD: 30.9 % (ref 18–48)
MCH RBC QN AUTO: 28 PG (ref 27–31)
MCHC RBC AUTO-ENTMCNC: 33.2 G/DL (ref 32–36)
MCV RBC AUTO: 84 FL (ref 82–98)
MONOCYTES # BLD AUTO: 0.4 K/UL (ref 0.3–1)
MONOCYTES NFR BLD: 7.8 % (ref 4–15)
NEUTROPHILS # BLD AUTO: 3.1 K/UL (ref 1.8–7.7)
NEUTROPHILS NFR BLD: 58.1 % (ref 38–73)
NRBC BLD-RTO: 0 /100 WBC
PLATELET # BLD AUTO: 292 K/UL (ref 150–450)
PMV BLD AUTO: 9.2 FL (ref 9.2–12.9)
RBC # BLD AUTO: 4.71 M/UL (ref 4–5.4)
WBC # BLD AUTO: 5.28 K/UL (ref 3.9–12.7)

## 2021-04-13 PROCEDURE — 85025 COMPLETE CBC W/AUTO DIFF WBC: CPT | Performed by: INTERNAL MEDICINE

## 2021-04-13 PROCEDURE — 36415 COLL VENOUS BLD VENIPUNCTURE: CPT | Performed by: INTERNAL MEDICINE

## 2021-04-13 PROCEDURE — 82728 ASSAY OF FERRITIN: CPT | Performed by: INTERNAL MEDICINE

## 2021-04-13 PROCEDURE — 83540 ASSAY OF IRON: CPT | Performed by: INTERNAL MEDICINE

## 2021-04-14 LAB
FERRITIN SERPL-MCNC: 191 NG/ML (ref 20–300)
IRON SERPL-MCNC: 92 UG/DL (ref 30–160)
SATURATED IRON: 28 % (ref 20–50)
TOTAL IRON BINDING CAPACITY: 326 UG/DL (ref 250–450)
TRANSFERRIN SERPL-MCNC: 220 MG/DL (ref 200–375)

## 2021-04-15 ENCOUNTER — OFFICE VISIT (OUTPATIENT)
Dept: HEMATOLOGY/ONCOLOGY | Facility: CLINIC | Age: 61
End: 2021-04-15
Payer: COMMERCIAL

## 2021-04-15 ENCOUNTER — PATIENT MESSAGE (OUTPATIENT)
Dept: HEMATOLOGY/ONCOLOGY | Facility: CLINIC | Age: 61
End: 2021-04-15

## 2021-04-15 DIAGNOSIS — D51.0 PERNICIOUS ANEMIA: ICD-10-CM

## 2021-04-15 DIAGNOSIS — E61.1 IRON DEFICIENCY: Primary | ICD-10-CM

## 2021-04-15 DIAGNOSIS — D50.0 IRON DEFICIENCY ANEMIA DUE TO CHRONIC BLOOD LOSS: ICD-10-CM

## 2021-04-15 PROBLEM — Z86.2 HISTORY OF IRON DEFICIENCY ANEMIA: Status: RESOLVED | Noted: 2017-09-14 | Resolved: 2021-04-15

## 2021-04-15 PROCEDURE — 99213 OFFICE O/P EST LOW 20 MIN: CPT | Mod: 95,,, | Performed by: INTERNAL MEDICINE

## 2021-04-15 PROCEDURE — 99213 PR OFFICE/OUTPT VISIT, EST, LEVL III, 20-29 MIN: ICD-10-PCS | Mod: 95,,, | Performed by: INTERNAL MEDICINE

## 2021-04-16 ENCOUNTER — PATIENT MESSAGE (OUTPATIENT)
Dept: INTERNAL MEDICINE | Facility: CLINIC | Age: 61
End: 2021-04-16

## 2021-04-21 ENCOUNTER — TELEPHONE (OUTPATIENT)
Dept: HEMATOLOGY/ONCOLOGY | Facility: CLINIC | Age: 61
End: 2021-04-21

## 2021-05-31 ENCOUNTER — PATIENT MESSAGE (OUTPATIENT)
Dept: PSYCHIATRY | Facility: CLINIC | Age: 61
End: 2021-05-31

## 2021-05-31 ENCOUNTER — PATIENT MESSAGE (OUTPATIENT)
Dept: INTERNAL MEDICINE | Facility: CLINIC | Age: 61
End: 2021-05-31

## 2021-05-31 ENCOUNTER — TELEPHONE (OUTPATIENT)
Dept: INTERNAL MEDICINE | Facility: CLINIC | Age: 61
End: 2021-05-31

## 2021-06-09 ENCOUNTER — PATIENT MESSAGE (OUTPATIENT)
Dept: INTERNAL MEDICINE | Facility: CLINIC | Age: 61
End: 2021-06-09

## 2021-06-14 ENCOUNTER — OFFICE VISIT (OUTPATIENT)
Dept: PSYCHIATRY | Facility: CLINIC | Age: 61
End: 2021-06-14
Payer: COMMERCIAL

## 2021-06-14 DIAGNOSIS — F43.21 COMPLICATED BEREAVEMENT: ICD-10-CM

## 2021-06-14 DIAGNOSIS — F41.0 GENERALIZED ANXIETY DISORDER WITH PANIC ATTACKS: Primary | ICD-10-CM

## 2021-06-14 DIAGNOSIS — F41.0 PANIC DISORDER: ICD-10-CM

## 2021-06-14 DIAGNOSIS — F41.1 GENERALIZED ANXIETY DISORDER WITH PANIC ATTACKS: Primary | ICD-10-CM

## 2021-06-14 PROCEDURE — 99214 PR OFFICE/OUTPT VISIT, EST, LEVL IV, 30-39 MIN: ICD-10-PCS | Mod: 95,,, | Performed by: PSYCHIATRY & NEUROLOGY

## 2021-06-14 PROCEDURE — 99214 OFFICE O/P EST MOD 30 MIN: CPT | Mod: 95,,, | Performed by: PSYCHIATRY & NEUROLOGY

## 2021-06-14 RX ORDER — GABAPENTIN 300 MG/1
300 CAPSULE ORAL NIGHTLY
Qty: 30 CAPSULE | Refills: 6 | Status: SHIPPED | OUTPATIENT
Start: 2021-06-14 | End: 2022-06-07 | Stop reason: SDUPTHER

## 2021-06-14 RX ORDER — ALPRAZOLAM 0.5 MG/1
0.5 TABLET ORAL DAILY PRN
Qty: 15 TABLET | Refills: 3 | Status: SHIPPED | OUTPATIENT
Start: 2021-06-14 | End: 2023-03-09

## 2021-07-21 ENCOUNTER — PATIENT MESSAGE (OUTPATIENT)
Dept: HEMATOLOGY/ONCOLOGY | Facility: CLINIC | Age: 61
End: 2021-07-21

## 2021-07-23 ENCOUNTER — LAB VISIT (OUTPATIENT)
Dept: LAB | Facility: HOSPITAL | Age: 61
End: 2021-07-23
Attending: INTERNAL MEDICINE
Payer: COMMERCIAL

## 2021-07-23 DIAGNOSIS — D51.0 PERNICIOUS ANEMIA: ICD-10-CM

## 2021-07-23 DIAGNOSIS — E61.1 IRON DEFICIENCY: Primary | ICD-10-CM

## 2021-07-23 DIAGNOSIS — E61.1 IRON DEFICIENCY: ICD-10-CM

## 2021-07-23 LAB
BASOPHILS # BLD AUTO: 0.05 K/UL (ref 0–0.2)
BASOPHILS NFR BLD: 0.7 % (ref 0–1.9)
DIFFERENTIAL METHOD: ABNORMAL
EOSINOPHIL # BLD AUTO: 0.1 K/UL (ref 0–0.5)
EOSINOPHIL NFR BLD: 0.7 % (ref 0–8)
ERYTHROCYTE [DISTWIDTH] IN BLOOD BY AUTOMATED COUNT: 12.7 % (ref 11.5–14.5)
HCT VFR BLD AUTO: 38.4 % (ref 37–48.5)
HGB BLD-MCNC: 12.9 G/DL (ref 12–16)
IMM GRANULOCYTES # BLD AUTO: 0.02 K/UL (ref 0–0.04)
IMM GRANULOCYTES NFR BLD AUTO: 0.3 % (ref 0–0.5)
LYMPHOCYTES # BLD AUTO: 1.2 K/UL (ref 1–4.8)
LYMPHOCYTES NFR BLD: 17.2 % (ref 18–48)
MCH RBC QN AUTO: 28.3 PG (ref 27–31)
MCHC RBC AUTO-ENTMCNC: 33.6 G/DL (ref 32–36)
MCV RBC AUTO: 84 FL (ref 82–98)
MONOCYTES # BLD AUTO: 0.5 K/UL (ref 0.3–1)
MONOCYTES NFR BLD: 6.6 % (ref 4–15)
NEUTROPHILS # BLD AUTO: 5.1 K/UL (ref 1.8–7.7)
NEUTROPHILS NFR BLD: 74.5 % (ref 38–73)
NRBC BLD-RTO: 0 /100 WBC
PLATELET # BLD AUTO: 299 K/UL (ref 150–450)
PMV BLD AUTO: 9.7 FL (ref 9.2–12.9)
RBC # BLD AUTO: 4.56 M/UL (ref 4–5.4)
WBC # BLD AUTO: 6.8 K/UL (ref 3.9–12.7)

## 2021-07-23 PROCEDURE — 83540 ASSAY OF IRON: CPT | Performed by: INTERNAL MEDICINE

## 2021-07-23 PROCEDURE — 36415 COLL VENOUS BLD VENIPUNCTURE: CPT | Performed by: INTERNAL MEDICINE

## 2021-07-23 PROCEDURE — 83921 ORGANIC ACID SINGLE QUANT: CPT | Performed by: INTERNAL MEDICINE

## 2021-07-23 PROCEDURE — 82728 ASSAY OF FERRITIN: CPT | Performed by: INTERNAL MEDICINE

## 2021-07-23 PROCEDURE — 85025 COMPLETE CBC W/AUTO DIFF WBC: CPT | Performed by: INTERNAL MEDICINE

## 2021-07-23 PROCEDURE — 82607 VITAMIN B-12: CPT | Performed by: INTERNAL MEDICINE

## 2021-07-24 LAB
FERRITIN SERPL-MCNC: 193 NG/ML (ref 20–300)
IRON SERPL-MCNC: 65 UG/DL (ref 30–160)
SATURATED IRON: 20 % (ref 20–50)
TOTAL IRON BINDING CAPACITY: 330 UG/DL (ref 250–450)
TRANSFERRIN SERPL-MCNC: 223 MG/DL (ref 200–375)
VIT B12 SERPL-MCNC: 348 PG/ML (ref 210–950)

## 2021-07-26 ENCOUNTER — OFFICE VISIT (OUTPATIENT)
Dept: HEMATOLOGY/ONCOLOGY | Facility: CLINIC | Age: 61
End: 2021-07-26
Payer: COMMERCIAL

## 2021-07-26 DIAGNOSIS — D51.0 PERNICIOUS ANEMIA: Chronic | ICD-10-CM

## 2021-07-26 DIAGNOSIS — D50.0 IRON DEFICIENCY ANEMIA DUE TO CHRONIC BLOOD LOSS: Primary | ICD-10-CM

## 2021-07-26 PROCEDURE — 1160F PR REVIEW ALL MEDS BY PRESCRIBER/CLIN PHARMACIST DOCUMENTED: ICD-10-PCS | Mod: CPTII,,, | Performed by: INTERNAL MEDICINE

## 2021-07-26 PROCEDURE — 1160F RVW MEDS BY RX/DR IN RCRD: CPT | Mod: CPTII,,, | Performed by: INTERNAL MEDICINE

## 2021-07-26 PROCEDURE — 99213 PR OFFICE/OUTPT VISIT, EST, LEVL III, 20-29 MIN: ICD-10-PCS | Mod: 95,,, | Performed by: INTERNAL MEDICINE

## 2021-07-26 PROCEDURE — 1159F MED LIST DOCD IN RCRD: CPT | Mod: CPTII,,, | Performed by: INTERNAL MEDICINE

## 2021-07-26 PROCEDURE — 1159F PR MEDICATION LIST DOCUMENTED IN MEDICAL RECORD: ICD-10-PCS | Mod: CPTII,,, | Performed by: INTERNAL MEDICINE

## 2021-07-26 PROCEDURE — 99213 OFFICE O/P EST LOW 20 MIN: CPT | Mod: 95,,, | Performed by: INTERNAL MEDICINE

## 2021-07-27 DIAGNOSIS — D51.0 PERNICIOUS ANEMIA: Primary | ICD-10-CM

## 2021-07-27 DIAGNOSIS — D51.0 PERNICIOUS ANEMIA: Chronic | ICD-10-CM

## 2021-07-27 LAB — METHYLMALONATE SERPL-SCNC: 0.56 UMOL/L

## 2021-07-27 RX ORDER — CYANOCOBALAMIN 1000 UG/ML
INJECTION, SOLUTION INTRAMUSCULAR; SUBCUTANEOUS
Qty: 10 ML | Refills: 0 | Status: SHIPPED | OUTPATIENT
Start: 2021-07-27 | End: 2022-09-23 | Stop reason: SDUPTHER

## 2021-08-13 ENCOUNTER — DOCUMENTATION ONLY (OUTPATIENT)
Dept: HEMATOLOGY/ONCOLOGY | Facility: CLINIC | Age: 61
End: 2021-08-13

## 2021-08-13 ENCOUNTER — PATIENT MESSAGE (OUTPATIENT)
Dept: HEMATOLOGY/ONCOLOGY | Facility: CLINIC | Age: 61
End: 2021-08-13

## 2021-08-24 ENCOUNTER — PATIENT MESSAGE (OUTPATIENT)
Dept: HEMATOLOGY/ONCOLOGY | Facility: CLINIC | Age: 61
End: 2021-08-24

## 2021-09-22 ENCOUNTER — PATIENT MESSAGE (OUTPATIENT)
Dept: INFUSION THERAPY | Facility: HOSPITAL | Age: 61
End: 2021-09-22

## 2021-10-04 ENCOUNTER — PATIENT MESSAGE (OUTPATIENT)
Dept: INFUSION THERAPY | Facility: HOSPITAL | Age: 61
End: 2021-10-04

## 2021-10-04 ENCOUNTER — PATIENT MESSAGE (OUTPATIENT)
Dept: INTERNAL MEDICINE | Facility: CLINIC | Age: 61
End: 2021-10-04

## 2021-10-04 DIAGNOSIS — Z12.31 ENCOUNTER FOR SCREENING MAMMOGRAM FOR BREAST CANCER: Primary | ICD-10-CM

## 2021-10-05 ENCOUNTER — PATIENT MESSAGE (OUTPATIENT)
Dept: INTERNAL MEDICINE | Facility: CLINIC | Age: 61
End: 2021-10-05

## 2021-10-23 ENCOUNTER — PATIENT MESSAGE (OUTPATIENT)
Dept: HEMATOLOGY/ONCOLOGY | Facility: CLINIC | Age: 61
End: 2021-10-23
Payer: COMMERCIAL

## 2021-11-01 ENCOUNTER — LAB VISIT (OUTPATIENT)
Dept: LAB | Facility: HOSPITAL | Age: 61
End: 2021-11-01
Attending: INTERNAL MEDICINE
Payer: COMMERCIAL

## 2021-11-01 ENCOUNTER — HOSPITAL ENCOUNTER (OUTPATIENT)
Dept: RADIOLOGY | Facility: HOSPITAL | Age: 61
Discharge: HOME | End: 2021-11-01
Attending: INTERNAL MEDICINE
Payer: COMMERCIAL

## 2021-11-01 DIAGNOSIS — Z12.31 ENCOUNTER FOR SCREENING MAMMOGRAM FOR BREAST CANCER: ICD-10-CM

## 2021-11-01 DIAGNOSIS — E61.1 IRON DEFICIENCY: ICD-10-CM

## 2021-11-01 DIAGNOSIS — D51.0 PERNICIOUS ANEMIA: ICD-10-CM

## 2021-11-01 LAB
BASOPHILS # BLD AUTO: 0.04 K/UL (ref 0–0.2)
BASOPHILS NFR BLD: 0.7 % (ref 0–1.9)
DIFFERENTIAL METHOD: NORMAL
EOSINOPHIL # BLD AUTO: 0.1 K/UL (ref 0–0.5)
EOSINOPHIL NFR BLD: 1.6 % (ref 0–8)
ERYTHROCYTE [DISTWIDTH] IN BLOOD BY AUTOMATED COUNT: 13.2 % (ref 11.5–14.5)
HCT VFR BLD AUTO: 41.2 % (ref 37–48.5)
HGB BLD-MCNC: 13.8 G/DL (ref 12–16)
IMM GRANULOCYTES # BLD AUTO: 0.01 K/UL (ref 0–0.04)
IMM GRANULOCYTES NFR BLD AUTO: 0.2 % (ref 0–0.5)
LYMPHOCYTES # BLD AUTO: 1.6 K/UL (ref 1–4.8)
LYMPHOCYTES NFR BLD: 29.1 % (ref 18–48)
MCH RBC QN AUTO: 28.5 PG (ref 27–31)
MCHC RBC AUTO-ENTMCNC: 33.5 G/DL (ref 32–36)
MCV RBC AUTO: 85 FL (ref 82–98)
MONOCYTES # BLD AUTO: 0.4 K/UL (ref 0.3–1)
MONOCYTES NFR BLD: 7.4 % (ref 4–15)
NEUTROPHILS # BLD AUTO: 3.4 K/UL (ref 1.8–7.7)
NEUTROPHILS NFR BLD: 61 % (ref 38–73)
NRBC BLD-RTO: 0 /100 WBC
PLATELET # BLD AUTO: 279 K/UL (ref 150–450)
PMV BLD AUTO: 9.4 FL (ref 9.2–12.9)
RBC # BLD AUTO: 4.85 M/UL (ref 4–5.4)
WBC # BLD AUTO: 5.54 K/UL (ref 3.9–12.7)

## 2021-11-01 PROCEDURE — 82728 ASSAY OF FERRITIN: CPT | Performed by: INTERNAL MEDICINE

## 2021-11-01 PROCEDURE — 84466 ASSAY OF TRANSFERRIN: CPT | Performed by: INTERNAL MEDICINE

## 2021-11-01 PROCEDURE — 77067 MAMMO DIGITAL SCREENING BILAT WITH TOMO: ICD-10-PCS | Mod: 26,,, | Performed by: RADIOLOGY

## 2021-11-01 PROCEDURE — 36415 COLL VENOUS BLD VENIPUNCTURE: CPT | Performed by: INTERNAL MEDICINE

## 2021-11-01 PROCEDURE — 77067 SCR MAMMO BI INCL CAD: CPT | Mod: 26,,, | Performed by: RADIOLOGY

## 2021-11-01 PROCEDURE — 77063 BREAST TOMOSYNTHESIS BI: CPT | Mod: 26,,, | Performed by: RADIOLOGY

## 2021-11-01 PROCEDURE — 85025 COMPLETE CBC W/AUTO DIFF WBC: CPT | Performed by: INTERNAL MEDICINE

## 2021-11-01 PROCEDURE — 82607 VITAMIN B-12: CPT | Performed by: INTERNAL MEDICINE

## 2021-11-01 PROCEDURE — 83921 ORGANIC ACID SINGLE QUANT: CPT | Performed by: INTERNAL MEDICINE

## 2021-11-01 PROCEDURE — 77067 SCR MAMMO BI INCL CAD: CPT | Mod: TC

## 2021-11-01 PROCEDURE — 77063 MAMMO DIGITAL SCREENING BILAT WITH TOMO: ICD-10-PCS | Mod: 26,,, | Performed by: RADIOLOGY

## 2021-11-02 LAB
FERRITIN SERPL-MCNC: 207 NG/ML (ref 20–300)
IRON SERPL-MCNC: 78 UG/DL (ref 30–160)
SATURATED IRON: 24 % (ref 20–50)
TOTAL IRON BINDING CAPACITY: 329 UG/DL (ref 250–450)
TRANSFERRIN SERPL-MCNC: 222 MG/DL (ref 200–375)
VIT B12 SERPL-MCNC: 340 PG/ML (ref 210–950)

## 2021-11-05 LAB — METHYLMALONATE SERPL-SCNC: 0.83 UMOL/L

## 2021-11-16 ENCOUNTER — PATIENT MESSAGE (OUTPATIENT)
Dept: HEMATOLOGY/ONCOLOGY | Facility: CLINIC | Age: 61
End: 2021-11-16
Payer: COMMERCIAL

## 2021-11-16 ENCOUNTER — TELEPHONE (OUTPATIENT)
Dept: HEMATOLOGY/ONCOLOGY | Facility: CLINIC | Age: 61
End: 2021-11-16
Payer: COMMERCIAL

## 2021-12-07 RX ORDER — DULOXETIN HYDROCHLORIDE 20 MG/1
40 CAPSULE, DELAYED RELEASE ORAL DAILY
Qty: 60 CAPSULE | Refills: 11 | Status: CANCELLED | OUTPATIENT
Start: 2021-12-07

## 2021-12-09 ENCOUNTER — PATIENT MESSAGE (OUTPATIENT)
Dept: INTERNAL MEDICINE | Facility: CLINIC | Age: 61
End: 2021-12-09
Payer: COMMERCIAL

## 2021-12-09 DIAGNOSIS — F33.2 SEVERE EPISODE OF RECURRENT MAJOR DEPRESSIVE DISORDER, WITHOUT PSYCHOTIC FEATURES: Primary | ICD-10-CM

## 2021-12-09 RX ORDER — LORAZEPAM 0.5 MG/1
TABLET ORAL
COMMUNITY
End: 2021-12-09 | Stop reason: SINTOL

## 2021-12-10 ENCOUNTER — PATIENT MESSAGE (OUTPATIENT)
Dept: INTERNAL MEDICINE | Facility: CLINIC | Age: 61
End: 2021-12-10
Payer: COMMERCIAL

## 2021-12-10 ENCOUNTER — OFFICE VISIT (OUTPATIENT)
Dept: HEMATOLOGY/ONCOLOGY | Facility: CLINIC | Age: 61
End: 2021-12-10
Payer: COMMERCIAL

## 2021-12-10 DIAGNOSIS — D51.0 PERNICIOUS ANEMIA: Chronic | ICD-10-CM

## 2021-12-10 DIAGNOSIS — D50.0 IRON DEFICIENCY ANEMIA DUE TO CHRONIC BLOOD LOSS: Primary | ICD-10-CM

## 2021-12-10 PROCEDURE — 99213 PR OFFICE/OUTPT VISIT, EST, LEVL III, 20-29 MIN: ICD-10-PCS | Mod: 95,,, | Performed by: INTERNAL MEDICINE

## 2021-12-10 PROCEDURE — 99213 OFFICE O/P EST LOW 20 MIN: CPT | Mod: 95,,, | Performed by: INTERNAL MEDICINE

## 2021-12-10 RX ORDER — DULOXETIN HYDROCHLORIDE 20 MG/1
40 CAPSULE, DELAYED RELEASE ORAL DAILY
Qty: 60 CAPSULE | Refills: 0 | Status: SHIPPED | OUTPATIENT
Start: 2021-12-10 | End: 2022-01-05 | Stop reason: SDUPTHER

## 2021-12-14 ENCOUNTER — PATIENT MESSAGE (OUTPATIENT)
Dept: PSYCHIATRY | Facility: CLINIC | Age: 61
End: 2021-12-14
Payer: COMMERCIAL

## 2021-12-17 ENCOUNTER — PATIENT MESSAGE (OUTPATIENT)
Dept: INTERNAL MEDICINE | Facility: CLINIC | Age: 61
End: 2021-12-17
Payer: COMMERCIAL

## 2021-12-28 ENCOUNTER — PATIENT MESSAGE (OUTPATIENT)
Dept: INTERNAL MEDICINE | Facility: CLINIC | Age: 61
End: 2021-12-28
Payer: COMMERCIAL

## 2022-01-05 ENCOUNTER — OFFICE VISIT (OUTPATIENT)
Dept: INTERNAL MEDICINE | Facility: CLINIC | Age: 62
End: 2022-01-05
Payer: COMMERCIAL

## 2022-01-05 VITALS
TEMPERATURE: 97 F | OXYGEN SATURATION: 98 % | SYSTOLIC BLOOD PRESSURE: 100 MMHG | HEART RATE: 65 BPM | WEIGHT: 131.38 LBS | DIASTOLIC BLOOD PRESSURE: 70 MMHG | BODY MASS INDEX: 25.66 KG/M2

## 2022-01-05 DIAGNOSIS — F33.2 SEVERE EPISODE OF RECURRENT MAJOR DEPRESSIVE DISORDER, WITHOUT PSYCHOTIC FEATURES: ICD-10-CM

## 2022-01-05 DIAGNOSIS — F41.1 GENERALIZED ANXIETY DISORDER WITH PANIC ATTACKS: ICD-10-CM

## 2022-01-05 DIAGNOSIS — M35.00 SICCA SYNDROME: ICD-10-CM

## 2022-01-05 DIAGNOSIS — D51.0 PERNICIOUS ANEMIA: Chronic | ICD-10-CM

## 2022-01-05 DIAGNOSIS — F41.0 GENERALIZED ANXIETY DISORDER WITH PANIC ATTACKS: ICD-10-CM

## 2022-01-05 DIAGNOSIS — Z00.00 ROUTINE GENERAL MEDICAL EXAMINATION AT A HEALTH CARE FACILITY: Primary | ICD-10-CM

## 2022-01-05 PROCEDURE — 99999 PR PBB SHADOW E&M-EST. PATIENT-LVL IV: CPT | Mod: PBBFAC,,, | Performed by: INTERNAL MEDICINE

## 2022-01-05 PROCEDURE — 3074F PR MOST RECENT SYSTOLIC BLOOD PRESSURE < 130 MM HG: ICD-10-PCS | Mod: CPTII,S$GLB,, | Performed by: INTERNAL MEDICINE

## 2022-01-05 PROCEDURE — 99999 PR PBB SHADOW E&M-EST. PATIENT-LVL IV: ICD-10-PCS | Mod: PBBFAC,,, | Performed by: INTERNAL MEDICINE

## 2022-01-05 PROCEDURE — 3078F DIAST BP <80 MM HG: CPT | Mod: CPTII,S$GLB,, | Performed by: INTERNAL MEDICINE

## 2022-01-05 PROCEDURE — 3008F BODY MASS INDEX DOCD: CPT | Mod: CPTII,S$GLB,, | Performed by: INTERNAL MEDICINE

## 2022-01-05 PROCEDURE — 3074F SYST BP LT 130 MM HG: CPT | Mod: CPTII,S$GLB,, | Performed by: INTERNAL MEDICINE

## 2022-01-05 PROCEDURE — 3078F PR MOST RECENT DIASTOLIC BLOOD PRESSURE < 80 MM HG: ICD-10-PCS | Mod: CPTII,S$GLB,, | Performed by: INTERNAL MEDICINE

## 2022-01-05 PROCEDURE — 90686 IIV4 VACC NO PRSV 0.5 ML IM: CPT | Mod: S$GLB,,, | Performed by: INTERNAL MEDICINE

## 2022-01-05 PROCEDURE — 90471 IMMUNIZATION ADMIN: CPT | Mod: S$GLB,,, | Performed by: INTERNAL MEDICINE

## 2022-01-05 PROCEDURE — 99396 PR PREVENTIVE VISIT,EST,40-64: ICD-10-PCS | Mod: 25,S$GLB,, | Performed by: INTERNAL MEDICINE

## 2022-01-05 PROCEDURE — 3008F PR BODY MASS INDEX (BMI) DOCUMENTED: ICD-10-PCS | Mod: CPTII,S$GLB,, | Performed by: INTERNAL MEDICINE

## 2022-01-05 PROCEDURE — 90686 FLU VACCINE (QUAD) GREATER THAN OR EQUAL TO 3YO PRESERVATIVE FREE IM: ICD-10-PCS | Mod: S$GLB,,, | Performed by: INTERNAL MEDICINE

## 2022-01-05 PROCEDURE — 90471 FLU VACCINE (QUAD) GREATER THAN OR EQUAL TO 3YO PRESERVATIVE FREE IM: ICD-10-PCS | Mod: S$GLB,,, | Performed by: INTERNAL MEDICINE

## 2022-01-05 PROCEDURE — 1159F MED LIST DOCD IN RCRD: CPT | Mod: CPTII,S$GLB,, | Performed by: INTERNAL MEDICINE

## 2022-01-05 PROCEDURE — 99396 PREV VISIT EST AGE 40-64: CPT | Mod: 25,S$GLB,, | Performed by: INTERNAL MEDICINE

## 2022-01-05 PROCEDURE — 1159F PR MEDICATION LIST DOCUMENTED IN MEDICAL RECORD: ICD-10-PCS | Mod: CPTII,S$GLB,, | Performed by: INTERNAL MEDICINE

## 2022-01-05 RX ORDER — DULOXETIN HYDROCHLORIDE 20 MG/1
40 CAPSULE, DELAYED RELEASE ORAL DAILY
Qty: 180 CAPSULE | Refills: 1 | Status: SHIPPED | OUTPATIENT
Start: 2022-01-05 | End: 2022-06-07

## 2022-01-05 NOTE — PROGRESS NOTES
Subjective:      Patient ID: Alma Mistry is a 61 y.o. female.    Chief Complaint: Annual Exam    HPI     62 yo with   Patient Active Problem List   Diagnosis    Pernicious anemia    Overweight    Lichen sclerosus    Panic disorder    Polyneuropathy    RLS (restless legs syndrome)    Neuropathic pain    Arthralgia    Severe episode of recurrent major depressive disorder, without psychotic features    Generalized anxiety disorder with panic attacks    Complicated bereavement    Neuropathy of left superficial peroneal nerve    Iron deficiency anemia due to chronic blood loss    Sicca syndrome     Past Medical History:   Diagnosis Date    Atrophic gastritis     B12 deficiency     Breast lump on left side at 3 o'clock position 03/25/2015    Fibrocystic breast     Lichen sclerosus 08/16/2012    Lupus      Here today for annual prev exam.  Compliant with meds without significant side effects. Energy and appetite are good.     Social History     Socioeconomic History    Marital status:    Tobacco Use    Smoking status: Never Smoker    Smokeless tobacco: Never Used   Substance and Sexual Activity    Alcohol use: Not Currently    Drug use: No    Sexual activity: Never   Social History Narrative    1 dog, no smokers; Originally from [facility].     Social Determinants of Health     Financial Resource Strain: Low Risk     Difficulty of Paying Living Expenses: Not very hard   Food Insecurity: No Food Insecurity    Worried About Running Out of Food in the Last Year: Never true    Ran Out of Food in the Last Year: Never true   Transportation Needs: No Transportation Needs    Lack of Transportation (Medical): No    Lack of Transportation (Non-Medical): No   Physical Activity: Sufficiently Active    Days of Exercise per Week: 7 days    Minutes of Exercise per Session: 120 min   Stress: Stress Concern Present    Feeling of Stress : To some extent   Social Connections: Unknown    Frequency of  Communication with Friends and Family: More than three times a week    Frequency of Social Gatherings with Friends and Family: Once a week    Active Member of Clubs or Organizations: Yes    Attends Club or Organization Meetings: More than 4 times per year    Marital Status:    Housing Stability: Low Risk     Unable to Pay for Housing in the Last Year: No    Number of Places Lived in the Last Year: 1    Unstable Housing in the Last Year: No     family history includes Anemia in her mother; Asthma in her father; Cirrhosis in her brother; Depression in her mother and sister; Hyperthyroidism in her mother; Hypotension in her mother; Leukemia in her brother; Multiple sclerosis in her brother; Nephritis in her mother; Thyroid disease in her sister.    Past Surgical History:   Procedure Laterality Date    BREAST BIOPSY Right 2009    benign    COLONOSCOPY  09/10/2010    COLONOSCOPY N/A 6/4/2020    Procedure: COLONOSCOPY;  Surgeon: Almas Perez MD;  Location: [facility];  Service: Endoscopy;  Laterality: N/A;    ESOPHAGOGASTRODUODENOSCOPY  09/10/2010    ESOPHAGOGASTRODUODENOSCOPY N/A 6/4/2020    Procedure: ESOPHAGOGASTRODUODENOSCOPY (EGD);  Surgeon: Almas Perez MD;  Location: [facility];  Service: Endoscopy;  Laterality: N/A;    ESOPHAGOGASTRODUODENOSCOPY N/A 7/28/2020    Procedure: EGD (ESOPHAGOGASTRODUODENOSCOPY);  Surgeon: Almas Perez MD;  Location: [facility];  Service: Endoscopy;  Laterality: N/A;    HYSTERECTOMY  2013    TONSILLECTOMY         Review of Systems   Constitutional: Negative for chills and fever.   HENT: Negative for ear pain and sore throat.    Respiratory: Negative for cough.    Cardiovascular: Negative for chest pain.   Gastrointestinal: Negative for abdominal pain and blood in stool.   Genitourinary: Negative for dysuria and hematuria.   Neurological: Negative for seizures and syncope.     Objective:   /70 (BP Location: Right arm,  Patient Position: Sitting, BP Method: Medium (Manual))   Pulse 65   Temp 97.1 °F (36.2 °C) (Tympanic)   Wt 59.6 kg (131 lb 6.3 oz)   SpO2 98%   BMI 25.66 kg/m²     Physical Exam  Constitutional:       General: She is not in acute distress.     Appearance: She is well-developed and well-nourished.   HENT:      Head: Normocephalic and atraumatic.      Right Ear: External ear normal.      Left Ear: External ear normal.      Mouth/Throat:      Mouth: Oropharynx is clear and moist.   Eyes:      Extraocular Movements: EOM normal.      Pupils: Pupils are equal, round, and reactive to light.   Neck:      Thyroid: No thyromegaly.   Cardiovascular:      Rate and Rhythm: Normal rate and regular rhythm.   Pulmonary:      Breath sounds: Normal breath sounds. No wheezing or rales.   Abdominal:      General: Bowel sounds are normal.      Palpations: Abdomen is soft.      Tenderness: There is no abdominal tenderness.   Musculoskeletal:      Cervical back: Neck supple.   Lymphadenopathy:      Cervical: No cervical adenopathy.   Skin:     General: Skin is warm and dry.   Neurological:      Mental Status: She is alert and oriented to person, place, and time.   Psychiatric:         Mood and Affect: Mood and affect normal.         Behavior: Behavior normal.         Assessment:     1. Routine general medical examination at a health care facility    2. Pernicious anemia    3. Generalized anxiety disorder with panic attacks    4. Severe episode of recurrent major depressive disorder, without psychotic features    5. Sicca syndrome      Plan:   Routine general medical examination at a health care facility  Heart healthy diet and reg exercise  HM reviewed  -     TSH; Future; Expected date: 01/05/2022  -     Lipid Panel; Future; Expected date: 01/05/2022  -     Comprehensive Metabolic Panel; Future; Expected date: 01/05/2022    Pernicious anemia  stable  Generalized anxiety disorder with panic attacks  Stable. No need for xanax x 2  mo  Severe episode of recurrent major depressive disorder, without psychotic features  Stable on cymbalta  -     DULoxetine (CYMBALTA) 20 MG capsule; Take 2 capsules (40 mg total) by mouth once daily.  Dispense: 180 capsule; Refill: 1    Sicca syndrome  As per rheum    Other orders  -     Influenza - Quadrivalent (PF)        Lab Frequency Next Occurrence   Ferritin Once 12/10/2021   CBC Auto Differential Once 12/10/2021   Iron and TIBC Once 12/10/2021   Vitamin B12 Once 12/10/2021   MMA Once 12/10/2021   Sjogrens syndrome-A extractable nuclear antibody     Sjogrens syndrome-B extractable nuclear antibody     NGOZI Screen w/Reflex     C3 complement     C4 complement     Anti-DNA antibody, double-stranded     Urinalysis     Protein / creatinine ratio, urine     CBC auto differential     Comprehensive metabolic panel     DRVVT     Cardiolipin antibody     Beta-2 Glycoprotein Abs (IgA, IgG, IgM)     Ferritin Every 4 Weeks 1/14/2022, 2/11/2022, 3/11/2022, 4/8/2022, 5/6/2022, 6/3/2022, 7/1/2022, 7/29/2022   CBC Auto Differential Every 4 Weeks 1/14/2022, 2/11/2022, 3/11/2022, 4/8/2022, 5/6/2022, 6/3/2022, 7/1/2022, 7/29/2022   Iron and TIBC Every 4 Weeks 1/14/2022, 2/11/2022, 3/11/2022, 4/8/2022, 5/6/2022, 6/3/2022, 7/1/2022, 7/29/2022       Problem List Items Addressed This Visit     Pernicious anemia (Chronic)    Severe episode of recurrent major depressive disorder, without psychotic features    Relevant Medications    DULoxetine (CYMBALTA) 20 MG capsule    Generalized anxiety disorder with panic attacks    Sicca syndrome      Other Visit Diagnoses     Routine general medical examination at a health care facility    -  Primary    Relevant Orders    TSH (Completed)    Lipid Panel (Completed)    Comprehensive Metabolic Panel (Completed)          Follow up in about 1 year (around 1/5/2023), or if symptoms worsen or fail to improve.  Labs tomorrow at [facility]am at [facility].

## 2022-01-06 ENCOUNTER — LAB VISIT (OUTPATIENT)
Dept: LAB | Facility: HOSPITAL | Age: 62
End: 2022-01-06
Attending: INTERNAL MEDICINE
Payer: COMMERCIAL

## 2022-01-06 DIAGNOSIS — Z00.00 ROUTINE GENERAL MEDICAL EXAMINATION AT A HEALTH CARE FACILITY: ICD-10-CM

## 2022-01-06 LAB
ALBUMIN SERPL BCP-MCNC: 3.9 G/DL (ref 3.5–5.2)
ALP SERPL-CCNC: 92 U/L (ref 55–135)
ALT SERPL W/O P-5'-P-CCNC: 26 U/L (ref 10–44)
ANION GAP SERPL CALC-SCNC: 7 MMOL/L (ref 8–16)
AST SERPL-CCNC: 29 U/L (ref 10–40)
BILIRUB SERPL-MCNC: 0.7 MG/DL (ref 0.1–1)
BUN SERPL-MCNC: 10 MG/DL (ref 6–20)
CALCIUM SERPL-MCNC: 9.2 MG/DL (ref 8.7–10.5)
CHLORIDE SERPL-SCNC: 106 MMOL/L (ref 95–110)
CHOLEST SERPL-MCNC: 222 MG/DL (ref 120–199)
CHOLEST/HDLC SERPL: 3.8 {RATIO} (ref 2–5)
CO2 SERPL-SCNC: 27 MMOL/L (ref 23–29)
CREAT SERPL-MCNC: 0.7 MG/DL (ref 0.5–1.4)
EST. GFR  (AFRICAN AMERICAN): >60 ML/MIN/1.73 M^2
EST. GFR  (NON AFRICAN AMERICAN): >60 ML/MIN/1.73 M^2
GLUCOSE SERPL-MCNC: 87 MG/DL (ref 70–110)
HDLC SERPL-MCNC: 59 MG/DL (ref 40–75)
HDLC SERPL: 26.6 % (ref 20–50)
LDLC SERPL CALC-MCNC: 143.8 MG/DL (ref 63–159)
NONHDLC SERPL-MCNC: 163 MG/DL
POTASSIUM SERPL-SCNC: 4.8 MMOL/L (ref 3.5–5.1)
PROT SERPL-MCNC: 6.5 G/DL (ref 6–8.4)
SODIUM SERPL-SCNC: 140 MMOL/L (ref 136–145)
TRIGL SERPL-MCNC: 96 MG/DL (ref 30–150)
TSH SERPL DL<=0.005 MIU/L-ACNC: 0.82 UIU/ML (ref 0.4–4)

## 2022-01-06 PROCEDURE — 84443 ASSAY THYROID STIM HORMONE: CPT | Performed by: INTERNAL MEDICINE

## 2022-01-06 PROCEDURE — 80053 COMPREHEN METABOLIC PANEL: CPT | Performed by: INTERNAL MEDICINE

## 2022-01-06 PROCEDURE — 80061 LIPID PANEL: CPT | Performed by: INTERNAL MEDICINE

## 2022-01-06 PROCEDURE — 36415 COLL VENOUS BLD VENIPUNCTURE: CPT | Performed by: INTERNAL MEDICINE

## 2022-01-09 PROBLEM — M35.00 SICCA SYNDROME: Status: ACTIVE | Noted: 2022-01-09

## 2022-06-07 ENCOUNTER — PATIENT MESSAGE (OUTPATIENT)
Dept: NEUROLOGY | Facility: CLINIC | Age: 62
End: 2022-06-07

## 2022-06-07 ENCOUNTER — TELEPHONE (OUTPATIENT)
Dept: PAIN MEDICINE | Facility: CLINIC | Age: 62
End: 2022-06-07
Payer: COMMERCIAL

## 2022-06-07 ENCOUNTER — OFFICE VISIT (OUTPATIENT)
Dept: NEUROLOGY | Facility: CLINIC | Age: 62
End: 2022-06-07
Payer: COMMERCIAL

## 2022-06-07 VITALS
HEIGHT: 60 IN | OXYGEN SATURATION: 99 % | HEART RATE: 70 BPM | RESPIRATION RATE: 16 BRPM | BODY MASS INDEX: 26.41 KG/M2 | SYSTOLIC BLOOD PRESSURE: 103 MMHG | WEIGHT: 134.5 LBS | DIASTOLIC BLOOD PRESSURE: 68 MMHG

## 2022-06-07 DIAGNOSIS — M43.6 NECK STIFFNESS: ICD-10-CM

## 2022-06-07 DIAGNOSIS — G62.9 POLYNEUROPATHY: ICD-10-CM

## 2022-06-07 DIAGNOSIS — M50.30 DDD (DEGENERATIVE DISC DISEASE), CERVICAL: ICD-10-CM

## 2022-06-07 DIAGNOSIS — M79.2 NEUROPATHIC PAIN: Primary | ICD-10-CM

## 2022-06-07 DIAGNOSIS — F41.0 GENERALIZED ANXIETY DISORDER WITH PANIC ATTACKS: ICD-10-CM

## 2022-06-07 DIAGNOSIS — G25.81 RLS (RESTLESS LEGS SYNDROME): ICD-10-CM

## 2022-06-07 DIAGNOSIS — R20.2 NUMBNESS AND TINGLING OF LEFT LEG: ICD-10-CM

## 2022-06-07 DIAGNOSIS — G56.22 ULNAR NEUROPATHY OF LEFT UPPER EXTREMITY: ICD-10-CM

## 2022-06-07 DIAGNOSIS — F41.1 GENERALIZED ANXIETY DISORDER WITH PANIC ATTACKS: ICD-10-CM

## 2022-06-07 DIAGNOSIS — R20.0 NUMBNESS AND TINGLING OF LEFT LEG: ICD-10-CM

## 2022-06-07 PROCEDURE — 99215 OFFICE O/P EST HI 40 MIN: CPT | Mod: S$GLB,,, | Performed by: NURSE PRACTITIONER

## 2022-06-07 PROCEDURE — 3008F BODY MASS INDEX DOCD: CPT | Mod: CPTII,S$GLB,, | Performed by: NURSE PRACTITIONER

## 2022-06-07 PROCEDURE — 99999 PR PBB SHADOW E&M-EST. PATIENT-LVL V: CPT | Mod: PBBFAC,,, | Performed by: NURSE PRACTITIONER

## 2022-06-07 PROCEDURE — 99215 PR OFFICE/OUTPT VISIT, EST, LEVL V, 40-54 MIN: ICD-10-PCS | Mod: S$GLB,,, | Performed by: NURSE PRACTITIONER

## 2022-06-07 PROCEDURE — 1160F RVW MEDS BY RX/DR IN RCRD: CPT | Mod: CPTII,S$GLB,, | Performed by: NURSE PRACTITIONER

## 2022-06-07 PROCEDURE — 99999 PR PBB SHADOW E&M-EST. PATIENT-LVL V: ICD-10-PCS | Mod: PBBFAC,,, | Performed by: NURSE PRACTITIONER

## 2022-06-07 PROCEDURE — 1160F PR REVIEW ALL MEDS BY PRESCRIBER/CLIN PHARMACIST DOCUMENTED: ICD-10-PCS | Mod: CPTII,S$GLB,, | Performed by: NURSE PRACTITIONER

## 2022-06-07 PROCEDURE — 1159F PR MEDICATION LIST DOCUMENTED IN MEDICAL RECORD: ICD-10-PCS | Mod: CPTII,S$GLB,, | Performed by: NURSE PRACTITIONER

## 2022-06-07 PROCEDURE — 3078F PR MOST RECENT DIASTOLIC BLOOD PRESSURE < 80 MM HG: ICD-10-PCS | Mod: CPTII,S$GLB,, | Performed by: NURSE PRACTITIONER

## 2022-06-07 PROCEDURE — 3074F PR MOST RECENT SYSTOLIC BLOOD PRESSURE < 130 MM HG: ICD-10-PCS | Mod: CPTII,S$GLB,, | Performed by: NURSE PRACTITIONER

## 2022-06-07 PROCEDURE — 3078F DIAST BP <80 MM HG: CPT | Mod: CPTII,S$GLB,, | Performed by: NURSE PRACTITIONER

## 2022-06-07 PROCEDURE — 1159F MED LIST DOCD IN RCRD: CPT | Mod: CPTII,S$GLB,, | Performed by: NURSE PRACTITIONER

## 2022-06-07 PROCEDURE — 3074F SYST BP LT 130 MM HG: CPT | Mod: CPTII,S$GLB,, | Performed by: NURSE PRACTITIONER

## 2022-06-07 PROCEDURE — 3008F PR BODY MASS INDEX (BMI) DOCUMENTED: ICD-10-PCS | Mod: CPTII,S$GLB,, | Performed by: NURSE PRACTITIONER

## 2022-06-07 RX ORDER — GABAPENTIN 300 MG/1
300 CAPSULE ORAL NIGHTLY
Qty: 30 CAPSULE | Refills: 11 | Status: SHIPPED | OUTPATIENT
Start: 2022-06-07 | End: 2023-06-07
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
## 2022-08-01 ENCOUNTER — PATIENT MESSAGE (OUTPATIENT)
Dept: PSYCHIATRY | Facility: CLINIC | Age: 62
End: 2022-08-01
Payer: COMMERCIAL

## 2022-08-03 ENCOUNTER — TELEPHONE (OUTPATIENT)
Dept: PAIN MEDICINE | Facility: CLINIC | Age: 62
End: 2022-08-03
Payer: COMMERCIAL

## 2022-09-05 ENCOUNTER — PATIENT MESSAGE (OUTPATIENT)
Dept: ADMINISTRATIVE | Facility: OTHER | Age: 62
End: 2022-09-05
Payer: COMMERCIAL

## 2022-09-25 ENCOUNTER — PATIENT MESSAGE (OUTPATIENT)
Dept: INTERNAL MEDICINE | Facility: CLINIC | Age: 62
End: 2022-09-25
Payer: COMMERCIAL

## 2022-09-25 DIAGNOSIS — Z12.31 ENCOUNTER FOR SCREENING MAMMOGRAM FOR BREAST CANCER: Primary | ICD-10-CM

## 2022-09-25 DIAGNOSIS — D51.0 PERNICIOUS ANEMIA: Chronic | ICD-10-CM

## 2022-09-25 RX ORDER — CYANOCOBALAMIN 1000 UG/ML
INJECTION, SOLUTION INTRAMUSCULAR; SUBCUTANEOUS
Qty: 10 ML | Refills: 0 | Status: CANCELLED | OUTPATIENT
Start: 2022-09-25

## 2022-11-22 ENCOUNTER — HOSPITAL ENCOUNTER (OUTPATIENT)
Dept: RADIOLOGY | Facility: HOSPITAL | Age: 62
Discharge: HOME | End: 2022-11-22
Attending: INTERNAL MEDICINE
Payer: COMMERCIAL

## 2022-11-22 DIAGNOSIS — Z12.31 ENCOUNTER FOR SCREENING MAMMOGRAM FOR BREAST CANCER: ICD-10-CM

## 2022-11-22 PROCEDURE — 77067 MAMMO DIGITAL SCREENING BILAT WITH TOMO: ICD-10-PCS | Mod: 26,,, | Performed by: RADIOLOGY

## 2022-11-22 PROCEDURE — 77063 BREAST TOMOSYNTHESIS BI: CPT | Mod: TC

## 2022-11-22 PROCEDURE — 77067 SCR MAMMO BI INCL CAD: CPT | Mod: 26,,, | Performed by: RADIOLOGY

## 2022-11-22 PROCEDURE — 77063 MAMMO DIGITAL SCREENING BILAT WITH TOMO: ICD-10-PCS | Mod: 26,,, | Performed by: RADIOLOGY

## 2022-11-22 PROCEDURE — 77063 BREAST TOMOSYNTHESIS BI: CPT | Mod: 26,,, | Performed by: RADIOLOGY

## 2022-11-22 PROCEDURE — 77067 SCR MAMMO BI INCL CAD: CPT | Mod: TC

## 2022-12-17 ENCOUNTER — PATIENT MESSAGE (OUTPATIENT)
Dept: INTERNAL MEDICINE | Facility: CLINIC | Age: 62
End: 2022-12-17
Payer: COMMERCIAL

## 2022-12-17 DIAGNOSIS — F33.2 SEVERE EPISODE OF RECURRENT MAJOR DEPRESSIVE DISORDER, WITHOUT PSYCHOTIC FEATURES: ICD-10-CM

## 2022-12-19 RX ORDER — DULOXETIN HYDROCHLORIDE 20 MG/1
40 CAPSULE, DELAYED RELEASE ORAL DAILY
Qty: 180 CAPSULE | Refills: 0 | Status: SHIPPED | OUTPATIENT
Start: 2022-12-19 | End: 2023-02-24 | Stop reason: SDUPTHER

## 2022-12-19 NOTE — TELEPHONE ENCOUNTER
No new care gaps identified.  [facility] Embedded Care Gaps. Reference number: [id number]. 12/19/2022   8:15:38 AM CST

## 2023-02-18 ENCOUNTER — PATIENT MESSAGE (OUTPATIENT)
Dept: INTERNAL MEDICINE | Facility: CLINIC | Age: 63
End: 2023-02-18
Payer: COMMERCIAL

## 2023-02-20 NOTE — TELEPHONE ENCOUNTER
You will need to ask management if she can be booked on one of the blue spots on Wednesday. If not then she may need to see an available provider.

## 2023-02-21 ENCOUNTER — PATIENT MESSAGE (OUTPATIENT)
Dept: INTERNAL MEDICINE | Facility: CLINIC | Age: 63
End: 2023-02-21

## 2023-02-21 ENCOUNTER — OFFICE VISIT (OUTPATIENT)
Dept: INTERNAL MEDICINE | Facility: CLINIC | Age: 63
End: 2023-02-21
Payer: COMMERCIAL

## 2023-02-21 VITALS
OXYGEN SATURATION: 99 % | SYSTOLIC BLOOD PRESSURE: 102 MMHG | BODY MASS INDEX: 27.71 KG/M2 | WEIGHT: 141.13 LBS | DIASTOLIC BLOOD PRESSURE: 72 MMHG | TEMPERATURE: 98 F | HEART RATE: 90 BPM | HEIGHT: 60 IN

## 2023-02-21 DIAGNOSIS — E53.8 B12 DEFICIENCY: ICD-10-CM

## 2023-02-21 DIAGNOSIS — J00 ACUTE NASOPHARYNGITIS: Primary | ICD-10-CM

## 2023-02-21 DIAGNOSIS — R53.83 MALAISE AND FATIGUE: ICD-10-CM

## 2023-02-21 DIAGNOSIS — R53.81 MALAISE AND FATIGUE: ICD-10-CM

## 2023-02-21 DIAGNOSIS — Z00.00 ANNUAL PHYSICAL EXAM: ICD-10-CM

## 2023-02-21 PROCEDURE — 99999 PR PBB SHADOW E&M-EST. PATIENT-LVL IV: ICD-10-PCS | Mod: PBBFAC,,, | Performed by: EMERGENCY MEDICINE

## 2023-02-21 PROCEDURE — 99999 PR PBB SHADOW E&M-EST. PATIENT-LVL IV: CPT | Mod: PBBFAC,,, | Performed by: EMERGENCY MEDICINE

## 2023-02-21 PROCEDURE — 3074F SYST BP LT 130 MM HG: CPT | Mod: CPTII,S$GLB,, | Performed by: EMERGENCY MEDICINE

## 2023-02-21 PROCEDURE — 1159F PR MEDICATION LIST DOCUMENTED IN MEDICAL RECORD: ICD-10-PCS | Mod: CPTII,S$GLB,, | Performed by: EMERGENCY MEDICINE

## 2023-02-21 PROCEDURE — 3078F DIAST BP <80 MM HG: CPT | Mod: CPTII,S$GLB,, | Performed by: EMERGENCY MEDICINE

## 2023-02-21 PROCEDURE — 3008F PR BODY MASS INDEX (BMI) DOCUMENTED: ICD-10-PCS | Mod: CPTII,S$GLB,, | Performed by: EMERGENCY MEDICINE

## 2023-02-21 PROCEDURE — 96372 THER/PROPH/DIAG INJ SC/IM: CPT | Mod: S$GLB,,, | Performed by: EMERGENCY MEDICINE

## 2023-02-21 PROCEDURE — 3078F PR MOST RECENT DIASTOLIC BLOOD PRESSURE < 80 MM HG: ICD-10-PCS | Mod: CPTII,S$GLB,, | Performed by: EMERGENCY MEDICINE

## 2023-02-21 PROCEDURE — 96372 PR INJECTION,THERAP/PROPH/DIAG2ST, IM OR SUBCUT: ICD-10-PCS | Mod: S$GLB,,, | Performed by: EMERGENCY MEDICINE

## 2023-02-21 PROCEDURE — 99203 OFFICE O/P NEW LOW 30 MIN: CPT | Mod: 25,S$GLB,, | Performed by: EMERGENCY MEDICINE

## 2023-02-21 PROCEDURE — 3074F PR MOST RECENT SYSTOLIC BLOOD PRESSURE < 130 MM HG: ICD-10-PCS | Mod: CPTII,S$GLB,, | Performed by: EMERGENCY MEDICINE

## 2023-02-21 PROCEDURE — 3008F BODY MASS INDEX DOCD: CPT | Mod: CPTII,S$GLB,, | Performed by: EMERGENCY MEDICINE

## 2023-02-21 PROCEDURE — 99203 PR OFFICE/OUTPT VISIT, NEW, LEVL III, 30-44 MIN: ICD-10-PCS | Mod: 25,S$GLB,, | Performed by: EMERGENCY MEDICINE

## 2023-02-21 PROCEDURE — 1159F MED LIST DOCD IN RCRD: CPT | Mod: CPTII,S$GLB,, | Performed by: EMERGENCY MEDICINE

## 2023-02-21 RX ORDER — AZITHROMYCIN 250 MG/1
TABLET, FILM COATED ORAL
Qty: 6 TABLET | Refills: 0 | Status: SHIPPED | OUTPATIENT
Start: 2023-02-21 | End: 2023-02-26

## 2023-02-21 RX ORDER — LIFITEGRAST 50 MG/ML
SOLUTION/ DROPS OPHTHALMIC 2 TIMES DAILY
COMMUNITY

## 2023-02-21 RX ORDER — CYANOCOBALAMIN 1000 UG/ML
1000 INJECTION, SOLUTION INTRAMUSCULAR; SUBCUTANEOUS ONCE
Status: COMPLETED | OUTPATIENT
Start: 2023-02-21 | End: 2023-02-21

## 2023-02-21 RX ORDER — HYDROXYZINE HYDROCHLORIDE 25 MG/1
25 TABLET, FILM COATED ORAL 3 TIMES DAILY
COMMUNITY
End: 2023-03-09

## 2023-02-21 RX ORDER — ZINC SULFATE 50(220)MG
220 CAPSULE ORAL 3 TIMES DAILY
COMMUNITY

## 2023-02-21 RX ADMIN — CYANOCOBALAMIN 1000 MCG: 1000 INJECTION, SOLUTION INTRAMUSCULAR; SUBCUTANEOUS at 06:02

## 2023-02-21 NOTE — PROGRESS NOTES
Subjective:       Patient ID: Alma Mistry is a 62 y.o. female.    Chief Complaint: Cough, Annual Exam, and Nasal Congestion    Cough  Associated symptoms include ear pain, rhinorrhea and a sore throat. Pertinent negatives include no chest pain or shortness of breath.     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 Treated with OTC meds, Covid Neg last week 2/15. Still have chest congestion, sore throat and fatigued, brain fog. No fever or chills. Also needs annual physical.     Past Medical History:   Diagnosis Date    Atrophic gastritis     B12 deficiency     Breast lump on left side at 3 o'clock position 03/25/2015    Celiac disease     Fibrocystic breast     Lichen sclerosus 08/16/2012    Lupus      Past Surgical History:   Procedure Laterality Date    BREAST BIOPSY Right 2009    benign    COLONOSCOPY  09/10/2010    COLONOSCOPY N/A 6/4/2020    Procedure: COLONOSCOPY;  Surgeon: Almas Perez MD;  Location: [facility];  Service: Endoscopy;  Laterality: N/A;    ESOPHAGOGASTRODUODENOSCOPY  09/10/2010    ESOPHAGOGASTRODUODENOSCOPY N/A 6/4/2020    Procedure: ESOPHAGOGASTRODUODENOSCOPY (EGD);  Surgeon: Almas Perez MD;  Location: [facility];  Service: Endoscopy;  Laterality: N/A;    ESOPHAGOGASTRODUODENOSCOPY N/A 7/28/2020    Procedure: EGD (ESOPHAGOGASTRODUODENOSCOPY);  Surgeon: Almas Perez MD;  Location: [facility];  Service: Endoscopy;  Laterality: N/A;    HYSTERECTOMY  2013    TONSILLECTOMY       Past Surgical History:   Procedure Laterality Date    BREAST BIOPSY Right 2009    benign    COLONOSCOPY  09/10/2010    COLONOSCOPY N/A 6/4/2020    Procedure: COLONOSCOPY;  Surgeon: Almas Perez MD;  Location: [facility];  Service: Endoscopy;  Laterality: N/A;    ESOPHAGOGASTRODUODENOSCOPY  09/10/2010     ESOPHAGOGASTRODUODENOSCOPY N/A 6/4/2020    Procedure: ESOPHAGOGASTRODUODENOSCOPY (EGD);  Surgeon: Almas Perez MD;  Location: [facility];  Service: Endoscopy;  Laterality: N/A;    ESOPHAGOGASTRODUODENOSCOPY N/A 7/28/2020    Procedure: EGD (ESOPHAGOGASTRODUODENOSCOPY);  Surgeon: Almas Perez MD;  Location: [facility];  Service: Endoscopy;  Laterality: N/A;    HYSTERECTOMY  2013    TONSILLECTOMY       Social History     Socioeconomic History    Marital status:    Tobacco Use    Smoking status: Never    Smokeless tobacco: Never   Substance and Sexual Activity    Alcohol use: Not Currently    Drug use: No    Sexual activity: Never   Social History Narrative    1 dog, no smokers; Originally from [facility].     Review of patient's allergies indicates:   Allergen Reactions    Fluticasone      Current Outpatient Medications   Medication Sig    CALCIUM CIT/MGOX/VIT D3/B6/MIN (CITRACAL PLUS ORAL) Take 1 tablet by mouth as needed.     CHOLECALCIFEROL, VITAMIN D3, (VITAMIN D3 ORAL) Take 1,000 Units by mouth daily as needed.     clobetasol 0.05% (TEMOVATE) 0.05 % Oint Apply topically as needed.    cyanocobalamin 1,000 mcg/mL injection INJECT 1 ML INTO THE MUSCLE EVERY 28 DAYS    DULoxetine (CYMBALTA) 20 MG capsule Take 2 capsules (40 mg total) by mouth once daily.    gabapentin (NEURONTIN) 300 MG capsule Take 1 capsule (300 mg total) by mouth every evening.    hydrOXYzine HCL (ATARAX) 25 MG tablet Take 25 mg by mouth 3 (three) times daily.    lifitegrast (XIIDRA) 5 % Dpet Apply to eye 2 (two) times daily.    magnesium 30 mg Tab Take 1 tablet by mouth once.    multivitamin (THERAGRAN) per tablet Take 1 tablet by mouth once daily.    triamcinolone acetonide 0.1% (KENALOG) 0.1 % cream Apply topically 2 (two) times daily. Apply two weeks at a time prn itching.    zinc sulfate (ZINCATE) 50 mg zinc (220 mg) capsule Take 220 mg by mouth 3 (three) times daily.    ALPRAZolam (XANAX) 0.5 MG tablet Take 1  tablet (0.5 mg total) by mouth daily as needed for Anxiety.    azithromycin (Z-EDWIN) 250 MG tablet Take 2 tablets by mouth on day 1; Take 1 tablet by mouth on days 2-5     Current Facility-Administered Medications   Medication    cyanocobalamin injection 1,000 mcg           Review of Systems   Constitutional:  Positive for activity change and fatigue.   HENT:  Positive for congestion, ear pain, rhinorrhea, sneezing and sore throat.    Respiratory:  Positive for cough. Negative for choking, chest tightness and shortness of breath.    Cardiovascular:  Negative for chest pain and leg swelling.   Gastrointestinal: Negative.    Genitourinary: Negative.      Objective:      Physical Exam  Vitals and nursing note reviewed.   Constitutional:       Appearance: Normal appearance. She is obese. She is not ill-appearing or toxic-appearing.   HENT:      Head: Normocephalic and atraumatic.      Right Ear: External ear normal.      Left Ear: External ear normal.      Nose: Congestion and rhinorrhea present.      Mouth/Throat:      Mouth: Mucous membranes are moist.      Pharynx: Oropharynx is clear. Posterior oropharyngeal erythema present. No oropharyngeal exudate.   Eyes:      General: No scleral icterus.     Extraocular Movements: Extraocular movements intact.      Conjunctiva/sclera: Conjunctivae normal.      Pupils: Pupils are equal, round, and reactive to light.   Cardiovascular:      Rate and Rhythm: Normal rate and regular rhythm.      Pulses: Normal pulses.      Heart sounds: Normal heart sounds. No murmur heard.    No friction rub.   Pulmonary:      Effort: Pulmonary effort is normal. No respiratory distress.      Breath sounds: Normal breath sounds. No wheezing or rales.   Abdominal:      General: Abdomen is flat. Bowel sounds are normal. There is no distension.      Palpations: Abdomen is soft.   Musculoskeletal:         General: No swelling, tenderness, deformity or signs of injury. Normal range of motion.       Cervical back: Normal range of motion and neck supple.   Skin:     General: Skin is warm and dry.      Capillary Refill: Capillary refill takes less than 2 seconds.      Coloration: Skin is not pale.      Findings: No erythema or rash.   Neurological:      General: No focal deficit present.      Mental Status: She is alert and oriented to person, place, and time.   Psychiatric:         Mood and Affect: Mood normal.         Behavior: Behavior normal.         Thought Content: Thought content normal.         Judgment: Judgment normal.       Assessment:     Vitals:    02/21/23 1713   BP: 102/72   Pulse: 90   Temp: 97.9 °F (36.6 °C)     1. Acute nasopharyngitis  -     azithromycin (Z-EDWIN) 250 MG tablet; Take 2 tablets by mouth on day 1; Take 1 tablet by mouth on days 2-5  Dispense: 6 tablet; Refill: 0    2. Annual physical exam  -     CBC Auto Differential; Future; Expected date: 02/21/2023  -     Comprehensive Metabolic Panel; Future; Expected date: 02/21/2023  -     Lipid Panel; Future; Expected date: 02/21/2023  -     TSH; Future; Expected date: 02/21/2023  -     Urinalysis; Future; Expected date: 02/21/2023    3. B12 deficiency  -     cyanocobalamin injection 1,000 mcg    4. Malaise and fatigue  -     cyanocobalamin injection 1,000 mcg

## 2023-02-24 ENCOUNTER — LAB VISIT (OUTPATIENT)
Dept: LAB | Facility: HOSPITAL | Age: 63
End: 2023-02-24
Attending: EMERGENCY MEDICINE
Payer: COMMERCIAL

## 2023-02-24 ENCOUNTER — PATIENT MESSAGE (OUTPATIENT)
Dept: INTERNAL MEDICINE | Facility: CLINIC | Age: 63
End: 2023-02-24
Payer: COMMERCIAL

## 2023-02-24 DIAGNOSIS — Z00.00 ANNUAL PHYSICAL EXAM: ICD-10-CM

## 2023-02-24 DIAGNOSIS — D51.0 PERNICIOUS ANEMIA: Primary | Chronic | ICD-10-CM

## 2023-02-24 DIAGNOSIS — D51.0 PERNICIOUS ANEMIA: Chronic | ICD-10-CM

## 2023-02-24 LAB
ALBUMIN SERPL BCP-MCNC: 3.8 G/DL (ref 3.5–5.2)
ALP SERPL-CCNC: 112 U/L (ref 55–135)
ALT SERPL W/O P-5'-P-CCNC: 35 U/L (ref 10–44)
ANION GAP SERPL CALC-SCNC: 11 MMOL/L (ref 8–16)
AST SERPL-CCNC: 25 U/L (ref 10–40)
BASOPHILS # BLD AUTO: 0.07 K/UL (ref 0–0.2)
BASOPHILS NFR BLD: 1 % (ref 0–1.9)
BILIRUB SERPL-MCNC: 0.2 MG/DL (ref 0.1–1)
BILIRUB UR QL STRIP: NEGATIVE
BUN SERPL-MCNC: 14 MG/DL (ref 6–20)
CALCIUM SERPL-MCNC: 9.5 MG/DL (ref 8.7–10.5)
CHLORIDE SERPL-SCNC: 103 MMOL/L (ref 95–110)
CHOLEST SERPL-MCNC: 177 MG/DL (ref 120–199)
CHOLEST/HDLC SERPL: 4.2 {RATIO} (ref 2–5)
CLARITY UR REFRACT.AUTO: CLEAR
CO2 SERPL-SCNC: 25 MMOL/L (ref 23–29)
COLOR UR AUTO: YELLOW
CREAT SERPL-MCNC: 0.7 MG/DL (ref 0.5–1.4)
DIFFERENTIAL METHOD: ABNORMAL
EOSINOPHIL # BLD AUTO: 0.2 K/UL (ref 0–0.5)
EOSINOPHIL NFR BLD: 2.2 % (ref 0–8)
ERYTHROCYTE [DISTWIDTH] IN BLOOD BY AUTOMATED COUNT: 13 % (ref 11.5–14.5)
EST. GFR  (NO RACE VARIABLE): >60 ML/MIN/1.73 M^2
FERRITIN SERPL-MCNC: 193 NG/ML (ref 20–300)
GLUCOSE SERPL-MCNC: 92 MG/DL (ref 70–110)
GLUCOSE UR QL STRIP: NEGATIVE
HCT VFR BLD AUTO: 40.1 % (ref 37–48.5)
HDLC SERPL-MCNC: 42 MG/DL (ref 40–75)
HDLC SERPL: 23.7 % (ref 20–50)
HGB BLD-MCNC: 12.9 G/DL (ref 12–16)
HGB UR QL STRIP: NEGATIVE
IMM GRANULOCYTES # BLD AUTO: 0.05 K/UL (ref 0–0.04)
IMM GRANULOCYTES NFR BLD AUTO: 0.7 % (ref 0–0.5)
KETONES UR QL STRIP: NEGATIVE
LDLC SERPL CALC-MCNC: 98.4 MG/DL (ref 63–159)
LEUKOCYTE ESTERASE UR QL STRIP: NEGATIVE
LYMPHOCYTES # BLD AUTO: 2.3 K/UL (ref 1–4.8)
LYMPHOCYTES NFR BLD: 31.7 % (ref 18–48)
MCH RBC QN AUTO: 27.7 PG (ref 27–31)
MCHC RBC AUTO-ENTMCNC: 32.2 G/DL (ref 32–36)
MCV RBC AUTO: 86 FL (ref 82–98)
MONOCYTES # BLD AUTO: 0.6 K/UL (ref 0.3–1)
MONOCYTES NFR BLD: 7.9 % (ref 4–15)
NEUTROPHILS # BLD AUTO: 4.1 K/UL (ref 1.8–7.7)
NEUTROPHILS NFR BLD: 56.5 % (ref 38–73)
NITRITE UR QL STRIP: NEGATIVE
NONHDLC SERPL-MCNC: 135 MG/DL
NRBC BLD-RTO: 0 /100 WBC
PH UR STRIP: 7 [PH] (ref 5–8)
PLATELET # BLD AUTO: 530 K/UL (ref 150–450)
PMV BLD AUTO: 10.9 FL (ref 9.2–12.9)
POTASSIUM SERPL-SCNC: 4.4 MMOL/L (ref 3.5–5.1)
PROT SERPL-MCNC: 7 G/DL (ref 6–8.4)
PROT UR QL STRIP: NEGATIVE
RBC # BLD AUTO: 4.66 M/UL (ref 4–5.4)
SODIUM SERPL-SCNC: 139 MMOL/L (ref 136–145)
SP GR UR STRIP: 1.01 (ref 1–1.03)
TRIGL SERPL-MCNC: 183 MG/DL (ref 30–150)
TSH SERPL DL<=0.005 MIU/L-ACNC: 0.96 UIU/ML (ref 0.4–4)
URN SPEC COLLECT METH UR: NORMAL
VIT B12 SERPL-MCNC: 724 PG/ML (ref 210–950)
WBC # BLD AUTO: 7.19 K/UL (ref 3.9–12.7)

## 2023-02-24 PROCEDURE — 85025 COMPLETE CBC W/AUTO DIFF WBC: CPT | Performed by: EMERGENCY MEDICINE

## 2023-02-24 PROCEDURE — 82728 ASSAY OF FERRITIN: CPT | Performed by: INTERNAL MEDICINE

## 2023-02-24 PROCEDURE — 81003 URINALYSIS AUTO W/O SCOPE: CPT | Performed by: EMERGENCY MEDICINE

## 2023-02-24 PROCEDURE — 80061 LIPID PANEL: CPT | Performed by: EMERGENCY MEDICINE

## 2023-02-24 PROCEDURE — 80053 COMPREHEN METABOLIC PANEL: CPT | Performed by: EMERGENCY MEDICINE

## 2023-02-24 PROCEDURE — 84443 ASSAY THYROID STIM HORMONE: CPT | Performed by: EMERGENCY MEDICINE

## 2023-02-24 PROCEDURE — 82607 VITAMIN B-12: CPT | Performed by: INTERNAL MEDICINE

## 2023-02-24 PROCEDURE — 36415 COLL VENOUS BLD VENIPUNCTURE: CPT | Performed by: INTERNAL MEDICINE

## 2023-02-27 ENCOUNTER — PATIENT MESSAGE (OUTPATIENT)
Dept: INTERNAL MEDICINE | Facility: CLINIC | Age: 63
End: 2023-02-27
Payer: COMMERCIAL

## 2023-02-28 NOTE — TELEPHONE ENCOUNTER
Thing is normal.  There is some mild abnormalities on her CBC.  I do not think anything extra is needed at this time.  We will discuss a detail at her upcoming appointment.

## 2023-03-02 ENCOUNTER — PATIENT MESSAGE (OUTPATIENT)
Dept: INTERNAL MEDICINE | Facility: CLINIC | Age: 63
End: 2023-03-02
Payer: COMMERCIAL

## 2023-03-02 DIAGNOSIS — J02.9 SORE THROAT: Primary | ICD-10-CM

## 2023-03-02 NOTE — TELEPHONE ENCOUNTER
I see she was treated for on 2/21. If having chronic sore throat for that long despite treatment.  Then recommend see ENT.  Referral placed

## 2023-03-09 ENCOUNTER — OFFICE VISIT (OUTPATIENT)
Dept: INTERNAL MEDICINE | Facility: CLINIC | Age: 63
End: 2023-03-09
Payer: COMMERCIAL

## 2023-03-09 VITALS
OXYGEN SATURATION: 99 % | HEIGHT: 60 IN | HEART RATE: 70 BPM | SYSTOLIC BLOOD PRESSURE: 108 MMHG | WEIGHT: 141.75 LBS | BODY MASS INDEX: 27.83 KG/M2 | TEMPERATURE: 98 F | DIASTOLIC BLOOD PRESSURE: 70 MMHG

## 2023-03-09 DIAGNOSIS — J32.9 SINUSITIS, UNSPECIFIED CHRONICITY, UNSPECIFIED LOCATION: ICD-10-CM

## 2023-03-09 DIAGNOSIS — D75.839 THROMBOCYTOSIS: ICD-10-CM

## 2023-03-09 DIAGNOSIS — H61.22 IMPACTED CERUMEN, LEFT EAR: ICD-10-CM

## 2023-03-09 DIAGNOSIS — Z00.00 ROUTINE GENERAL MEDICAL EXAMINATION AT A HEALTH CARE FACILITY: Primary | ICD-10-CM

## 2023-03-09 PROBLEM — F33.2 SEVERE EPISODE OF RECURRENT MAJOR DEPRESSIVE DISORDER, WITHOUT PSYCHOTIC FEATURES: Status: RESOLVED | Noted: 2019-01-21 | Resolved: 2023-03-09

## 2023-03-09 PROBLEM — M35.00 SICCA SYNDROME: Status: RESOLVED | Noted: 2022-01-09 | Resolved: 2023-03-09

## 2023-03-09 PROCEDURE — 3078F PR MOST RECENT DIASTOLIC BLOOD PRESSURE < 80 MM HG: ICD-10-PCS | Mod: CPTII,S$GLB,, | Performed by: INTERNAL MEDICINE

## 2023-03-09 PROCEDURE — 3074F SYST BP LT 130 MM HG: CPT | Mod: CPTII,S$GLB,, | Performed by: INTERNAL MEDICINE

## 2023-03-09 PROCEDURE — 3008F PR BODY MASS INDEX (BMI) DOCUMENTED: ICD-10-PCS | Mod: CPTII,S$GLB,, | Performed by: INTERNAL MEDICINE

## 2023-03-09 PROCEDURE — 99999 PR PBB SHADOW E&M-EST. PATIENT-LVL V: ICD-10-PCS | Mod: PBBFAC,,, | Performed by: INTERNAL MEDICINE

## 2023-03-09 PROCEDURE — 99999 PR PBB SHADOW E&M-EST. PATIENT-LVL V: CPT | Mod: PBBFAC,,, | Performed by: INTERNAL MEDICINE

## 2023-03-09 PROCEDURE — 1159F MED LIST DOCD IN RCRD: CPT | Mod: CPTII,S$GLB,, | Performed by: INTERNAL MEDICINE

## 2023-03-09 PROCEDURE — 99396 PR PREVENTIVE VISIT,EST,40-64: ICD-10-PCS | Mod: S$GLB,,, | Performed by: INTERNAL MEDICINE

## 2023-03-09 PROCEDURE — 3074F PR MOST RECENT SYSTOLIC BLOOD PRESSURE < 130 MM HG: ICD-10-PCS | Mod: CPTII,S$GLB,, | Performed by: INTERNAL MEDICINE

## 2023-03-09 PROCEDURE — 3008F BODY MASS INDEX DOCD: CPT | Mod: CPTII,S$GLB,, | Performed by: INTERNAL MEDICINE

## 2023-03-09 PROCEDURE — 99214 OFFICE O/P EST MOD 30 MIN: CPT | Mod: 25,S$GLB,, | Performed by: INTERNAL MEDICINE

## 2023-03-09 PROCEDURE — 99214 PR OFFICE/OUTPT VISIT, EST, LEVL IV, 30-39 MIN: ICD-10-PCS | Mod: 25,S$GLB,, | Performed by: INTERNAL MEDICINE

## 2023-03-09 PROCEDURE — 99396 PREV VISIT EST AGE 40-64: CPT | Mod: S$GLB,,, | Performed by: INTERNAL MEDICINE

## 2023-03-09 PROCEDURE — 3078F DIAST BP <80 MM HG: CPT | Mod: CPTII,S$GLB,, | Performed by: INTERNAL MEDICINE

## 2023-03-09 PROCEDURE — 1159F PR MEDICATION LIST DOCUMENTED IN MEDICAL RECORD: ICD-10-PCS | Mod: CPTII,S$GLB,, | Performed by: INTERNAL MEDICINE

## 2023-03-09 RX ORDER — AMOXICILLIN AND CLAVULANATE POTASSIUM 875; 125 MG/1; MG/1
1 TABLET, FILM COATED ORAL 2 TIMES DAILY
Qty: 20 TABLET | Refills: 0 | Status: SHIPPED | OUTPATIENT
Start: 2023-03-09 | End: 2023-03-19

## 2023-03-09 RX ORDER — METHYLPREDNISOLONE 4 MG/1
TABLET ORAL
Qty: 1 EACH | Refills: 0 | Status: SHIPPED | OUTPATIENT
Start: 2023-03-09

## 2023-03-09 RX ORDER — BLACK COHOSH ROOT 200 MG
500 CAPSULE ORAL 2 TIMES DAILY
COMMUNITY
Start: 2023-02-13

## 2023-03-09 NOTE — PATIENT INSTRUCTIONS
Check with your pharmacy regarding new shingles vaccine.      Try saline nasal spray over counter for nasal congestion.

## 2023-03-09 NOTE — PROGRESS NOTES
Subjective:      Patient ID: Alma Mistry is a 62 y.o. female.    Chief Complaint: Nasal Congestion  Tested positive for covid 2/13. Is still having nasal congestion, , and ear fullness(bilateral). Not currently taking anything for these symptoms.   HPI        62 y.o. with  Patient Active Problem List   Diagnosis    Pernicious anemia    Overweight    Lichen sclerosus    Panic disorder    Polyneuropathy    RLS (restless legs syndrome)    Neuropathic pain    Arthralgia    Generalized anxiety disorder with panic attacks    Complicated bereavement    Neuropathy of left superficial peroneal nerve    Iron deficiency anemia due to chronic blood loss    Routine general medical examination at a health care facility     Past Medical History:   Diagnosis Date    Atrophic gastritis     B12 deficiency     Breast lump on left side at 3 o'clock position 03/25/2015    Celiac disease     Fibrocystic breast     Lichen sclerosus 08/16/2012    Lupus        Here today for annual prev exam.  Compliant with meds without significant side effects. Energy and appetite are good.     Pt also with c/o as below;  Has had tiredness, nasal congestion, ear pressure, thick nasal d/c since covid cx in feb    Clear nasal d/c has improved over last 1 to 2 weeks. Still ear and nasal congestion. HAs improved. Still sig facial and ear pressure.   Had b12 injection. Prev provider rx azithromycin but she did not fill.   Has HAs with with flonase  Has been using vaporizer   Energy improved but still some tiredness.         Past Surgical History:   Procedure Laterality Date    BREAST BIOPSY Right 2009    benign    COLONOSCOPY  09/10/2010    COLONOSCOPY N/A 6/4/2020    Procedure: COLONOSCOPY;  Surgeon: Almas Perez MD;  Location: [facility];  Service: Endoscopy;  Laterality: N/A;    ESOPHAGOGASTRODUODENOSCOPY  09/10/2010    ESOPHAGOGASTRODUODENOSCOPY N/A 6/4/2020    Procedure: ESOPHAGOGASTRODUODENOSCOPY (EGD);  Surgeon: Almas Perez MD;   Location: [facility];  Service: Endoscopy;  Laterality: N/A;    ESOPHAGOGASTRODUODENOSCOPY N/A 7/28/2020    Procedure: EGD (ESOPHAGOGASTRODUODENOSCOPY);  Surgeon: Almas Perez MD;  Location: [facility];  Service: Endoscopy;  Laterality: N/A;    HYSTERECTOMY  2013    TONSILLECTOMY       Social History     Socioeconomic History    Marital status:    Tobacco Use    Smoking status: Never    Smokeless tobacco: Never   Substance and Sexual Activity    Alcohol use: Not Currently    Drug use: No    Sexual activity: Never   Social History Narrative    1 dog, no smokers; Originally from [facility].     family history includes Anemia in her mother; Asthma in her father; Cirrhosis in her brother; Depression in her mother and sister; Hyperthyroidism in her mother; Hypotension in her mother; Leukemia in her brother; Multiple sclerosis in her brother; Nephritis in her mother; Thyroid disease in her sister.  Review of Systems   Constitutional:  Negative for chills and fever.   HENT:  Positive for congestion, sinus pressure and sinus pain. Negative for ear pain, sore throat and trouble swallowing.    Respiratory:  Negative for cough, shortness of breath and wheezing.    Cardiovascular:  Negative for chest pain.   Gastrointestinal:  Negative for abdominal pain, blood in stool, nausea and vomiting.   Genitourinary:  Negative for dysuria and hematuria.   Skin:  Negative for rash and wound.   Neurological:  Negative for seizures and syncope.   Objective:   /70 (BP Location: Right arm, Patient Position: Sitting, BP Method: Medium (Manual))   Pulse 70   Temp 97.7 °F (36.5 °C) (Tympanic)   Ht 5' (1.524 m)   Wt 64.3 kg (141 lb 12.1 oz)   SpO2 99%   BMI 27.68 kg/m²     Physical Exam  Constitutional:       General: She is awake. She is not in acute distress.     Appearance: Normal appearance. She is well-developed. She is not ill-appearing, toxic-appearing or diaphoretic.   HENT:      Head: Normocephalic and  atraumatic.      Right Ear: Tympanic membrane normal.      Left Ear: There is impacted cerumen.      Nose: Congestion present.      Right Sinus: Maxillary sinus tenderness present. No frontal sinus tenderness.      Left Sinus: Maxillary sinus tenderness present. No frontal sinus tenderness.      Mouth/Throat:      Mouth: Mucous membranes are moist.      Pharynx: Posterior oropharyngeal erythema present. No oropharyngeal exudate.   Eyes:      Extraocular Movements: Extraocular movements intact.      Conjunctiva/sclera: Conjunctivae normal.   Neck:      Thyroid: No thyromegaly.   Cardiovascular:      Rate and Rhythm: Normal rate and regular rhythm.   Pulmonary:      Effort: Pulmonary effort is normal.      Breath sounds: Normal breath sounds. No wheezing or rales.   Abdominal:      General: Bowel sounds are normal.      Palpations: Abdomen is soft.      Tenderness: There is no abdominal tenderness.   Musculoskeletal:         General: No swelling.      Cervical back: Normal range of motion and neck supple. No rigidity.   Lymphadenopathy:      Cervical: No cervical adenopathy.   Skin:     General: Skin is warm and dry.   Neurological:      Mental Status: She is alert and oriented to person, place, and time. Mental status is at baseline.      Gait: Gait normal.   Psychiatric:         Mood and Affect: Mood normal.         Behavior: Behavior normal. Behavior is cooperative.         Thought Content: Thought content normal.         Judgment: Judgment normal.       Lab Results   Component Value Date    WBC 7.19 02/24/2023    HGB 12.9 02/24/2023    HGB 13.8 11/01/2021    HGB 12.9 07/23/2021    HCT 40.1 02/24/2023    MCV 86 02/24/2023    MCV 85 11/01/2021    MCV 84 07/23/2021     (H) 02/24/2023    CHOL 177 02/24/2023    TRIG 183 (H) 02/24/2023    HDL 42 02/24/2023    LDLCALC 98.4 02/24/2023    LDLCALC 143.8 01/06/2022    LDLCALC 126.6 05/11/2020    ALT 35 02/24/2023    AST 25 02/24/2023     02/24/2023    K 4.4  02/24/2023     02/24/2023    CO2 25 02/24/2023    BUN 14 02/24/2023    CREATININE 0.7 02/24/2023    CREATININE 0.7 01/06/2022    CREATININE 0.7 10/21/2020    EGFRNORACEVR >60.0 02/24/2023    TSH 0.960 02/24/2023    TSH 0.817 01/06/2022    TSH 0.750 05/22/2019    GLU 92 02/24/2023    HGBA1C 4.6 09/21/2017    LMNJQQDF50VL 27 (L) 09/21/2017          The 10-year ASCVD risk score (Denise BRUSH, et al., 2019) is: 2.6%    Values used to calculate the score:      Age: 62 years      Sex: Female      Is Non- : No      Diabetic: No      Tobacco smoker: No      Systolic Blood Pressure: 108 mmHg      Is BP treated: No      HDL Cholesterol: 42 mg/dL      Total Cholesterol: 177 mg/dL     Assessment:     1. Routine general medical examination at a health care facility    2. Thrombocytosis    3. Sinusitis, unspecified chronicity, unspecified location    4. Impacted cerumen, left ear      Plan:   1. Routine general medical examination at a health care facility  Overview:  Heart healthy diet, regular exercise, and regular use of sunscreen.    reviewed    Orders:  -     Cancel: Lipid Panel; Future; Expected date: 03/09/2023    2. Thrombocytosis  -     CBC Auto Differential; Future; Expected date: 03/09/2023    3. Sinusitis, unspecified chronicity, unspecified location  -     Ambulatory referral/consult to ENT; Future; Expected date: 03/16/2023  -     methylPREDNISolone (MEDROL, EDWIN,) 4 mg tablet; use as directed  Dispense: 1 each; Refill: 0  -     amoxicillin-clavulanate 875-125mg (AUGMENTIN) 875-125 mg per tablet; Take 1 tablet by mouth 2 (two) times daily. for 10 days  Dispense: 20 tablet; Refill: 0    4. Impacted cerumen, left ear  -     Ambulatory referral/consult to ENT; Future; Expected date: 03/16/2023        Patient Instructions   Check with your pharmacy regarding new shingles vaccine.      Try saline nasal spray over counter for nasal congestion.     Future Appointments   Date Time Provider  Department Center   3/30/2023  9:00 AM Rupert Thomas MD [facility] ENT [facility]   4/10/2023 11:40 AM LABORATORY, [facility] ON [facility]   4/21/2023 10:00 AM Lang Hernández MD [facility] RHEUM [facility]       Lab Frequency Next Occurrence   Ambulatory referral/consult to [facility] Once 06/14/2022   Ambulatory consult to Psychiatry Once 06/14/2022   Ambulatory referral/consult to ENT Once 03/09/2023   Sjogrens syndrome-A extractable nuclear antibody     Sjogrens syndrome-B extractable nuclear antibody     NGOZI Screen w/Reflex     C3 complement     C4 complement     Anti-DNA antibody, double-stranded     Urinalysis     Protein / creatinine ratio, urine     CBC auto differential     Comprehensive metabolic panel     DRVVT     Cardiolipin antibody     Beta-2 Glycoprotein Abs (IgA, IgG, IgM)         Follow up in 1 year (on 3/9/2024), or if symptoms worsen or fail to improve.  Cbc and  in 4 weeks

## 2023-03-23 ENCOUNTER — OFFICE VISIT (OUTPATIENT)
Dept: OTOLARYNGOLOGY | Facility: CLINIC | Age: 63
End: 2023-03-23
Payer: COMMERCIAL

## 2023-03-23 DIAGNOSIS — H61.22 IMPACTED CERUMEN, LEFT EAR: ICD-10-CM

## 2023-03-23 DIAGNOSIS — J32.9 SINUSITIS, UNSPECIFIED CHRONICITY, UNSPECIFIED LOCATION: ICD-10-CM

## 2023-03-23 DIAGNOSIS — H91.93 BILATERAL HEARING LOSS, UNSPECIFIED HEARING LOSS TYPE: ICD-10-CM

## 2023-03-23 DIAGNOSIS — H93.13 TINNITUS, BILATERAL: ICD-10-CM

## 2023-03-23 DIAGNOSIS — H61.22 IMPACTED CERUMEN OF LEFT EAR: Primary | ICD-10-CM

## 2023-03-23 PROCEDURE — 99999 PR PBB SHADOW E&M-EST. PATIENT-LVL III: CPT | Mod: PBBFAC,,, | Performed by: STUDENT IN AN ORGANIZED HEALTH CARE EDUCATION/TRAINING PROGRAM

## 2023-03-23 PROCEDURE — 99999 PR PBB SHADOW E&M-EST. PATIENT-LVL III: ICD-10-PCS | Mod: PBBFAC,,, | Performed by: STUDENT IN AN ORGANIZED HEALTH CARE EDUCATION/TRAINING PROGRAM

## 2023-03-23 PROCEDURE — 99203 PR OFFICE/OUTPT VISIT, NEW, LEVL III, 30-44 MIN: ICD-10-PCS | Mod: 25,S$GLB,, | Performed by: STUDENT IN AN ORGANIZED HEALTH CARE EDUCATION/TRAINING PROGRAM

## 2023-03-23 PROCEDURE — 69210 REMOVE IMPACTED EAR WAX UNI: CPT | Mod: S$GLB,,, | Performed by: STUDENT IN AN ORGANIZED HEALTH CARE EDUCATION/TRAINING PROGRAM

## 2023-03-23 PROCEDURE — 69210 PR REMOVAL IMPACTED CERUMEN REQUIRING INSTRUMENTATION, UNILATERAL: ICD-10-PCS | Mod: S$GLB,,, | Performed by: STUDENT IN AN ORGANIZED HEALTH CARE EDUCATION/TRAINING PROGRAM

## 2023-03-23 PROCEDURE — 99203 OFFICE O/P NEW LOW 30 MIN: CPT | Mod: 25,S$GLB,, | Performed by: STUDENT IN AN ORGANIZED HEALTH CARE EDUCATION/TRAINING PROGRAM

## 2023-03-23 NOTE — PROGRESS NOTES
Chief complaint:   Chief Complaint   Patient presents with    Ear Fullness          Referring Provider:  Zheng Orozco Md  [id number] [place] [address]    History of Present Illness:     Ms. Mistry is a 62 y.o. female presenting for evaluation of tinnitus, fullness (L>R). Onset of this chief complaint was about 2 months ago - after COVID.  Additional symptoms that also have been associated are muffled hearing. Has had trouble hearing for many years, progressively worsening. Told 9 years ago she was near qualifying for hearing aids.    Also had a sinus infection 2 weeks ago. Treatment has included: Augmentin and medrol dosepack 3/9/23. Sinus symptoms better since then.    The patient denies significant hearing loss risk factors, ototoxic medication exposure, chronic vestibular suppressant use, head/ facial/ noel trauma, and otologic surgery.        History        Past Medical History:   Past Medical History:   Diagnosis Date    Atrophic gastritis     B12 deficiency     Breast lump on left side at 3 o'clock position 03/25/2015    Celiac disease     Fibrocystic breast     Lichen sclerosus 08/16/2012    Lupus     .          Past Surgical History:  Past Surgical History:   Procedure Laterality Date    BREAST BIOPSY Right 2009    benign    COLONOSCOPY  09/10/2010    COLONOSCOPY N/A 6/4/2020    Procedure: COLONOSCOPY;  Surgeon: Almas Perez MD;  Location: [facility];  Service: Endoscopy;  Laterality: N/A;    ESOPHAGOGASTRODUODENOSCOPY  09/10/2010    ESOPHAGOGASTRODUODENOSCOPY N/A 6/4/2020    Procedure: ESOPHAGOGASTRODUODENOSCOPY (EGD);  Surgeon: Almas Perez MD;  Location: [facility];  Service: Endoscopy;  Laterality: N/A;    ESOPHAGOGASTRODUODENOSCOPY N/A 7/28/2020    Procedure: EGD (ESOPHAGOGASTRODUODENOSCOPY);  Surgeon: Amlas Perez MD;  Location: [facility];  Service: Endoscopy;  Laterality: N/A;    HYSTERECTOMY  2013    TONSILLECTOMY     .         Medications:  Medication list was reviewed. She  has a current medication list which includes the following prescription(s): alprazolam, calcium cit/mgox/vit d3/b6/min, cholecalciferol (vitamin d3), clobetasol 0.05%, cyanocobalamin, gabapentin, xiidra, magnesium, methylprednisolone, multivitamin, triamcinolone acetonide 0.1%, vitamin c, and zinc sulfate.         Allergies:   Review of patient's allergies indicates:   Allergen Reactions    Fluticasone             Family history: family history includes Anemia in her mother; Asthma in her father; Cirrhosis in her brother; Depression in her mother and sister; Hyperthyroidism in her mother; Hypotension in her mother; Leukemia in her brother; Multiple sclerosis in her brother; Nephritis in her mother; Thyroid disease in her sister.         Social History          Alcohol use:  reports that she does not currently use alcohol.            Tobacco:  reports that she has never smoked. She has never used smokeless tobacco.         Please see the patient's intake form for full details of past medical history, past surgical history, family history, social history and review of systems. ?This information was reviewed by me and noted.      Physical Examination     General: Well developed, well nourished, well hydrated. Verbal with a strong voice and not dysphonic.     Head/Face: Normocephalic, atraumatic. No scars or lesions. Facial musculature equal.     Eyes: No scleral icterus or conjunctival hemorrhage. EOMI. PERRLA.     Ears: after disimpaction     Right ear: No gross deformity. EAC is clear of debris and erythema. The TM is intact with a pneumatized middle ear. No signs of retraction, fluid or infection.      Left ear: No gross deformity. EAC is clear of debris and erythema. The TM is intact with a pneumatized middle ear. No signs of retraction, fluid or infection.     Hearing: grossly intact    Nose: No gross deformity or lesions. No purulent discharge. No significant NSD.       Mouth/Oropharynx: Lips without any lesions. No mucosal lesions within the oropharynx. No tonsillar exudate or lesions. Pharyngeal walls symmetrical. Uvula midline. Tongue midline without lesions.     Neck: Trachea midline. No masses. No thyromegaly or nodules palpated.     Lymphatic: No lymphadenopathy in the neck.     Extremities: No cyanosis. Warm and well-perfused.     Skin: No scars or lesions on face or neck.      Neurologic: Moving all extremities without gross abnormality.CN II-XII grossly intact. House-Brackmann 1/6. No signs of nystagmus.      Psych: Alert and oriented to person, place, and time with an appropriate mood and affect.     Data review:    Review of records:      I reviewed records from the referring provider's office visits.  These describe the history, workup, and/or treatment of this problem thus far.    Procedure: ear microscopy with removal of cerumen    Description: The patient was in agreement with the examination and debridement of the ears. Removal of the cerumen required use of an operating microscope and multiple micro-instruments.     With the patient in the supine position, we used the operating microscope to examine both ears with the appropriate sized ear speculum.  A variety of sterile, micro-instruments were utilized to remove the cerumen atraumatically.  I performed the procedure which required a significant amount of time and effort. The tympanic membrane was then well visualized.  The patient tolerated the procedure well and there were no complications.    Findings:   Right ear had moderate wax, the EAC was normal, and the tympanic membrane was intact with no evidence of middle ear fluid.    Left ear had significant wax, the EAC was normal, and the tympanic membrane was intact with no evidence of middle ear fluid.          Assessment/Plan:      1. Impacted cerumen of left ear    2. Sinusitis, unspecified chronicity, unspecified location    3. Impacted cerumen, left ear     4. Tinnitus, bilateral    5. Bilateral hearing loss, unspecified hearing loss type       Sinusitis symptoms improved, she will return if those symptoms recur  Audio at her convenience       Rupert Thomas MD  [facility]   [facility] - [address]  [place] [address]  P: [phone]  F: [phone]

## 2023-04-29 NOTE — PROGRESS NOTES
12-    Received the reports of Brain, C and T spine MRIs done at [facility].    Brain MRI WWO NL Brain     C/T Spine MRI WWO NL SC with DDD at C5-C6, C6-C7   0

## 2023-05-21 DIAGNOSIS — D51.0 PERNICIOUS ANEMIA: Chronic | ICD-10-CM

## 2023-05-22 RX ORDER — CYANOCOBALAMIN 1000 UG/ML
INJECTION, SOLUTION INTRAMUSCULAR; SUBCUTANEOUS
Qty: 10 ML | Refills: 11 | Status: SHIPPED | OUTPATIENT
Start: 2023-05-22

## 2023-06-12 PROBLEM — Z00.00 ROUTINE GENERAL MEDICAL EXAMINATION AT A HEALTH CARE FACILITY: Status: RESOLVED | Noted: 2023-03-09 | Resolved: 2023-06-12

## 2023-07-12 ENCOUNTER — PATIENT MESSAGE (OUTPATIENT)
Dept: INTERNAL MEDICINE | Facility: CLINIC | Age: 63
End: 2023-07-12
Payer: COMMERCIAL

## 2023-07-12 DIAGNOSIS — F33.2 SEVERE EPISODE OF RECURRENT MAJOR DEPRESSIVE DISORDER, WITHOUT PSYCHOTIC FEATURES: ICD-10-CM

## 2023-07-12 RX ORDER — DULOXETIN HYDROCHLORIDE 20 MG/1
40 CAPSULE, DELAYED RELEASE ORAL DAILY
Qty: 180 CAPSULE | Refills: 1 | Status: SHIPPED | OUTPATIENT
Start: 2023-07-12

## 2023-07-12 NOTE — TELEPHONE ENCOUNTER
No care due was identified.  [facility] Embedded Care Due Messages. Reference number: [id number].   7/12/2023 10:35:52 AM CDT

## 2023-08-05 ENCOUNTER — OFFICE VISIT (OUTPATIENT)
Dept: URGENT CARE | Facility: CLINIC | Age: 63
End: 2023-08-05
Payer: COMMERCIAL

## 2023-08-05 VITALS
WEIGHT: 141 LBS | BODY MASS INDEX: 27.68 KG/M2 | HEART RATE: 70 BPM | RESPIRATION RATE: 18 BRPM | HEIGHT: 60 IN | TEMPERATURE: 99 F | OXYGEN SATURATION: 97 % | SYSTOLIC BLOOD PRESSURE: 112 MMHG | DIASTOLIC BLOOD PRESSURE: 73 MMHG

## 2023-08-05 DIAGNOSIS — S99.921A INJURY OF SMALL TOE, RIGHT, INITIAL ENCOUNTER: ICD-10-CM

## 2023-08-05 DIAGNOSIS — S92.514A CLOSED NONDISPLACED FRACTURE OF PROXIMAL PHALANX OF LESSER TOE OF RIGHT FOOT, INITIAL ENCOUNTER: ICD-10-CM

## 2023-08-05 DIAGNOSIS — M79.674 PAIN IN RIGHT TOE(S): Primary | ICD-10-CM

## 2023-08-05 PROCEDURE — 99203 PR OFFICE/OUTPT VISIT, NEW, LEVL III, 30-44 MIN: ICD-10-PCS | Mod: S$GLB,,,

## 2023-08-05 PROCEDURE — 73660 XR TOE 2 OR MORE VIEWS RIGHT: ICD-10-PCS | Mod: TIER,RT,S$GLB, | Performed by: RADIOLOGY

## 2023-08-05 PROCEDURE — 99203 OFFICE O/P NEW LOW 30 MIN: CPT | Mod: S$GLB,,,

## 2023-08-05 PROCEDURE — 73660 X-RAY EXAM OF TOE(S): CPT | Mod: TIER,RT,S$GLB, | Performed by: RADIOLOGY

## 2023-08-05 NOTE — PATIENT INSTRUCTIONS
R.I.C.E:    Rest, apply ice intermittently, compress with ace wrap or walking boot, and elevate above heart level as much as possible.  Do not apply ice directly to skin. Wrap ice in cloth before applying.    Please make sure that you wearing appropriate supportive shoes for lower extremity issues.    OTC Tylenol (acetaminophen) up to 4,000 mg a day is safe for short periods and can be used for pain, and fever. However in high doses and prolonged use it can cause liver irritation.    OTC Ibuprofen (NSAID) is a non-steroidal anti-inflammatory that can be used for pain. However it can also cause stomach irritation if over used.    Of note: Aleve, Motrin, Advil, Mobic, meloxicam, and indomethacin are also other names of NSAIDs.    OTC Voltaren topical cream may be applied for relief, as long as you do not have any allergy to the ingredients.    You will need to follow-up with primary care provider if your symptoms persist, as we discussed.

## 2023-08-05 NOTE — PROGRESS NOTES
Subjective:      Patient ID: Alma Mistry is a 62 y.o. female.    Vitals:  height is 5' (1.524 m) and weight is 64 kg (141 lb). Her oral temperature is 98.5 °F (36.9 °C). Her blood pressure is 112/73 and her pulse is 70. Her respiration is 18 and oxygen saturation is 97%.     Chief Complaint: Toe Injury    63 yo female Pt presents today with foot injury x's 2 days. Pt was playing with dog and accidentally injured smallest toe on a sharp table leg. Pt has been using R.I.C.E. method along with alleve to relieve symptoms with mild relief. States pain was better in the mornings but as she continued to walk and bear weight, it got worse. Patient states she ordered a boot through [employer] website and came to see if it was broken since the pain persisted. Patient denies n/v, numbness/tingling, redness, bruising, or swelling. Allergic to fluticasone.     Toe Injury  The current episode started in the past 7 days. The problem occurs constantly. The problem has been waxing and waning. Associated symptoms include arthralgias. Pertinent negatives include no chills, fever, numbness or rash. The symptoms are aggravated by walking and standing. She has tried ice, rest, position changes and relaxation for the symptoms. The treatment provided moderate relief.       Constitution: Negative for chills and fever.   Musculoskeletal:  Positive for joint pain. Negative for abnormal ROM of joint (painful to move).   Skin:  Negative for rash, erythema and bruising.   Neurological:  Negative for numbness and tingling.      Objective:     Vitals:    08/05/23 1637   BP: 112/73   Pulse: 70   Resp: 18   Temp: 98.5 °F (36.9 °C)       Physical Exam   Constitutional: She is oriented to person, place, and time. She appears well-developed.  Non-toxic appearance. She does not appear ill. No distress.   HENT:   Head: Normocephalic and atraumatic. Head is without abrasion, without contusion and without laceration.   Ears:   Right Ear: External ear normal.    Left Ear: External ear normal.   Nose: Nose normal.   Mouth/Throat: Oropharynx is clear and moist and mucous membranes are normal.   Eyes: Conjunctivae, EOM and lids are normal. Pupils are equal, round, and reactive to light.   Neck: Trachea normal and phonation normal. Neck supple.   Cardiovascular: Normal rate, regular rhythm and normal heart sounds.   Pulses:       Dorsalis pedis pulses are 2+ on the right side.   Pulmonary/Chest: Effort normal and breath sounds normal. No stridor. No respiratory distress.   Musculoskeletal: Normal range of motion.         General: Tenderness (base of fifth r toe) and signs of injury present. No swelling or deformity. Normal range of motion.      Right ankle: Normal.      Left ankle: Normal.      Right foot: Normal range of motion. No deformity. Right little toe: Exhibits tenderness. There is tenderness of the PIP joint. No bruising present. Anterior drawer test: negative.  Plantar foot sensation: normal.   Neurological: She is alert and oriented to person, place, and time.   Skin: Skin is warm, dry, intact, not diaphoretic and no rash. Capillary refill takes less than 2 seconds. No abrasion, No burn, No bruising, No erythema and No ecchymosis   Psychiatric: Her speech is normal and behavior is normal. Judgment and thought content normal.   Nursing note and vitals reviewed.      Assessment:     1. Pain in right toe(s)    2. Injury of small toe, right, initial encounter    3. Closed nondisplaced fracture of proximal phalanx of lesser toe of right foot, initial encounter      X-Ray Toe 2 or More Views Right    Result Date: 8/5/2023  EXAM: XR TOE 2 OR MORE VIEWS RIGHT CLINICAL HISTORY: Foot pain FINDINGS:  3 views.  Oblique fracture involving the midshaft of the fifth proximal phalanx.  No significant displacement.      Fifth proximal phalanx fracture. Finalized on: 8/5/2023 5:23 PM By:  Kirill Cabrera MD [facility]# [id number]      2023-08-05 17:26:00.Mariella DHALIWAL     Plan:       Pain in right  toe(s)  -     X-Ray Toe 2 or More Views Right; Future; Expected date: 08/05/2023  -     Cancel: NON-PNEUMATIC WALKING BOOT FOR HOME USE    Injury of small toe, right, initial encounter  -     X-Ray Toe 2 or More Views Right; Future; Expected date: 08/05/2023  -     Cancel: NON-PNEUMATIC WALKING BOOT FOR HOME USE    Closed nondisplaced fracture of proximal phalanx of lesser toe of right foot, initial encounter  -     Cancel: NON-PNEUMATIC WALKING BOOT FOR HOME USE        Patient Instructions   R.I.C.E:    Rest, apply ice intermittently, compress with ace wrap or walking boot, and elevate above heart level as much as possible.  Do not apply ice directly to skin. Wrap ice in cloth before applying.    Please make sure that you wearing appropriate supportive shoes for lower extremity issues.    OTC Tylenol (acetaminophen) up to 4,000 mg a day is safe for short periods and can be used for pain, and fever. However in high doses and prolonged use it can cause liver irritation.    OTC Ibuprofen (NSAID) is a non-steroidal anti-inflammatory that can be used for pain. However it can also cause stomach irritation if over used.    Of note: Aleve, Motrin, Advil, Mobic, meloxicam, and indomethacin are also other names of NSAIDs.    OTC Voltaren topical cream may be applied for relief, as long as you do not have any allergy to the ingredients.    You will need to follow-up with primary care provider if your symptoms persist, as we discussed.      Medical Decision Making:   History:   Old Medical Records: I decided to obtain old medical records.  Independently Interpreted Test(s):   I have ordered and independently interpreted X-rays - see summary below.       <> Summary of X-Ray Reading(s): Proximal 5th phalanx fracture. Will confirm with radiologist  Clinical Tests:   Radiological Study: Ordered and Reviewed  Urgent Care Management:  Patient ordered walking boot at home from [facility]. Refusing walking boot or crutches in clinic.  Discussed findings with patient. Encouraged to continue RICE and follow up with PCP as indicated. Patient agreeable to plan. Work note given through portal. Patient left in NAD, VSS.

## 2023-08-05 NOTE — LETTER
August 5, 2023      [facility]  [address], [address]  [facility] [address]  Phone: [phone]  Fax: [phone]       Patient: Alma Mistry   YOB: 1960  Date of Visit: 08/05/2023    To Whom It May Concern:    Gregorio Mistry  was at [facility] on 08/05/2023. The patient may return to work/school on 8/7/2023 with weight bearing restrictions. If you have any questions or concerns, or if I can be of further assistance, please do not hesitate to contact me.    Sincerely,      Payton Lopez PA-C

## 2023-12-03 ENCOUNTER — OFFICE VISIT (OUTPATIENT)
Dept: URGENT CARE | Facility: CLINIC | Age: 63
End: 2023-12-03
Payer: COMMERCIAL

## 2023-12-03 VITALS
HEIGHT: 60 IN | RESPIRATION RATE: 20 BRPM | BODY MASS INDEX: 27.54 KG/M2 | TEMPERATURE: 100 F | OXYGEN SATURATION: 99 % | HEART RATE: 84 BPM | SYSTOLIC BLOOD PRESSURE: 122 MMHG | DIASTOLIC BLOOD PRESSURE: 70 MMHG

## 2023-12-03 DIAGNOSIS — R50.9 LOW GRADE FEVER: ICD-10-CM

## 2023-12-03 DIAGNOSIS — U07.1 COVID-19: Primary | ICD-10-CM

## 2023-12-03 DIAGNOSIS — R52 GENERALIZED BODY ACHES: ICD-10-CM

## 2023-12-03 DIAGNOSIS — U07.1 POSITIVE SELF-ADMINISTERED ANTIGEN TEST FOR COVID-19: ICD-10-CM

## 2023-12-03 DIAGNOSIS — R05.1 ACUTE COUGH: ICD-10-CM

## 2023-12-03 LAB
CTP QC/QA: YES
SARS-COV-2 AG RESP QL IA.RAPID: POSITIVE

## 2023-12-03 PROCEDURE — 87811 SARS-COV-2 COVID19 W/OPTIC: CPT | Mod: QW,S$GLB,,

## 2023-12-03 PROCEDURE — 99214 OFFICE O/P EST MOD 30 MIN: CPT | Mod: S$GLB,,,

## 2023-12-03 PROCEDURE — 99214 PR OFFICE/OUTPT VISIT, EST, LEVL IV, 30-39 MIN: ICD-10-PCS | Mod: S$GLB,,,

## 2023-12-03 PROCEDURE — 87811 SARS CORONAVIRUS 2 ANTIGEN POCT, MANUAL READ: ICD-10-PCS | Mod: QW,S$GLB,,

## 2023-12-03 RX ORDER — PROMETHAZINE HYDROCHLORIDE AND DEXTROMETHORPHAN HYDROBROMIDE 6.25; 15 MG/5ML; MG/5ML
5 SYRUP ORAL NIGHTLY PRN
Qty: 50 ML | Refills: 0 | Status: SHIPPED | OUTPATIENT
Start: 2023-12-03 | End: 2023-12-13

## 2023-12-03 RX ORDER — NIRMATRELVIR AND RITONAVIR 300-100 MG
KIT ORAL
Qty: 30 TABLET | Refills: 0 | Status: SHIPPED | OUTPATIENT
Start: 2023-12-03 | End: 2023-12-08

## 2023-12-03 NOTE — PROGRESS NOTES
Subjective:      Patient ID: Alma Mistry is a 63 y.o. female.    Vitals:  height is 5' (1.524 m). Her oral temperature is 99.9 °F (37.7 °C). Her blood pressure is 122/70 and her pulse is 84. Her respiration is 20 and oxygen saturation is 99%.     Chief Complaint: Nasal Congestion and COVID-19 Concerns    64 yo female Pt presents today with chills, head & body aches, sore throat (burning), sneezing, cough and looser stool (but not full diarrhea) X 1day starting yesterday at noon. Home Covid Test this morning (+), alternating tylenol/motrin. Last dose was about 8-8:30 am today. Patient states somebody she works closely with just tested positive for COVID 3 days ago. Denies cp, sob, voice change, post nasal drip, vision changes, weakness. States she has had covid twice in the past and tolerated the antiviral medication. Allergic to fluticasone.     Sore Throat   The maximum temperature recorded prior to her arrival was 100.4 - 100.9 F. The fever has been present for Less than 1 day. The pain is at a severity of 7/10. Associated symptoms include congestion (nasal), coughing and headaches. Pertinent negatives include no abdominal pain, drooling, ear discharge, hoarse voice, plugged ear sensation, neck pain, shortness of breath, stridor, swollen glands, trouble swallowing or vomiting. She has had no exposure to strep or mono. She has tried acetaminophen and NSAIDs for the symptoms. The treatment provided no relief.       Constitution: Positive for chills and fever. Negative for generalized weakness.   HENT:  Positive for congestion (nasal) and sore throat. Negative for ear discharge, drooling, postnasal drip, trouble swallowing and voice change.    Neck: Negative for neck pain and neck stiffness.   Cardiovascular:  Negative for chest pain.   Eyes:  Negative for eye discharge, eye itching, double vision and blurred vision.   Respiratory:  Positive for cough. Negative for shortness of breath, stridor and asthma.     Gastrointestinal:  Negative for abdominal pain, nausea, vomiting and constipation.   Genitourinary:  Negative for dysuria and flank pain.   Musculoskeletal:  Positive for muscle ache (general body aches).   Skin:  Negative for rash.   Allergic/Immunologic: Positive for sneezing. Negative for asthma.   Neurological:  Positive for headaches.      Objective:     Vitals:    12/03/23 1151   BP: 122/70   Pulse: 84   Resp: 20   Temp: 99.9 °F (37.7 °C)       Physical Exam   Constitutional: She is oriented to person, place, and time. She appears well-developed. She is cooperative.  Non-toxic appearance. She does not appear ill. No distress. awake  HENT:   Head: Normocephalic and atraumatic.   Ears:   Right Ear: Hearing, tympanic membrane, external ear and ear canal normal. No cerumen not present. Tympanic membrane is not erythematous and not bulging. impacted cerumen  Left Ear: Hearing, tympanic membrane, external ear and ear canal normal. No cerumen not present. Tympanic membrane is not erythematous and not bulging. impacted cerumen  Nose: Nose normal. No mucosal edema, rhinorrhea or nasal deformity. No epistaxis. Right sinus exhibits no maxillary sinus tenderness and no frontal sinus tenderness. Left sinus exhibits no maxillary sinus tenderness and no frontal sinus tenderness.   Mouth/Throat: Uvula is midline, oropharynx is clear and moist and mucous membranes are normal. Mucous membranes are moist. No trismus in the jaw. Normal dentition. No uvula swelling. No oropharyngeal exudate, posterior oropharyngeal edema, posterior oropharyngeal erythema or cobblestoning. No tonsillar exudate.   Eyes: Conjunctivae and lids are normal. Right eye exhibits no discharge. Left eye exhibits no discharge. No scleral icterus.   Neck: Trachea normal and phonation normal. Neck supple. No edema present. No erythema present. No neck rigidity present.   Cardiovascular: Normal rate, regular rhythm, normal heart sounds and normal pulses.    Pulmonary/Chest: Effort normal and breath sounds normal. No accessory muscle usage. No respiratory distress. She has no decreased breath sounds. She has no wheezes. She has no rhonchi. She has no rales.   Abdominal: Normal appearance.   Musculoskeletal: Normal range of motion.         General: No deformity. Normal range of motion.   Neurological: She is alert and oriented to person, place, and time. She has normal sensation and intact cranial nerves (2-12). She displays no weakness and facial symmetry. She exhibits normal muscle tone. Gait normal. Coordination and gait normal.   Skin: Skin is warm, dry, intact, not diaphoretic, not pale and no rash.   Psychiatric: Her speech is normal and behavior is normal. Judgment and thought content normal.   Nursing note and vitals reviewed.      Assessment:     1. COVID-19    2. Positive self-administered antigen test for COVID-19    3. Low grade fever    4. Acute cough    5. Generalized body aches        COVID risk score: 1  Results for orders placed or performed in visit on 12/03/23   SARS Coronavirus 2 Antigen, POCT Manual Read   Result Value Ref Range    SARS Coronavirus 2 Antigen Positive (A) Negative     Acceptable Yes        Plan:       COVID-19  -     nirmatrelvir-ritonavir (PAXLOVID) 300 mg (150 mg x 2)-100 mg copackaged tablets (EUA); Take 3 tablets by mouth 2 (two) times daily. Each dose contains 2 nirmatrelvir (pink tablets) and 1 ritonavir (white tablet). Take all 3 tablets together  Dispense: 30 tablet; Refill: 0    Positive self-administered antigen test for COVID-19  -     SARS Coronavirus 2 Antigen, POCT Manual Read    Low grade fever    Acute cough  -     promethazine-dextromethorphan (PROMETHAZINE-DM) 6.25-15 mg/5 mL Syrp; Take 5 mLs by mouth nightly as needed (cough).  Dispense: 50 mL; Refill: 0    Generalized body aches        Patient Instructions   You have tested positive for COVID-19 today.      ISOLATION  If you tested positive and do  not have symptoms, you must isolate for 5 days starting on the day of the positive test. I    If you tested positive and have symptoms, you must isolate for 5 days starting on the day of the first symptoms,  not the day of the positive test.     This is the most important part, both the CDC and the [facility] emphasize that you do not test out of isolation.     After 5 days, if your symptoms have improved and you have not had fever on day 5, you can return to the community on day 6- NO TESTING REQUIRED!      In fact, we do not retest if you were positive in the last 90 days.    After your 5 days of isolation are completed, the CDC recommends strict mask use for the first 5 days that you come out of isolation. PLEASE FOLLOW CDC GUIDELINES REGARDING TRAVEL AFTER COVID INFECTION.    Return to Urgent Care or go to ER if symptoms worsen or fail to improve.  Follow up with PCP as recommended for further management.     Your symptoms should begin to improve by Day 5 of the infection-- if symptoms worsen, you develop a new fever, shortness of breath, worsening shortness of breath with activity, chest pain, worsening cough, you must return to Urgent Care or go to the ER.    PLEASE READ YOUR DISCHARGE INSTRUCTIONS ENTIRELY AS IT CONTAINS IMPORTANT INFORMATION.      Please drink plenty of fluids.    Please get plenty of rest.    Please return here or go to the Emergency Department for any concerns or worsening of condition.      Tylenol or ibuprofen can also be used as directed for pain and fever unless you have an allergy to them or medical condition such as stomach ulcers, kidney or liver disease or blood thinners etc for which you should not be taking these type of medications.   YOU CAN ALTERNATE TYLENOL AND IBUPROFEN EVERY 3-4 HOURS. Take 1000mg (2 pills) of Extra Strength Acetaminophen (Tylenol) every 6 hours and 600mg (3 pills) of Ibuprofen (Motrin/Advil) every 6 hours, alternating the two so that every 3 hours you take one or  the other. Start with the Tylenol, then 3 hours later take the Ibuprofen, then 3 hours later take the Tylenol again, and so on.         For your allergy symptoms and/or runny nose, sinus/ear pressure, congestion:        - You can take over-the-counter claritin, zyrtec, allegra, or xyzal as directed. These are antihistamines that can help with runny nose, nasal congestion, sneezing, and helps to dry up post-nasal drip, which usually causes sore throat and cough.               - If you do NOT have high blood pressure, you may use a decongestant form (D)  of this medication (ie. Claritin- D, zyrtec-D, allegra-D) or if you do not take the D form, you can take sudafed (pseudoephedrine) over the counter, which is a decongestant. Do NOT take two decongestant (D) medications at the same time (such as mucinex-D and claritin-D or plain sudafed and claritin D). Dextromethorphan (DM) is a cough suppressant over the counter (ie. mucinex DM, robitussin, delsym; dayquil/nyquil has DM as well.)    -If you DO have high blood pressure, AVOID DECONGESTANTS (I.e., Phenylephrine and Pseudoephedrine). You may take Coricidin HBP for sinus congestion and Delsym for cough.        - You can take plain Mucinex (guaifenesin) 1200 mg twice a day to help loosen mucous.       Use over the counter flonase or nasocort: one spray each nostril twice daily OR two sprays each nostril once daily until nares dry out, unless you have Glaucoma.   If you find this dries your nose out or your nose bleeds, try using over the counter nasal saline a few minutes prior to using the flonase to moisten the lining of your nose and throughout the day as needed.   Flonase/nasal spray use directions:  1) Use once per day.  2) Blow nose first.  3) Close one nostril and spray flonase up the other nostril while inhaling gently.   4) If you inhale to aggressively, you will have a bitter taste in the back of your mouth.       You can try breathe right strips at night to  help you breathe.  A cool mist humidifier in bedroom may help with cough and relieve stuffy nose.     Sinus rinses DO NOT USE TAP WATER, if you must, water must be a rolling boil for 1 minute, let it cool, then use.  May use distilled water, or over the counter nasal saline rinses.  Vics vapor rub in shower to help open nasal passages.  May use nasal gel to keep passages moisturized.  May use Nasal saline sprays during the day for added relief of congestion.   For those who go to the gym, please do not use the sauna or steam room now to clear sinuses.      Sore throat recommendations: Warm fluids, warm salt water gargles, throat lozenges, tea, honey, soup, rest, hydration.      Cough     Honey with alek to soothe your throat    Robitussin or Delsyum for cough suppressant for dry cough.    Mucinex DM or products containing Guaifenesin or Dextromethorphan for expectorant (wet cough).    Take prescription cough meds (pills) as prescribed; take prescription cough syrup at night as needed for cough.  Do not take both the prescribed cough pills and syrup at the same time or within 6 hours of each other.  Do not take the cough syrup with any other sedative medication as it can can cause drowsiness. Do not operate any heavy machinery, drink or drive while taking the cough syrup.    Try taking half a dose first of the cough syrup to see how it affects you.       Please follow up with your primary care doctor or specialist in the next 48-72hrs as needed and if no improvement    If you  smoke, please stop smoking.      Please return or see your primary care doctor if you develop new or worsening symptoms.     Please arrange follow up with your primary medical clinic as soon as possible. You must understand that you've received an Urgent Care treatment only and that you may be released before all of your medical problems are known or treated. You, the patient, will arrange for follow up as instructed. If your symptoms worsen  or fail to improve you should go to the Emergency Room.    You have been prescribed Paxlovid, which is an antiviral medication.     Oral antiviral treatment may help your body fight COVID-19 by stopping the SARS-CoV-2 virus (the virus that causes COVID-19) from multiplying in your body, lowering the amount of the virus within your body, or helping your immune system. By getting treatment, you could have less serious symptoms and may lower the chances of your illness getting worse and needing care in the hospital.    To be eligible for Paxlovid, you must be at least 12 years of age and weigh at least 88 pounds.    What is PAXLOVID?    PAXLOVID is an investigational medicine used to treat mild-to-moderate COVID-19 in  adults and children [12 years of age and older weighing at least 88 pounds (40 kg)] with  positive results of direct SARS-CoV-2 viral testing, and who are at high risk for  progression to severe COVID-19, including hospitalization or death. PAXLOVID is  investigational because it is still being studied. There is limited information about the  safety and effectiveness of using PAXLOVID to treat people with mild-to-moderate  COVID-19.    Tell your healthcare provider if you:  ? Have any allergies  ? Have liver or kidney disease  ? Are pregnant or plan to become pregnant  ? Are breastfeeding a child  ? Have any serious illnesses    Tell your healthcare provider about all the medicines you take, including  prescription and over-the-counter medicines, vitamins, and herbal supplements.  Some medicines may interact with PAXLOVID and may cause serious side effects.  Keep a list of your medicines to show your healthcare provider and pharmacist when you get a new medicine.  You can ask your healthcare provider or pharmacist for a list of medicines that interact with PAXLOVID. Do not start taking a new medicine without telling your  healthcare provider. Your healthcare provider can tell you if it is safe to  take  PAXLOVID with other medicines    How do I take PAXLOVID?  ? PAXLOVID consists of 2 medicines: nirmatrelvir and ritonavir.  Take 2 pink tablets of nirmatrelvir with 1 white tablet of ritonavir by mouth  2 times each day (in the morning and in the evening) for 5 days. For each  dose, take all 3 tablets at the same time.  If you have kidney disease, talk to your healthcare provider. You  may need a different dose.  Swallow the tablets whole. Do not chew, break, or crush the tablets.  Take PAXLOVID with or without food.  Do not stop taking PAXLOVID without talking to your healthcare provider, even if  you feel better.  If you miss a dose of PAXLOVID within 8 hours of the time it is usually taken,  take it as soon as you remember. If you miss a dose by more than 8 hours, skip  the missed dose and take the next dose at your regular time. Do not take  2 doses of PAXLOVID at the same time.  If you take too much PAXLOVID, call your healthcare provider or go to the  nearest hospital emergency room right away.  If you are taking a ritonavir- or cobicistat-containing medicine to treat hepatitis C  or Human Immunodeficiency Virus (HIV), you should continue to take your  medicine as prescribed by your healthcare provider    Possible side effects of PAXLOVID are:  ? Allergic Reactions. Allergic reactions can happen in people taking  PAXLOVID, even after only 1 dose. Stop taking PAXLOVID and call your  healthcare provider right away if you get any of the following symptoms of an  allergic reaction:  hives  trouble swallowing or breathing  swelling of the mouth, lips, or face  throat tightness  hoarseness  skin rash  ? Liver Problems. Tell your healthcare provider right away if you have any of  these signs and symptoms of liver problems: loss of appetite, yellowing of your  skin and the whites of eyes (jaundice), dark-colored urine, pale colored stools  and itchy skin, stomach area (abdominal) pain.  ? Resistance to HIV  Medicines: If you have untreated HIV infection, PAXLOVID  may lead to some HIV medicines not working as well in the future.  ? Other possible side effects include:  altered sense of taste  diarrhea  high blood pressure  muscle aches  These are not all the possible side effects of PAXLOVID. Not many people have taken PAXLOVID. Serious and unexpected side effects may happen. PAXLOVID is still being studied, so it is possible that all of the risks are not known at this time.

## 2023-12-03 NOTE — PATIENT INSTRUCTIONS
You have tested positive for COVID-19 today.      ISOLATION  If you tested positive and do not have symptoms, you must isolate for 5 days starting on the day of the positive test. I    If you tested positive and have symptoms, you must isolate for 5 days starting on the day of the first symptoms,  not the day of the positive test.     This is the most important part, both the CDC and the [facility] emphasize that you do not test out of isolation.     After 5 days, if your symptoms have improved and you have not had fever on day 5, you can return to the community on day 6- NO TESTING REQUIRED!      In fact, we do not retest if you were positive in the last 90 days.    After your 5 days of isolation are completed, the CDC recommends strict mask use for the first 5 days that you come out of isolation. PLEASE FOLLOW CDC GUIDELINES REGARDING TRAVEL AFTER COVID INFECTION.    Return to Urgent Care or go to ER if symptoms worsen or fail to improve.  Follow up with PCP as recommended for further management.     Your symptoms should begin to improve by Day 5 of the infection-- if symptoms worsen, you develop a new fever, shortness of breath, worsening shortness of breath with activity, chest pain, worsening cough, you must return to Urgent Care or go to the ER.    PLEASE READ YOUR DISCHARGE INSTRUCTIONS ENTIRELY AS IT CONTAINS IMPORTANT INFORMATION.      Please drink plenty of fluids.    Please get plenty of rest.    Please return here or go to the Emergency Department for any concerns or worsening of condition.      Tylenol or ibuprofen can also be used as directed for pain and fever unless you have an allergy to them or medical condition such as stomach ulcers, kidney or liver disease or blood thinners etc for which you should not be taking these type of medications.   YOU CAN ALTERNATE TYLENOL AND IBUPROFEN EVERY 3-4 HOURS. Take 1000mg (2 pills) of Extra Strength Acetaminophen (Tylenol) every 6 hours and 600mg (3 pills) of  Ibuprofen (Motrin/Advil) every 6 hours, alternating the two so that every 3 hours you take one or the other. Start with the Tylenol, then 3 hours later take the Ibuprofen, then 3 hours later take the Tylenol again, and so on.         For your allergy symptoms and/or runny nose, sinus/ear pressure, congestion:        - You can take over-the-counter claritin, zyrtec, allegra, or xyzal as directed. These are antihistamines that can help with runny nose, nasal congestion, sneezing, and helps to dry up post-nasal drip, which usually causes sore throat and cough.               - If you do NOT have high blood pressure, you may use a decongestant form (D)  of this medication (ie. Claritin- D, zyrtec-D, allegra-D) or if you do not take the D form, you can take sudafed (pseudoephedrine) over the counter, which is a decongestant. Do NOT take two decongestant (D) medications at the same time (such as mucinex-D and claritin-D or plain sudafed and claritin D). Dextromethorphan (DM) is a cough suppressant over the counter (ie. mucinex DM, robitussin, delsym; dayquil/nyquil has DM as well.)    -If you DO have high blood pressure, AVOID DECONGESTANTS (I.e., Phenylephrine and Pseudoephedrine). You may take Coricidin HBP for sinus congestion and Delsym for cough.        - You can take plain Mucinex (guaifenesin) 1200 mg twice a day to help loosen mucous.       Use over the counter flonase or nasocort: one spray each nostril twice daily OR two sprays each nostril once daily until nares dry out, unless you have Glaucoma.   If you find this dries your nose out or your nose bleeds, try using over the counter nasal saline a few minutes prior to using the flonase to moisten the lining of your nose and throughout the day as needed.   Flonase/nasal spray use directions:  1) Use once per day.  2) Blow nose first.  3) Close one nostril and spray flonase up the other nostril while inhaling gently.   4) If you inhale to aggressively, you will  have a bitter taste in the back of your mouth.       You can try breathe right strips at night to help you breathe.  A cool mist humidifier in bedroom may help with cough and relieve stuffy nose.     Sinus rinses DO NOT USE TAP WATER, if you must, water must be a rolling boil for 1 minute, let it cool, then use.  May use distilled water, or over the counter nasal saline rinses.  Vics vapor rub in shower to help open nasal passages.  May use nasal gel to keep passages moisturized.  May use Nasal saline sprays during the day for added relief of congestion.   For those who go to the gym, please do not use the sauna or steam room now to clear sinuses.      Sore throat recommendations: Warm fluids, warm salt water gargles, throat lozenges, tea, honey, soup, rest, hydration.      Cough     Honey with alek to soothe your throat    Robitussin or Delsyum for cough suppressant for dry cough.    Mucinex DM or products containing Guaifenesin or Dextromethorphan for expectorant (wet cough).    Take prescription cough meds (pills) as prescribed; take prescription cough syrup at night as needed for cough.  Do not take both the prescribed cough pills and syrup at the same time or within 6 hours of each other.  Do not take the cough syrup with any other sedative medication as it can can cause drowsiness. Do not operate any heavy machinery, drink or drive while taking the cough syrup.    Try taking half a dose first of the cough syrup to see how it affects you.       Please follow up with your primary care doctor or specialist in the next 48-72hrs as needed and if no improvement    If you  smoke, please stop smoking.      Please return or see your primary care doctor if you develop new or worsening symptoms.     Please arrange follow up with your primary medical clinic as soon as possible. You must understand that you've received an Urgent Care treatment only and that you may be released before all of your medical problems are  known or treated. You, the patient, will arrange for follow up as instructed. If your symptoms worsen or fail to improve you should go to the Emergency Room.    You have been prescribed Paxlovid, which is an antiviral medication.     Oral antiviral treatment may help your body fight COVID-19 by stopping the SARS-CoV-2 virus (the virus that causes COVID-19) from multiplying in your body, lowering the amount of the virus within your body, or helping your immune system. By getting treatment, you could have less serious symptoms and may lower the chances of your illness getting worse and needing care in the hospital.    To be eligible for Paxlovid, you must be at least 12 years of age and weigh at least 88 pounds.    What is PAXLOVID?    PAXLOVID is an investigational medicine used to treat mild-to-moderate COVID-19 in  adults and children [12 years of age and older weighing at least 88 pounds (40 kg)] with  positive results of direct SARS-CoV-2 viral testing, and who are at high risk for  progression to severe COVID-19, including hospitalization or death. PAXLOVID is  investigational because it is still being studied. There is limited information about the  safety and effectiveness of using PAXLOVID to treat people with mild-to-moderate  COVID-19.    Tell your healthcare provider if you:  ? Have any allergies  ? Have liver or kidney disease  ? Are pregnant or plan to become pregnant  ? Are breastfeeding a child  ? Have any serious illnesses    Tell your healthcare provider about all the medicines you take, including  prescription and over-the-counter medicines, vitamins, and herbal supplements.  Some medicines may interact with PAXLOVID and may cause serious side effects.  Keep a list of your medicines to show your healthcare provider and pharmacist when you get a new medicine.  You can ask your healthcare provider or pharmacist for a list of medicines that interact with PAXLOVID. Do not start taking a new medicine  without telling your  healthcare provider. Your healthcare provider can tell you if it is safe to take  PAXLOVID with other medicines    How do I take PAXLOVID?  ? PAXLOVID consists of 2 medicines: nirmatrelvir and ritonavir.  Take 2 pink tablets of nirmatrelvir with 1 white tablet of ritonavir by mouth  2 times each day (in the morning and in the evening) for 5 days. For each  dose, take all 3 tablets at the same time.  If you have kidney disease, talk to your healthcare provider. You  may need a different dose.  Swallow the tablets whole. Do not chew, break, or crush the tablets.  Take PAXLOVID with or without food.  Do not stop taking PAXLOVID without talking to your healthcare provider, even if  you feel better.  If you miss a dose of PAXLOVID within 8 hours of the time it is usually taken,  take it as soon as you remember. If you miss a dose by more than 8 hours, skip  the missed dose and take the next dose at your regular time. Do not take  2 doses of PAXLOVID at the same time.  If you take too much PAXLOVID, call your healthcare provider or go to the  nearest hospital emergency room right away.  If you are taking a ritonavir- or cobicistat-containing medicine to treat hepatitis C  or Human Immunodeficiency Virus (HIV), you should continue to take your  medicine as prescribed by your healthcare provider    Possible side effects of PAXLOVID are:  ? Allergic Reactions. Allergic reactions can happen in people taking  PAXLOVID, even after only 1 dose. Stop taking PAXLOVID and call your  healthcare provider right away if you get any of the following symptoms of an  allergic reaction:  hives  trouble swallowing or breathing  swelling of the mouth, lips, or face  throat tightness  hoarseness  skin rash  ? Liver Problems. Tell your healthcare provider right away if you have any of  these signs and symptoms of liver problems: loss of appetite, yellowing of your  skin and the whites of eyes (jaundice), dark-colored  urine, pale colored stools  and itchy skin, stomach area (abdominal) pain.  ? Resistance to HIV Medicines: If you have untreated HIV infection, PAXLOVID  may lead to some HIV medicines not working as well in the future.  ? Other possible side effects include:  altered sense of taste  diarrhea  high blood pressure  muscle aches  These are not all the possible side effects of PAXLOVID. Not many people have taken PAXLOVID. Serious and unexpected side effects may happen. PAXLOVID is still being studied, so it is possible that all of the risks are not known at this time.

## 2023-12-03 NOTE — LETTER
"    [id number] , [address]  [facility] [address]  Phone: [phone]  Fax: [phone]      Return to School    Alma Mistry (Sandra) was seen and treated in our clinic on 12/3/2023.     COVID-19 is present in our communities across the [facility]. There is limited testing for COVID at this time, so not all patients can be tested. In this situation, your employee meets the following criteria:    Alma Mistry has met the criteria for COVID-19 testing and has a POSITIVE result. She can return to school once they are asymptomatic for 24 hours without the use of fever reducing medications AND at least five days from the first positive result. A mask is recommended for 5 days post quarantine.     If you have any questions or concerns, or if I can be of further assistance, please do not hesitate to contact me.    Sincerely,           Payton Lopez PA-C    "

## 2023-12-14 NOTE — PROGRESS NOTES
Care Everywhere: n/a  Immunization: updated  Health Maintenance: updated  Media Review: uploaded 2010 colonoscopy report to [facility]   [facility]acy Review: n/a  Order placed: n/a  Upcoming appts:gastro 5.14.2020         Vaccine status unknown

## 2023-12-21 ENCOUNTER — OFFICE VISIT (OUTPATIENT)
Dept: URGENT CARE | Facility: CLINIC | Age: 63
End: 2023-12-21
Payer: COMMERCIAL

## 2023-12-21 VITALS
TEMPERATURE: 99 F | BODY MASS INDEX: 28.99 KG/M2 | OXYGEN SATURATION: 97 % | HEIGHT: 60 IN | WEIGHT: 147.69 LBS | RESPIRATION RATE: 18 BRPM | SYSTOLIC BLOOD PRESSURE: 107 MMHG | DIASTOLIC BLOOD PRESSURE: 72 MMHG | HEART RATE: 75 BPM

## 2023-12-21 DIAGNOSIS — J06.9 URI WITH COUGH AND CONGESTION: Primary | ICD-10-CM

## 2023-12-21 DIAGNOSIS — R68.89 FLU-LIKE SYMPTOMS: ICD-10-CM

## 2023-12-21 DIAGNOSIS — R52 GENERALIZED BODY ACHES: ICD-10-CM

## 2023-12-21 LAB
CTP QC/QA: YES
POC MOLECULAR INFLUENZA A AGN: NEGATIVE
POC MOLECULAR INFLUENZA B AGN: NEGATIVE

## 2023-12-21 PROCEDURE — 87502 POCT INFLUENZA A/B MOLECULAR: ICD-10-PCS | Mod: QW,S$GLB,,

## 2023-12-21 PROCEDURE — 87502 INFLUENZA DNA AMP PROBE: CPT | Mod: QW,S$GLB,,

## 2023-12-21 PROCEDURE — 99213 OFFICE O/P EST LOW 20 MIN: CPT | Mod: S$GLB,,,

## 2023-12-21 PROCEDURE — 99213 PR OFFICE/OUTPT VISIT, EST, LEVL III, 20-29 MIN: ICD-10-PCS | Mod: S$GLB,,,

## 2023-12-21 NOTE — PROGRESS NOTES
"Subjective:      Patient ID: Alma Mistry is a 63 y.o. female.    Vitals:  height is 5' (1.524 m) and weight is 67 kg (147 lb 11.3 oz). Her oral temperature is 98.9 °F (37.2 °C). Her blood pressure is 107/72 and her pulse is 75. Her respiration is 18 and oxygen saturation is 97%.     Chief Complaint: Sore Throat    64 y/o female presents with nasal congestion, runny nose, post nasal drip, sore throat, chills with low grade fever of 99.1 today, body aches and headache since Tuesday. Patient treated with claritin starting yesterday, tylenol and motrin. 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 Had Covid earlier in the month "but after 3 days, I felt back to normal." Patient states she canceled her flight because she felt sick to go see daughter. Denies cp, sob, voice change, drooling, rash, ear pain or discharge. Allergic to fluticasone.     Sore Throat   This is a new problem. The current episode started in the past 7 days. There has been no fever. Associated symptoms include congestion, coughing and headaches. Pertinent negatives include no diarrhea, ear discharge, ear pain, neck pain, shortness of breath, trouble swallowing or vomiting.       Constitution: Positive for chills. Negative for fever.   HENT:  Positive for congestion, postnasal drip, sinus pressure and sore throat. Negative for ear pain, ear discharge, sinus pain, trouble swallowing and voice change.    Neck: Negative for neck pain and neck stiffness.   Cardiovascular:  Negative for chest pain.   Eyes:  Negative for eye discharge, eye itching and eye redness.   Respiratory:  Positive for cough. Negative for sputum production and shortness of breath.    Gastrointestinal:  Negative for vomiting and diarrhea.   Musculoskeletal:  Positive for muscle ache (generalized).   Allergic/Immunologic: Positive for sneezing.   Neurological:  Positive for headaches.      Objective:     Vitals:    12/21/23 1219   BP: 107/72   Pulse: 75   Resp: 18   Temp: 98.9 °F (37.2 °C) "       Physical Exam   Constitutional: She is oriented to person, place, and time. She appears well-developed. She is cooperative.  Non-toxic appearance. She does not appear ill. No distress.      Comments:Patient was sleeping in room wearing multiple layers   awake  HENT:   Head: Normocephalic and atraumatic.   Ears:   Right Ear: Hearing, tympanic membrane, external ear and ear canal normal. No cerumen not present. Tympanic membrane is not erythematous and not bulging. impacted cerumen  Left Ear: Hearing, tympanic membrane, external ear and ear canal normal. No cerumen not present. Tympanic membrane is not erythematous and not bulging. impacted cerumen  Nose: Nose normal. No mucosal edema, rhinorrhea or nasal deformity. No epistaxis. Right sinus exhibits no maxillary sinus tenderness and no frontal sinus tenderness. Left sinus exhibits no maxillary sinus tenderness and no frontal sinus tenderness.   Mouth/Throat: Uvula is midline, oropharynx is clear and moist and mucous membranes are normal. Mucous membranes are moist. No trismus in the jaw. Normal dentition. No uvula swelling. No oropharyngeal exudate, posterior oropharyngeal edema, posterior oropharyngeal erythema or cobblestoning.   Eyes: Conjunctivae and lids are normal. Pupils are equal, round, and reactive to light. Right eye exhibits no discharge. Left eye exhibits no discharge. No scleral icterus.   Neck: Trachea normal and phonation normal. Neck supple. No edema present. No erythema present. No neck rigidity present.   Cardiovascular: Normal rate, regular rhythm, normal heart sounds and normal pulses.   Pulmonary/Chest: Effort normal and breath sounds normal. No accessory muscle usage or stridor. No respiratory distress. She has no decreased breath sounds. She has no wheezes. She has no rhonchi. She has no rales.   Abdominal: Normal appearance.   Musculoskeletal: Normal range of motion.         General: No deformity. Normal range of motion.   Neurological:  She is alert and oriented to person, place, and time. She displays no weakness. She exhibits normal muscle tone. Coordination and gait normal.   Skin: Skin is warm, dry, intact, not diaphoretic, not pale and no rash.   Psychiatric: Her speech is normal and behavior is normal. Judgment and thought content normal.   Nursing note and vitals reviewed.      Assessment:     1. URI with cough and congestion    2. Generalized body aches      Results for orders placed or performed in visit on 12/21/23   POCT Influenza A/B MOLECULAR   Result Value Ref Range    POC Molecular Influenza A Ag Negative Negative, Not Reported    POC Molecular Influenza B Ag Negative Negative, Not Reported     Acceptable Yes        Plan:       URI with cough and congestion    Generalized body aches  -     POCT Influenza A/B MOLECULAR      Patient Instructions   Reviewed negative flu test with patient who verbalized understanding.  Advised patient that symptoms are indicative of an upper respiratory infection which is viral in nature and should be treated symptomatically.  We discussed over-the-counter medications as well as home remedies to help with current symptoms.  We also discussed a wait and see antibiotic plan which the patient verbalized understanding.  Patient educational handouts also included in discharge paperwork for patient who verbalized understanding agrees with plan of care.  They deny any further questions or concerns at this time.  Patient exits exam room in no acute distress.      PLEASE READ YOUR DISCHARGE INSTRUCTIONS ENTIRELY AS IT CONTAINS IMPORTANT INFORMATION.      - Please drink plenty of fluids.  - Please get plenty of rest.  - You can take plain Mucinex (guaifenesin) 1200 mg twice a day to help loosen mucous.   - Use over the counter Flonase as directed  Please return here or go to the Emergency Department for any concerns or worsening of condition.  - Please take an over the counter antihistamine  medication (Allegra/Claritin/Zyrtec/Xyzal) of your choice as directed. These are antihistamines that can help with runny nose, nasal congestion, sneezing, and helps to dry up post-nasal drip, which usually causes sore throat and cough.    -If you do NOT have high blood pressure, you may use a decongestant form (D)  of this medication (ie. Claritin- D, zyrtec-D, allegra-D, Mucinex-D) or if you do not take the D form, you can take sudafed (pseudoephedrine) over the counter, which is a decongestant. Do NOT take two decongestant (D) medications at the same time (such as mucinex-D and claritin-D or plain sudafed and claritin D). Dextromethorphan (DM) is a cough suppressant over the counter (ie. mucinex DM, robitussin, delsym; dayquil/nyquil has DM as well.)    If you do have Hypertension or palpitations, it is safe to take Coricidin HBP for relief of sinus symptoms.    - If not allergic, please take over the counter Tylenol (Acetaminophen) and/or Motrin (Ibuprofen) as directed for control of pain and/or fever.  Avoid tylenol if you have a history of liver disease. Do not take ibuprofen if you have a history of GI bleeding, kidney disease, or if you take blood thinners.  Please follow up with your primary care doctor or specialist as needed.    -IF YOU RECEIVED PRESCRIPTION COUGH SUPPRESSANTS: Take prescription cough meds (pills) as prescribed; take prescription cough syrup at night as needed for cough.  Do not take both the prescribed cough pills and syrup at the same time or within 6 hours of each other.  Do not take the cough syrup with any other sedative medication as it can can cause drowsiness. Do not operate any heavy machinery, drink or drive while taking the cough syrup.    Try taking half a dose first of the cough syrup to see how it affects you.     Sore throat recommendations: Warm fluids, warm salt water gargles, throat lozenges, tea, honey, soup, rest, hydration.    Use over the counter flonase: one spray  each nostril twice daily OR two sprays each nostril once daily for sinus congestion and postnasal drip. This is a steroid nasal spray that works locally over time to decrease the inflammation in your nose/sinuses and help with allergic symptoms. This is not an quick- relief spray like afrin, but it works well if used daily.  Discontinue if you develop nose bleed    Sinus rinses DO NOT USE TAP WATER, if you must, water must be a rolling boil for 1 minute, let it cool, then use.  May use distilled water, or over the counter nasal saline rinses.  Vics vapor rub in shower to help open nasal passages.  May use nasal gel to keep passages moisturized.  May use Nasal saline sprays during the day for added relief of congestion.   For those who go to the gym, please do not use the sauna or steam room now to clear sinuses.    If you  smoke, please stop smoking.      Please return or see your primary care doctor if you develop new or worsening symptoms.     Please arrange follow up with your primary medical clinic as soon as possible. You must understand that you've received an Urgent Care treatment only and that you may be released before all of your medical problems are known or treated. You, the patient, will arrange for follow up as instructed. If your symptoms worsen or fail to improve you should go to the Emergency Room.

## 2023-12-21 NOTE — PATIENT INSTRUCTIONS
Reviewed negative flu test with patient who verbalized understanding.  Advised patient that symptoms are indicative of an upper respiratory infection which is viral in nature and should be treated symptomatically.  We discussed over-the-counter medications as well as home remedies to help with current symptoms.  We also discussed a wait and see antibiotic plan which the patient verbalized understanding.  Patient educational handouts also included in discharge paperwork for patient who verbalized understanding agrees with plan of care.  They deny any further questions or concerns at this time.  Patient exits exam room in no acute distress.      PLEASE READ YOUR DISCHARGE INSTRUCTIONS ENTIRELY AS IT CONTAINS IMPORTANT INFORMATION.      - Please drink plenty of fluids.  - Please get plenty of rest.  - You can take plain Mucinex (guaifenesin) 1200 mg twice a day to help loosen mucous.   - Use over the counter Flonase as directed  Please return here or go to the Emergency Department for any concerns or worsening of condition.  - Please take an over the counter antihistamine medication (Allegra/Claritin/Zyrtec/Xyzal) of your choice as directed. These are antihistamines that can help with runny nose, nasal congestion, sneezing, and helps to dry up post-nasal drip, which usually causes sore throat and cough.    -If you do NOT have high blood pressure, you may use a decongestant form (D)  of this medication (ie. Claritin- D, zyrtec-D, allegra-D, Mucinex-D) or if you do not take the D form, you can take sudafed (pseudoephedrine) over the counter, which is a decongestant. Do NOT take two decongestant (D) medications at the same time (such as mucinex-D and claritin-D or plain sudafed and claritin D). Dextromethorphan (DM) is a cough suppressant over the counter (ie. mucinex DM, robitussin, delsym; dayquil/nyquil has DM as well.)    If you do have Hypertension or palpitations, it is safe to take Coricidin HBP for relief of sinus  symptoms.    - If not allergic, please take over the counter Tylenol (Acetaminophen) and/or Motrin (Ibuprofen) as directed for control of pain and/or fever.  Avoid tylenol if you have a history of liver disease. Do not take ibuprofen if you have a history of GI bleeding, kidney disease, or if you take blood thinners.  Please follow up with your primary care doctor or specialist as needed.    -IF YOU RECEIVED PRESCRIPTION COUGH SUPPRESSANTS: Take prescription cough meds (pills) as prescribed; take prescription cough syrup at night as needed for cough.  Do not take both the prescribed cough pills and syrup at the same time or within 6 hours of each other.  Do not take the cough syrup with any other sedative medication as it can can cause drowsiness. Do not operate any heavy machinery, drink or drive while taking the cough syrup.    Try taking half a dose first of the cough syrup to see how it affects you.     Sore throat recommendations: Warm fluids, warm salt water gargles, throat lozenges, tea, honey, soup, rest, hydration.    Use over the counter flonase: one spray each nostril twice daily OR two sprays each nostril once daily for sinus congestion and postnasal drip. This is a steroid nasal spray that works locally over time to decrease the inflammation in your nose/sinuses and help with allergic symptoms. This is not an quick- relief spray like afrin, but it works well if used daily.  Discontinue if you develop nose bleed    Sinus rinses DO NOT USE TAP WATER, if you must, water must be a rolling boil for 1 minute, let it cool, then use.  May use distilled water, or over the counter nasal saline rinses.  Vics vapor rub in shower to help open nasal passages.  May use nasal gel to keep passages moisturized.  May use Nasal saline sprays during the day for added relief of congestion.   For those who go to the gym, please do not use the sauna or steam room now to clear sinuses.    If you  smoke, please stop  smoking.      Please return or see your primary care doctor if you develop new or worsening symptoms.     Please arrange follow up with your primary medical clinic as soon as possible. You must understand that you've received an Urgent Care treatment only and that you may be released before all of your medical problems are known or treated. You, the patient, will arrange for follow up as instructed. If your symptoms worsen or fail to improve you should go to the Emergency Room.

## 2023-12-29 ENCOUNTER — PATIENT MESSAGE (OUTPATIENT)
Dept: ADMINISTRATIVE | Facility: HOSPITAL | Age: 63
End: 2023-12-29
Payer: COMMERCIAL

## 2023-12-29 ENCOUNTER — PATIENT OUTREACH (OUTPATIENT)
Dept: ADMINISTRATIVE | Facility: HOSPITAL | Age: 63
End: 2023-12-29
Payer: COMMERCIAL

## 2023-12-29 NOTE — PROGRESS NOTES
EPIC CAMPAIGN: per portal response pt requested mammogram, will instruct pt on how to self schedule.

## 2024-02-28 DIAGNOSIS — Z12.31 OTHER SCREENING MAMMOGRAM: ICD-10-CM

## 2024-04-08 ENCOUNTER — OFFICE VISIT (OUTPATIENT)
Dept: INTERNAL MEDICINE | Facility: CLINIC | Age: 64
End: 2024-04-08
Payer: COMMERCIAL

## 2024-04-08 ENCOUNTER — TELEPHONE (OUTPATIENT)
Dept: INTERNAL MEDICINE | Facility: CLINIC | Age: 64
End: 2024-04-08

## 2024-04-08 VITALS
TEMPERATURE: 98 F | WEIGHT: 149.06 LBS | HEIGHT: 60 IN | OXYGEN SATURATION: 98 % | SYSTOLIC BLOOD PRESSURE: 110 MMHG | DIASTOLIC BLOOD PRESSURE: 60 MMHG | BODY MASS INDEX: 29.26 KG/M2 | HEART RATE: 76 BPM

## 2024-04-08 DIAGNOSIS — F41.0 PANIC DISORDER: ICD-10-CM

## 2024-04-08 DIAGNOSIS — Z00.00 ROUTINE GENERAL MEDICAL EXAMINATION AT A HEALTH CARE FACILITY: Primary | ICD-10-CM

## 2024-04-08 DIAGNOSIS — F41.0 GENERALIZED ANXIETY DISORDER WITH PANIC ATTACKS: ICD-10-CM

## 2024-04-08 DIAGNOSIS — R01.1 MURMUR: ICD-10-CM

## 2024-04-08 DIAGNOSIS — D51.0 PERNICIOUS ANEMIA: Chronic | ICD-10-CM

## 2024-04-08 DIAGNOSIS — G56.22 ULNAR NEUROPATHY OF LEFT UPPER EXTREMITY: ICD-10-CM

## 2024-04-08 DIAGNOSIS — F33.2 SEVERE EPISODE OF RECURRENT MAJOR DEPRESSIVE DISORDER, WITHOUT PSYCHOTIC FEATURES: ICD-10-CM

## 2024-04-08 DIAGNOSIS — F32.A DEPRESSION, UNSPECIFIED DEPRESSION TYPE: ICD-10-CM

## 2024-04-08 DIAGNOSIS — F41.1 GENERALIZED ANXIETY DISORDER WITH PANIC ATTACKS: ICD-10-CM

## 2024-04-08 PROCEDURE — 3078F DIAST BP <80 MM HG: CPT | Mod: CPTII,S$GLB,, | Performed by: INTERNAL MEDICINE

## 2024-04-08 PROCEDURE — 3008F BODY MASS INDEX DOCD: CPT | Mod: CPTII,S$GLB,, | Performed by: INTERNAL MEDICINE

## 2024-04-08 PROCEDURE — 99396 PREV VISIT EST AGE 40-64: CPT | Mod: S$GLB,,, | Performed by: INTERNAL MEDICINE

## 2024-04-08 PROCEDURE — 3074F SYST BP LT 130 MM HG: CPT | Mod: CPTII,S$GLB,, | Performed by: INTERNAL MEDICINE

## 2024-04-08 PROCEDURE — 1159F MED LIST DOCD IN RCRD: CPT | Mod: CPTII,S$GLB,, | Performed by: INTERNAL MEDICINE

## 2024-04-08 PROCEDURE — 99214 OFFICE O/P EST MOD 30 MIN: CPT | Mod: 25,S$GLB,, | Performed by: INTERNAL MEDICINE

## 2024-04-08 PROCEDURE — 99999 PR PBB SHADOW E&M-EST. PATIENT-LVL V: CPT | Mod: PBBFAC,,, | Performed by: INTERNAL MEDICINE

## 2024-04-08 RX ORDER — LORATADINE 10 MG/1
10 TABLET ORAL
COMMUNITY
Start: 2023-12-21

## 2024-04-08 RX ORDER — ZINC GLUCONATE 50 MG
1000 TABLET ORAL
COMMUNITY
Start: 2023-12-21

## 2024-04-08 RX ORDER — ESCITALOPRAM OXALATE 10 MG/1
10 TABLET ORAL DAILY
Qty: 90 TABLET | Refills: 3 | Status: SHIPPED | OUTPATIENT
Start: 2024-04-08 | End: 2025-04-08

## 2024-04-08 RX ORDER — IBUPROFEN 800 MG/1
800 TABLET ORAL EVERY 8 HOURS PRN
COMMUNITY
Start: 2023-12-21

## 2024-04-08 NOTE — PROGRESS NOTES
Subjective:      Patient ID: Alma Mistry is a 63 y.o. female.    Chief Complaint: Annual Exam    HPI      63 y.o. with  Patient Active Problem List   Diagnosis    Pernicious anemia    Overweight    Lichen sclerosus    Panic disorder    Polyneuropathy    RLS (restless legs syndrome)    Neuropathic pain    Arthralgia    Generalized anxiety disorder with panic attacks    Complicated bereavement    Neuropathy of left superficial peroneal nerve    Iron deficiency anemia due to chronic blood loss    Routine general medical examination at a health care facility     Past Medical History:   Diagnosis Date    Atrophic gastritis     B12 deficiency     Breast lump on left side at 3 o'clock position 03/25/2015    Celiac disease     Fibrocystic breast     Lichen sclerosus 08/16/2012    Lupus        Here today for annual prev exam.  Compliant with meds without significant side effects. Energy and appetite are good.     Depression/anxiety - nightmares with cymbalta. Was off cymbalta for over one year. Self resumed after lost her dog in jan 2025.     5 months - pain radiating from left elbow into left 4th and 5th fingers.  No trauma.  No weakness.  Can occur day or night.  No significant aggravating or relieving factors.  PE: LUE- good rom without pain. Good strength. No ttp to left elbow.     Past Surgical History:   Procedure Laterality Date    BREAST BIOPSY Right 2009    benign    COLONOSCOPY  09/10/2010    COLONOSCOPY N/A 6/4/2020    Procedure: COLONOSCOPY;  Surgeon: Almas Perez MD;  Location: [facility];  Service: Endoscopy;  Laterality: N/A;    ESOPHAGOGASTRODUODENOSCOPY  09/10/2010    ESOPHAGOGASTRODUODENOSCOPY N/A 6/4/2020    Procedure: ESOPHAGOGASTRODUODENOSCOPY (EGD);  Surgeon: Almas Perez MD;  Location: [facility];  Service: Endoscopy;  Laterality: N/A;    ESOPHAGOGASTRODUODENOSCOPY N/A 7/28/2020    Procedure: EGD (ESOPHAGOGASTRODUODENOSCOPY);  Surgeon: Almas Perez MD;  Location:  [facility];  Service: Endoscopy;  Laterality: N/A;    HYSTERECTOMY  2013    TONSILLECTOMY       Social History     Socioeconomic History    Marital status:    Tobacco Use    Smoking status: Never     Passive exposure: Never    Smokeless tobacco: Never   Substance and Sexual Activity    Alcohol use: Not Currently    Drug use: No    Sexual activity: Never   Social History Narrative    1 dog, no smokers; Originally from [facility].     Social Determinants of Health     Financial Resource Strain: Low Risk  (12/10/2021)    Overall Financial Resource Strain (CARDIA)     Difficulty of Paying Living Expenses: Not very hard   Food Insecurity: No Food Insecurity (12/10/2021)    Hunger Vital Sign     Worried About Running Out of Food in the Last Year: Never true     Ran Out of Food in the Last Year: Never true   Transportation Needs: No Transportation Needs (12/10/2021)    PRAPARE - Transportation     Lack of Transportation (Medical): No     Lack of Transportation (Non-Medical): No   Physical Activity: Sufficiently Active (12/10/2021)    Exercise Vital Sign     Days of Exercise per Week: 7 days     Minutes of Exercise per Session: 120 min   Stress: Stress Concern Present (12/10/2021)    [employer] - Occupational Stress Questionnaire     Feeling of Stress : To some extent   Social Connections: Unknown (12/10/2021)    Social Connection and Isolation Panel [NHANES]     Frequency of Communication with Friends and Family: More than three times a week     Frequency of Social Gatherings with Friends and Family: Once a week     Active Member of Clubs or Organizations: Yes     Attends Club or Organization Meetings: More than 4 times per year     Marital Status:    Housing Stability: Low Risk  (12/10/2021)    Housing Stability Vital Sign     Unable to Pay for Housing in the Last Year: No     Number of Places Lived in the Last Year: 1     Unstable Housing in the Last Year: No     family history  includes Anemia in her mother; Asthma in her father; Cirrhosis in her brother; Depression in her mother and sister; Diabetes in her maternal grandfather; Hyperthyroidism in her mother; Hypotension in her mother; Leukemia in her brother; Multiple sclerosis in her brother; Nephritis in her mother; Thyroid disease in her sister.  Review of Systems   Constitutional:  Negative for chills and fever.   HENT:  Negative for ear pain and sore throat.    Respiratory:  Negative for cough.    Cardiovascular:  Negative for chest pain.   Gastrointestinal:  Negative for abdominal pain and blood in stool.   Genitourinary:  Negative for dysuria and hematuria.   Neurological:  Negative for seizures and syncope.   Psychiatric/Behavioral:  Positive for dysphoric mood. Negative for hallucinations and suicidal ideas. The patient is not nervous/anxious.      Objective:   /60 (BP Location: Left arm, Patient Position: Sitting, BP Method: Medium (Manual))   Pulse 76   Temp 97.8 °F (36.6 °C) (Tympanic)   Ht 5' (1.524 m)   Wt 67.6 kg (149 lb 0.5 oz)   SpO2 98%   BMI 29.11 kg/m²     Physical Exam  Constitutional:       General: She is awake. She is not in acute distress.     Appearance: Normal appearance. She is well-developed.   HENT:      Head: Normocephalic and atraumatic.   Eyes:      Extraocular Movements: Extraocular movements intact.      Conjunctiva/sclera: Conjunctivae normal.   Neck:      Thyroid: No thyromegaly.   Cardiovascular:      Rate and Rhythm: Normal rate and regular rhythm.      Heart sounds: Murmur heard.   Pulmonary:      Effort: Pulmonary effort is normal.      Breath sounds: Normal breath sounds. No wheezing or rales.   Abdominal:      General: Bowel sounds are normal.      Palpations: Abdomen is soft.      Tenderness: There is no abdominal tenderness.   Musculoskeletal:         General: No swelling.      Cervical back: Normal range of motion and neck supple. No rigidity.   Lymphadenopathy:      Cervical: No  cervical adenopathy.   Skin:     General: Skin is warm and dry.   Neurological:      Mental Status: She is alert and oriented to person, place, and time. Mental status is at baseline.   Psychiatric:         Mood and Affect: Mood normal.         Behavior: Behavior normal. Behavior is cooperative.         Thought Content: Thought content normal.         Judgment: Judgment normal.         Lab Results   Component Value Date    WBC 7.19 02/24/2023    HGB 12.9 02/24/2023    HGB 13.8 11/01/2021    HGB 12.9 07/23/2021    HCT 40.1 02/24/2023    MCV 86 02/24/2023    MCV 85 11/01/2021    MCV 84 07/23/2021     (H) 02/24/2023    CHOL 177 02/24/2023    TRIG 183 (H) 02/24/2023    HDL 42 02/24/2023    LDLCALC 98.4 02/24/2023    LDLCALC 143.8 01/06/2022    LDLCALC 126.6 05/11/2020    ALT 35 02/24/2023    AST 25 02/24/2023     02/24/2023    K 4.4 02/24/2023    CALCIUM 9.5 02/24/2023     02/24/2023    CO2 25 02/24/2023    BUN 14 02/24/2023    CREATININE 0.7 02/24/2023    CREATININE 0.7 01/06/2022    CREATININE 0.7 10/21/2020    EGFRNORACEVR >60.0 02/24/2023    TSH 0.960 02/24/2023    TSH 0.817 01/06/2022    TSH 0.750 05/22/2019    GLU 92 02/24/2023    HGBA1C 4.6 09/21/2017    [id number] 27 (L) 09/21/2017          The 10-year ASCVD risk score (Denise BRUSH, et al., 2019) is: 3%    Values used to calculate the score:      Age: 63 years      Sex: Female      Is Non- : No      Diabetic: No      Tobacco smoker: No      Systolic Blood Pressure: 110 mmHg      Is BP treated: No      HDL Cholesterol: 42 mg/dL      Total Cholesterol: 177 mg/dL     Assessment:     1. Routine general medical examination at a health care facility    2. Pernicious anemia    3. Panic disorder    4. Generalized anxiety disorder with panic attacks    5. Severe episode of recurrent major depressive disorder, without psychotic features    6. Ulnar neuropathy of left upper extremity    7. Murmur    8. Depression, unspecified  depression type      Plan:   1. Routine general medical examination at a health care facility  Overview:  Heart healthy diet, regular exercise, and regular use of sunscreen.    reviewed    Orders:  -     Comprehensive Metabolic Panel; Future; Expected date: 04/08/2024  -     CBC Auto Differential; Future; Expected date: 04/08/2024  -     TSH; Future; Expected date: 04/08/2024  -     Lipid Panel; Future; Expected date: 04/08/2024    2. Pernicious anemia  -     Vitamin B12; Future; Expected date: 04/08/2024    3. Panic disorder    4. Generalized anxiety disorder with panic attacks  -     EScitalopram oxalate (LEXAPRO) 10 MG tablet; Take 1 tablet (10 mg total) by mouth once daily.  Dispense: 90 tablet; Refill: 3  -     Ambulatory referral/consult to Psychiatry; Future; Expected date: 04/15/2024    5. Severe episode of recurrent major depressive disorder, without psychotic features  -     EScitalopram oxalate (LEXAPRO) 10 MG tablet; Take 1 tablet (10 mg total) by mouth once daily.  Dispense: 90 tablet; Refill: 3  -     Ambulatory referral/consult to Psychiatry; Future; Expected date: 04/15/2024    6. Ulnar neuropathy of left upper extremity  -     Ambulatory referral/consult to Orthopedics; Future; Expected date: 04/15/2024    7. Murmur  -     Echo; Future; Expected date: 10/08/2024    8. Depression, unspecified depression type  -     Ambulatory referral/consult to Psychiatry; Future; Expected date: 04/15/2024        Patient Instructions   Check with your pharmacy regarding rsv and shingles vaccines.      Future Appointments   Date Time Provider Department Center   4/13/2024 11:20 AM LABORATORY, [facility]   10/8/2024  2:30 PM ECHOCARDIOGRAM, [facility] SPE[facility]   4/8/2025  1:40 PM [facility]       Lab Frequency Next Occurrence   Mammo Digital Screening Bilat Once 02/28/2024   Sjogrens syndrome-A extractable nuclear antibody     Sjogrens syndrome-B extractable  nuclear antibody     NGOZI Screen w/Reflex     C3 complement     C4 complement     Anti-DNA antibody, double-stranded     Urinalysis     Protein / creatinine ratio, urine     CBC auto differential     Comprehensive metabolic panel     DRVVT     Cardiolipin antibody     Beta-2 Glycoprotein Abs (IgA, IgG, IgM)         Follow up in about 1 year (around 4/8/2025), or if symptoms worsen or fail to improve, for fasting labs saturday.

## 2024-04-11 ENCOUNTER — TELEPHONE (OUTPATIENT)
Dept: ORTHOPEDICS | Facility: CLINIC | Age: 64
End: 2024-04-11
Payer: COMMERCIAL

## 2024-04-11 ENCOUNTER — PATIENT MESSAGE (OUTPATIENT)
Dept: PSYCHIATRY | Facility: CLINIC | Age: 64
End: 2024-04-11
Payer: COMMERCIAL

## 2024-04-13 ENCOUNTER — LAB VISIT (OUTPATIENT)
Dept: LAB | Facility: HOSPITAL | Age: 64
End: 2024-04-13
Attending: INTERNAL MEDICINE
Payer: COMMERCIAL

## 2024-04-13 DIAGNOSIS — Z00.00 ROUTINE GENERAL MEDICAL EXAMINATION AT A HEALTH CARE FACILITY: ICD-10-CM

## 2024-04-13 DIAGNOSIS — D51.0 PERNICIOUS ANEMIA: Chronic | ICD-10-CM

## 2024-04-13 LAB
ALBUMIN SERPL BCP-MCNC: 3.9 G/DL (ref 3.5–5.2)
ALP SERPL-CCNC: 96 U/L (ref 55–135)
ALT SERPL W/O P-5'-P-CCNC: 26 U/L (ref 10–44)
ANION GAP SERPL CALC-SCNC: 8 MMOL/L (ref 8–16)
AST SERPL-CCNC: 26 U/L (ref 10–40)
BASOPHILS # BLD AUTO: 0.04 K/UL (ref 0–0.2)
BASOPHILS NFR BLD: 0.7 % (ref 0–1.9)
BILIRUB SERPL-MCNC: 0.6 MG/DL (ref 0.1–1)
BUN SERPL-MCNC: 13 MG/DL (ref 8–23)
CALCIUM SERPL-MCNC: 9.5 MG/DL (ref 8.7–10.5)
CHLORIDE SERPL-SCNC: 105 MMOL/L (ref 95–110)
CHOLEST SERPL-MCNC: 225 MG/DL (ref 120–199)
CHOLEST/HDLC SERPL: 4.1 {RATIO} (ref 2–5)
CO2 SERPL-SCNC: 25 MMOL/L (ref 23–29)
CREAT SERPL-MCNC: 0.7 MG/DL (ref 0.5–1.4)
DIFFERENTIAL METHOD BLD: NORMAL
EOSINOPHIL # BLD AUTO: 0.2 K/UL (ref 0–0.5)
EOSINOPHIL NFR BLD: 2.7 % (ref 0–8)
ERYTHROCYTE [DISTWIDTH] IN BLOOD BY AUTOMATED COUNT: 13.4 % (ref 11.5–14.5)
EST. GFR  (NO RACE VARIABLE): >60 ML/MIN/1.73 M^2
GLUCOSE SERPL-MCNC: 82 MG/DL (ref 70–110)
HCT VFR BLD AUTO: 42.8 % (ref 37–48.5)
HDLC SERPL-MCNC: 55 MG/DL (ref 40–75)
HDLC SERPL: 24.4 % (ref 20–50)
HGB BLD-MCNC: 14.1 G/DL (ref 12–16)
IMM GRANULOCYTES # BLD AUTO: 0.03 K/UL (ref 0–0.04)
IMM GRANULOCYTES NFR BLD AUTO: 0.5 % (ref 0–0.5)
LDLC SERPL CALC-MCNC: 151.8 MG/DL (ref 63–159)
LYMPHOCYTES # BLD AUTO: 1.7 K/UL (ref 1–4.8)
LYMPHOCYTES NFR BLD: 27.9 % (ref 18–48)
MCH RBC QN AUTO: 28.4 PG (ref 27–31)
MCHC RBC AUTO-ENTMCNC: 32.9 G/DL (ref 32–36)
MCV RBC AUTO: 86 FL (ref 82–98)
MONOCYTES # BLD AUTO: 0.4 K/UL (ref 0.3–1)
MONOCYTES NFR BLD: 7.2 % (ref 4–15)
NEUTROPHILS # BLD AUTO: 3.6 K/UL (ref 1.8–7.7)
NEUTROPHILS NFR BLD: 61 % (ref 38–73)
NONHDLC SERPL-MCNC: 170 MG/DL
NRBC BLD-RTO: 0 /100 WBC
PLATELET # BLD AUTO: 310 K/UL (ref 150–450)
PMV BLD AUTO: 11.1 FL (ref 9.2–12.9)
POTASSIUM SERPL-SCNC: 4.4 MMOL/L (ref 3.5–5.1)
PROT SERPL-MCNC: 7.1 G/DL (ref 6–8.4)
RBC # BLD AUTO: 4.96 M/UL (ref 4–5.4)
SODIUM SERPL-SCNC: 138 MMOL/L (ref 136–145)
TRIGL SERPL-MCNC: 91 MG/DL (ref 30–150)
TSH SERPL DL<=0.005 MIU/L-ACNC: 1.01 UIU/ML (ref 0.4–4)
VIT B12 SERPL-MCNC: 292 PG/ML (ref 210–950)
WBC # BLD AUTO: 5.95 K/UL (ref 3.9–12.7)

## 2024-04-13 PROCEDURE — 84443 ASSAY THYROID STIM HORMONE: CPT | Performed by: INTERNAL MEDICINE

## 2024-04-13 PROCEDURE — 80053 COMPREHEN METABOLIC PANEL: CPT | Performed by: INTERNAL MEDICINE

## 2024-04-13 PROCEDURE — 85025 COMPLETE CBC W/AUTO DIFF WBC: CPT | Performed by: INTERNAL MEDICINE

## 2024-04-13 PROCEDURE — 80061 LIPID PANEL: CPT | Performed by: INTERNAL MEDICINE

## 2024-04-13 PROCEDURE — 36415 COLL VENOUS BLD VENIPUNCTURE: CPT | Performed by: INTERNAL MEDICINE

## 2024-04-13 PROCEDURE — 82607 VITAMIN B-12: CPT | Performed by: INTERNAL MEDICINE

## 2024-04-15 DIAGNOSIS — M25.522 LEFT ELBOW PAIN: Primary | ICD-10-CM

## 2024-07-05 DIAGNOSIS — D51.0 PERNICIOUS ANEMIA: Primary | Chronic | ICD-10-CM

## 2024-07-05 RX ORDER — CYANOCOBALAMIN 1000 UG/ML
INJECTION, SOLUTION INTRAMUSCULAR; SUBCUTANEOUS
Qty: 10 ML | Refills: 12 | Status: SHIPPED | OUTPATIENT
Start: 2024-07-05

## 2024-07-05 RX ORDER — ZINC GLUCONATE 50 MG
1000 TABLET ORAL DAILY
Qty: 30 TABLET | Refills: 6 | Status: SHIPPED | OUTPATIENT
Start: 2024-07-05

## 2024-07-08 PROBLEM — Z00.00 ROUTINE GENERAL MEDICAL EXAMINATION AT A HEALTH CARE FACILITY: Status: RESOLVED | Noted: 2023-03-09 | Resolved: 2024-07-08

## 2024-08-05 DIAGNOSIS — F33.2 SEVERE EPISODE OF RECURRENT MAJOR DEPRESSIVE DISORDER, WITHOUT PSYCHOTIC FEATURES: ICD-10-CM

## 2024-08-05 RX ORDER — DULOXETIN HYDROCHLORIDE 20 MG/1
40 CAPSULE, DELAYED RELEASE ORAL DAILY
Qty: 180 CAPSULE | Refills: 1 | OUTPATIENT
Start: 2024-08-05

## 2024-09-21 ENCOUNTER — PATIENT MESSAGE (OUTPATIENT)
Dept: INTERNAL MEDICINE | Facility: CLINIC | Age: 64
End: 2024-09-21
Payer: COMMERCIAL

## 2024-09-21 DIAGNOSIS — D51.0 PERNICIOUS ANEMIA: Chronic | ICD-10-CM

## 2024-09-23 RX ORDER — ZINC GLUCONATE 50 MG
1000 TABLET ORAL DAILY
Qty: 30 TABLET | Refills: 6 | Status: SHIPPED | OUTPATIENT
Start: 2024-09-23

## 2024-09-24 ENCOUNTER — TELEPHONE (OUTPATIENT)
Dept: INTERNAL MEDICINE | Facility: CLINIC | Age: 64
End: 2024-09-24
Payer: COMMERCIAL

## 2024-10-08 ENCOUNTER — HOSPITAL ENCOUNTER (OUTPATIENT)
Dept: CARDIOLOGY | Facility: HOSPITAL | Age: 64
Discharge: HOME | End: 2024-10-08
Attending: INTERNAL MEDICINE
Payer: COMMERCIAL

## 2024-10-08 VITALS
BODY MASS INDEX: 29.25 KG/M2 | HEIGHT: 60 IN | WEIGHT: 149 LBS | SYSTOLIC BLOOD PRESSURE: 110 MMHG | DIASTOLIC BLOOD PRESSURE: 60 MMHG

## 2024-10-08 DIAGNOSIS — R01.1 MURMUR: ICD-10-CM

## 2024-10-08 LAB
AORTIC ROOT ANNULUS: 2.68 CM
ASCENDING AORTA: 2.65 CM
AV INDEX (PROSTH): 0.76
AV MEAN GRADIENT: 9.7 MMHG
AV PEAK GRADIENT: 17.6 MMHG
AV VALVE AREA BY VELOCITY RATIO: 2 CM²
AV VALVE AREA: 2.2 CM²
AV VELOCITY RATIO: 0.71
BSA FOR ECHO PROCEDURE: 1.69 M2
CV ECHO LV RWT: 0.39 CM
DOP CALC AO PEAK VEL: 2.1 M/S
DOP CALC AO VTI: 43.7 CM
DOP CALC LVOT AREA: 2.8 CM2
DOP CALC LVOT DIAMETER: 1.9 CM
DOP CALC LVOT PEAK VEL: 1.5 M/S
DOP CALC LVOT STROKE VOLUME: 94.1 CM3
DOP CALC RVOT PEAK VEL: 0.68 M/S
DOP CALC RVOT VTI: 14.7 CM
DOP CALCLVOT PEAK VEL VTI: 33.2 CM
E WAVE DECELERATION TIME: 178.83 MSEC
E/A RATIO: 0.88
E/E' RATIO: 7.43 M/S
ECHO LV POSTERIOR WALL: 0.8 CM (ref 0.6–1.1)
FRACTIONAL SHORTENING: 36.6 % (ref 28–44)
INTERVENTRICULAR SEPTUM: 0.9 CM (ref 0.6–1.1)
IVC DIAMETER: 2.04 CM
IVRT: 77.07 MSEC
LA MAJOR: 5.57 CM
LA MINOR: 5.9 CM
LA WIDTH: 4.4 CM
LEFT ATRIUM AREA SYSTOLIC (APICAL 2 CHAMBER): 14.29 CM2
LEFT ATRIUM AREA SYSTOLIC (APICAL 4 CHAMBER): 21.24 CM2
LEFT ATRIUM SIZE: 5.22 CM
LEFT ATRIUM VOLUME INDEX MOD: 27 ML/M2
LEFT ATRIUM VOLUME INDEX: 67.8 ML/M2
LEFT ATRIUM VOLUME MOD: 44.52 ML
LEFT ATRIUM VOLUME: 111.87 CM3
LEFT INTERNAL DIMENSION IN SYSTOLE: 2.6 CM (ref 2.1–4)
LEFT VENTRICLE DIASTOLIC VOLUME INDEX: 45.93 ML/M2
LEFT VENTRICLE DIASTOLIC VOLUME: 75.78 ML
LEFT VENTRICLE END SYSTOLIC VOLUME APICAL 2 CHAMBER: 29.34 ML
LEFT VENTRICLE END SYSTOLIC VOLUME APICAL 4 CHAMBER: 66.08 ML
LEFT VENTRICLE MASS INDEX: 64 G/M2
LEFT VENTRICLE SYSTOLIC VOLUME INDEX: 14.2 ML/M2
LEFT VENTRICLE SYSTOLIC VOLUME: 23.51 ML
LEFT VENTRICULAR INTERNAL DIMENSION IN DIASTOLE: 4.1 CM (ref 3.5–6)
LEFT VENTRICULAR MASS: 105.6 G
LV LATERAL E/E' RATIO: 7.09 M/S
LV SEPTAL E/E' RATIO: 7.8 M/S
LVED V (TEICH): 75.78 ML
LVES V (TEICH): 23.51 ML
LVOT MG: 5.26 MMHG
LVOT MV: 1.08 CM/S
MR PISA EROA: 0.05 CM2
MV PEAK A VEL: 0.89 M/S
MV PEAK E VEL: 0.78 M/S
MV STENOSIS PRESSURE HALF TIME: 51.86 MS
MV VALVE AREA P 1/2 METHOD: 4.24 CM2
PISA MRMAX VEL: 6.27 M/S
PISA RADIUS: 0.38 CM
PISA TR MAX VEL: 2.47 M/S
PISA VN NYQUIST MS: 0.35 M/S
PISA VN NYQUIST: 0.35 M/S
PULM VEIN S/D RATIO: 1.41
PV MEAN GRADIENT: 1 MMHG
PV MV: 0.73 M/S
PV PEAK D VEL: 0.39 M/S
PV PEAK GRADIENT: 4 MMHG
PV PEAK S VEL: 0.55 M/S
PV PEAK VELOCITY: 1.03 M/S
RA MAJOR: 5.03 CM
RA PRESSURE ESTIMATED: 8 MMHG
RA WIDTH: 3.55 CM
RIGHT VENTRICULAR END-DIASTOLIC DIMENSION: 3.61 CM
RV TB RVSP: 10 MMHG
RV TISSUE DOPPLER FREE WALL SYSTOLIC VELOCITY 1 (APICAL 4 CHAMBER VIEW): 15.69 CM/S
SINUS: 2.5 CM
STJ: 2.2 CM
TDI LATERAL: 0.11 M/S
TDI SEPTAL: 0.1 M/S
TDI: 0.11 M/S
TR MAX PG: 24 MMHG
TRICUSPID ANNULAR PLANE SYSTOLIC EXCURSION: 2.17 CM
TV REST PULMONARY ARTERY PRESSURE: 32 MMHG
Z-SCORE OF LEFT VENTRICULAR DIMENSION IN END DIASTOLE: -1.22
Z-SCORE OF LEFT VENTRICULAR DIMENSION IN END SYSTOLE: -0.78

## 2024-10-08 PROCEDURE — 93306 TTE W/DOPPLER COMPLETE: CPT | Mod: 26,,, | Performed by: INTERNAL MEDICINE

## 2024-10-08 PROCEDURE — 93306 TTE W/DOPPLER COMPLETE: CPT

## 2024-10-10 ENCOUNTER — HOSPITAL ENCOUNTER (OUTPATIENT)
Dept: RADIOLOGY | Facility: HOSPITAL | Age: 64
Discharge: HOME | End: 2024-10-10
Attending: INTERNAL MEDICINE
Payer: COMMERCIAL

## 2024-10-10 DIAGNOSIS — I51.7 LEFT ATRIAL ENLARGEMENT: Primary | ICD-10-CM

## 2024-10-10 DIAGNOSIS — Z12.31 OTHER SCREENING MAMMOGRAM: ICD-10-CM

## 2024-10-10 PROCEDURE — 77067 SCR MAMMO BI INCL CAD: CPT | Mod: 26,,, | Performed by: RADIOLOGY

## 2024-10-10 PROCEDURE — 77063 BREAST TOMOSYNTHESIS BI: CPT | Mod: 26,,, | Performed by: RADIOLOGY

## 2024-10-10 PROCEDURE — 77067 SCR MAMMO BI INCL CAD: CPT | Mod: TC

## 2024-10-10 NOTE — PROGRESS NOTES
Subjective:      Patient ID: Alma Mistry is a 63 y.o. female.    Chief Complaint: No chief complaint on file.    HPI  Review of Systems  Objective:   There were no vitals taken for this visit.    Physical Exam    Lab Results   Component Value Date    WBC 5.95 04/13/2024    HGB 14.1 04/13/2024    HGB 12.9 02/24/2023    HGB 13.8 11/01/2021    HCT 42.8 04/13/2024    MCV 86 04/13/2024    MCV 86 02/24/2023    MCV 85 11/01/2021     04/13/2024    CHOL 225 (H) 04/13/2024    TRIG 91 04/13/2024    HDL 55 04/13/2024    LDLCALC 151.8 04/13/2024    LDLCALC 98.4 02/24/2023    LDLCALC 143.8 01/06/2022    ALT 26 04/13/2024    AST 26 04/13/2024     04/13/2024    K 4.4 04/13/2024    CALCIUM 9.5 04/13/2024     04/13/2024    CO2 25 04/13/2024    BUN 13 04/13/2024    CREATININE 0.7 04/13/2024    CREATININE 0.7 02/24/2023    CREATININE 0.7 01/06/2022    EGFRNORACEVR >60.0 04/13/2024    EGFRNORACEVR >60.0 02/24/2023    TSH 1.014 04/13/2024    TSH 0.960 02/24/2023    TSH 0.817 01/06/2022    GLU 82 04/13/2024    HGBA1C 4.6 09/21/2017    [id number] 27 (L) 09/21/2017          The 10-year ASCVD risk score (Denise BRUSH, et al., 2019) is: 3%    Values used to calculate the score:      Age: 63 years      Sex: Female      Is Non- : No      Diabetic: No      Tobacco smoker: No      Systolic Blood Pressure: 110 mmHg      Is BP treated: No      HDL Cholesterol: 55 mg/dL      Total Cholesterol: 225 mg/dL     Assessment:     1. Left atrial enlargement      Plan:   1. Left atrial enlargement  -     Ambulatory referral/consult to Cardiology; Future; Expected date: 10/17/2024        There are no Patient Instructions on file for this visit.    Future Appointments   Date Time Provider Department Center   4/8/2025  1:40 PM Zhegn Orozco MD [facility]       Lab Frequency Next Occurrence   Ambulatory referral/consult to Psychiatry Once 04/15/2024   Ambulatory referral/consult to Orthopedics Once 04/15/2024    X-Ray Elbow Complete Left Once 04/15/2024   Ambulatory referral/consult to Cardiology Once 10/17/2024   Sjogrens syndrome-A extractable nuclear antibody     Sjogrens syndrome-B extractable nuclear antibody     NGOZI Screen w/Reflex     C3 complement     C4 complement     Anti-DNA antibody, double-stranded     Urinalysis     Protein / creatinine ratio, urine     CBC auto differential     Comprehensive metabolic panel     DRVVT     Cardiolipin antibody     Beta-2 Glycoprotein Abs (IgA, IgG, IgM)         No follow-ups on file.

## 2024-11-08 ENCOUNTER — OFFICE VISIT (OUTPATIENT)
Dept: URGENT CARE | Facility: CLINIC | Age: 64
End: 2024-11-08
Payer: COMMERCIAL

## 2024-11-08 VITALS
WEIGHT: 146.31 LBS | TEMPERATURE: 98 F | HEIGHT: 60 IN | OXYGEN SATURATION: 97 % | DIASTOLIC BLOOD PRESSURE: 69 MMHG | SYSTOLIC BLOOD PRESSURE: 106 MMHG | BODY MASS INDEX: 28.73 KG/M2 | HEART RATE: 67 BPM | RESPIRATION RATE: 18 BRPM

## 2024-11-08 DIAGNOSIS — H61.22 IMPACTED CERUMEN OF LEFT EAR: ICD-10-CM

## 2024-11-08 DIAGNOSIS — H81.10 BENIGN PAROXYSMAL POSITIONAL VERTIGO, UNSPECIFIED LATERALITY: Primary | ICD-10-CM

## 2024-11-08 LAB — OTHER: NORMAL

## 2024-11-08 RX ORDER — MECLIZINE HYDROCHLORIDE 25 MG/1
25 TABLET ORAL 3 TIMES DAILY PRN
Qty: 30 TABLET | Refills: 0 | Status: SHIPPED | OUTPATIENT
Start: 2024-11-08 | End: 2024-11-08 | Stop reason: ALTCHOICE

## 2024-11-08 RX ORDER — SCOLOPAMINE TRANSDERMAL SYSTEM 1 MG/1
1 PATCH, EXTENDED RELEASE TRANSDERMAL
Qty: 10 PATCH | Refills: 1 | Status: SHIPPED | OUTPATIENT
Start: 2024-11-08

## 2024-11-08 NOTE — PATIENT INSTRUCTIONS
[facility] can assist with appointment scheduling    [facility] Mon-Fri 8-5pm [phone]     Rest activity ad kendal   Avoid extreme heat temps and environments   Zofran as needed for nausea   Transcop patches as needed for vertigo symptoms   Increase and maintain hydration with minimal caffeine or no caffeine  fluids   Any weakness, slurred speech, blurred vision---911 STROKE alert      Please arrange follow up with your primary medical clinic as soon as possible. You must understand that you've received an Urgent Care treatment only and that you may be released before all of your medical problems are known or treated. You, the patient, will arrange for follow up as instructed. If your symptoms worsen or fail to improve you should go to the Emergency Room.

## 2024-11-08 NOTE — PROGRESS NOTES
Subjective:      Patient ID: Alma Mistry is a 63 y.o. female.    Vitals:  height is 5' (1.524 m) and weight is 66.4 kg (146 lb 4.8 oz). Her oral temperature is 97.6 °F (36.4 °C). Her blood pressure is 106/69 and her pulse is 67. Her respiration is 18 and oxygen saturation is 97%.     Chief Complaint: Dizziness    BP was 90 something over 58    Dizziness:   Chronicity:  New  Onset:  Today  Frequency:  Constantly  Pain Scale:  0/10  Duration: since 4am today.  Dizziness characteristics: vertigo.   Associated symptoms: ear pain, nausea, diaphoresis and light-headedness.no fever, no headaches, no tinnitus, no palpitations, no slurred speech, no numbness in extremities and no chest pain.   Nausea, blurry vision  Treatments tried: ear drops.   PMH includes: anxiety.no strokes, no cardiac surgery, no neurologic disease, no head trauma, no head trauma and no ear infections.      Constitution: Positive for sweating. Negative for fever.   HENT:  Positive for ear pain. Negative for tinnitus.    Cardiovascular:  Negative for chest pain and palpitations.   Gastrointestinal:  Positive for nausea.   Neurological:  Positive for dizziness and light-headedness. Negative for headaches.      Objective:     Vitals:    11/08/24 1310   BP: 106/69   BP Location: Left arm   Patient Position: Sitting   Pulse: 67   Resp: 18   Temp: 97.6 °F (36.4 °C)   TempSrc: Oral   SpO2: 97%   Weight: 66.4 kg (146 lb 4.8 oz)   Height: 5' (1.524 m)       Physical Exam   Constitutional: She is oriented to person, place, and time. She appears well-developed. She is cooperative.   HENT:   Head: Normocephalic and atraumatic.   Ears:   Right Ear: Hearing, tympanic membrane, external ear and ear canal normal.   Left Ear: Hearing, tympanic membrane and external ear normal. impacted cerumen  Nose: Nose normal. No mucosal edema or nasal deformity. No epistaxis. Right sinus exhibits no maxillary sinus tenderness and no frontal sinus tenderness. Left sinus exhibits no  "maxillary sinus tenderness and no frontal sinus tenderness.   Mouth/Throat: Uvula is midline, oropharynx is clear and moist and mucous membranes are normal. Mucous membranes are moist. No trismus in the jaw. Normal dentition. No uvula swelling. Oropharynx is clear.   Eyes: Conjunctivae and lids are normal. Pupils are equal, round, and reactive to light. Extraocular movement intact   Neck: Trachea normal and phonation normal. Neck supple.   Cardiovascular: Normal rate, regular rhythm, normal heart sounds and normal pulses.   Pulmonary/Chest: Effort normal and breath sounds normal.   Abdominal: Normal appearance and bowel sounds are normal. Soft.   Musculoskeletal: Normal range of motion.         General: Normal range of motion.   Neurological: no focal deficit. She is alert and oriented to person, place, and time. She displays no weakness and normal reflexes. No cranial nerve deficit or sensory deficit. She exhibits normal muscle tone. Coordination and gait normal.   Skin: Skin is warm, dry and intact. Capillary refill takes less than 2 seconds.   Psychiatric: Her speech is normal and behavior is normal. Mood, judgment and thought content normal.   Nursing note and vitals reviewed.      Assessment:     1. Benign paroxysmal positional vertigo, unspecified laterality    2. Impacted cerumen of left ear        Plan:   Ear Cerumen Removal    Date/Time: 11/8/2024 1:00 PM    Performed by: Evangelist yN NP  Authorized by: Evangelist Ny NP    Time out: Immediately prior to procedure a "time out" was called to verify the correct patient, procedure, equipment, support staff and site/side marked as required.    Consent Done?:  Yes (Verbal)    Local anesthetic:  None  Location details:  Both ears  Procedure type: curette and irrigation    Cerumen  Removal Results:  Cerumen completely removed  Patient tolerance:  Patient tolerated the procedure well with no immediate complications   Ear Cerumen Removal    Date/Time: " "11/8/2024 1:00 PM    Performed by: Evangelist Ny NP  Authorized by: Evangelist Ny NP    Time out: Immediately prior to procedure a "time out" was called to verify the correct patient, procedure, equipment, support staff and site/side marked as required.    Consent Done?:  Yes (Verbal)    Local anesthetic:  None  Location details:  Both ears  Procedure type: curette and irrigation    Cerumen  Removal Results:  Cerumen completely removed  Patient tolerance:  Patient tolerated the procedure well with no immediate complications     Ear Cerumen Removal    Date/Time: 11/8/2024 1:00 PM    Performed by: Evangelist Ny NP  Authorized by: Evangelist Ny NP    Time out: Immediately prior to procedure a "time out" was called to verify the correct patient, procedure, equipment, support staff and site/side marked as required.    Consent Done?:  Yes (Verbal)    Local anesthetic:  None  Location details:  Both ears  Procedure type: curette and irrigation    Cerumen  Removal Results:  Cerumen completely removed  Patient tolerance:  Patient tolerated the procedure well with no immediate complications     Benign paroxysmal positional vertigo, unspecified laterality  -     Discontinue: meclizine (ANTIVERT) 25 mg tablet; Take 1 tablet (25 mg total) by mouth 3 (three) times daily as needed for Dizziness.  Dispense: 30 tablet; Refill: 0  -     Ambulatory referral/consult to Physical/Occupational Therapy  -     scopolamine (TRANSDERM-SCOP) 1.3-1.5 mg (1 mg over 3 days); Place 1 patch onto the skin every 72 hours.  Dispense: 10 patch; Refill: 1    Impacted cerumen of left ear  -     Ear wax removal  -     Ear Cerumen Removal      Patient Instructions   [facility] can assist with appointment scheduling    [facility] Mon-Fri 8-5pm [phone]     Rest activity ad kendal   Avoid extreme heat temps and environments   Zofran as needed for nausea   Transcop patches as needed for vertigo symptoms   Increase and maintain " hydration with minimal caffeine or no caffeine  fluids   Any weakness, slurred speech, blurred vision---911 STROKE alert      Please arrange follow up with your primary medical clinic as soon as possible. You must understand that you've received an Urgent Care treatment only and that you may be released before all of your medical problems are known or treated. You, the patient, will arrange for follow up as instructed. If your symptoms worsen or fail to improve you should go to the Emergency Room.        No follow-ups on file.

## 2024-11-10 NOTE — PROCEDURES
"Ear Cerumen Removal    Date/Time: 11/8/2024 1:00 PM    Performed by: Evangelist Ny NP  Authorized by: Evangelist Ny NP    Time out: Immediately prior to procedure a "time out" was called to verify the correct patient, procedure, equipment, support staff and site/side marked as required.    Consent Done?:  Yes (Verbal)    Local anesthetic:  None  Location details:  Both ears  Procedure type: curette and irrigation    Cerumen  Removal Results:  Cerumen completely removed  Patient tolerance:  Patient tolerated the procedure well with no immediate complications    "

## 2024-12-02 ENCOUNTER — PATIENT MESSAGE (OUTPATIENT)
Dept: INTERNAL MEDICINE | Facility: CLINIC | Age: 64
End: 2024-12-02
Payer: COMMERCIAL

## 2024-12-03 ENCOUNTER — PATIENT MESSAGE (OUTPATIENT)
Dept: FAMILY MEDICINE | Facility: CLINIC | Age: 64
End: 2024-12-03

## 2024-12-03 ENCOUNTER — OFFICE VISIT (OUTPATIENT)
Dept: FAMILY MEDICINE | Facility: CLINIC | Age: 64
End: 2024-12-03
Attending: FAMILY MEDICINE
Payer: COMMERCIAL

## 2024-12-03 VITALS
WEIGHT: 150.88 LBS | BODY MASS INDEX: 29.62 KG/M2 | TEMPERATURE: 98 F | SYSTOLIC BLOOD PRESSURE: 110 MMHG | DIASTOLIC BLOOD PRESSURE: 74 MMHG | OXYGEN SATURATION: 98 % | HEART RATE: 81 BPM | HEIGHT: 60 IN

## 2024-12-03 DIAGNOSIS — J01.40 ACUTE PANSINUSITIS, RECURRENCE NOT SPECIFIED: Primary | ICD-10-CM

## 2024-12-03 DIAGNOSIS — F32.A DEPRESSION, UNSPECIFIED DEPRESSION TYPE: ICD-10-CM

## 2024-12-03 LAB
CTP QC/QA: YES
CTP QC/QA: YES
FLUAV AG NPH QL: NEGATIVE
FLUBV AG NPH QL: NEGATIVE
MOLECULAR STREP A: NEGATIVE

## 2024-12-03 PROCEDURE — 87651 STREP A DNA AMP PROBE: CPT | Mod: QW,S$GLB,, | Performed by: FAMILY MEDICINE

## 2024-12-03 PROCEDURE — 3074F SYST BP LT 130 MM HG: CPT | Mod: CPTII,S$GLB,, | Performed by: FAMILY MEDICINE

## 2024-12-03 PROCEDURE — 99999 PR PBB SHADOW E&M-EST. PATIENT-LVL III: CPT | Mod: PBBFAC,,, | Performed by: FAMILY MEDICINE

## 2024-12-03 PROCEDURE — 3078F DIAST BP <80 MM HG: CPT | Mod: CPTII,S$GLB,, | Performed by: FAMILY MEDICINE

## 2024-12-03 PROCEDURE — 99214 OFFICE O/P EST MOD 30 MIN: CPT | Mod: S$GLB,,, | Performed by: FAMILY MEDICINE

## 2024-12-03 PROCEDURE — 1160F RVW MEDS BY RX/DR IN RCRD: CPT | Mod: CPTII,S$GLB,, | Performed by: FAMILY MEDICINE

## 2024-12-03 PROCEDURE — 1159F MED LIST DOCD IN RCRD: CPT | Mod: CPTII,S$GLB,, | Performed by: FAMILY MEDICINE

## 2024-12-03 PROCEDURE — 87804 INFLUENZA ASSAY W/OPTIC: CPT | Mod: 59,QW,S$GLB, | Performed by: FAMILY MEDICINE

## 2024-12-03 PROCEDURE — 3008F BODY MASS INDEX DOCD: CPT | Mod: CPTII,S$GLB,, | Performed by: FAMILY MEDICINE

## 2024-12-03 PROCEDURE — G2211 COMPLEX E/M VISIT ADD ON: HCPCS | Mod: S$GLB,,, | Performed by: FAMILY MEDICINE

## 2024-12-03 RX ORDER — PROMETHAZINE HYDROCHLORIDE AND DEXTROMETHORPHAN HYDROBROMIDE 6.25; 15 MG/5ML; MG/5ML
5 SYRUP ORAL EVERY 4 HOURS PRN
Qty: 118 ML | Refills: 0 | Status: SHIPPED | OUTPATIENT
Start: 2024-12-03 | End: 2024-12-13

## 2024-12-03 RX ORDER — VENLAFAXINE HYDROCHLORIDE 37.5 MG/1
37.5 CAPSULE, EXTENDED RELEASE ORAL DAILY
Qty: 30 CAPSULE | Refills: 1 | Status: SHIPPED | OUTPATIENT
Start: 2024-12-03 | End: 2025-12-03

## 2024-12-03 RX ORDER — IPRATROPIUM BROMIDE 21 UG/1
2 SPRAY, METERED NASAL 3 TIMES DAILY
Qty: 30 ML | Refills: 0 | Status: SHIPPED | OUTPATIENT
Start: 2024-12-03

## 2024-12-03 RX ORDER — AMOXICILLIN AND CLAVULANATE POTASSIUM 875; 125 MG/1; MG/1
1 TABLET, FILM COATED ORAL EVERY 12 HOURS
Qty: 20 TABLET | Refills: 0 | Status: SHIPPED | OUTPATIENT
Start: 2024-12-03

## 2024-12-03 NOTE — PROGRESS NOTES
"Subjective:       Patient ID: Alma Mistry is a 64 y.o. female.    Chief Complaint: Sinus Problem (Nasal congestion, sneezing, fever, sore throat x 2 weeks )    64 y old female with nasal congestion , headache and non productive cough for 2 w. No sob . She tested neg for COVID . Using home remedies . Also would like to address  side effects from Lexapro . She has had insomnia since starting taking it . Currently in a " bad place" No s/I . She has had thoughts, no means . She has a praying group . She took Cymbalta which worked but  caused vivid dreams       Review of Systems   Constitutional: Negative.    HENT:  Positive for sinus pressure and sneezing.    Eyes: Negative.    Respiratory:  Positive for cough.    Cardiovascular: Negative.    Gastrointestinal: Negative.    Genitourinary: Negative.    Musculoskeletal: Negative.    Skin: Negative.    Hematological: Negative.    Psychiatric/Behavioral:  Positive for dysphoric mood and sleep disturbance.        Objective:      Physical Exam  Constitutional:       General: She is not in acute distress.     Appearance: She is well-developed. She is not diaphoretic.   HENT:      Head: Normocephalic and atraumatic.      Right Ear: External ear normal.      Left Ear: External ear normal.      Mouth/Throat:      Pharynx: Posterior oropharyngeal erythema present. No oropharyngeal exudate.   Eyes:      General: No scleral icterus.        Right eye: No discharge.         Left eye: No discharge.      Conjunctiva/sclera: Conjunctivae normal.      Pupils: Pupils are equal, round, and reactive to light.   Neck:      Thyroid: No thyromegaly.      Vascular: No JVD.      Trachea: No tracheal deviation.   Cardiovascular:      Rate and Rhythm: Normal rate and regular rhythm.      Heart sounds: Normal heart sounds. No murmur heard.     No friction rub. No gallop.   Pulmonary:      Effort: Pulmonary effort is normal. No respiratory distress.      Breath sounds: Normal breath sounds. No " stridor. No wheezing or rales.   Chest:      Chest wall: No tenderness.   Abdominal:      General: Bowel sounds are normal. There is no distension.      Palpations: Abdomen is soft.      Tenderness: There is no abdominal tenderness. There is no guarding or rebound.   Musculoskeletal:         General: Normal range of motion.      Cervical back: Normal range of motion and neck supple.   Lymphadenopathy:      Cervical: No cervical adenopathy.   Skin:     General: Skin is warm and dry.   Neurological:      Mental Status: She is alert and oriented to person, place, and time.   Psychiatric:         Mood and Affect: Mood normal.         Behavior: Behavior normal.         Thought Content: Thought content normal.         Judgment: Judgment normal.         Assessment:     Alma was seen today for sinus problem.    Diagnoses and all orders for this visit:    Acute pansinusitis, recurrence not specified  -     POCT Strep A, Molecular  -     POCT Influenza A/B    Depression, unspecified depression type    Other orders  -     amoxicillin-clavulanate 875-125mg (AUGMENTIN) 875-125 mg per tablet; Take 1 tablet by mouth every 12 (twelve) hours.  -     ipratropium (ATROVENT) 21 mcg (0.03 %) nasal spray; 2 sprays by Each Nostril route 3 (three) times daily.  -     promethazine-dextromethorphan (PROMETHAZINE-DM) 6.25-15 mg/5 mL Syrp; Take 5 mLs by mouth every 4 (four) hours as needed.  -     venlafaxine (EFFEXOR-XR) 37.5 MG 24 hr capsule; Take 1 capsule (37.5 mg total) by mouth once daily.           Plan:   Call or return to clinic prn if these symptoms worsen or fail to improve as anticipated.   Stop Lexapro . Start Effexor .   F.u in 6 weeks .

## 2024-12-23 ENCOUNTER — PATIENT MESSAGE (OUTPATIENT)
Dept: INTERNAL MEDICINE | Facility: CLINIC | Age: 64
End: 2024-12-23
Payer: COMMERCIAL

## 2024-12-23 ENCOUNTER — OFFICE VISIT (OUTPATIENT)
Dept: INTERNAL MEDICINE | Facility: CLINIC | Age: 64
End: 2024-12-23
Payer: COMMERCIAL

## 2024-12-23 ENCOUNTER — HOSPITAL ENCOUNTER (OUTPATIENT)
Dept: RADIOLOGY | Facility: HOSPITAL | Age: 64
Discharge: HOME | End: 2024-12-23
Attending: PHYSICIAN ASSISTANT
Payer: COMMERCIAL

## 2024-12-23 ENCOUNTER — PATIENT MESSAGE (OUTPATIENT)
Dept: FAMILY MEDICINE | Facility: CLINIC | Age: 64
End: 2024-12-23
Payer: COMMERCIAL

## 2024-12-23 VITALS
DIASTOLIC BLOOD PRESSURE: 82 MMHG | BODY MASS INDEX: 29.35 KG/M2 | HEIGHT: 60 IN | SYSTOLIC BLOOD PRESSURE: 110 MMHG | TEMPERATURE: 97 F | OXYGEN SATURATION: 97 % | HEART RATE: 78 BPM | WEIGHT: 149.5 LBS

## 2024-12-23 DIAGNOSIS — R15.9 INCONTINENCE OF FECES, UNSPECIFIED FECAL INCONTINENCE TYPE: ICD-10-CM

## 2024-12-23 DIAGNOSIS — J32.9 RECURRENT SINUSITIS: ICD-10-CM

## 2024-12-23 DIAGNOSIS — R19.7 DIARRHEA, UNSPECIFIED TYPE: Primary | ICD-10-CM

## 2024-12-23 DIAGNOSIS — R61 NIGHT SWEATS: ICD-10-CM

## 2024-12-23 DIAGNOSIS — R19.5 NONSPECIFIC ABNORMAL FINDING IN STOOL CONTENTS: ICD-10-CM

## 2024-12-23 DIAGNOSIS — E53.8 B12 DEFICIENCY: ICD-10-CM

## 2024-12-23 PROCEDURE — 74019 RADEX ABDOMEN 2 VIEWS: CPT | Mod: TC

## 2024-12-23 PROCEDURE — 3074F SYST BP LT 130 MM HG: CPT | Mod: CPTII,S$GLB,, | Performed by: PHYSICIAN ASSISTANT

## 2024-12-23 PROCEDURE — 74019 RADEX ABDOMEN 2 VIEWS: CPT | Mod: 26,,, | Performed by: RADIOLOGY

## 2024-12-23 PROCEDURE — 70210 X-RAY EXAM OF SINUSES: CPT | Mod: TC

## 2024-12-23 PROCEDURE — 1160F RVW MEDS BY RX/DR IN RCRD: CPT | Mod: CPTII,S$GLB,, | Performed by: PHYSICIAN ASSISTANT

## 2024-12-23 PROCEDURE — 99999 PR PBB SHADOW E&M-EST. PATIENT-LVL V: CPT | Mod: PBBFAC,,, | Performed by: PHYSICIAN ASSISTANT

## 2024-12-23 PROCEDURE — 3008F BODY MASS INDEX DOCD: CPT | Mod: CPTII,S$GLB,, | Performed by: PHYSICIAN ASSISTANT

## 2024-12-23 PROCEDURE — 1159F MED LIST DOCD IN RCRD: CPT | Mod: CPTII,S$GLB,, | Performed by: PHYSICIAN ASSISTANT

## 2024-12-23 PROCEDURE — 99214 OFFICE O/P EST MOD 30 MIN: CPT | Mod: S$GLB,,, | Performed by: PHYSICIAN ASSISTANT

## 2024-12-23 PROCEDURE — 70210 X-RAY EXAM OF SINUSES: CPT | Mod: 26,,, | Performed by: RADIOLOGY

## 2024-12-23 PROCEDURE — 3079F DIAST BP 80-89 MM HG: CPT | Mod: CPTII,S$GLB,, | Performed by: PHYSICIAN ASSISTANT

## 2024-12-23 NOTE — PROGRESS NOTES
Subjective:      Patient ID: Alma Mistry is a 64 y.o. female.    Chief Complaint: GI Problem    HPI  History of Present Illness    CHIEF COMPLAINT:  Ms. Mistry presents today with mucous in stool and incontinence with mucous mixed with diarrhea/stool.     GASTROINTESTINAL:  She reports involuntary discharge of mucus from the rectum, sometimes accompanied by feces or diarrhea with cramps, occurring 6-7 times daily since last Tuesday. Symptoms initially improved with raw garlic, kombucha, and electrolytes but returned this morning with diarrhea. She reports bloating. Cramps are relieved by bowel movements. She denies blood in stools, hemorrhoids, persistent abdominal pain, and prior constipation. Last colonoscopy was in July 2020.  She denies any recent abnormal weight loss but admits to night sweats.    MEDICAL HISTORY:  She has celiac disease and maintains a gluten-free diet. She also has atrophic arthritis. She denies involuntary weight loss.    Requesting B12 to be checked with labs.     Medications were reviewed      Patient Active Problem List   Diagnosis    Pernicious anemia    Overweight    Lichen sclerosus    Panic disorder    Polyneuropathy    RLS (restless legs syndrome)    Neuropathic pain    Arthralgia    Generalized anxiety disorder with panic attacks    Complicated bereavement    Neuropathy of left superficial peroneal nerve    Iron deficiency anemia due to chronic blood loss         Current Outpatient Medications:     amoxicillin-clavulanate 875-125mg (AUGMENTIN) 875-125 mg per tablet, Take 1 tablet by mouth every 12 (twelve) hours., Disp: 20 tablet, Rfl: 0    cyanocobalamin 1,000 mcg/mL injection, INJECT 1ML INTO MUSCLE EVERY 28 DAYS, Disp: 10 mL, Rfl: 12    ipratropium (ATROVENT) 21 mcg (0.03 %) nasal spray, 2 sprays by Each Nostril route 3 (three) times daily., Disp: 30 mL, Rfl: 0    loratadine (CLARITIN) 10 mg tablet, Take 10 mg by mouth., Disp: , Rfl:     VITAMIN B-12 1000 MCG tablet, Take 1  tablet (1,000 mcg total) by mouth once daily., Disp: 30 tablet, Rfl: 6    gabapentin (NEURONTIN) 300 MG capsule, Take 1 capsule (300 mg total) by mouth every evening., Disp: 30 capsule, Rfl: 11    venlafaxine (EFFEXOR-XR) 37.5 MG 24 hr capsule, Take 1 capsule (37.5 mg total) by mouth once daily. (Patient not taking: Reported on 12/23/2024), Disp: 30 capsule, Rfl: 1    Review of Systems   Constitutional:  Positive for diaphoresis. Negative for activity change, appetite change, chills, fatigue, fever and unexpected weight change.   HENT: Negative.  Negative for congestion, hearing loss, postnasal drip, rhinorrhea, sore throat, trouble swallowing and voice change.    Eyes: Negative.  Negative for visual disturbance.   Respiratory: Negative.  Negative for cough, choking, chest tightness and shortness of breath.    Cardiovascular:  Negative for chest pain, palpitations and leg swelling.   Gastrointestinal:  Positive for abdominal distention, abdominal pain and diarrhea. Negative for anal bleeding, blood in stool, constipation, nausea, rectal pain and vomiting.   Endocrine: Negative for cold intolerance, heat intolerance, polydipsia and polyuria.   Genitourinary: Negative.  Negative for difficulty urinating and frequency.   Musculoskeletal:  Negative for arthralgias, back pain, gait problem, joint swelling and myalgias.   Skin:  Negative for color change, pallor, rash and wound.   Neurological:  Negative for dizziness, tremors, weakness, light-headedness, numbness and headaches.   Hematological:  Negative for adenopathy.   Psychiatric/Behavioral:  Negative for behavioral problems, confusion, self-injury, sleep disturbance and suicidal ideas. The patient is not nervous/anxious.      Objective:   /82 (BP Location: Left arm, Patient Position: Sitting)   Pulse 78   Temp 96.8 °F (36 °C)   Ht 5' (1.524 m)   Wt 67.8 kg (149 lb 7.6 oz)   SpO2 97%   BMI 29.19 kg/m²     Physical Exam  Vitals reviewed.   Constitutional:        General: She is not in acute distress.     Appearance: Normal appearance. She is well-developed. She is not ill-appearing, toxic-appearing or diaphoretic.   HENT:      Head: Normocephalic and atraumatic.      Right Ear: External ear normal.      Left Ear: External ear normal.      Nose: Nose normal.   Eyes:      Conjunctiva/sclera: Conjunctivae normal.      Pupils: Pupils are equal, round, and reactive to light.   Cardiovascular:      Rate and Rhythm: Normal rate and regular rhythm.      Heart sounds: Normal heart sounds. No murmur heard.     No friction rub. No gallop.   Pulmonary:      Effort: Pulmonary effort is normal. No respiratory distress.      Breath sounds: Normal breath sounds. No wheezing or rales.   Chest:      Chest wall: No tenderness.   Abdominal:      General: Bowel sounds are increased. There is distension.      Palpations: Abdomen is soft.      Tenderness: There is no abdominal tenderness. There is no guarding.   Musculoskeletal:         General: Normal range of motion.      Cervical back: Normal range of motion and neck supple.   Lymphadenopathy:      Cervical: No cervical adenopathy.   Skin:     General: Skin is warm and dry.      Capillary Refill: Capillary refill takes less than 2 seconds.      Findings: No rash.   Neurological:      Mental Status: She is alert and oriented to person, place, and time.      Motor: No weakness.      Coordination: Coordination normal.      Gait: Gait normal.   Psychiatric:         Mood and Affect: Mood normal.         Behavior: Behavior normal.         Thought Content: Thought content normal.         Judgment: Judgment normal.         Assessment:     1. Diarrhea, unspecified type    2. Incontinence of feces, unspecified fecal incontinence type    3. Nonspecific abnormal finding in stool contents    4. Recurrent sinusitis    5. B12 deficiency    6. Night sweats      Plan:   Considering GI workup due to persistent mucoid discharge and intermittent  diarrhea  Evaluating for potential internal hemorrhoids as cause of mucus discharge  Assessing for possible C. diff infection secondary to recent antibiotic use  Investigating recurrent sinus congestion; considering imaging to evaluate for fluid levels indicative of infection  Reviewed previous lab results, noting prior elevated platelet count that normalized  Evaluating for systemic inflammation and infection through labs  Considering combination colonoscopy/endoscopy for comprehensive GI evaluation    Diarrhea, unspecified type  -     CBC Auto Differential; Future; Expected date: 12/23/2024  -     Comprehensive Metabolic Panel; Future  -     C-REACTIVE PROTEIN; Future; Expected date: 12/23/2024  -     Sedimentation rate; Future; Expected date: 12/23/2024  -     Clostridium difficile EIA; Future; Expected date: 12/23/2024  -     Stool culture; Future; Expected date: 12/23/2024  -     Ambulatory referral/consult to Endo Procedure ; Future; Expected date: 12/24/2024  -     Ambulatory referral/consult to Colorectal Surgery; Future; Expected date: 12/30/2024    Incontinence of feces, unspecified fecal incontinence type  -     CBC Auto Differential; Future; Expected date: 12/23/2024  -     Comprehensive Metabolic Panel; Future  -     C-REACTIVE PROTEIN; Future; Expected date: 12/23/2024  -     Sedimentation rate; Future; Expected date: 12/23/2024  -     Clostridium difficile EIA; Future; Expected date: 12/23/2024  -     Stool culture; Future; Expected date: 12/23/2024  -     Ambulatory referral/consult to Endo Procedure ; Future; Expected date: 12/24/2024  -     Ambulatory referral/consult to Colorectal Surgery; Future; Expected date: 12/30/2024  -     X-Ray Abdomen Flat And Erect; Future; Expected date: 12/23/2024    Nonspecific abnormal finding in stool contents  -     Ambulatory referral/consult to Endo Procedure ; Future; Expected date: 12/24/2024  -     Ambulatory referral/consult to  Colorectal Surgery; Future; Expected date: 12/30/2024  -     X-Ray Abdomen Flat And Erect; Future; Expected date: 12/23/2024    Recurrent sinusitis  -     X-Ray Sinuses 1 view Gray; Future; Expected date: 12/23/2024    B12 deficiency  -     Vitamin B12; Future; Expected date: 12/23/2024    Night sweats      GASTROENTERITIS AND COLITIS:  - Discussed potential causes of mucoid discharge, including internal hemorrhoids.  - Explained rationale for comprehensive GI workup and imaging studies.  - Started Imodium as needed for diarrhea.  - Ordered abdominal X-ray.  - Ordered labs: CRP, ESR, CBC, liver and kidney function tests, electrolytes.  - Ordered stool studies to rule out C. diff infection.  - Ordered combination colonoscopy and endoscopy.  - Referred to colorectal surgeon for evaluation of rectal symptoms.  - Referred to gastroenterologist if unable to schedule timely colonoscopy/endoscopy.  - Informed about the importance of ruling out C. diff infection after antibiotic use.    VITAMIN B12 DEFICIENCY:  - Discussed the impact of vitamin B12 deficiency on overall health.  - Ordered labs: vitamin B12 level.    CHRONIC SINUSITIS:  - Ordered X-ray of sinuses to evaluate for fluid levels.    FOLLOW-UP:    - Follow up after completion of ordered tests and specialist evaluations.  - Contact the office if symptoms worsen or new concerns arise.     This note was generated with the assistance of ambient listening technology. Verbal consent was obtained by the patient and accompanying visitor(s) for the recording of patient appointment to facilitate this note. I attest to having reviewed and edited the generated note for accuracy, though some syntax or spelling errors may persist. Please contact the author of this note for any clarification.    Follow up if symptoms worsen or fail to improve.

## 2024-12-24 ENCOUNTER — PATIENT MESSAGE (OUTPATIENT)
Dept: INTERNAL MEDICINE | Facility: CLINIC | Age: 64
End: 2024-12-24
Payer: COMMERCIAL

## 2024-12-24 ENCOUNTER — LAB VISIT (OUTPATIENT)
Dept: LAB | Facility: HOSPITAL | Age: 64
End: 2024-12-24
Attending: PHYSICIAN ASSISTANT
Payer: COMMERCIAL

## 2024-12-24 DIAGNOSIS — J01.90 ACUTE SINUSITIS, RECURRENCE NOT SPECIFIED, UNSPECIFIED LOCATION: Primary | ICD-10-CM

## 2024-12-24 DIAGNOSIS — R19.7 DIARRHEA, UNSPECIFIED TYPE: Primary | ICD-10-CM

## 2024-12-24 DIAGNOSIS — R19.7 DIARRHEA, UNSPECIFIED TYPE: ICD-10-CM

## 2024-12-24 LAB — OB PNL STL: NEGATIVE

## 2024-12-24 PROCEDURE — 87045 FECES CULTURE AEROBIC BACT: CPT | Performed by: PHYSICIAN ASSISTANT

## 2024-12-24 PROCEDURE — 87046 STOOL CULTR AEROBIC BACT EA: CPT | Performed by: PHYSICIAN ASSISTANT

## 2024-12-24 PROCEDURE — 87328 CRYPTOSPORIDIUM AG IA: CPT | Performed by: PHYSICIAN ASSISTANT

## 2024-12-24 PROCEDURE — 87177 OVA AND PARASITES SMEARS: CPT | Performed by: PHYSICIAN ASSISTANT

## 2024-12-24 PROCEDURE — 87427 SHIGA-LIKE TOXIN AG IA: CPT | Performed by: PHYSICIAN ASSISTANT

## 2024-12-24 PROCEDURE — 87449 NOS EACH ORGANISM AG IA: CPT | Performed by: PHYSICIAN ASSISTANT

## 2024-12-24 PROCEDURE — 82272 OCCULT BLD FECES 1-3 TESTS: CPT | Performed by: PHYSICIAN ASSISTANT

## 2024-12-24 RX ORDER — LEVOFLOXACIN 500 MG/1
500 TABLET, FILM COATED ORAL DAILY
Qty: 7 TABLET | Refills: 0 | Status: SHIPPED | OUTPATIENT
Start: 2024-12-24 | End: 2024-12-31

## 2024-12-25 LAB
E COLI SXT1 STL QL IA: NEGATIVE
E COLI SXT2 STL QL IA: NEGATIVE

## 2024-12-26 LAB
CRYPTOSP AG STL QL IA: NEGATIVE
G LAMBLIA AG STL QL IA: NEGATIVE

## 2024-12-27 ENCOUNTER — PATIENT MESSAGE (OUTPATIENT)
Dept: INTERNAL MEDICINE | Facility: CLINIC | Age: 64
End: 2024-12-27
Payer: COMMERCIAL

## 2024-12-27 LAB — BACTERIA STL CULT: NORMAL

## 2024-12-30 ENCOUNTER — TELEPHONE (OUTPATIENT)
Dept: INTERNAL MEDICINE | Facility: CLINIC | Age: 64
End: 2024-12-30
Payer: COMMERCIAL

## 2024-12-30 DIAGNOSIS — L90.0 LICHEN SCLEROSUS: Primary | ICD-10-CM

## 2024-12-30 DIAGNOSIS — M25.50 ARTHRALGIA, UNSPECIFIED JOINT: ICD-10-CM

## 2024-12-30 NOTE — TELEPHONE ENCOUNTER
----- Message from Issa sent at 12/30/2024  8:04 AM CST -----  Contact: Pt. Portal  .Type:  Patient Requesting Referral    Who Called:Pt  Does the patient already have the specialty appointment scheduled?:  If yes, what is the date of that appointment?:  Referral to What Specialty:  rheumatologist  Reason for Referral:autoimmune  Does the patient want the referral with a specific physician?: Yes Dr. Lang Hernández  [facility]  Patient Requesting a Response?: Yes  Would the patient rather a call back or a response via [facility]?  Call back  Best Call Back Number: [phone]  Additional Information: Inflammation and Bowels and gastric problems

## 2024-12-31 LAB — O+P STL MICRO: ABNORMAL

## 2025-01-03 RX ORDER — METRONIDAZOLE 500 MG/1
500 TABLET ORAL EVERY 12 HOURS
Qty: 14 TABLET | Refills: 0 | Status: SHIPPED | OUTPATIENT
Start: 2025-01-03 | End: 2025-01-10

## 2025-01-16 ENCOUNTER — OFFICE VISIT (OUTPATIENT)
Dept: GASTROENTEROLOGY | Facility: CLINIC | Age: 65
End: 2025-01-16
Payer: COMMERCIAL

## 2025-01-16 ENCOUNTER — LAB VISIT (OUTPATIENT)
Dept: LAB | Facility: HOSPITAL | Age: 65
End: 2025-01-16
Attending: INTERNAL MEDICINE
Payer: COMMERCIAL

## 2025-01-16 ENCOUNTER — PATIENT MESSAGE (OUTPATIENT)
Dept: GASTROENTEROLOGY | Facility: CLINIC | Age: 65
End: 2025-01-16

## 2025-01-16 VITALS
HEART RATE: 61 BPM | WEIGHT: 150 LBS | SYSTOLIC BLOOD PRESSURE: 122 MMHG | HEIGHT: 60 IN | BODY MASS INDEX: 29.45 KG/M2 | DIASTOLIC BLOOD PRESSURE: 75 MMHG

## 2025-01-16 DIAGNOSIS — K57.30 DIVERTICULOSIS OF COLON: ICD-10-CM

## 2025-01-16 DIAGNOSIS — K90.0 CELIAC DISEASE: Primary | ICD-10-CM

## 2025-01-16 DIAGNOSIS — K90.0 CELIAC DISEASE: ICD-10-CM

## 2025-01-16 DIAGNOSIS — D51.0 PERNICIOUS ANEMIA: Chronic | ICD-10-CM

## 2025-01-16 DIAGNOSIS — K31.A11 INTESTINAL METAPLASIA OF ANTRUM OF STOMACH WITHOUT DYSPLASIA: ICD-10-CM

## 2025-01-16 PROCEDURE — 1160F RVW MEDS BY RX/DR IN RCRD: CPT | Mod: CPTII,S$GLB,, | Performed by: INTERNAL MEDICINE

## 2025-01-16 PROCEDURE — 36415 COLL VENOUS BLD VENIPUNCTURE: CPT | Performed by: INTERNAL MEDICINE

## 2025-01-16 PROCEDURE — G2211 COMPLEX E/M VISIT ADD ON: HCPCS | Mod: S$GLB,,, | Performed by: INTERNAL MEDICINE

## 2025-01-16 PROCEDURE — 86258 DGP ANTIBODY EACH IG CLASS: CPT | Performed by: INTERNAL MEDICINE

## 2025-01-16 PROCEDURE — 3008F BODY MASS INDEX DOCD: CPT | Mod: CPTII,S$GLB,, | Performed by: INTERNAL MEDICINE

## 2025-01-16 PROCEDURE — 99999 PR PBB SHADOW E&M-EST. PATIENT-LVL V: CPT | Mod: PBBFAC,,, | Performed by: INTERNAL MEDICINE

## 2025-01-16 PROCEDURE — 3078F DIAST BP <80 MM HG: CPT | Mod: CPTII,S$GLB,, | Performed by: INTERNAL MEDICINE

## 2025-01-16 PROCEDURE — 3074F SYST BP LT 130 MM HG: CPT | Mod: CPTII,S$GLB,, | Performed by: INTERNAL MEDICINE

## 2025-01-16 PROCEDURE — 1159F MED LIST DOCD IN RCRD: CPT | Mod: CPTII,S$GLB,, | Performed by: INTERNAL MEDICINE

## 2025-01-16 PROCEDURE — 99203 OFFICE O/P NEW LOW 30 MIN: CPT | Mod: S$GLB,,, | Performed by: INTERNAL MEDICINE

## 2025-01-16 NOTE — PROGRESS NOTES
[facility]  [facility]astroenterology    Patient evaluated at the request of Zheng Orozco Md  [address] [facility] [place] [address]     PCP: Zheng Orozco MD    Chief Complaint    gastirits; Establish Care       History of present illness/subjective    Mrs. Mistry is a 64 year old female who comes to see us to establish care and manage her chronic GI issues. She was diagnosed with autoimmune atrophic gastritis diagnosed 15 years ago and had positive anti-parietal cell antibodies and anti intrinsic factor antibodies. She also had prior colon polyps. She had a positive TTG IgA a year ago and iron deficiency and underwent an EGD and colonsocopy in 2021. The duodenal biopsies showed Marsh 1-2 changes in the bulb consistent with celiac disease along with her positive serology. The gastric biopsies showed intestinal metaplasia with no dysplasia. Colonoscopy showed a normal terminal ileum and normal colonic mucosa with no polyps. She has been on a gluten free diet and has done well since but has intermittent symptoms of bloating and diarrhea. She is originally from [facility] and we had a very pleasant visit.    Past Medical History:   Diagnosis Date    Atrophic gastritis     B12 deficiency     Breast lump on left side at 3 o'clock position 03/25/2015    Celiac disease     Fibrocystic breast     Lichen sclerosus 08/16/2012    Lupus        Past Surgical History:   Procedure Laterality Date    BREAST BIOPSY Right 2009    benign    COLONOSCOPY  09/10/2010    COLONOSCOPY N/A 6/4/2020    Procedure: COLONOSCOPY;  Surgeon: Almas Perez MD;  Location: [facility];  Service: Endoscopy;  Laterality: N/A;    ESOPHAGOGASTRODUODENOSCOPY  09/10/2010    ESOPHAGOGASTRODUODENOSCOPY N/A 6/4/2020    Procedure: ESOPHAGOGASTRODUODENOSCOPY (EGD);  Surgeon: Almas Perez MD;  Location: [facility];  Service: Endoscopy;  Laterality: N/A;    ESOPHAGOGASTRODUODENOSCOPY N/A 7/28/2020    Procedure: EGD  (ESOPHAGOGASTRODUODENOSCOPY);  Surgeon: Almas Perez MD;  Location: [facility];  Service: Endoscopy;  Laterality: N/A;    HYSTERECTOMY  2013    TONSILLECTOMY         Current Outpatient Medications on File Prior to Visit   Medication Sig Dispense Refill    cyanocobalamin 1,000 mcg/mL injection INJECT 1ML INTO MUSCLE EVERY 28 DAYS 10 mL 12    ipratropium (ATROVENT) 21 mcg (0.03 %) nasal spray 2 sprays by Each Nostril route 3 (three) times daily. 30 mL 0    loratadine (CLARITIN) 10 mg tablet Take 10 mg by mouth.      venlafaxine (EFFEXOR-XR) 37.5 MG 24 hr capsule Take 1 capsule (37.5 mg total) by mouth once daily. 30 capsule 1    VITAMIN B-12 1000 MCG tablet Take 1 tablet (1,000 mcg total) by mouth once daily. 30 tablet 6    amoxicillin-clavulanate 875-125mg (AUGMENTIN) 875-125 mg per tablet Take 1 tablet by mouth every 12 (twelve) hours. 20 tablet 0    gabapentin (NEURONTIN) 300 MG capsule Take 1 capsule (300 mg total) by mouth every evening. (Patient not taking: Reported on 1/16/2025) 30 capsule 11     No current facility-administered medications on file prior to visit.       Review of patient's allergies indicates:   Allergen Reactions    Fluticasone Nausea Only and Other (See Comments)     Feels out of control in daily functions.    Promethazine        Social History     Socioeconomic History    Marital status:    Tobacco Use    Smoking status: Never     Passive exposure: Never    Smokeless tobacco: Never   Substance and Sexual Activity    Alcohol use: Not Currently    Drug use: No    Sexual activity: Never   Social History Narrative    1 dog, no smokers; Originally from [facility].     Social Drivers of Health     Financial Resource Strain: Low Risk  (12/10/2021)    Overall Financial Resource Strain (CARDIA)     Difficulty of Paying Living Expenses: Not very hard   Food Insecurity: No Food Insecurity (12/10/2021)    Hunger Vital Sign     Worried About Running Out of Food in the Last Year: Never  true     Ran Out of Food in the Last Year: Never true   Transportation Needs: No Transportation Needs (12/10/2021)    PRAPARE - Transportation     Lack of Transportation (Medical): No     Lack of Transportation (Non-Medical): No   Physical Activity: Sufficiently Active (12/10/2021)    Exercise Vital Sign     Days of Exercise per Week: 7 days     Minutes of Exercise per Session: 120 min   Stress: Stress Concern Present (12/10/2021)    [employer] - Occupational Stress Questionnaire     Feeling of Stress : To some extent   Housing Stability: Low Risk  (12/10/2021)    Housing Stability Vital Sign     Unable to Pay for Housing in the Last Year: No     Number of Places Lived in the Last Year: 1     Unstable Housing in the Last Year: No       Family History   Problem Relation Name Age of Onset    Depression Mother      Nephritis Mother      Hyperthyroidism Mother      Hypotension Mother      Anemia Mother      Asthma Father      Thyroid disease Sister      Depression Sister      Leukemia Brother      Cirrhosis Brother      Multiple sclerosis Brother      Diabetes Maternal Grandfather         Vitals:    01/16/25 1506   BP: 122/75   Patient Position: Sitting   Pulse: 61   Weight: 68.1 kg (150 lb 0.4 oz)   Height: 5' (1.524 m)     Body mass index is 29.3 kg/m².    Physical Exam  Constitutional:       Appearance: She is well-developed.   HENT:      Head: Normocephalic and atraumatic.   Eyes:      Pupils: Pupils are equal, round, and reactive to light.   Neck:      Thyroid: No thyromegaly.   Cardiovascular:      Rate and Rhythm: Normal rate and regular rhythm.      Heart sounds: Normal heart sounds. No murmur heard.  Pulmonary:      Effort: Pulmonary effort is normal. No respiratory distress.      Breath sounds: Normal breath sounds. No wheezing or rales.   Abdominal:      General: Bowel sounds are normal. There is no distension.      Palpations: Abdomen is soft. There is no mass.      Tenderness:  "There is no abdominal tenderness.   Musculoskeletal:         General: No tenderness. Normal range of motion.      Cervical back: Normal range of motion and neck supple.   Lymphadenopathy:      Cervical: No cervical adenopathy.   Skin:     General: Skin is warm and dry.      Findings: No erythema.   Neurological:      Mental Status: She is alert and oriented to person, place, and time.      Cranial Nerves: No cranial nerve deficit.      Deep Tendon Reflexes: Reflexes are normal and symmetric.   Psychiatric:         Behavior: Behavior normal.         Lab Results   Component Value Date    WBC 8.12 12/23/2024    HGB 13.5 12/23/2024    HCT 41.6 12/23/2024    MCV 87 12/23/2024     12/23/2024         BMP  Lab Results   Component Value Date     12/23/2024    K 4.4 12/23/2024     12/23/2024    CO2 23 12/23/2024    BUN 10 12/23/2024    CREATININE 0.6 12/23/2024    CALCIUM 9.5 12/23/2024    ANIONGAP 10 12/23/2024    EGFRNORACEVR >60.0 12/23/2024       Lab Results   Component Value Date    ALT 44 12/23/2024    AST 34 12/23/2024    ALKPHOS 103 12/23/2024    BILITOT 0.3 12/23/2024       No results found for: "LIPASE"    A/P  1. Celiac disease  -     Ambulatory referral/consult to Endo Procedure ; Future; Expected date: 01/17/2025  -     Celiac Disease Panel; Future; Expected date: 01/16/2025  -     CT Abdomen Pelvis With IV Contrast Routine Oral Contrast; Future; Expected date: 01/16/2025  -     Calprotectin, Stool; Future; Expected date: 01/16/2025    2. Intestinal metaplasia of antrum of stomach without dysplasia  -     Ambulatory referral/consult to Endo Procedure ; Future; Expected date: 01/17/2025    3. Diverticulosis of colon    4. Pernicious anemia       We discussed the risks of having celiac disease and the increased incidence of developing small bowel lymphoma. A high fiber diet with plenty of fluids (up to 8 glasses of water daily) is suggested to relieve these symptoms. Psyllium " husk 1 tablespoon once or twice daily can be used to keep bowels regular if needed. Lifestyle modifications for reflux discussed with the patient. These include weight loss, proper elevation of the head of the bed, avoiding foods that trigger reflux, avoiding late meals and staying upright for at least 1.5 hours after meals.    Risks, benefits and alternative options were discussed with the patient. Risks including but not limited to infection, bleeding, heart or respiratory problems and perforation that may require surgery were all explained to the patient. The patient had a chance for questions if there were doubts or concerns about the test. The referring provider will be notified that our consultation is complete and available through the patient's records.    Thanks for letting us participate in the care of this nice patient,      Joseluis Minor

## 2025-01-16 NOTE — H&P (VIEW-ONLY)
[facility]  [facility]enterology    Patient evaluated at the request of Zheng Orozco Md  [address] [place] [address]     PCP: Zheng Orozco MD    Chief Complaint    gastirits; Establish Care       History of present illness/subjective    Mrs. Mistry is a 64 year old female who comes to see us to establish care and manage her chronic GI issues. She was diagnosed with autoimmune atrophic gastritis diagnosed 15 years ago and had positive anti-parietal cell antibodies and anti intrinsic factor antibodies. She also had prior colon polyps. She had a positive TTG IgA a year ago and iron deficiency and underwent an EGD and colonsocopy in 2021. The duodenal biopsies showed Marsh 1-2 changes in the bulb consistent with celiac disease along with her positive serology. The gastric biopsies showed intestinal metaplasia with no dysplasia. Colonoscopy showed a normal terminal ileum and normal colonic mucosa with no polyps. She has been on a gluten free diet and has done well since but has intermittent symptoms of bloating and diarrhea. She is originally from [facility] and we had a very pleasant visit.    Past Medical History:   Diagnosis Date    Atrophic gastritis     B12 deficiency     Breast lump on left side at 3 o'clock position 03/25/2015    Celiac disease     Fibrocystic breast     Lichen sclerosus 08/16/2012    Lupus        Past Surgical History:   Procedure Laterality Date    BREAST BIOPSY Right 2009    benign    COLONOSCOPY  09/10/2010    COLONOSCOPY N/A 6/4/2020    Procedure: COLONOSCOPY;  Surgeon: Almas Perez MD;  Location: [facility];  Service: Endoscopy;  Laterality: N/A;    ESOPHAGOGASTRODUODENOSCOPY  09/10/2010    ESOPHAGOGASTRODUODENOSCOPY N/A 6/4/2020    Procedure: ESOPHAGOGASTRODUODENOSCOPY (EGD);  Surgeon: Almas Perez MD;  Location: [facility];  Service: Endoscopy;  Laterality: N/A;    ESOPHAGOGASTRODUODENOSCOPY N/A 7/28/2020    Procedure: EGD  (ESOPHAGOGASTRODUODENOSCOPY);  Surgeon: Almas Perez MD;  Location: [facility];  Service: Endoscopy;  Laterality: N/A;    HYSTERECTOMY  2013    TONSILLECTOMY         Current Outpatient Medications on File Prior to Visit   Medication Sig Dispense Refill    cyanocobalamin 1,000 mcg/mL injection INJECT 1ML INTO MUSCLE EVERY 28 DAYS 10 mL 12    ipratropium (ATROVENT) 21 mcg (0.03 %) nasal spray 2 sprays by Each Nostril route 3 (three) times daily. 30 mL 0    loratadine (CLARITIN) 10 mg tablet Take 10 mg by mouth.      venlafaxine (EFFEXOR-XR) 37.5 MG 24 hr capsule Take 1 capsule (37.5 mg total) by mouth once daily. 30 capsule 1    VITAMIN B-12 1000 MCG tablet Take 1 tablet (1,000 mcg total) by mouth once daily. 30 tablet 6    amoxicillin-clavulanate 875-125mg (AUGMENTIN) 875-125 mg per tablet Take 1 tablet by mouth every 12 (twelve) hours. 20 tablet 0    gabapentin (NEURONTIN) 300 MG capsule Take 1 capsule (300 mg total) by mouth every evening. (Patient not taking: Reported on 1/16/2025) 30 capsule 11     No current facility-administered medications on file prior to visit.       Review of patient's allergies indicates:   Allergen Reactions    Fluticasone Nausea Only and Other (See Comments)     Feels out of control in daily functions.    Promethazine        Social History     Socioeconomic History    Marital status:    Tobacco Use    Smoking status: Never     Passive exposure: Never    Smokeless tobacco: Never   Substance and Sexual Activity    Alcohol use: Not Currently    Drug use: No    Sexual activity: Never   Social History Narrative    1 dog, no smokers; Originally from [facility].     Social Drivers of Health     Financial Resource Strain: Low Risk  (12/10/2021)    Overall Financial Resource Strain (CARDIA)     Difficulty of Paying Living Expenses: Not very hard   Food Insecurity: No Food Insecurity (12/10/2021)    Hunger Vital Sign     Worried About Running Out of Food in the Last Year: Never  true     Ran Out of Food in the Last Year: Never true   Transportation Needs: No Transportation Needs (12/10/2021)    PRAPARE - Transportation     Lack of Transportation (Medical): No     Lack of Transportation (Non-Medical): No   Physical Activity: Sufficiently Active (12/10/2021)    Exercise Vital Sign     Days of Exercise per Week: 7 days     Minutes of Exercise per Session: 120 min   Stress: Stress Concern Present (12/10/2021)    [employer] - Occupational Stress Questionnaire     Feeling of Stress : To some extent   Housing Stability: Low Risk  (12/10/2021)    Housing Stability Vital Sign     Unable to Pay for Housing in the Last Year: No     Number of Places Lived in the Last Year: 1     Unstable Housing in the Last Year: No       Family History   Problem Relation Name Age of Onset    Depression Mother      Nephritis Mother      Hyperthyroidism Mother      Hypotension Mother      Anemia Mother      Asthma Father      Thyroid disease Sister      Depression Sister      Leukemia Brother      Cirrhosis Brother      Multiple sclerosis Brother      Diabetes Maternal Grandfather         Vitals:    01/16/25 1506   BP: 122/75   Patient Position: Sitting   Pulse: 61   Weight: 68.1 kg (150 lb 0.4 oz)   Height: 5' (1.524 m)     Body mass index is 29.3 kg/m².    Physical Exam  Constitutional:       Appearance: She is well-developed.   HENT:      Head: Normocephalic and atraumatic.   Eyes:      Pupils: Pupils are equal, round, and reactive to light.   Neck:      Thyroid: No thyromegaly.   Cardiovascular:      Rate and Rhythm: Normal rate and regular rhythm.      Heart sounds: Normal heart sounds. No murmur heard.  Pulmonary:      Effort: Pulmonary effort is normal. No respiratory distress.      Breath sounds: Normal breath sounds. No wheezing or rales.   Abdominal:      General: Bowel sounds are normal. There is no distension.      Palpations: Abdomen is soft. There is no mass.      Tenderness:  "There is no abdominal tenderness.   Musculoskeletal:         General: No tenderness. Normal range of motion.      Cervical back: Normal range of motion and neck supple.   Lymphadenopathy:      Cervical: No cervical adenopathy.   Skin:     General: Skin is warm and dry.      Findings: No erythema.   Neurological:      Mental Status: She is alert and oriented to person, place, and time.      Cranial Nerves: No cranial nerve deficit.      Deep Tendon Reflexes: Reflexes are normal and symmetric.   Psychiatric:         Behavior: Behavior normal.         Lab Results   Component Value Date    WBC 8.12 12/23/2024    HGB 13.5 12/23/2024    HCT 41.6 12/23/2024    MCV 87 12/23/2024     12/23/2024         BMP  Lab Results   Component Value Date     12/23/2024    K 4.4 12/23/2024     12/23/2024    CO2 23 12/23/2024    BUN 10 12/23/2024    CREATININE 0.6 12/23/2024    CALCIUM 9.5 12/23/2024    ANIONGAP 10 12/23/2024    EGFRNORACEVR >60.0 12/23/2024       Lab Results   Component Value Date    ALT 44 12/23/2024    AST 34 12/23/2024    ALKPHOS 103 12/23/2024    BILITOT 0.3 12/23/2024       No results found for: "LIPASE"    A/P  1. Celiac disease  -     Ambulatory referral/consult to Endo Procedure ; Future; Expected date: 01/17/2025  -     Celiac Disease Panel; Future; Expected date: 01/16/2025  -     CT Abdomen Pelvis With IV Contrast Routine Oral Contrast; Future; Expected date: 01/16/2025  -     Calprotectin, Stool; Future; Expected date: 01/16/2025    2. Intestinal metaplasia of antrum of stomach without dysplasia  -     Ambulatory referral/consult to Endo Procedure ; Future; Expected date: 01/17/2025    3. Diverticulosis of colon    4. Pernicious anemia       We discussed the risks of having celiac disease and the increased incidence of developing small bowel lymphoma. A high fiber diet with plenty of fluids (up to 8 glasses of water daily) is suggested to relieve these symptoms. Psyllium " husk 1 tablespoon once or twice daily can be used to keep bowels regular if needed. Lifestyle modifications for reflux discussed with the patient. These include weight loss, proper elevation of the head of the bed, avoiding foods that trigger reflux, avoiding late meals and staying upright for at least 1.5 hours after meals.    Risks, benefits and alternative options were discussed with the patient. Risks including but not limited to infection, bleeding, heart or respiratory problems and perforation that may require surgery were all explained to the patient. The patient had a chance for questions if there were doubts or concerns about the test. The referring provider will be notified that our consultation is complete and available through the patient's records.    Thanks for letting us participate in the care of this nice patient,      Joseluis Minor

## 2025-01-16 NOTE — PATIENT INSTRUCTIONS
01/16/2025    Dear Alma Mistry,    We are writing to express our heartfelt gratitude for choosing us as your healthcare provider and entrusting us with your well-being. Your health and satisfaction are our top priorities, and we are truly honored to have the opportunity to serve you.    At [facility], we strive to provide the best possible care and support for our patients. My assessment and recommendations are as follows:    Celiac disease  -     Ambulatory referral/consult to Endo Procedure ; Future; Expected date: 01/17/2025  -     Celiac Disease Panel; Future; Expected date: 01/16/2025  -     CT Abdomen Pelvis With IV Contrast Routine Oral Contrast; Future; Expected date: 01/16/2025  -     Calprotectin, Stool; Future; Expected date: 01/16/2025    Intestinal metaplasia of antrum of stomach without dysplasia  -     Ambulatory referral/consult to Endo Procedure ; Future; Expected date: 01/17/2025    Diverticulosis of colon    Pernicious anemia    We genuinely value your feedback and believe that it plays a crucial role in our pursuit of excellence. We kindly request you to take a few minutes of your time to share your experience with us by posting a Google review. Your honest thoughts and opinions can make a significant difference for others seeking healthcare services and will help us better understand how we can further enhance our patient care.    Your kind words and positive reviews will not only motivate our dedicated team but will also inspire confidence in potential patients who are looking for exceptional healthcare services.    Once again, we extend our sincere appreciation for giving us the opportunity to serve you. If there is anything else we can do to improve your experience or assist you further, please do not hesitate to reach out to us.    Thank you for being part of our [facility] family, and we look forward to continuing to provide you with the best care  "possible.    Thanks for trusting us with your healthcare needs and using [facility]. If you want to ask us a question, you can do so by replying to this message or by calling [phone].    Sincerely,    Joseluis Minor M.D.    PS: At [facility] we offer virtual visits. Please use your [facility] reina to schedule a virtual visit.     To rate your experience with Dr. Minor please click on the link below:    [email or web address]    To post a review, simply follow these quick steps:  1. Visit [employer] or search for my name on Google.  2. Click on the "Write a review" button on the left-hand side of the page.  3. Rate your experience by choosing the number of stars that reflect your satisfaction.  4. Share your thoughts and insights about your visit - we'd love to hear your feedback!    "

## 2025-01-18 ENCOUNTER — LAB VISIT (OUTPATIENT)
Dept: LAB | Facility: HOSPITAL | Age: 65
End: 2025-01-18
Attending: INTERNAL MEDICINE
Payer: COMMERCIAL

## 2025-01-18 DIAGNOSIS — K90.0 CELIAC DISEASE: ICD-10-CM

## 2025-01-18 PROCEDURE — 83993 ASSAY FOR CALPROTECTIN FECAL: CPT | Performed by: INTERNAL MEDICINE

## 2025-01-21 LAB
GLIADIN PEPTIDE IGA SER-ACNC: 12 U/ML
GLIADIN PEPTIDE IGG SER-ACNC: <0.6 U/ML
IGA SERPL-MCNC: 269 MG/DL (ref 70–400)
TTG IGA SER-ACNC: 1.4 U/ML
TTG IGG SER-ACNC: <0.6 U/ML

## 2025-01-26 ENCOUNTER — PATIENT MESSAGE (OUTPATIENT)
Dept: GASTROENTEROLOGY | Facility: CLINIC | Age: 65
End: 2025-01-26
Payer: COMMERCIAL

## 2025-01-26 NOTE — PROGRESS NOTES
Mrs. Mistry,     Recent celiac testing revealed that you have been adhering to a gluten free diet.     Thanks,     Joseluis Minor

## 2025-01-27 LAB — CALPROTECTIN STL-MCNT: 16.5 MCG/G

## 2025-01-31 ENCOUNTER — PATIENT MESSAGE (OUTPATIENT)
Dept: GASTROENTEROLOGY | Facility: CLINIC | Age: 65
End: 2025-01-31
Payer: COMMERCIAL

## 2025-01-31 ENCOUNTER — HOSPITAL ENCOUNTER (OUTPATIENT)
Dept: RADIOLOGY | Facility: HOSPITAL | Age: 65
Discharge: HOME | End: 2025-01-31
Attending: INTERNAL MEDICINE
Payer: COMMERCIAL

## 2025-01-31 DIAGNOSIS — K90.0 CELIAC DISEASE: ICD-10-CM

## 2025-01-31 PROCEDURE — 74177 CT ABD & PELVIS W/CONTRAST: CPT | Mod: 26,,, | Performed by: RADIOLOGY

## 2025-01-31 PROCEDURE — 25500020 PHARM REV CODE 255: Performed by: INTERNAL MEDICINE

## 2025-01-31 PROCEDURE — 74177 CT ABD & PELVIS W/CONTRAST: CPT | Mod: TC

## 2025-01-31 RX ADMIN — IOHEXOL 100 ML: 350 INJECTION, SOLUTION INTRAVENOUS at 10:01

## 2025-01-31 NOTE — PROGRESS NOTES
Staff, please ask the patient if she has a GYN, if not, let's get her an appointment to see one next week. Thanks    . Alma Mistry,    The CT scan revealed several ovarian cysts. I recommend you speak with your GYN or we can refer you to one.     Thanks,     Joseluis Minor

## 2025-02-05 ENCOUNTER — HOSPITAL ENCOUNTER (OUTPATIENT)
Dept: ENDOSCOPY | Facility: HOSPITAL | Age: 65
Discharge: HOME | End: 2025-02-05
Attending: INTERNAL MEDICINE | Admitting: INTERNAL MEDICINE
Payer: COMMERCIAL

## 2025-02-05 ENCOUNTER — PATIENT MESSAGE (OUTPATIENT)
Dept: GASTROENTEROLOGY | Facility: CLINIC | Age: 65
End: 2025-02-05
Payer: COMMERCIAL

## 2025-02-05 ENCOUNTER — ANESTHESIA EVENT (OUTPATIENT)
Dept: ENDOSCOPY | Facility: HOSPITAL | Age: 65
End: 2025-02-05
Payer: COMMERCIAL

## 2025-02-05 ENCOUNTER — ANESTHESIA (OUTPATIENT)
Dept: ENDOSCOPY | Facility: HOSPITAL | Age: 65
End: 2025-02-05
Payer: COMMERCIAL

## 2025-02-05 DIAGNOSIS — K90.0 CELIAC DISEASE: ICD-10-CM

## 2025-02-05 DIAGNOSIS — D51.0 PERNICIOUS ANEMIA DUE TO AUTOIMMUNE DISORDER: ICD-10-CM

## 2025-02-05 DIAGNOSIS — D51.0 PERNICIOUS ANEMIA: Primary | Chronic | ICD-10-CM

## 2025-02-05 DIAGNOSIS — K31.A11 INTESTINAL METAPLASIA OF ANTRUM OF STOMACH WITHOUT DYSPLASIA: ICD-10-CM

## 2025-02-05 DIAGNOSIS — D89.9 PERNICIOUS ANEMIA DUE TO AUTOIMMUNE DISORDER: ICD-10-CM

## 2025-02-05 PROCEDURE — 88305 TISSUE EXAM BY PATHOLOGIST: CPT | Performed by: PATHOLOGY

## 2025-02-05 PROCEDURE — 88305 TISSUE EXAM BY PATHOLOGIST: CPT | Mod: 26,,, | Performed by: PATHOLOGY

## 2025-02-05 PROCEDURE — 37000008 HC ANESTHESIA 1ST 15 MINUTES

## 2025-02-05 PROCEDURE — 43239 EGD BIOPSY SINGLE/MULTIPLE: CPT | Performed by: INTERNAL MEDICINE

## 2025-02-05 PROCEDURE — 43239 EGD BIOPSY SINGLE/MULTIPLE: CPT | Mod: ,,, | Performed by: INTERNAL MEDICINE

## 2025-02-05 PROCEDURE — 27201012 HC FORCEPS, HOT/COLD, DISP

## 2025-02-05 PROCEDURE — 63600175 PHARM REV CODE 636 W HCPCS: Performed by: STUDENT IN AN ORGANIZED HEALTH CARE EDUCATION/TRAINING PROGRAM

## 2025-02-05 RX ORDER — LIDOCAINE HYDROCHLORIDE 20 MG/ML
INJECTION INTRAVENOUS
Status: DISCONTINUED | OUTPATIENT
Start: 2025-02-05 | End: 2025-02-05

## 2025-02-05 RX ORDER — PROPOFOL 10 MG/ML
VIAL (ML) INTRAVENOUS
Status: DISCONTINUED | OUTPATIENT
Start: 2025-02-05 | End: 2025-02-05

## 2025-02-05 RX ADMIN — LIDOCAINE HYDROCHLORIDE 100 MG: 20 INJECTION INTRAVENOUS at 12:02

## 2025-02-05 RX ADMIN — PROPOFOL 50 MG: 10 INJECTION, EMULSION INTRAVENOUS at 12:02

## 2025-02-05 RX ADMIN — PROPOFOL 100 MG: 10 INJECTION, EMULSION INTRAVENOUS at 12:02

## 2025-02-05 NOTE — BRIEF OP NOTE
Endoscopy Discharge Summary      Admit Date: 2/5/2025    Discharge Date and Time:  2/5/2025 1:00 PM    Attending Physician: Joseluis Minor MD     Discharge Physician: Joseluis Minor MD     Principal Admitting Diagnoses: Pernicious anemia         Discharge Diagnosis: The primary encounter diagnosis was Pernicious anemia. Diagnoses of Pernicious anemia due to autoimmune disorder, Celiac disease, and Intestinal metaplasia of antrum of stomach without dysplasia were also pertinent to this visit.     Discharged Condition: Good    Indication for Admission: Pernicious anemia     Hospital Course: Patient was admitted for an inpatient procedure and tolerated the procedure well with no complications.    Significant Diagnostic Studies: EGD    Pathology (if any):  Specimen (24h ago, onward)       Start     Ordered    02/05/25 1245  Specimen to Pathology, Surgery Gastrointestinal tract  Once        Comments: Pre-op Diagnosis: Celiac disease, Intestinal metaplasia of antrum of stomach without dysplasiaProcedures: EGD1. Small bowel bx's, hx. celiac2. Antrum bx's, hx. pernicious anemia     References:    Click here for ordering Quick Tip   Question Answer Comment   Procedure Type: Gastrointestinal tract    Release to patient Immediate        02/05/25 1249                    Disposition: Home.    Bleeding: None    No Implants         Follow Up/Patient Instructions:   Patient's Medications   New Prescriptions    No medications on file   Previous Medications    CYANOCOBALAMIN 1,000 MCG/ML INJECTION    INJECT 1ML INTO MUSCLE EVERY 28 DAYS    IPRATROPIUM (ATROVENT) 21 MCG (0.03 %) NASAL SPRAY    2 sprays by Each Nostril route 3 (three) times daily.    LORATADINE (CLARITIN) 10 MG TABLET    Take 10 mg by mouth.    VENLAFAXINE (EFFEXOR-XR) 37.5 MG 24 HR CAPSULE    Take 1 capsule (37.5 mg total) by mouth once daily.    VITAMIN B-12 1000 MCG TABLET    Take 1 tablet (1,000 mcg total) by mouth once daily.   Modified Medications    No  medications on file   Discontinued Medications    No medications on file       Discharge Procedure Orders   Diet general     No dressing needed     Call MD for:  temperature >100.4     Call MD for:  persistent nausea and vomiting     Call MD for:  severe uncontrolled pain     Call MD for:  difficulty breathing, headache or visual disturbances     Call MD for:  redness, tenderness, or signs of infection (pain, swelling, redness, odor or green/yellow discharge around incision site)     Call MD for:  hives     Call MD for:  persistent dizziness or light-headedness        Follow-up Information       Zheng Orozco MD Follow up.    Specialty: Internal Medicine  Why: As needed  Contact information:  [address]  [place] [address]  [phone]

## 2025-02-05 NOTE — ANESTHESIA PREPROCEDURE EVALUATION
02/05/2025  Alma Mistry is a 64 y.o., female.    Anesthesia Evaluation    I have reviewed the Patient Summary Reports.    I have reviewed the Nursing Notes.    I have reviewed the Medications.     Review of Systems  Anesthesia Hx:  No problems with previous Anesthesia  Denies Family Hx of Anesthesia complications.   Denies Personal Hx of Anesthesia complications.   Social:  No Alcohol Use, Non-Smoker    Hematology/Oncology:         -- Anemia:   Cardiovascular:  Cardiovascular Normal  ECG has been reviewed.    Pulmonary:  Pulmonary Normal    Renal/:  Renal/ Normal     Hepatic/GI:  Hepatic/GI Normal    Neurological:   Peripheral Neuropathy    Psych:   Psychiatric History anxiety depression          Physical Exam  General:  Well nourished    Airway/Jaw/Neck:  Airway Findings: Mouth Opening: Normal Tongue: Normal  General Airway Assessment: Adult  Mallampati: II  TM Distance: Normal, at least 6 cm  Jaw/Neck Findings:  Neck ROM: Normal ROM  Neck Findings:     Eyes/Ears/Nose:  Eyes/Ears/Nose Findings:    Dental:  Dental Findings: In tact   Chest/Lungs:  Chest/Lungs Findings: Clear to auscultation, Normal Respiratory Rate     Heart/Vascular:  Heart Findings: Rate: Normal  Rhythm: Regular Rhythm  Sounds: Normal  Heart murmur: negative Vascular Findings: Normal    Abdomen:  Abdomen Findings: Normal    Musculoskeletal:  Musculoskeletal Findings: Normal   Skin:  Skin Findings: Normal    Mental Status:  Mental Status Findings:  Alert and Oriented         Anesthesia Plan  Type of Anesthesia, risks & benefits discussed:  Anesthesia Type:  general  Patient's Preference:   Intra-op Monitoring Plan: standard ASA monitors  Intra-op Monitoring Plan Comments:   Post Op Pain Control Plan: per primary service following discharge from PACU  Post Op Pain Control Plan Comments:   Induction:   IV  Beta Blocker:  Patient is  not currently on a Beta-Blocker (No further documentation required).       Informed Consent: Patient understands risks and agrees with Anesthesia plan.  Questions answered. Anesthesia consent signed with patient.  ASA Score: 2     Day of Surgery Review of History & Physical:    H&P update referred to the surgeon.         Ready For Surgery From Anesthesia Perspective.                                                                                                                  02/05/2025  Alma Mistry is a 64 y.o., female.    Anesthesia Evaluation    I have reviewed the Patient Summary Reports.     I have reviewed the Nursing Notes. I have reviewed the NPO Status.   I have reviewed the Medications.     Review of Systems  Social:  Non-Smoker       Hematology/Oncology:       -- Anemia:                                  Neurological:    Neuromuscular Disease,                                 Neuromuscular Disease   Psych:  Psychiatric History anxiety depression                Physical Exam  General:  Well nourished       Airway/Jaw/Neck:  Airway Findings: Mouth Opening: Normal     Tongue: Normal      General Airway Assessment: Adult      Mallampati: II   TM Distance: Normal, at least 6 cm   Jaw/Neck Findings:     Neck ROM: Normal ROM   Neck Findings:       Eyes/Ears/Nose:  Eyes/Ears/Nose Findings:     Dental:  Dental Findings: In tact      Chest/Lungs:  Chest/Lungs Findings:  Clear to auscultation, Normal Respiratory Rate       Heart/Vascular:  Heart Findings: Rate: Normal  Rhythm: Regular Rhythm  Sounds: Normal  Heart murmur: negative       Vascular Findings: Normal           Abdomen:  Abdomen Findings: Normal      Musculoskeletal:  Musculoskeletal Findings: Normal   Skin:  Skin Findings: Normal      Mental Status:  Mental Status Findings:  Alert and Oriented         Anesthesia Plan  Type of Anesthesia, risks & benefits discussed:  Anesthesia Type:  MAC    Patient's Preference:   Plan Factors:          Intra-op  Monitoring Plan: standard ASA monitors  Intra-op Monitoring Plan Comments:   Post Op Pain Control Plan: per primary service following discharge from PACU  Post Op Pain Control Plan Comments:     Induction:   IV  Beta Blocker:  Patient is not currently on a Beta-Blocker (No further documentation required).       Informed Consent: Informed consent signed with the Patient and all parties understand the risks and agree with anesthesia plan.  All questions answered.  Anesthesia consent signed with patient.  ASA Score: 2     Day of Surgery Review of History & Physical: I have interviewed and examined the patient. I have reviewed the patient's H&P dated:              Ready For Surgery From Anesthesia Perspective.             Physical Exam  General: Well nourished    Airway:  Mallampati: II   Mouth Opening: Normal  TM Distance: Normal, at least 6 cm  Tongue: Normal  Neck ROM: Normal ROM    Dental:  In tact    Chest/Lungs:  Clear to auscultation, Normal Respiratory Rate    Heart:  Rate: Normal  Rhythm: Regular Rhythm  Sounds: Normal        Anesthesia Plan  Type of Anesthesia, risks & benefits discussed:    Anesthesia Type: MAC  Intra-op Monitoring Plan: standard ASA monitors  Post Op Pain Control Plan: per primary service following discharge from PACU  Induction:  IV  Informed Consent: Informed consent signed with the Patient and all parties understand the risks and agree with anesthesia plan.  All questions answered.   ASA Score: 2  Day of Surgery Review of History & Physical: I have interviewed and examined the patient. I have reviewed the patient's H&P dated:     Ready For Surgery From Anesthesia Perspective.     .

## 2025-02-05 NOTE — INTERVAL H&P NOTE
The patient has been examined and the H&P has been reviewed:    I concur with the findings and no changes have occurred since H&P was written.        Active Hospital Problems    Diagnosis  POA    *Pernicious anemia [D51.0]  Yes     Chronic      Resolved Hospital Problems   No resolved problems to display.

## 2025-02-05 NOTE — TRANSFER OF CARE
Anesthesia Transfer of Care Note    Patient: Alma Mistry    Procedure(s) Performed: * No procedures listed *    Patient location: PACU    Anesthesia Type: MAC    Transport from OR: Transported from OR on room air with adequate spontaneous ventilation    Post pain: adequate analgesia    Post assessment: no apparent anesthetic complications    Post vital signs: stable    Level of consciousness: responds to stimulation and awake    Nausea/Vomiting: no nausea/vomiting    Complications: none    Transfer of care protocol was followedComments: Report given to PACU RN at bedside. Hand off tool used. RN given opportunity to ask questions or clarify concerns. No Concerns verbalized. RN was asked if ready to assume care of patient. RN verbally confirmed. Pt. left in stable condition. SV. Vital Signs Return to Near Baseline. No s/s of distress noted.       Last vitals: Visit Vitals  /64   Pulse (!) 56   Temp 36.9 °C (98.4 °F)   Resp 15   Ht 5' (1.524 m)   Wt 67.6 kg (149 lb)   SpO2 99%   Breastfeeding No   BMI 29.10 kg/m²

## 2025-02-05 NOTE — DISCHARGE INSTRUCTIONS
Dear Alma Mistry      If you develop fevers, severe abdominal pain, significant bleeding, nausea or vomiting, please contact us or come to our Emergency Department at [facility].  Our  contact number is [phone] available for emergencies or any question that you may have.      You may return to normal activities tomorrow.  Written discharge instructions were provided to you today and have them handy since you may not remember our conversation today.       If you take Aspirin, please resume taking it at your prior dose today. If we obtained biopsies or removed polyps during your procedure, we will contact you within 5 to 6 days with the results.      Thanks for trusting us with your healthcare needs.      Sincerely,     Joseluis Minor M.D.        To rate your experience with Dr. Minor, please click on the link below     [email or web address]

## 2025-02-05 NOTE — ANESTHESIA POSTPROCEDURE EVALUATION
Anesthesia Post Evaluation    Patient: Alma Mistry    Procedure(s) Performed: * No procedures listed *    Final Anesthesia Type: MAC      Patient location during evaluation: PACU  Patient participation: Yes- Able to Participate  Level of consciousness: awake and alert and oriented  Post-procedure vital signs: reviewed and stable  Pain management: adequate  Airway patency: patent  SHARIF mitigation strategies: Multimodal analgesia and Extubation and recovery carried out in lateral, semiupright, or other nonsupine position  PONV status at discharge: No PONV  Anesthetic complications: no      Cardiovascular status: blood pressure returned to baseline and hemodynamically stable  Respiratory status: unassisted and spontaneous ventilation  Hydration status: euvolemic  Follow-up not needed.  Comments: Report given to PACU RN. Hand Off Tool Used. RN given opportunity to ask questions or clarify concerns. No Concerns verbalized. Pt. Left in stable condition. SV. Vital Signs Return to Near Baseline. No s/s of distress noted.               Vitals Value Taken Time   /64 02/05/25 1100   Temp 36.9 °C (98.4 °F) 02/05/25 1100   Pulse 56 02/05/25 1100   Resp 15 02/05/25 1100   SpO2 99 % 02/05/25 1100         No case tracking events are documented in the log.      Pain/Amado Score: No data recorded

## 2025-02-06 ENCOUNTER — TELEPHONE (OUTPATIENT)
Dept: GASTROENTEROLOGY | Facility: CLINIC | Age: 65
End: 2025-02-06
Payer: COMMERCIAL

## 2025-02-06 VITALS
HEIGHT: 60 IN | HEART RATE: 62 BPM | WEIGHT: 149 LBS | TEMPERATURE: 98 F | BODY MASS INDEX: 29.25 KG/M2 | SYSTOLIC BLOOD PRESSURE: 105 MMHG | RESPIRATION RATE: 18 BRPM | DIASTOLIC BLOOD PRESSURE: 61 MMHG | OXYGEN SATURATION: 98 %

## 2025-02-06 NOTE — TELEPHONE ENCOUNTER
----- Message from Joseluis Minor MD sent at 1/31/2025 12:11 PM CST -----  Staff, please ask the patient if she has a GYN, if not, let's get her an appointment to see one next week. Thanks    . Alma Mistry,    The CT scan revealed several ovarian cysts. I recommend you speak with your GYN or we can refer you to one.     Thanks,     Joseluis Minor

## 2025-02-07 LAB
COMMENT: NORMAL
FINAL PATHOLOGIC DIAGNOSIS: NORMAL
GROSS: NORMAL
Lab: NORMAL

## 2025-02-08 ENCOUNTER — PATIENT MESSAGE (OUTPATIENT)
Dept: GASTROENTEROLOGY | Facility: CLINIC | Age: 65
End: 2025-02-08
Payer: COMMERCIAL

## 2025-02-08 NOTE — PROGRESS NOTES
Fede,     Biopsies obtained during your procedure are essentially benign/normal. No further action is needed at this point.    Thanks for using [facility], Aldo J. Russo, M.D.

## 2025-02-13 ENCOUNTER — TELEPHONE (OUTPATIENT)
Dept: PSYCHIATRY | Facility: CLINIC | Age: 65
End: 2025-02-13
Payer: COMMERCIAL

## 2025-02-13 ENCOUNTER — PATIENT MESSAGE (OUTPATIENT)
Dept: INTERNAL MEDICINE | Facility: CLINIC | Age: 65
End: 2025-02-13
Payer: COMMERCIAL

## 2025-02-13 DIAGNOSIS — F41.0 GENERALIZED ANXIETY DISORDER WITH PANIC ATTACKS: Primary | ICD-10-CM

## 2025-02-13 DIAGNOSIS — F41.1 GENERALIZED ANXIETY DISORDER WITH PANIC ATTACKS: Primary | ICD-10-CM

## 2025-02-14 NOTE — TELEPHONE ENCOUNTER
Spoke with pt and she is interested in Med Mgmt so I exp that we need a ref from her pcp and then we will reach out to her to book and that there is currently up to a 6 mo wait

## 2025-02-14 NOTE — TELEPHONE ENCOUNTER
----- Message from [employer] sent at 2/13/2025  4:56 PM CST -----  Contact: KRISTY BRADLEY [[id number]]  .Type:  Patient Requesting Call    Who Called:KRISTY BRADLEY [[id number]]  Does the patient know what this is regarding?:pt trying to est care   Would the patient rather a call back or a response via [facility]? call  Best Call Back Number:.[phone]    Additional Information:

## 2025-02-21 ENCOUNTER — OFFICE VISIT (OUTPATIENT)
Dept: INTERNAL MEDICINE | Facility: CLINIC | Age: 65
End: 2025-02-21
Payer: COMMERCIAL

## 2025-02-21 DIAGNOSIS — D51.0 PERNICIOUS ANEMIA: Chronic | ICD-10-CM

## 2025-02-21 DIAGNOSIS — R45.851 SUICIDAL IDEATION: ICD-10-CM

## 2025-02-21 DIAGNOSIS — D50.0 IRON DEFICIENCY ANEMIA DUE TO CHRONIC BLOOD LOSS: ICD-10-CM

## 2025-02-21 DIAGNOSIS — F33.9 RECURRENT DEPRESSION: Primary | ICD-10-CM

## 2025-02-21 RX ORDER — DESVENLAFAXINE SUCCINATE 25 MG/1
25 TABLET, EXTENDED RELEASE ORAL DAILY
Qty: 90 TABLET | Refills: 0 | Status: SHIPPED | OUTPATIENT
Start: 2025-02-21

## 2025-02-21 NOTE — PROGRESS NOTES
"Subjective:      Patient ID: Alma Mistry is a 64 y.o. female.    Chief Complaint: Medication Problem    HPI  History of Present Illness             The patient location is: [place]  The chief complaint leading to consultation is: depression    Visit type:  Audiovisual    Face to Face time with patient: 15   minutes of total time spent on the encounter, which includes face to face time and non-face to face time preparing to see the patient (eg, review of tests), Obtaining and/or reviewing separately obtained history, Documenting clinical information in the electronic or other health record, Independently interpreting results (not separately reported) and communicating results to the patient/family/caregiver, or Care coordination (not separately reported).         Each patient to whom he or she provides medical services by telemedicine is:  (1) informed of the relationship between the physician and patient and the respective role of any other health care provider with respect to management of the patient; and (2) notified that he or she may decline to receive medical services by telemedicine and may withdraw from such care at any time.    Notes:     Presents today to discuss depression     Depression- present for years - Was off cymbalta for over one year. Self resumed after lost her dog in jan 2025. nightmares with cymbalta. Then started lexapro which caused insomnia. Self dc'd lexapro for a few months. Started effexor we made her a "zombie".  Self dc'd effexor. Fees anxiety resolved but depression continues. No panic attacks in over 2 years.     Has hadd occ thoughts of death. Last episode 5 days ago - thinking if she were dead it would all end. No plan.   A reason she will not act is not wanting to leave her daughter.     Review of Systems   Constitutional:  Negative for activity change and unexpected weight change.   HENT:  Positive for rhinorrhea. Negative for hearing loss and trouble swallowing.    Eyes:  " Negative for discharge and visual disturbance.   Respiratory:  Negative for chest tightness and wheezing.    Cardiovascular:  Negative for chest pain and palpitations.   Gastrointestinal:  Negative for blood in stool, constipation, diarrhea and vomiting.   Endocrine: Negative for polydipsia and polyuria.   Genitourinary:  Negative for difficulty urinating, dysuria, hematuria and menstrual problem.   Musculoskeletal:  Negative for arthralgias, joint swelling and neck pain.   Neurological:  Negative for weakness and headaches.   Psychiatric/Behavioral:  Positive for dysphoric mood and suicidal ideas (described as hopeless, thinks if she were dead it would all end, no plan). Negative for confusion and hallucinations. The patient is not nervous/anxious.      Objective:   There were no vitals taken for this visit.    Physical Exam  Constitutional:       General: She is awake.      Appearance: Normal appearance.   HENT:      Head: Normocephalic and atraumatic.   Eyes:      Conjunctiva/sclera: Conjunctivae normal.   Pulmonary:      Effort: Pulmonary effort is normal.   Musculoskeletal:      Cervical back: Normal range of motion.   Neurological:      Mental Status: She is alert. Mental status is at baseline.   Psychiatric:         Mood and Affect: Mood normal.         Behavior: Behavior normal. Behavior is cooperative.         Thought Content: Thought content normal.         Judgment: Judgment normal.         Lab Results   Component Value Date    WBC 8.12 12/23/2024    HGB 13.5 12/23/2024    HGB 14.1 04/13/2024    HGB 12.9 02/24/2023    HCT 41.6 12/23/2024    MCV 87 12/23/2024    MCV 86 04/13/2024    MCV 86 02/24/2023     12/23/2024    CHOL 225 (H) 04/13/2024    TRIG 91 04/13/2024    HDL 55 04/13/2024    LDLCALC 151.8 04/13/2024    LDLCALC 98.4 02/24/2023    LDLCALC 143.8 01/06/2022    ALT 44 12/23/2024    AST 34 12/23/2024     12/23/2024    K 4.4 12/23/2024    CALCIUM 9.5 12/23/2024     12/23/2024    CO2  23 12/23/2024    BUN 10 12/23/2024    CREATININE 0.6 12/23/2024    CREATININE 0.7 04/13/2024    CREATININE 0.7 02/24/2023    EGFRNORACEVR >60.0 12/23/2024    EGFRNORACEVR >60.0 04/13/2024    EGFRNORACEVR >60.0 02/24/2023    TSH 1.014 04/13/2024    TSH 0.960 02/24/2023    TSH 0.817 01/06/2022    GLU 86 12/23/2024    HGBA1C 4.6 09/21/2017    [id number] 27 (L) 09/21/2017          The 10-year ASCVD risk score (Denise DK, et al., 2019) is: 3.9%    Values used to calculate the score:      Age: 64 years      Sex: Female      Is Non- : No      Diabetic: No      Tobacco smoker: No      Systolic Blood Pressure: 120 mmHg      Is BP treated: No      HDL Cholesterol: 55 mg/dL      Total Cholesterol: 225 mg/dL     Assessment:     1. Recurrent depression    2. Suicidal ideation    3. Pernicious anemia    4. Iron deficiency anemia due to chronic blood loss      Plan:   1. Recurrent depression  -     Pharmacogenomics Panel; Future; Expected date: 02/21/2025  -     Cancel: Ambulatory referral/consult to Psychiatry; Future; Expected date: 02/28/2025  -     Cancel: Ambulatory referral/consult to Psychiatry; Future; Expected date: 02/28/2025  -     desvenlafaxine succinate (PRISTIQ) 25 mg Tb24; Take 1 tablet (25 mg total) by mouth once daily.  Dispense: 90 tablet; Refill: 0  -     Ambulatory referral/consult to Psychiatry; Future; Expected date: 02/21/2025  -     Ambulatory referral/consult to Psychiatry; Future; Expected date: 02/21/2025    2. Suicidal ideation  -     Pharmacogenomics Panel; Future; Expected date: 02/21/2025  -     Cancel: Ambulatory referral/consult to Psychiatry; Future; Expected date: 02/28/2025  -     Cancel: Ambulatory referral/consult to Psychiatry; Future; Expected date: 02/28/2025  -     Ambulatory referral/consult to Psychiatry; Future; Expected date: 02/21/2025  -     Ambulatory referral/consult to Psychiatry; Future; Expected date: 02/21/2025    3. Pernicious anemia  Assessment &  Plan:  Compliant with b12. At goal on 12/2024 lab      4. Iron deficiency anemia due to chronic blood loss    Cbc wnl 12/2024    There are no Patient Instructions on file for this visit.    Future Appointments   Date Time Provider Department Center   4/8/2025  1:40 PM Zheng Orozco MD [facility]   4/25/2025  9:10 AM Col[facility] Love III, [facility]   9/4/2025 12:00 PM Lang Hernández MD [facility]       Lab Frequency Next Occurrence   Ambulatory referral/consult to Psychiatry Once 04/15/2024   Ambulatory referral/consult to Orthopedics Once 04/15/2024   X-Ray Elbow Complete Left Once 04/15/2024   Ambulatory referral/consult to Cardiology Once 10/17/2024   Ambulatory referral/consult to Endo Procedure  Once 12/24/2024   Ambulatory referral/consult to Colorectal Surgery Once 12/30/2024   Ambulatory referral/consult to Rheumatology Once 01/06/2025   Ambulatory referral/consult to Endo Procedure  Once 01/17/2025   Mammo Digital Screening Bilat w/ Sanjay Once 02/07/2025   Sjogrens syndrome-A extractable nuclear antibody     Sjogrens syndrome-B extractable nuclear antibody     NGOZI Screen w/Reflex     C3 complement     C4 complement     Anti-DNA antibody, double-stranded     Urinalysis     Protein / creatinine ratio, urine     CBC auto differential     Comprehensive metabolic panel     DRVVT     Cardiolipin antibody     Beta-2 Glycoprotein Abs (IgA, IgG, IgM)         Follow up in about 2 weeks (around 3/7/2025), or if symptoms worsen or fail to improve, for Virtual Visit ok for f/u.         Visit today included increased complexity  addressed and managing the longitudinal care of the patient due to the serious and/or complex managed problem(s)

## 2025-02-28 ENCOUNTER — PATIENT MESSAGE (OUTPATIENT)
Dept: INTERNAL MEDICINE | Facility: CLINIC | Age: 65
End: 2025-02-28
Payer: COMMERCIAL

## 2025-03-05 ENCOUNTER — RESULTS FOLLOW-UP (OUTPATIENT)
Dept: INTERNAL MEDICINE | Facility: CLINIC | Age: 65
End: 2025-03-05

## 2025-04-08 ENCOUNTER — OFFICE VISIT (OUTPATIENT)
Dept: INTERNAL MEDICINE | Facility: CLINIC | Age: 65
End: 2025-04-08
Payer: COMMERCIAL

## 2025-04-08 ENCOUNTER — LAB VISIT (OUTPATIENT)
Dept: LAB | Facility: HOSPITAL | Age: 65
End: 2025-04-08
Attending: INTERNAL MEDICINE
Payer: COMMERCIAL

## 2025-04-08 ENCOUNTER — PATIENT MESSAGE (OUTPATIENT)
Dept: INTERNAL MEDICINE | Facility: CLINIC | Age: 65
End: 2025-04-08
Payer: COMMERCIAL

## 2025-04-08 VITALS
WEIGHT: 149.69 LBS | TEMPERATURE: 98 F | HEIGHT: 60 IN | SYSTOLIC BLOOD PRESSURE: 112 MMHG | HEART RATE: 78 BPM | BODY MASS INDEX: 29.39 KG/M2 | DIASTOLIC BLOOD PRESSURE: 70 MMHG | OXYGEN SATURATION: 97 %

## 2025-04-08 DIAGNOSIS — Z00.00 ROUTINE GENERAL MEDICAL EXAMINATION AT A HEALTH CARE FACILITY: Primary | ICD-10-CM

## 2025-04-08 DIAGNOSIS — F43.21 COMPLICATED BEREAVEMENT: ICD-10-CM

## 2025-04-08 DIAGNOSIS — Z00.00 ROUTINE GENERAL MEDICAL EXAMINATION AT A HEALTH CARE FACILITY: ICD-10-CM

## 2025-04-08 DIAGNOSIS — Z23 NEED FOR PNEUMOCOCCAL VACCINATION: ICD-10-CM

## 2025-04-08 DIAGNOSIS — D51.0 PERNICIOUS ANEMIA: Chronic | ICD-10-CM

## 2025-04-08 DIAGNOSIS — F41.0 GENERALIZED ANXIETY DISORDER WITH PANIC ATTACKS: ICD-10-CM

## 2025-04-08 DIAGNOSIS — K90.0 CELIAC DISEASE: ICD-10-CM

## 2025-04-08 DIAGNOSIS — F41.1 GENERALIZED ANXIETY DISORDER WITH PANIC ATTACKS: ICD-10-CM

## 2025-04-08 PROCEDURE — 85025 COMPLETE CBC W/AUTO DIFF WBC: CPT

## 2025-04-08 PROCEDURE — 82465 ASSAY BLD/SERUM CHOLESTEROL: CPT

## 2025-04-08 PROCEDURE — 3008F BODY MASS INDEX DOCD: CPT | Mod: CPTII,S$GLB,, | Performed by: INTERNAL MEDICINE

## 2025-04-08 PROCEDURE — 82607 VITAMIN B-12: CPT

## 2025-04-08 PROCEDURE — 83036 HEMOGLOBIN GLYCOSYLATED A1C: CPT

## 2025-04-08 PROCEDURE — 1159F MED LIST DOCD IN RCRD: CPT | Mod: CPTII,S$GLB,, | Performed by: INTERNAL MEDICINE

## 2025-04-08 PROCEDURE — 36415 COLL VENOUS BLD VENIPUNCTURE: CPT

## 2025-04-08 PROCEDURE — 84443 ASSAY THYROID STIM HORMONE: CPT

## 2025-04-08 PROCEDURE — 90677 PCV20 VACCINE IM: CPT | Mod: S$GLB,,, | Performed by: INTERNAL MEDICINE

## 2025-04-08 PROCEDURE — 99999 PR PBB SHADOW E&M-EST. PATIENT-LVL IV: CPT | Mod: PBBFAC,,, | Performed by: INTERNAL MEDICINE

## 2025-04-08 PROCEDURE — 3074F SYST BP LT 130 MM HG: CPT | Mod: CPTII,S$GLB,, | Performed by: INTERNAL MEDICINE

## 2025-04-08 PROCEDURE — 80053 COMPREHEN METABOLIC PANEL: CPT

## 2025-04-08 PROCEDURE — 3044F HG A1C LEVEL LT 7.0%: CPT | Mod: CPTII,S$GLB,, | Performed by: INTERNAL MEDICINE

## 2025-04-08 PROCEDURE — 3078F DIAST BP <80 MM HG: CPT | Mod: CPTII,S$GLB,, | Performed by: INTERNAL MEDICINE

## 2025-04-08 PROCEDURE — 90471 IMMUNIZATION ADMIN: CPT | Mod: S$GLB,,, | Performed by: INTERNAL MEDICINE

## 2025-04-08 PROCEDURE — 99396 PREV VISIT EST AGE 40-64: CPT | Mod: 25,S$GLB,, | Performed by: INTERNAL MEDICINE

## 2025-04-08 RX ORDER — CYANOCOBALAMIN 1000 UG/ML
1000 INJECTION, SOLUTION INTRAMUSCULAR; SUBCUTANEOUS
Qty: 3 ML | Refills: 3 | Status: SHIPPED | OUTPATIENT
Start: 2025-04-08

## 2025-04-08 RX ORDER — ZINC GLUCONATE 50 MG
1000 TABLET ORAL DAILY
Qty: 30 TABLET | Refills: 6 | Status: SHIPPED | OUTPATIENT
Start: 2025-04-08 | End: 2025-04-08

## 2025-04-08 NOTE — PROGRESS NOTES
Subjective:      Patient ID: Alma Mistry is a 64 y.o. female.    Chief Complaint: Annual Exam    HPI  History of Present Illness                 64 y.o. with  Problem List[1]  Past Medical History:   Diagnosis Date    Atrophic gastritis     B12 deficiency     Breast lump on left side at 3 o'clock position 03/25/2015    Celiac disease     Cystic disease of ovaries     Fibrocystic breast     Lichen sclerosus 08/16/2012    Lupus        Here today for annual prev exam.  Compliant with meds without significant side effects. Energy and appetite are good.         Past Surgical History:   Procedure Laterality Date    ADENOIDECTOMY  1970    BREAST BIOPSY Right 2009    benign    BREAST SURGERY  2010    Left breast biopsy    COLONOSCOPY  09/10/2010    COLONOSCOPY N/A 06/04/2020    Procedure: COLONOSCOPY;  Surgeon: Almas Perez MD;  Location: [facility];  Service: Endoscopy;  Laterality: N/A;    ESOPHAGOGASTRODUODENOSCOPY  09/10/2010    ESOPHAGOGASTRODUODENOSCOPY N/A 06/04/2020    Procedure: ESOPHAGOGASTRODUODENOSCOPY (EGD);  Surgeon: Almas Perez MD;  Location: [facility];  Service: Endoscopy;  Laterality: N/A;    ESOPHAGOGASTRODUODENOSCOPY N/A 07/28/2020    Procedure: EGD (ESOPHAGOGASTRODUODENOSCOPY);  Surgeon: Almas Perez MD;  Location: [facility];  Service: Endoscopy;  Laterality: N/A;    HYSTERECTOMY  2013    TONSILLECTOMY       Social History[2]  family history includes Anemia in her mother; Asthma in her father; COPD in her daughter; Cancer in her brother; Cirrhosis in her brother; Depression in her brother, mother, and sister; Diabetes in her maternal aunt and maternal grandfather; Early death in her daughter; Hearing loss in her paternal uncle; Hypertension in her mother; Hyperthyroidism in her mother; Hypotension in her mother; Kidney disease in her mother; Leukemia in her brother; Mental illness in her daughter, mother, and sister; Multiple sclerosis in her brother; Nephritis  in her mother; Thyroid disease in her sister.      Review of Systems   Constitutional:  Negative for chills and fever.   HENT:  Negative for ear pain and sore throat.    Respiratory:  Negative for cough.    Cardiovascular:  Negative for chest pain.   Gastrointestinal:  Negative for abdominal pain and blood in stool.   Genitourinary:  Negative for dysuria and hematuria.   Neurological:  Negative for seizures and syncope.     Objective:   /70 (BP Location: Right arm, Patient Position: Sitting)   Pulse 78   Temp 98.1 °F (36.7 °C)   Ht 5' (1.524 m)   Wt 67.9 kg (149 lb 11.1 oz)   SpO2 97%   BMI 29.23 kg/m²     Physical Exam  Constitutional:       General: She is not in acute distress.     Appearance: She is well-developed.   HENT:      Head: Normocephalic and atraumatic.   Eyes:      Extraocular Movements: Extraocular movements intact.   Neck:      Thyroid: No thyromegaly.   Cardiovascular:      Rate and Rhythm: Normal rate and regular rhythm.   Pulmonary:      Breath sounds: Normal breath sounds. No wheezing or rales.   Abdominal:      General: Bowel sounds are normal.      Palpations: Abdomen is soft.      Tenderness: There is no abdominal tenderness.   Musculoskeletal:         General: No swelling.      Cervical back: Neck supple. No rigidity.   Lymphadenopathy:      Cervical: No cervical adenopathy.   Skin:     General: Skin is warm and dry.   Neurological:      Mental Status: She is alert and oriented to person, place, and time.   Psychiatric:         Behavior: Behavior normal.         Lab Results   Component Value Date    WBC 8.87 04/08/2025    HGB 13.8 04/08/2025    HGB 13.5 12/23/2024    HGB 14.1 04/13/2024    HCT 42.1 04/08/2025    MCV 84 04/08/2025    MCV 87 12/23/2024    MCV 86 04/13/2024     04/08/2025    CHOL 231 (H) 04/08/2025    TRIG 167 (H) 04/08/2025    HDL 50 04/08/2025    LDLCALC 151.8 04/13/2024    LDLCALC 98.4 02/24/2023    LDLCALC 143.8 01/06/2022    ALT 29 04/08/2025    AST 29  04/08/2025     04/08/2025    K 4.2 04/08/2025    CALCIUM 9.8 04/08/2025     04/08/2025    CO2 24 04/08/2025    BUN 15 04/08/2025    CREATININE 0.7 04/08/2025    CREATININE 0.6 12/23/2024    CREATININE 0.7 04/13/2024    EGFRNORACEVR >60 04/08/2025    EGFRNORACEVR >60.0 12/23/2024    EGFRNORACEVR >60.0 04/13/2024    TSH 1.074 04/08/2025    TSH 1.014 04/13/2024    TSH 0.960 02/24/2023    GLU 86 12/23/2024    HGBA1C 4.8 04/08/2025    HGBA1C 4.6 09/21/2017    VCEVPMET08VT 27 (L) 09/21/2017          The 10-year ASCVD risk score (Denise BRUSH, et al., 2019) is: 3.7%    Values used to calculate the score:      Age: 64 years      Sex: Female      Is Non- : No      Diabetic: No      Tobacco smoker: No      Systolic Blood Pressure: 112 mmHg      Is BP treated: No      HDL Cholesterol: 50 mg/dL      Total Cholesterol: 231 mg/dL     Assessment:     1. Routine general medical examination at a health care facility    2. Celiac disease    3. Generalized anxiety disorder with panic attacks    4. Pernicious anemia    5. Need for pneumococcal vaccination    6. Complicated bereavement      Plan:   1. Routine general medical examination at a health care facility  Heart healthy diet, regular exercise, and regular use of sunscreen.   HM reviewed    -     Comprehensive Metabolic Panel; Future; Expected date: 04/08/2025  -     CBC Auto Differential; Future; Expected date: 04/08/2025  -     TSH; Future; Expected date: 04/08/2025  -     Lipid Panel; Future; Expected date: 04/08/2025  -     Hemoglobin A1C; Future; Expected date: 04/08/2025    2. Celiac disease    3. Generalized anxiety disorder with panic attacks  Overview:  Side effects with pristiq   Vivid dreams with cybalta  Side effects with effexor.       Assessment & Plan:  Wants to discuss with psyc prior to medication addition.     Orders:  -     Ambulatory referral/consult to Psychiatry; Future; Expected date: 04/15/2025    4. Pernicious anemia  -      Vitamin B12; Future; Expected date: 04/08/2025  -     Discontinue: VITAMIN B-12 1000 MCG tablet; Take 1 tablet (1,000 mcg total) by mouth once daily.  Dispense: 30 tablet; Refill: 6  -     cyanocobalamin 1,000 mcg/mL injection; Inject 1 mL (1,000 mcg total) into the muscle every 30 days.  Dispense: 3 mL; Refill: 3    5. Need for pneumococcal vaccination  -     pneumoc 20-rayne conj-dip cr(PF) (PREVNAR-20 (PF)) injection Syrg 0.5 mL    6. Complicated bereavement  Overview:  Declines med          Patient Instructions   Check with your pharmacy regarding rsv and shingles vaccine.          Future Appointments   Date Time Provider Department Center   4/25/2025  9:10 AM Colton Love III, MD [facility]   9/4/2025 12:00 PM Lang Hernández MD [facility]   4/9/2026  3:20 PM Zheng Orozco MD [facility]       Lab Frequency Next Occurrence   Ambulatory referral/consult to Psychiatry Once 04/15/2024   Ambulatory referral/consult to Orthopedics Once 04/15/2024   X-Ray Elbow Complete Left Once 04/15/2024   Ambulatory referral/consult to Cardiology Once 10/17/2024   Ambulatory referral/consult to Endo Procedure  Once 12/24/2024   Ambulatory referral/consult to Colorectal Surgery Once 12/30/2024   Ambulatory referral/consult to Rheumatology Once 01/06/2025   Ambulatory referral/consult to Endo Procedure  Once 01/17/2025   Mammo Digital Screening Bilat w/ Sanjay Once 02/07/2025   Pharmacogenomics Panel Once 02/21/2025   Ambulatory referral/consult to Psychiatry Once 02/21/2025   Ambulatory referral/consult to Psychiatry Once 02/21/2025   Sjogrens syndrome-A extractable nuclear antibody     Sjogrens syndrome-B extractable nuclear antibody     NGOZI Screen w/Reflex     C3 complement     C4 complement     Anti-DNA antibody, double-stranded     Urinalysis     Protein / creatinine ratio, urine     CBC auto differential     Comprehensive metabolic panel     DRVVT     Cardiolipin  antibody     Beta-2 Glycoprotein Abs (IgA, IgG, IgM)         Follow up in about 1 year (around 4/8/2026), or if symptoms worsen or fail to improve, for labs today.                  [1]   Patient Active Problem List  Diagnosis    Pernicious anemia    Overweight    Lichen sclerosus    Panic disorder    Polyneuropathy    RLS (restless legs syndrome)    Neuropathic pain    Arthralgia    Generalized anxiety disorder with panic attacks    Complicated bereavement    Neuropathy of left superficial peroneal nerve    Iron deficiency anemia due to chronic blood loss    Celiac disease   [2]   Social History  Socioeconomic History    Marital status:    Tobacco Use    Smoking status: Never     Passive exposure: Never    Smokeless tobacco: Never   Substance and Sexual Activity    Alcohol use: Not Currently     Alcohol/week: 2.0 standard drinks of alcohol     Types: 2 Glasses of wine per week    Drug use: Never    Sexual activity: Not Currently     Partners: Male     Birth control/protection: Abstinence, Coitus interruptus, Condom     Comment: Past   Social History Narrative    1 dog, no smokers; Originally from [place].     Social Drivers of Health     Financial Resource Strain: Low Risk  (2/20/2025)    Overall Financial Resource Strain (CARDIA)     Difficulty of Paying Living Expenses: Not hard at all   Food Insecurity: No Food Insecurity (2/20/2025)    Hunger Vital Sign     Worried About Running Out of Food in the Last Year: Never true     Ran Out of Food in the Last Year: Never true   Transportation Needs: No Transportation Needs (2/20/2025)    PRAPARE - Transportation     Lack of Transportation (Medical): No     Lack of Transportation (Non-Medical): No   Physical Activity: Sufficiently Active (2/20/2025)    Exercise Vital Sign     Days of Exercise per Week: 3 days     Minutes of Exercise per Session: 60 min   Stress: Stress Concern Present (2/20/2025)    [employer] [place] [employer] - Occupational Stress  Questionnaire     Feeling of Stress : To some extent   Housing Stability: Low Risk  (2/20/2025)    Housing Stability Vital Sign     Unable to Pay for Housing in the Last Year: No     Number of Times Moved in the Last Year: 0     Homeless in the Last Year: No

## 2025-04-09 LAB
ABSOLUTE EOSINOPHIL (OHS): 0.25 K/UL
ABSOLUTE MONOCYTE (OHS): 0.57 K/UL (ref 0.3–1)
ABSOLUTE NEUTROPHIL COUNT (OHS): 5.71 K/UL (ref 1.8–7.7)
ALBUMIN SERPL BCP-MCNC: 4.1 G/DL (ref 3.5–5.2)
ALP SERPL-CCNC: 104 UNIT/L (ref 40–150)
ALT SERPL W/O P-5'-P-CCNC: 29 UNIT/L (ref 10–44)
ANION GAP (OHS): 10 MMOL/L (ref 8–16)
AST SERPL-CCNC: 29 UNIT/L (ref 11–45)
BASOPHILS # BLD AUTO: 0.05 K/UL
BASOPHILS NFR BLD AUTO: 0.6 %
BILIRUB SERPL-MCNC: 0.4 MG/DL (ref 0.1–1)
BUN SERPL-MCNC: 15 MG/DL (ref 8–23)
CALCIUM SERPL-MCNC: 9.8 MG/DL (ref 8.7–10.5)
CHLORIDE SERPL-SCNC: 106 MMOL/L (ref 95–110)
CHOLEST SERPL-MCNC: 231 MG/DL (ref 120–199)
CHOLEST/HDLC SERPL: 4.6 {RATIO} (ref 2–5)
CO2 SERPL-SCNC: 24 MMOL/L (ref 23–29)
CREAT SERPL-MCNC: 0.7 MG/DL (ref 0.5–1.4)
EAG (OHS): 91 MG/DL (ref 68–131)
ERYTHROCYTE [DISTWIDTH] IN BLOOD BY AUTOMATED COUNT: 13.5 % (ref 11.5–14.5)
GFR SERPLBLD CREATININE-BSD FMLA CKD-EPI: >60 ML/MIN/1.73/M2
GLUCOSE SERPL-MCNC: 88 MG/DL (ref 70–110)
HBA1C MFR BLD: 4.8 % (ref 4–5.6)
HCT VFR BLD AUTO: 42.1 % (ref 37–48.5)
HDLC SERPL-MCNC: 50 MG/DL (ref 40–75)
HDLC SERPL: 21.6 % (ref 20–50)
HGB BLD-MCNC: 13.8 GM/DL (ref 12–16)
IMM GRANULOCYTES # BLD AUTO: 0.03 K/UL (ref 0–0.04)
IMM GRANULOCYTES NFR BLD AUTO: 0.3 % (ref 0–0.5)
LDLC SERPL CALC-MCNC: 147.6 MG/DL (ref 63–159)
LYMPHOCYTES # BLD AUTO: 2.26 K/UL (ref 1–4.8)
MCH RBC QN AUTO: 27.5 PG (ref 27–31)
MCHC RBC AUTO-ENTMCNC: 32.8 G/DL (ref 32–36)
MCV RBC AUTO: 84 FL (ref 82–98)
NONHDLC SERPL-MCNC: 181 MG/DL
NUCLEATED RBC (/100WBC) (OHS): 0 /100 WBC
PLATELET # BLD AUTO: 420 K/UL (ref 150–450)
PMV BLD AUTO: 11.8 FL (ref 9.2–12.9)
POTASSIUM SERPL-SCNC: 4.2 MMOL/L (ref 3.5–5.1)
PROT SERPL-MCNC: 7.9 GM/DL (ref 6–8.4)
RBC # BLD AUTO: 5.01 M/UL (ref 4–5.4)
RELATIVE EOSINOPHIL (OHS): 2.8 %
RELATIVE LYMPHOCYTE (OHS): 25.5 % (ref 18–48)
RELATIVE MONOCYTE (OHS): 6.4 % (ref 4–15)
RELATIVE NEUTROPHIL (OHS): 64.4 % (ref 38–73)
SODIUM SERPL-SCNC: 140 MMOL/L (ref 136–145)
TRIGL SERPL-MCNC: 167 MG/DL (ref 30–150)
TSH SERPL-ACNC: 1.07 UIU/ML (ref 0.4–4)
VIT B12 SERPL-MCNC: 439 PG/ML (ref 210–950)
WBC # BLD AUTO: 8.87 K/UL (ref 3.9–12.7)

## 2025-04-10 ENCOUNTER — PATIENT MESSAGE (OUTPATIENT)
Dept: PSYCHIATRY | Facility: CLINIC | Age: 65
End: 2025-04-10
Payer: COMMERCIAL

## 2025-04-16 ENCOUNTER — RESULTS FOLLOW-UP (OUTPATIENT)
Dept: INTERNAL MEDICINE | Facility: CLINIC | Age: 65
End: 2025-04-16

## 2025-05-15 ENCOUNTER — PATIENT MESSAGE (OUTPATIENT)
Dept: INTERNAL MEDICINE | Facility: CLINIC | Age: 65
End: 2025-05-15
Payer: COMMERCIAL

## 2025-05-15 DIAGNOSIS — F32.A DEPRESSION, UNSPECIFIED DEPRESSION TYPE: ICD-10-CM

## 2025-05-15 DIAGNOSIS — F41.0 GENERALIZED ANXIETY DISORDER WITH PANIC ATTACKS: Primary | ICD-10-CM

## 2025-05-15 DIAGNOSIS — F41.1 GENERALIZED ANXIETY DISORDER WITH PANIC ATTACKS: Primary | ICD-10-CM

## 2025-05-16 RX ORDER — DULOXETIN HYDROCHLORIDE 20 MG/1
20 CAPSULE, DELAYED RELEASE ORAL DAILY
Qty: 30 CAPSULE | Refills: 0 | Status: SHIPPED | OUTPATIENT
Start: 2025-05-16 | End: 2026-05-16

## 2025-05-26 ENCOUNTER — OFFICE VISIT (OUTPATIENT)
Dept: URGENT CARE | Facility: CLINIC | Age: 65
End: 2025-05-26
Payer: COMMERCIAL

## 2025-05-26 VITALS
SYSTOLIC BLOOD PRESSURE: 114 MMHG | TEMPERATURE: 98 F | OXYGEN SATURATION: 96 % | WEIGHT: 147.06 LBS | HEART RATE: 69 BPM | RESPIRATION RATE: 16 BRPM | DIASTOLIC BLOOD PRESSURE: 75 MMHG | BODY MASS INDEX: 28.87 KG/M2 | HEIGHT: 60 IN

## 2025-05-26 DIAGNOSIS — H61.22 IMPACTED CERUMEN OF LEFT EAR: Primary | ICD-10-CM

## 2025-05-26 PROCEDURE — 99213 OFFICE O/P EST LOW 20 MIN: CPT | Mod: S$GLB,,,

## 2025-05-26 RX ORDER — SYRING-NEEDL,DISP,INSUL,0.3 ML 29 G X1/2"
150 SYRINGE, EMPTY DISPOSABLE MISCELLANEOUS
COMMUNITY
Start: 2025-05-01

## 2025-05-26 RX ORDER — OXYCODONE AND ACETAMINOPHEN 5; 325 MG/1; MG/1
TABLET ORAL
COMMUNITY
Start: 2025-05-03

## 2025-05-26 RX ORDER — MELATONIN 5 MG
1 CAPSULE ORAL
COMMUNITY

## 2025-05-26 NOTE — PROGRESS NOTES
Subjective:      Patient ID: Alma Mistry is a 64 y.o. female.    Vitals:  height is 5' (1.524 m) and weight is 66.7 kg (147 lb 0.8 oz). Her oral temperature is 97.9 °F (36.6 °C). Her blood pressure is 114/75 and her pulse is 69. Her respiration is 16 and oxygen saturation is 96%.     Chief Complaint: Ear Fullness    64-year-old female patient presents to the clinic with complaints of hearing loss due to potential cerumen impaction on left ear only.  She states this is a recurrent issue because her left ear canal is smaller than her right side.  She denies any ear pain or ear drainage.  She denies all other URI symptoms.  She states she tried over-the-counter ear cleaning kit however it seems to be ineffective.  She states she really noticed it becoming more of an issue about a week ago.  Patient is a allergic to fluticasone, gluten, promethazine.      Ear Fullness   There is pain in the left ear. This is a new problem. The current episode started in the past 7 days. The problem occurs constantly. The problem has been gradually worsening. There has been no fever. The patient is experiencing no pain. Associated symptoms include hearing loss (left side). Pertinent negatives include no coughing, ear discharge or sore throat. Treatments tried: Over the counter wax removal. The treatment provided no relief.       Constitution: Negative for chills, sweating and fever.   HENT:  Positive for hearing loss (left side). Negative for ear pain, ear discharge, congestion, sinus pressure, sore throat, trouble swallowing and voice change.    Neck: Negative for neck stiffness.   Cardiovascular:  Negative for chest pain.   Eyes:  Negative for eye discharge, eye itching and eye redness.   Respiratory:  Negative for cough and shortness of breath.    Musculoskeletal:  Negative for muscle ache.   Allergic/Immunologic: Negative for sneezing.      Objective:     Vitals:    05/26/25 1105   BP: 114/75   Pulse: 69   Resp: 16   Temp: 97.9  °F (36.6 °C)       Physical Exam   Constitutional: She is oriented to person, place, and time. She appears well-developed. She is cooperative.  Non-toxic appearance. She does not appear ill. No distress.   HENT:   Head: Normocephalic and atraumatic.   Ears:   Right Ear: Hearing, tympanic membrane, external ear and ear canal normal. No no drainage, swelling or tenderness. no impacted cerumen  Left Ear: Hearing, external ear and ear canal normal. No no drainage, swelling or tenderness. impacted cerumen  Nose: Nose normal. No mucosal edema, rhinorrhea or nasal deformity. No epistaxis. Right sinus exhibits no maxillary sinus tenderness and no frontal sinus tenderness. Left sinus exhibits no maxillary sinus tenderness and no frontal sinus tenderness.   Mouth/Throat: Uvula is midline, oropharynx is clear and moist and mucous membranes are normal. Mucous membranes are moist. No trismus in the jaw. Normal dentition. No uvula swelling. No oropharyngeal exudate, posterior oropharyngeal edema, posterior oropharyngeal erythema or cobblestoning. No tonsillar exudate.   Eyes: Conjunctivae and lids are normal. Pupils are equal, round, and reactive to light. Right eye exhibits no discharge. Left eye exhibits no discharge.   Neck: Trachea normal and phonation normal. Neck supple. No edema present. No erythema present. No neck rigidity present.   Cardiovascular: Normal rate, regular rhythm, normal heart sounds and normal pulses.   Pulmonary/Chest: Effort normal and breath sounds normal. No accessory muscle usage or stridor. No tachypnea. No respiratory distress. She has no decreased breath sounds. She has no wheezes. She has no rhonchi. She has no rales.   Abdominal: Normal appearance.   Musculoskeletal: Normal range of motion.         General: No deformity. Normal range of motion.   Neurological: no focal deficit. She is alert and oriented to person, place, and time. No cranial nerve deficit. She exhibits normal muscle tone.  Coordination normal.   Skin: Skin is warm, dry, intact, not diaphoretic, not pale and no rash.   Psychiatric: Her speech is normal and behavior is normal. Judgment and thought content normal.   Nursing note and vitals reviewed.      Assessment:     1. Impacted cerumen of left ear        Plan:       Impacted cerumen of left ear  -     Ear wax removal  -     Ambulatory referral/consult to ENT        Patient Instructions   Ear Wax Cleaning/ Removal  - You can use over the counter mineral oil to soften ear wax  -  Use Over the Counter Debrox as directed for any future ear was buildup  -  Avoid cleaning ears with any foreign objects  -  Follow up with here or with your PCP for no improvement of symptoms  -  Follow up in the ER for any worsening of symptoms.   -  Please return here or go to the Emergency Department for any concerns or worsening of condition.  -  Please follow up with your primary care doctor or specialist in the next 48-72hrs as needed.        Medical Decision Making:   History:   Old Medical Records: I decided to obtain old medical records.  Urgent Care Management:  Ear wax partially removed with curette and irrigation. Unable to completely remove cerumen. No signs of otitis externa, no tenderness with procedure. Low suspicion of AOM. Referral to ENT placed. OTC medications suggested for cerumen impaction. Patient agreeable to plan and leaves clinic in [facility].

## 2025-05-26 NOTE — PATIENT INSTRUCTIONS
Ear Wax Cleaning/ Removal  - You can use over the counter mineral oil to soften ear wax  -  Use Over the Counter Debrox as directed for any future ear was buildup  -  Avoid cleaning ears with any foreign objects  -  Follow up with here or with your PCP for no improvement of symptoms  -  Follow up in the ER for any worsening of symptoms.   -  Please return here or go to the Emergency Department for any concerns or worsening of condition.  -  Please follow up with your primary care doctor or specialist in the next 48-72hrs as needed.

## 2025-05-27 ENCOUNTER — OFFICE VISIT (OUTPATIENT)
Dept: OTOLARYNGOLOGY | Facility: CLINIC | Age: 65
End: 2025-05-27
Payer: COMMERCIAL

## 2025-05-27 VITALS — BODY MASS INDEX: 29.08 KG/M2 | WEIGHT: 148.13 LBS | HEIGHT: 60 IN

## 2025-05-27 DIAGNOSIS — H61.22 IMPACTED CERUMEN OF LEFT EAR: ICD-10-CM

## 2025-05-27 DIAGNOSIS — H92.12 OTORRHEA OF LEFT EAR: ICD-10-CM

## 2025-05-27 DIAGNOSIS — H60.392 OTHER INFECTIVE OTITIS EXTERNA OF LEFT EAR, UNSPECIFIED CHRONICITY: Primary | ICD-10-CM

## 2025-05-27 PROCEDURE — 99213 OFFICE O/P EST LOW 20 MIN: CPT | Mod: 25,S$GLB,,

## 2025-05-27 PROCEDURE — 87186 SC STD MICRODIL/AGAR DIL: CPT

## 2025-05-27 PROCEDURE — 3044F HG A1C LEVEL LT 7.0%: CPT | Mod: CPTII,S$GLB,,

## 2025-05-27 PROCEDURE — 3008F BODY MASS INDEX DOCD: CPT | Mod: CPTII,S$GLB,,

## 2025-05-27 PROCEDURE — 69210 REMOVE IMPACTED EAR WAX UNI: CPT | Mod: S$GLB,,,

## 2025-05-27 PROCEDURE — 99999 PR PBB SHADOW E&M-EST. PATIENT-LVL III: CPT | Mod: PBBFAC,,,

## 2025-05-27 PROCEDURE — 1159F MED LIST DOCD IN RCRD: CPT | Mod: CPTII,S$GLB,,

## 2025-05-27 RX ORDER — CLOTRIMAZOLE 1 G/ML
SOLUTION TOPICAL
Qty: 10 ML | Refills: 0 | Status: SHIPPED | OUTPATIENT
Start: 2025-05-27

## 2025-05-27 NOTE — PROGRESS NOTES
Subjective:   Patient ID: Alma Mistry is a 64 y.o. female.    Chief Complaint: Cerumen Impaction and Ear Fullness (Pt is coming in today for ear cleaning. Pt c/o L ear discharge and clogged up.)    History of Present Illness    Patient presents today for left ear fullness. She reports left ear fullness for 1.5 weeks with associated decreased hearing. She denies pain or Q-tip use. She has experienced similar episodes in the past. She attempted treatment with OTC earwax removal kit without success. She was diagnosed with cerumen impaction at urgent care yesterday. Admits to tinnitus for many years bilaterally. She has not had a recent audiogram.        Review of patient's allergies indicates:   Allergen Reactions    Fluticasone Nausea Only and Other (See Comments)     Feels out of control in daily functions.    Gluten Other (See Comments)     Celiac disease    Promethazine            Review of Systems   Constitutional: Negative.  Negative for chills, fatigue, fever and unexpected weight change.   HENT:  Positive for ear discharge and hearing loss. Negative for congestion, dental problem, ear pain, facial swelling, nosebleeds, postnasal drip, rhinorrhea, sinus pressure, sneezing, sore throat, tinnitus, trouble swallowing and voice change.    Eyes:  Negative for redness, itching and visual disturbance.   Respiratory: Negative.  Negative for cough, choking, shortness of breath and wheezing.    Cardiovascular: Negative.  Negative for chest pain and palpitations.   Gastrointestinal: Negative.  Negative for abdominal pain.        No reflux.   Endocrine: Negative.    Genitourinary: Negative.    Musculoskeletal: Negative.  Negative for gait problem.   Skin: Negative.  Negative for rash.   Allergic/Immunologic: Negative.    Neurological: Negative.  Negative for dizziness, light-headedness and headaches.   Hematological: Negative.          Objective:   Ht 5' (1.524 m)   Wt 67.2 kg (148 lb 2.4 oz)   BMI 28.93 kg/m²      Physical Exam  Constitutional:       General: She is not in acute distress.     Appearance: Normal appearance. She is not ill-appearing.   HENT:      Head: Normocephalic and atraumatic.      Right Ear: Tympanic membrane, ear canal and external ear normal.      Left Ear: Tympanic membrane, ear canal and external ear normal. There is impacted cerumen (cerumen mixed with fungal looking debris suctioned today).      Nose: Nose normal. No congestion or rhinorrhea.      Mouth/Throat:      Mouth: Mucous membranes are moist.      Pharynx: Oropharynx is clear. Uvula midline. No oropharyngeal exudate or posterior oropharyngeal erythema.   Eyes:      Extraocular Movements: Extraocular movements intact.      Conjunctiva/sclera: Conjunctivae normal.      Pupils: Pupils are equal, round, and reactive to light.   Neck:      Thyroid: No thyroid mass.   Pulmonary:      Effort: Pulmonary effort is normal. No respiratory distress.   Musculoskeletal:         General: Normal range of motion.      Cervical back: Full passive range of motion without pain.   Lymphadenopathy:      Cervical: No cervical adenopathy.   Neurological:      General: No focal deficit present.      Mental Status: She is alert and oriented to person, place, and time.   Psychiatric:         Mood and Affect: Mood normal.         Behavior: Behavior normal.         Thought Content: Thought content normal.         Judgment: Judgment normal.          Procedure Note    CHIEF COMPLAINT:  Cerumen Impaction/debris    Description:  The patient was seated in an exam chair.  An ear speculum was placed in the left EAC and was examined under the microscope.  Suction and/or loop curettes were used to remove a large cerumen impaction mixed with fungal looking debris.  The tympanic membrane was visualized and was normal in appearance.  Left EAC friable. The patient tolerated the procedure well.     Assessment/Plan:     1. Other infective otitis externa of left ear, unspecified  chronicity    2. Otorrhea of left ear          Other infective otitis externa of left ear, unspecified chronicity  -     clotrimazole (LOTRIMIN) 1 % Soln; Apply to affected ear twice daily  Dispense: 10 mL; Refill: 0  -     Aerobic culture    Otorrhea of left ear  -     Aerobic culture        Assessment & Plan    OTHER INFECTIVE OTITIS EXTERNA OF LEFT EAR, UNSPECIFIED CHRONICITY:  - Removed wax mixed with possible fungal debridement   - Suspect fungal infection, initiating antifungal treatment pending culture results   - Started clotrimazole drops   - Ordered culture of ear debris to determine appropriate treatment   - Educated on dry ear precautions   - Follow up in 1 week   - Will review culture results and contact patient if medication changes are needed    OTORRHEA OF LEFT EAR:  - Removed wax mixed with possible fungal debridement   - Educated on dry ear precautions   - Follow up in 1 week    PLAN SUMMARY:  - Removed earwax and possible fungal debris  - Initiated antifungal treatment with clotrimazole drops  - Ordered culture of ear debris  - Educated patient on dry ear precautions  - Follow-up in 1 week to review culture results and adjust treatment if needed   - will schedule audiogram once otitis externa resolves         This note was generated with the assistance of ambient listening technology. Patient was not recorded, this note was recorded after the visit had concluded. I attest to having reviewed and edited the generated note for accuracy, though some syntax or spelling errors may persist. Please contact the author of this note for any clarification.

## 2025-05-28 ENCOUNTER — PATIENT MESSAGE (OUTPATIENT)
Dept: OTOLARYNGOLOGY | Facility: CLINIC | Age: 65
End: 2025-05-28
Payer: COMMERCIAL

## 2025-05-28 DIAGNOSIS — H92.12 OTORRHEA OF LEFT EAR: ICD-10-CM

## 2025-05-28 DIAGNOSIS — H60.392 OTHER INFECTIVE OTITIS EXTERNA OF LEFT EAR, UNSPECIFIED CHRONICITY: Primary | ICD-10-CM

## 2025-05-28 NOTE — TELEPHONE ENCOUNTER
Called patient, no answer, LVM. Explained to patient she does have an ear infection and we started ear drops yesterday for what I believe to be a fungal ear infection. Offered a short course of steroids if she is able to tolerate to help decrease inflammation and pain. Also recommend she try tylenol/ibuprofen (if she is allowed to take) to help with pain. Left phone number to call back if needed.

## 2025-05-30 RX ORDER — CIPROFLOXACIN AND DEXAMETHASONE 3; 1 MG/ML; MG/ML
4 SUSPENSION/ DROPS AURICULAR (OTIC) 2 TIMES DAILY
Qty: 7.5 ML | Refills: 0 | Status: SHIPPED | OUTPATIENT
Start: 2025-05-30 | End: 2025-06-09

## 2025-05-30 RX ORDER — CEFDINIR 300 MG/1
300 CAPSULE ORAL 2 TIMES DAILY
Qty: 20 CAPSULE | Refills: 0 | Status: SHIPPED | OUTPATIENT
Start: 2025-05-30 | End: 2025-06-09

## 2025-05-30 NOTE — TELEPHONE ENCOUNTER
Spoke to patient on the phone. She starting having increasing pain in her ear yesterday. I explained that I only have her preliminary results back showing a gram negative shahriar which is not fungal like I suspected. I will send in new ear drop and oral antibiotic to prevent her from worsening over the weekend. I will contact her if I need to change medication once I have final culture results. Keep follow up as scheduled.

## 2025-05-31 LAB — BACTERIA SPEC AEROBE CULT: ABNORMAL

## 2025-06-02 ENCOUNTER — TELEPHONE (OUTPATIENT)
Dept: OTOLARYNGOLOGY | Facility: CLINIC | Age: 65
End: 2025-06-02
Payer: COMMERCIAL

## 2025-06-02 ENCOUNTER — RESULTS FOLLOW-UP (OUTPATIENT)
Dept: OTOLARYNGOLOGY | Facility: CLINIC | Age: 65
End: 2025-06-02

## 2025-06-03 ENCOUNTER — OFFICE VISIT (OUTPATIENT)
Dept: OTOLARYNGOLOGY | Facility: CLINIC | Age: 65
End: 2025-06-03
Payer: COMMERCIAL

## 2025-06-03 VITALS — WEIGHT: 147.69 LBS | BODY MASS INDEX: 28.85 KG/M2

## 2025-06-03 DIAGNOSIS — H61.22 IMPACTED CERUMEN OF LEFT EAR: ICD-10-CM

## 2025-06-03 DIAGNOSIS — H60.392 OTHER INFECTIVE ACUTE OTITIS EXTERNA OF LEFT EAR: Primary | ICD-10-CM

## 2025-06-03 PROCEDURE — 69210 REMOVE IMPACTED EAR WAX UNI: CPT | Mod: LT,S$GLB,,

## 2025-06-03 PROCEDURE — 1159F MED LIST DOCD IN RCRD: CPT | Mod: CPTII,S$GLB,,

## 2025-06-03 PROCEDURE — 3008F BODY MASS INDEX DOCD: CPT | Mod: CPTII,S$GLB,,

## 2025-06-03 PROCEDURE — 3044F HG A1C LEVEL LT 7.0%: CPT | Mod: CPTII,S$GLB,,

## 2025-06-03 PROCEDURE — 99213 OFFICE O/P EST LOW 20 MIN: CPT | Mod: 25,S$GLB,,

## 2025-06-03 PROCEDURE — 99999 PR PBB SHADOW E&M-EST. PATIENT-LVL III: CPT | Mod: PBBFAC,,,

## 2025-06-04 ENCOUNTER — TELEPHONE (OUTPATIENT)
Dept: OTOLARYNGOLOGY | Facility: CLINIC | Age: 65
End: 2025-06-04
Payer: COMMERCIAL

## 2025-06-23 ENCOUNTER — PATIENT MESSAGE (OUTPATIENT)
Dept: OTOLARYNGOLOGY | Facility: CLINIC | Age: 65
End: 2025-06-23
Payer: COMMERCIAL

## 2025-06-23 NOTE — TELEPHONE ENCOUNTER
Hello! I scheduled you an appt for Thurs 06/26/25 at 10:40 at [facility] with Tl. Have a great day!

## 2025-06-26 ENCOUNTER — OFFICE VISIT (OUTPATIENT)
Dept: OTOLARYNGOLOGY | Facility: CLINIC | Age: 65
End: 2025-06-26
Payer: COMMERCIAL

## 2025-06-26 DIAGNOSIS — H60.392 OTHER INFECTIVE ACUTE OTITIS EXTERNA OF LEFT EAR: Primary | ICD-10-CM

## 2025-06-26 DIAGNOSIS — H61.22 IMPACTED CERUMEN OF LEFT EAR: ICD-10-CM

## 2025-06-26 PROCEDURE — 1159F MED LIST DOCD IN RCRD: CPT | Mod: CPTII,S$GLB,,

## 2025-06-26 PROCEDURE — 3044F HG A1C LEVEL LT 7.0%: CPT | Mod: CPTII,S$GLB,,

## 2025-06-26 PROCEDURE — 99212 OFFICE O/P EST SF 10 MIN: CPT | Mod: S$GLB,,,

## 2025-06-26 PROCEDURE — 99999 PR PBB SHADOW E&M-EST. PATIENT-LVL II: CPT | Mod: PBBFAC,,,

## 2025-06-26 NOTE — PROGRESS NOTES
Subjective:   Patient ID: Amla Mistry is a 64 y.o. female.    Chief Complaint: Follow-up (Left ear, states feeling better)    History of Present Illness    Patient presents today for left ear fullness. She reports left ear fullness for 1.5 weeks with associated decreased hearing. She denies pain or Q-tip use. She has experienced similar episodes in the past. She attempted treatment with OTC earwax removal kit without success. She was diagnosed with cerumen impaction at urgent care yesterday. Admits to tinnitus for many years bilaterally. She has not had a recent audiogram.     Update 6/3/25: patient is here for 1 week follow up. She has noticed resolution of pain and hearing since last debridement and starting on ciprodex and omnicef.     Update 6/26/25: patient is here for left ear follow up. No more pain or issues. Used the compounded ear drop from [facility]. She has no concerns today.       Review of patient's allergies indicates:   Allergen Reactions    Fluticasone Nausea Only and Other (See Comments)     Feels out of control in daily functions.    Gluten Other (See Comments)     Celiac disease    Promethazine            Review of Systems   Constitutional: Negative.  Negative for chills, fatigue, fever and unexpected weight change.   HENT:  Negative for congestion, dental problem, ear discharge, ear pain, facial swelling, hearing loss, nosebleeds, postnasal drip, rhinorrhea, sinus pressure, sneezing, sore throat, tinnitus, trouble swallowing and voice change.    Eyes:  Negative for redness, itching and visual disturbance.   Respiratory: Negative.  Negative for cough, choking, shortness of breath and wheezing.    Cardiovascular: Negative.  Negative for chest pain and palpitations.   Gastrointestinal: Negative.  Negative for abdominal pain.        No reflux.   Endocrine: Negative.    Genitourinary: Negative.    Musculoskeletal: Negative.  Negative for gait problem.   Skin: Negative.  Negative for rash.    Allergic/Immunologic: Negative.    Neurological: Negative.  Negative for dizziness, light-headedness and headaches.   Hematological: Negative.          Objective:   There were no vitals taken for this visit.    Physical Exam  Constitutional:       General: She is not in acute distress.     Appearance: Normal appearance. She is not ill-appearing.   HENT:      Head: Normocephalic and atraumatic.      Right Ear: Tympanic membrane, ear canal and external ear normal.      Left Ear: Tympanic membrane, ear canal and external ear normal. There is no impacted cerumen (layer of cerumen adhered to inferior EAC (attempted to remove today but uncomfortable to patient so did not complete), not occluding TM, no evidence of persisting infection).      Nose: Nose normal. No congestion or rhinorrhea.      Mouth/Throat:      Mouth: Mucous membranes are moist.      Pharynx: Oropharynx is clear. Uvula midline. No oropharyngeal exudate or posterior oropharyngeal erythema.   Eyes:      Extraocular Movements: Extraocular movements intact.      Conjunctiva/sclera: Conjunctivae normal.      Pupils: Pupils are equal, round, and reactive to light.   Neck:      Thyroid: No thyroid mass.   Pulmonary:      Effort: Pulmonary effort is normal. No respiratory distress.   Musculoskeletal:         General: Normal range of motion.      Cervical back: Full passive range of motion without pain.   Lymphadenopathy:      Cervical: No cervical adenopathy.   Neurological:      General: No focal deficit present.      Mental Status: She is alert and oriented to person, place, and time.   Psychiatric:         Mood and Affect: Mood normal.         Behavior: Behavior normal. Behavior is cooperative.         Thought Content: Thought content normal.         Judgment: Judgment normal.             Contains abnormal data Aerobic culture  Order: [id number]   Status: Final result       Next appt: 06/13/2025 at 09:50 AM in Obstetrics and Gynecology (Colton Love  III, MD)       Dx: Other infective otitis externa of lef...    Test Result Released: Yes (seen)    Specimen Information: Ear, Left   0 Result Notes       3 Follow-up Encounters  CULTURE, AEROBIC Many Escherichia coli Abnormal    with normal skin diana        Resulting Agency: [facility]     Susceptibility     Escherichia coli     JASON     Ampicillin >16 µg/ml Resistant     Ampicillin/Sulbactam 16/8 µg/ml Intermediate     Cefepime <=2 µg/ml Sensitive     Ceftriaxone 2 µg/ml Intermediate     Ciprofloxacin >2 µg/ml Resistant     Ertapenem <=0.5 µg/ml Sensitive     Gentamicin >8 µg/ml Resistant     Levofloxacin >4 µg/ml Resistant     Meropenem <=1 µg/ml Sensitive     Piperacillin/Tazobactam <=8 µg/ml Sensitive     Tobramycin >8 µg/ml Resistant     Trimeth/Sulfa >2/38 µg/ml Resistant               Linear View          Assessment/Plan:     1. Other infective acute otitis externa of left ear    2. Impacted cerumen of left ear              Other infective acute otitis externa of left ear    Impacted cerumen of left ear            Assessment & Plan    OTHER INFECTIVE OTITIS EXTERNA OF LEFT EAR, UNSPECIFIED CHRONICITY:  - Removed wax mixed with possible fungal debridement   - Suspect fungal infection, initiating antifungal treatment pending culture results   - Started clotrimazole drops   - Ordered culture of ear debris to determine appropriate treatment   - Educated on dry ear precautions   - Follow up in 1 week   - Will review culture results and contact patient if medication changes are needed    OTORRHEA OF LEFT EAR:  - Removed wax mixed with possible fungal debridement   - Educated on dry ear precautions   - Follow up in 1 week    PLAN SUMMARY:  - Removed earwax and possible fungal debris  - Initiated antifungal treatment with clotrimazole drops  - Ordered culture of ear debris  - Educated patient on dry ear precautions  - Follow-up in 1 week to review culture results and adjust treatment if needed   - will schedule audiogram  once otitis externa resolves         6/3/25 update:   Debrided again today. We discussed her culture results and that the bacteria that grew is resistant to many of the antibiotics and ear drops we typically use. I have faxed culture results to [facility] for their recommendations. I will call and let her know when they reach out to me. In the time being, continue ciprodex drops and omnicef antibiotics as prescribed.   RTC 2 weeks or sooner if needed.     6/26/25 update: Infection has resolved. Patient would like to return to clinic in 6 months as she has issues with cerumen impaction, or sooner if needed. Recommend Debrox weekly.   Avoid putting anything into the ear canals.         This note was generated with the assistance of ambient listening technology. Patient was not recorded, this note was recorded after the visit had concluded. I attest to having reviewed and edited the generated note for accuracy, though some syntax or spelling errors may persist. Please contact the author of this note for any clarification.